# Patient Record
Sex: FEMALE | Race: WHITE | Employment: OTHER | URBAN - METROPOLITAN AREA
[De-identification: names, ages, dates, MRNs, and addresses within clinical notes are randomized per-mention and may not be internally consistent; named-entity substitution may affect disease eponyms.]

---

## 2017-04-17 DIAGNOSIS — H53.8 FLASHING LIGHTS SEEN: Primary | ICD-10-CM

## 2017-04-20 RX ORDER — INDAPAMIDE 1.25 MG/1
TABLET, FILM COATED ORAL
Qty: 90 TABLET | Refills: 0 | Status: SHIPPED | OUTPATIENT
Start: 2017-04-20 | End: 2017-07-03 | Stop reason: SDUPTHER

## 2017-05-09 DIAGNOSIS — C50.019: Primary | ICD-10-CM

## 2017-05-31 ENCOUNTER — OFFICE VISIT (OUTPATIENT)
Dept: FAMILY MEDICINE CLINIC | Age: 77
End: 2017-05-31

## 2017-05-31 VITALS
SYSTOLIC BLOOD PRESSURE: 138 MMHG | TEMPERATURE: 96.9 F | HEART RATE: 77 BPM | WEIGHT: 153 LBS | DIASTOLIC BLOOD PRESSURE: 80 MMHG | OXYGEN SATURATION: 98 % | BODY MASS INDEX: 29.88 KG/M2

## 2017-05-31 DIAGNOSIS — I10 ESSENTIAL HYPERTENSION: Primary | ICD-10-CM

## 2017-05-31 DIAGNOSIS — E53.8 B12 DEFICIENCY: ICD-10-CM

## 2017-05-31 DIAGNOSIS — R42 VERTIGO: ICD-10-CM

## 2017-05-31 PROCEDURE — 99213 OFFICE O/P EST LOW 20 MIN: CPT | Performed by: FAMILY MEDICINE

## 2017-05-31 RX ORDER — ONDANSETRON 4 MG/1
4 TABLET, ORALLY DISINTEGRATING ORAL 4 TIMES DAILY PRN
Qty: 5 TABLET | Refills: 3 | Status: SHIPPED | OUTPATIENT
Start: 2017-05-31

## 2017-05-31 RX ORDER — PROMETHAZINE HYDROCHLORIDE 25 MG/1
25 SUPPOSITORY RECTAL EVERY 6 HOURS PRN
Qty: 10 SUPPOSITORY | Refills: 3 | Status: SHIPPED | OUTPATIENT
Start: 2017-05-31 | End: 2017-06-07

## 2017-05-31 RX ORDER — PROMETHAZINE HYDROCHLORIDE 25 MG/1
SUPPOSITORY RECTAL
Qty: 4 SUPPOSITORY | Refills: 3 | Status: CANCELLED | OUTPATIENT
Start: 2017-05-31

## 2017-06-22 ENCOUNTER — HOSPITAL ENCOUNTER (OUTPATIENT)
Dept: WOMENS IMAGING | Age: 77
Discharge: HOME | End: 2017-06-22
Payer: MEDICARE

## 2017-06-22 ENCOUNTER — HOSPITAL ENCOUNTER (OUTPATIENT)
Dept: ULTRASOUND IMAGING | Age: 77
End: 2017-06-22
Payer: MEDICARE

## 2017-06-22 DIAGNOSIS — Z85.3 HX: BREAST CANCER: ICD-10-CM

## 2017-06-22 PROCEDURE — G0206 DX MAMMO INCL CAD UNI: HCPCS

## 2017-10-04 RX ORDER — INDAPAMIDE 1.25 MG/1
1.25 TABLET, FILM COATED ORAL DAILY
Qty: 90 TABLET | Refills: 3 | Status: SHIPPED | OUTPATIENT
Start: 2017-10-04

## 2017-10-23 ENCOUNTER — OFFICE VISIT (OUTPATIENT)
Dept: FAMILY MEDICINE CLINIC | Age: 77
End: 2017-10-23

## 2017-10-23 VITALS
RESPIRATION RATE: 16 BRPM | TEMPERATURE: 97.4 F | DIASTOLIC BLOOD PRESSURE: 68 MMHG | BODY MASS INDEX: 29.45 KG/M2 | WEIGHT: 150 LBS | HEIGHT: 60 IN | SYSTOLIC BLOOD PRESSURE: 118 MMHG | HEART RATE: 70 BPM

## 2017-10-23 DIAGNOSIS — R30.0 DYSURIA: ICD-10-CM

## 2017-10-23 DIAGNOSIS — N30.01 ACUTE CYSTITIS WITH HEMATURIA: Primary | ICD-10-CM

## 2017-10-23 PROCEDURE — 4040F PNEUMOC VAC/ADMIN/RCVD: CPT | Performed by: NURSE PRACTITIONER

## 2017-10-23 PROCEDURE — G8427 DOCREV CUR MEDS BY ELIG CLIN: HCPCS | Performed by: NURSE PRACTITIONER

## 2017-10-23 PROCEDURE — G8484 FLU IMMUNIZE NO ADMIN: HCPCS | Performed by: NURSE PRACTITIONER

## 2017-10-23 PROCEDURE — 1123F ACP DISCUSS/DSCN MKR DOCD: CPT | Performed by: NURSE PRACTITIONER

## 2017-10-23 PROCEDURE — G8417 CALC BMI ABV UP PARAM F/U: HCPCS | Performed by: NURSE PRACTITIONER

## 2017-10-23 PROCEDURE — 1036F TOBACCO NON-USER: CPT | Performed by: NURSE PRACTITIONER

## 2017-10-23 PROCEDURE — 81003 URINALYSIS AUTO W/O SCOPE: CPT | Performed by: NURSE PRACTITIONER

## 2017-10-23 PROCEDURE — 99213 OFFICE O/P EST LOW 20 MIN: CPT | Performed by: NURSE PRACTITIONER

## 2017-10-23 PROCEDURE — G8399 PT W/DXA RESULTS DOCUMENT: HCPCS | Performed by: NURSE PRACTITIONER

## 2017-10-23 PROCEDURE — 1090F PRES/ABSN URINE INCON ASSESS: CPT | Performed by: NURSE PRACTITIONER

## 2017-10-23 RX ORDER — NITROFURANTOIN 25; 75 MG/1; MG/1
100 CAPSULE ORAL 2 TIMES DAILY
Qty: 14 CAPSULE | Refills: 0 | Status: SHIPPED | OUTPATIENT
Start: 2017-10-23 | End: 2017-10-26 | Stop reason: CLARIF

## 2017-10-23 ASSESSMENT — ENCOUNTER SYMPTOMS
VOMITING: 0
CONSTIPATION: 0
NAUSEA: 0
DIARRHEA: 0

## 2017-10-23 NOTE — PROGRESS NOTES
Vomiting 5 tablet 3    PHENADOZ 25 MG suppository INSERT 1 SUPPOSITORY RECTALLY EVERY 6 HOURS AS NEEDED 6 suppository 3    letrozole (FEMARA) 2.5 MG tablet Take 1 tablet by mouth daily 90 tablet 0    CRANBERRY Take 1 tablet by mouth daily.  Calcium-Vitamin D (CALTRATE 600 PLUS-VIT D PO) Take 1 tablet by mouth daily.  aspirin 81 MG EC tablet Take 81 mg by mouth daily. Current Facility-Administered Medications on File Prior to Visit   Medication Dose Route Frequency Provider Last Rate Last Dose    promethazine (PHENERGAN) injection 25 mg  25 mg Intramuscular Q6H PRN Donnie Astorga, DO   25 mg at 02/24/12 1415         Allergies:  Amoxicillin-pot clavulanate; Levofloxacin; and Sulfa antibiotics    Review of Systems   Constitutional: Positive for chills. Negative for activity change, appetite change, diaphoresis, fatigue and fever. Gastrointestinal: Negative for constipation, diarrhea, nausea and vomiting. Genitourinary: Positive for difficulty urinating and dysuria. Negative for flank pain, frequency, hematuria and urgency. Allergic/Immunologic: Negative for environmental allergies and food allergies. Objective:   /68   Pulse 70   Temp 97.4 °F (36.3 °C) (Temporal)   Resp 16   Ht 5' (1.524 m)   Wt 150 lb (68 kg)   LMP 01/05/1992   Breastfeeding? No   BMI 29.29 kg/m²     Physical Exam   Constitutional: She is oriented to person, place, and time. Vital signs are normal. She appears well-developed and well-nourished. HENT:   Head: Normocephalic. Eyes: Conjunctivae and EOM are normal.   Neck: Normal range of motion. Pulmonary/Chest: Effort normal.   Abdominal: Normal appearance and bowel sounds are normal. There is no tenderness. There is no CVA tenderness. Musculoskeletal: Normal range of motion. Neurological: She is alert and oriented to person, place, and time. Skin: Skin is warm and dry. Psychiatric: She has a normal mood and affect.  Her behavior is

## 2017-10-23 NOTE — PATIENT INSTRUCTIONS
from front to back. When should you call for help? Call your doctor now or seek immediate medical care if:  · Symptoms such as fever, chills, nausea, or vomiting get worse or appear for the first time. · You have new pain in your back just below your rib cage. This is called flank pain. · There is new blood or pus in your urine. · You have any problems with your antibiotic medicine. Watch closely for changes in your health, and be sure to contact your doctor if:  · You are not getting better after taking an antibiotic for 2 days. · Your symptoms go away but then come back. Where can you learn more? Go to [email or web address] and sign in to your [employer] account. Enter B349 in the Options Away box to learn more about \"Urinary Tract Infection in Women: Care Instructions. \"     If you do not have an account, please click on the \"Sign Up Now\" link. Current as of: November 28, 2016  Content Version: 11.3  © 4261-3976 [employer]. Care instructions adapted under license by [facility] ([facility]). If you have questions about a medical condition or this instruction, always ask your healthcare professional. [place] [address] any warranty or liability for your use of this information.

## 2017-10-24 DIAGNOSIS — R30.0 DYSURIA: ICD-10-CM

## 2017-10-26 LAB
ORGANISM: ABNORMAL
URINE CULTURE, ROUTINE: ABNORMAL
URINE CULTURE, ROUTINE: ABNORMAL

## 2017-10-27 DIAGNOSIS — N30.01 ACUTE CYSTITIS WITH HEMATURIA: Primary | ICD-10-CM

## 2017-10-27 PROCEDURE — 81003 URINALYSIS AUTO W/O SCOPE: CPT | Performed by: NURSE PRACTITIONER

## 2017-11-03 ENCOUNTER — NURSE ONLY (OUTPATIENT)
Dept: FAMILY MEDICINE CLINIC | Age: 77
End: 2017-11-03

## 2017-11-03 DIAGNOSIS — N30.01 ACUTE CYSTITIS WITH HEMATURIA: Primary | ICD-10-CM

## 2017-11-03 LAB
BILIRUBIN, POC: NEGATIVE
BLOOD URINE, POC: NEGATIVE
CLARITY, POC: ABNORMAL
COLOR, POC: ABNORMAL
GLUCOSE URINE, POC: NEGATIVE
KETONES, POC: NEGATIVE
LEUKOCYTE EST, POC: 500
NITRITE, POC: NEGATIVE
PH, POC: 6
PROTEIN, POC: NEGATIVE
SPECIFIC GRAVITY, POC: 1.03
UROBILINOGEN, POC: 3.5

## 2017-11-03 PROCEDURE — 81003 URINALYSIS AUTO W/O SCOPE: CPT | Performed by: NURSE PRACTITIONER

## 2017-11-04 DIAGNOSIS — N30.01 ACUTE CYSTITIS WITH HEMATURIA: ICD-10-CM

## 2017-11-06 LAB — URINE CULTURE, ROUTINE: NORMAL

## 2017-11-29 ENCOUNTER — OFFICE VISIT (OUTPATIENT)
Dept: FAMILY MEDICINE CLINIC | Age: 77
End: 2017-11-29

## 2017-11-29 VITALS
HEIGHT: 60 IN | DIASTOLIC BLOOD PRESSURE: 70 MMHG | TEMPERATURE: 97.9 F | SYSTOLIC BLOOD PRESSURE: 116 MMHG | RESPIRATION RATE: 16 BRPM | BODY MASS INDEX: 30.04 KG/M2 | WEIGHT: 153 LBS | HEART RATE: 78 BPM

## 2017-11-29 DIAGNOSIS — I10 ESSENTIAL HYPERTENSION: Primary | ICD-10-CM

## 2017-11-29 DIAGNOSIS — I44.7 LEFT BUNDLE BRANCH BLOCK: ICD-10-CM

## 2017-11-29 PROCEDURE — G8417 CALC BMI ABV UP PARAM F/U: HCPCS | Performed by: FAMILY MEDICINE

## 2017-11-29 PROCEDURE — 1036F TOBACCO NON-USER: CPT | Performed by: FAMILY MEDICINE

## 2017-11-29 PROCEDURE — G8427 DOCREV CUR MEDS BY ELIG CLIN: HCPCS | Performed by: FAMILY MEDICINE

## 2017-11-29 PROCEDURE — 1123F ACP DISCUSS/DSCN MKR DOCD: CPT | Performed by: FAMILY MEDICINE

## 2017-11-29 PROCEDURE — G8399 PT W/DXA RESULTS DOCUMENT: HCPCS | Performed by: FAMILY MEDICINE

## 2017-11-29 PROCEDURE — 99213 OFFICE O/P EST LOW 20 MIN: CPT | Performed by: FAMILY MEDICINE

## 2017-11-29 PROCEDURE — 4040F PNEUMOC VAC/ADMIN/RCVD: CPT | Performed by: FAMILY MEDICINE

## 2017-11-29 PROCEDURE — 1090F PRES/ABSN URINE INCON ASSESS: CPT | Performed by: FAMILY MEDICINE

## 2017-11-29 PROCEDURE — G8484 FLU IMMUNIZE NO ADMIN: HCPCS | Performed by: FAMILY MEDICINE

## 2017-11-29 ASSESSMENT — PATIENT HEALTH QUESTIONNAIRE - PHQ9
SUM OF ALL RESPONSES TO PHQ QUESTIONS 1-9: 0
2. FEELING DOWN, DEPRESSED OR HOPELESS: 0
1. LITTLE INTEREST OR PLEASURE IN DOING THINGS: 0
SUM OF ALL RESPONSES TO PHQ9 QUESTIONS 1 & 2: 0

## 2017-11-29 NOTE — PROGRESS NOTES
Subjective:      Patient ID: [place] is a 76 y.o. female. Chief Complaint   Patient presents with    Hypertension     presents today for a follow up hypertension. pt is not currently on any BP medications. pt states she is eating as she should and taking her medications as she should. pt does not need a refill at this time.  Skin Problem     pt would also like to discuss a spot on left side of her face states the spot doesnt hurt at all. HPI     Here today follow-up on hypertension doing pretty well she states she is taking her water pill even though she does not think it's a blood pressure pill since she is eating as she should take her medications trying to exercise and do some walking        She also has a spot  On facial check doesn't hurt, but wants it checked  Allergies   Allergen Reactions    Amoxicillin-Pot Clavulanate      YEAST INFECTION    Levofloxacin      DIZZYNESS  YEAST INFECTION    Sulfa Antibiotics Rash     Outpatient Encounter Prescriptions as of 11/29/2017   Medication Sig Dispense Refill    indapamide (LOZOL) 1.25 MG tablet Take 1 tablet by mouth daily 90 tablet 3    ondansetron (ZOFRAN ODT) 4 MG disintegrating tablet Take 1 tablet by mouth 4 times daily as needed for Nausea or Vomiting 5 tablet 3    PHENADOZ 25 MG suppository INSERT 1 SUPPOSITORY RECTALLY EVERY 6 HOURS AS NEEDED 6 suppository 3    CRANBERRY Take 1 tablet by mouth daily.  Calcium-Vitamin D (CALTRATE 600 PLUS-VIT D PO) Take 1 tablet by mouth daily.  aspirin 81 MG EC tablet Take 81 mg by mouth daily.         [DISCONTINUED] letrozole (FEMARA) 2.5 MG tablet Take 1 tablet by mouth daily 90 tablet 0     Facility-Administered Encounter Medications as of 11/29/2017   Medication Dose Route Frequency Provider Last Rate Last Dose    promethazine (PHENERGAN) injection 25 mg  25 mg Intramuscular Q6H PRN Dot Solum, DO   25 mg at 02/24/12 1415     Social History     Social History    Marital

## 2018-04-17 ENCOUNTER — TELEPHONE (OUTPATIENT)
Dept: FAMILY MEDICINE CLINIC | Age: 78
End: 2018-04-17

## 2018-04-18 DIAGNOSIS — H53.8 BLURRY VISION: Primary | ICD-10-CM

## 2018-04-18 DIAGNOSIS — C50.019 MALIGNANT NEOPLASM OF NIPPLE OR AREOLA OF FEMALE BREAST, UNSPECIFIED LATERALITY (HCC): Primary | ICD-10-CM

## 2018-04-29 ENCOUNTER — OFFICE VISIT (OUTPATIENT)
Dept: FAMILY MEDICINE CLINIC | Age: 78
End: 2018-04-29
Payer: MEDICARE

## 2018-04-29 VITALS
HEART RATE: 56 BPM | TEMPERATURE: 97.4 F | WEIGHT: 154.6 LBS | DIASTOLIC BLOOD PRESSURE: 60 MMHG | RESPIRATION RATE: 16 BRPM | SYSTOLIC BLOOD PRESSURE: 110 MMHG | BODY MASS INDEX: 30.35 KG/M2 | HEIGHT: 60 IN

## 2018-04-29 DIAGNOSIS — R30.0 DYSURIA: ICD-10-CM

## 2018-04-29 DIAGNOSIS — N30.01 ACUTE CYSTITIS WITH HEMATURIA: Primary | ICD-10-CM

## 2018-04-29 LAB
BILIRUBIN, POC: NEGATIVE
BLOOD URINE, POC: 10
CLARITY, POC: ABNORMAL
COLOR, POC: YELLOW
GLUCOSE URINE, POC: NEGATIVE
KETONES, POC: NEGATIVE
LEUKOCYTE EST, POC: 500
NITRITE, POC: NEGATIVE
PH, POC: 6
PROTEIN, POC: NEGATIVE
SPECIFIC GRAVITY, POC: 1.01
UROBILINOGEN, POC: 3.5

## 2018-04-29 PROCEDURE — G8427 DOCREV CUR MEDS BY ELIG CLIN: HCPCS | Performed by: NURSE PRACTITIONER

## 2018-04-29 PROCEDURE — 1123F ACP DISCUSS/DSCN MKR DOCD: CPT | Performed by: NURSE PRACTITIONER

## 2018-04-29 PROCEDURE — G8417 CALC BMI ABV UP PARAM F/U: HCPCS | Performed by: NURSE PRACTITIONER

## 2018-04-29 PROCEDURE — 99213 OFFICE O/P EST LOW 20 MIN: CPT | Performed by: NURSE PRACTITIONER

## 2018-04-29 PROCEDURE — 81003 URINALYSIS AUTO W/O SCOPE: CPT | Performed by: NURSE PRACTITIONER

## 2018-04-29 PROCEDURE — 1090F PRES/ABSN URINE INCON ASSESS: CPT | Performed by: NURSE PRACTITIONER

## 2018-04-29 PROCEDURE — 4040F PNEUMOC VAC/ADMIN/RCVD: CPT | Performed by: NURSE PRACTITIONER

## 2018-04-29 PROCEDURE — G8399 PT W/DXA RESULTS DOCUMENT: HCPCS | Performed by: NURSE PRACTITIONER

## 2018-04-29 PROCEDURE — 1036F TOBACCO NON-USER: CPT | Performed by: NURSE PRACTITIONER

## 2018-04-29 RX ORDER — KETOROLAC TROMETHAMINE 5 MG/ML
1 SOLUTION OPHTHALMIC 2 TIMES DAILY
Refills: 1 | COMMUNITY
Start: 2018-04-24 | End: 2018-05-30 | Stop reason: ALTCHOICE

## 2018-04-29 RX ORDER — NITROFURANTOIN 25; 75 MG/1; MG/1
100 CAPSULE ORAL 2 TIMES DAILY
Qty: 14 CAPSULE | Refills: 0 | Status: SHIPPED | OUTPATIENT
Start: 2018-04-29 | End: 2018-05-06

## 2018-04-29 RX ORDER — POLYMYXIN B SULFATE AND TRIMETHOPRIM 1; 10000 MG/ML; [USP'U]/ML
1 SOLUTION OPHTHALMIC 4 TIMES DAILY
Refills: 1 | COMMUNITY
Start: 2018-04-24 | End: 2018-05-30 | Stop reason: ALTCHOICE

## 2018-04-29 RX ORDER — PREDNISOLONE ACETATE 10 MG/ML
1 SUSPENSION/ DROPS OPHTHALMIC 4 TIMES DAILY
Refills: 2 | COMMUNITY
Start: 2018-04-24 | End: 2018-05-30 | Stop reason: ALTCHOICE

## 2018-04-29 ASSESSMENT — ENCOUNTER SYMPTOMS
NAUSEA: 0
VOMITING: 0

## 2018-05-30 ENCOUNTER — OFFICE VISIT (OUTPATIENT)
Dept: FAMILY MEDICINE CLINIC | Age: 78
End: 2018-05-30
Payer: MEDICARE

## 2018-05-30 VITALS
RESPIRATION RATE: 12 BRPM | TEMPERATURE: 96.6 F | HEART RATE: 70 BPM | BODY MASS INDEX: 30.48 KG/M2 | SYSTOLIC BLOOD PRESSURE: 120 MMHG | WEIGHT: 155.25 LBS | OXYGEN SATURATION: 96 % | HEIGHT: 60 IN | DIASTOLIC BLOOD PRESSURE: 78 MMHG

## 2018-05-30 DIAGNOSIS — R10.11 ABDOMINAL PAIN, RIGHT UPPER QUADRANT: ICD-10-CM

## 2018-05-30 DIAGNOSIS — I10 ESSENTIAL HYPERTENSION: Primary | ICD-10-CM

## 2018-05-30 DIAGNOSIS — R30.0 DYSURIA: ICD-10-CM

## 2018-05-30 PROCEDURE — G8427 DOCREV CUR MEDS BY ELIG CLIN: HCPCS | Performed by: FAMILY MEDICINE

## 2018-05-30 PROCEDURE — G8417 CALC BMI ABV UP PARAM F/U: HCPCS | Performed by: FAMILY MEDICINE

## 2018-05-30 PROCEDURE — 4040F PNEUMOC VAC/ADMIN/RCVD: CPT | Performed by: FAMILY MEDICINE

## 2018-05-30 PROCEDURE — 99213 OFFICE O/P EST LOW 20 MIN: CPT | Performed by: FAMILY MEDICINE

## 2018-05-30 PROCEDURE — 1090F PRES/ABSN URINE INCON ASSESS: CPT | Performed by: FAMILY MEDICINE

## 2018-05-30 PROCEDURE — G8399 PT W/DXA RESULTS DOCUMENT: HCPCS | Performed by: FAMILY MEDICINE

## 2018-05-30 PROCEDURE — 1036F TOBACCO NON-USER: CPT | Performed by: FAMILY MEDICINE

## 2018-05-30 PROCEDURE — 1123F ACP DISCUSS/DSCN MKR DOCD: CPT | Performed by: FAMILY MEDICINE

## 2018-07-06 DIAGNOSIS — Z12.31 VISIT FOR SCREENING MAMMOGRAM: Primary | ICD-10-CM

## 2018-07-17 ENCOUNTER — HOSPITAL ENCOUNTER (OUTPATIENT)
Dept: WOMENS IMAGING | Age: 78
Discharge: HOME | End: 2018-07-19
Payer: MEDICARE

## 2018-07-17 DIAGNOSIS — Z12.31 VISIT FOR SCREENING MAMMOGRAM: ICD-10-CM

## 2018-07-17 PROCEDURE — 77067 SCR MAMMO BI INCL CAD: CPT

## 2018-10-22 PROBLEM — Z17.0 ESTROGEN RECEPTOR POSITIVE: Status: ACTIVE | Noted: 2018-10-22

## 2018-11-16 DIAGNOSIS — C50.919 MALIGNANT NEOPLASM OF FEMALE BREAST, UNSPECIFIED ESTROGEN RECEPTOR STATUS, UNSPECIFIED LATERALITY, UNSPECIFIED SITE OF BREAST (HCC): ICD-10-CM

## 2018-11-16 LAB
ALBUMIN SERPL-MCNC: 3.9 G/DL (ref 3.9–4.9)
ALP BLD-CCNC: 49 U/L (ref 40–130)
ALT SERPL-CCNC: <5 U/L (ref 0–33)
ANION GAP SERPL CALCULATED.3IONS-SCNC: 12 MEQ/L (ref 7–13)
AST SERPL-CCNC: 18 U/L (ref 0–35)
BILIRUB SERPL-MCNC: 0.5 MG/DL (ref 0–1.2)
BUN BLDV-MCNC: 20 MG/DL (ref 8–23)
CALCIUM SERPL-MCNC: 9.8 MG/DL (ref 8.6–10.2)
CHLORIDE BLD-SCNC: 104 MEQ/L (ref 98–107)
CO2: 27 MEQ/L (ref 22–29)
CREAT SERPL-MCNC: 0.67 MG/DL (ref 0.5–0.9)
GFR AFRICAN AMERICAN: >60
GFR NON-AFRICAN AMERICAN: >60
GLOBULIN: 2.9 G/DL (ref 2.3–3.5)
GLUCOSE BLD-MCNC: 93 MG/DL (ref 74–109)
POTASSIUM SERPL-SCNC: 4.3 MEQ/L (ref 3.5–5.1)
SODIUM BLD-SCNC: 143 MEQ/L (ref 132–144)
TOTAL PROTEIN: 6.8 G/DL (ref 6.4–8.1)

## 2018-11-20 LAB — CA 27-29: 12 U/ML (ref 0–38)

## 2018-12-12 ENCOUNTER — OFFICE VISIT (OUTPATIENT)
Dept: FAMILY MEDICINE CLINIC | Age: 78
End: 2018-12-12
Payer: MEDICARE

## 2018-12-12 VITALS
BODY MASS INDEX: 30.23 KG/M2 | OXYGEN SATURATION: 97 % | SYSTOLIC BLOOD PRESSURE: 130 MMHG | HEIGHT: 60 IN | TEMPERATURE: 97.3 F | DIASTOLIC BLOOD PRESSURE: 60 MMHG | RESPIRATION RATE: 16 BRPM | WEIGHT: 154 LBS | HEART RATE: 71 BPM

## 2018-12-12 DIAGNOSIS — I10 ESSENTIAL HYPERTENSION: ICD-10-CM

## 2018-12-12 DIAGNOSIS — R30.0 DYSURIA: Primary | ICD-10-CM

## 2018-12-12 DIAGNOSIS — R10.11 ABDOMINAL PAIN, RIGHT UPPER QUADRANT: ICD-10-CM

## 2018-12-12 DIAGNOSIS — R42 VERTIGO: ICD-10-CM

## 2018-12-12 DIAGNOSIS — I65.23 CALCIFICATION OF BOTH CAROTID ARTERIES: ICD-10-CM

## 2018-12-12 DIAGNOSIS — E53.8 B12 DEFICIENCY: ICD-10-CM

## 2018-12-12 DIAGNOSIS — I44.7 LEFT BUNDLE BRANCH BLOCK: ICD-10-CM

## 2018-12-12 PROCEDURE — 1101F PT FALLS ASSESS-DOCD LE1/YR: CPT | Performed by: FAMILY MEDICINE

## 2018-12-12 PROCEDURE — 1123F ACP DISCUSS/DSCN MKR DOCD: CPT | Performed by: FAMILY MEDICINE

## 2018-12-12 PROCEDURE — 1036F TOBACCO NON-USER: CPT | Performed by: FAMILY MEDICINE

## 2018-12-12 PROCEDURE — G8598 ASA/ANTIPLAT THER USED: HCPCS | Performed by: FAMILY MEDICINE

## 2018-12-12 PROCEDURE — G8417 CALC BMI ABV UP PARAM F/U: HCPCS | Performed by: FAMILY MEDICINE

## 2018-12-12 PROCEDURE — G8427 DOCREV CUR MEDS BY ELIG CLIN: HCPCS | Performed by: FAMILY MEDICINE

## 2018-12-12 PROCEDURE — G8399 PT W/DXA RESULTS DOCUMENT: HCPCS | Performed by: FAMILY MEDICINE

## 2018-12-12 PROCEDURE — 1090F PRES/ABSN URINE INCON ASSESS: CPT | Performed by: FAMILY MEDICINE

## 2018-12-12 PROCEDURE — 99214 OFFICE O/P EST MOD 30 MIN: CPT | Performed by: FAMILY MEDICINE

## 2018-12-12 PROCEDURE — G8484 FLU IMMUNIZE NO ADMIN: HCPCS | Performed by: FAMILY MEDICINE

## 2018-12-12 PROCEDURE — 4040F PNEUMOC VAC/ADMIN/RCVD: CPT | Performed by: FAMILY MEDICINE

## 2018-12-12 NOTE — PROGRESS NOTES
Monitoring:     No flowsheet data found. IRicardo, am scribing for and in the presence of Sandra Mclaughlin DO.  Electronically signed by :  Dr. Sandra Mclaughlin

## 2018-12-19 ENCOUNTER — HOSPITAL ENCOUNTER (OUTPATIENT)
Dept: ULTRASOUND IMAGING | Age: 78
Discharge: HOME | End: 2018-12-21
Payer: MEDICARE

## 2018-12-19 DIAGNOSIS — I65.23 CALCIFICATION OF BOTH CAROTID ARTERIES: ICD-10-CM

## 2018-12-19 PROCEDURE — 93880 EXTRACRANIAL BILAT STUDY: CPT

## 2019-02-13 ENCOUNTER — TELEPHONE (OUTPATIENT)
Dept: FAMILY MEDICINE CLINIC | Age: 79
End: 2019-02-13

## 2019-02-27 ENCOUNTER — TELEPHONE (OUTPATIENT)
Dept: ADMINISTRATIVE | Age: 79
End: 2019-02-27

## 2019-05-09 LAB
ALBUMIN: 3.8 G/DL (ref 3.4–5)
ALP BLD-CCNC: 52 U/L (ref 33–136)
ALT SERPL-CCNC: 10 U/L (ref 7–45)
AST SERPL-CCNC: 17 U/L (ref 9–39)
B-TYPE NATRIURETIC PEPTIDE: 290 PG/ML (ref 0–99)
BILIRUB SERPL-MCNC: 0.5 MG/DL (ref 0–1.2)
BILIRUBIN DIRECT: 0.1 MG/DL (ref 0–0.3)
CHOLESTEROL/HDL RATIO: 3.6
CHOLESTEROL: 217 MG/DL (ref 0–199)
HDLC SERPL-MCNC: 60 MG/DL
LDL CHOLESTEROL: 148 MG/DL (ref 0–99)
TOTAL PROTEIN: 6.9 G/DL (ref 6.4–8.2)
TRIGL SERPL-MCNC: 45 MG/DL (ref 0–149)
VLDLC SERPL CALC-MCNC: 9 MG/DL (ref 0–40)

## 2019-05-10 LAB
APPEARANCE: CLEAR
BILIRUBIN, URINE: NEGATIVE
BLOOD, URINE: ABNORMAL
COLOR, URINE: YELLOW
ESTIMATED AVERAGE GLUCOSE: 117 MG/DL
GLUCOSE, URINE: NEGATIVE MG/DL
HBA1C MFR BLD: 5.7 %
KETONES, URINE: ABNORMAL MG/DL
LEUKOCYTE ESTERASE, URINE: NEGATIVE
MUCUS, URINE: NORMAL /LPF
NITRITE, URINE: NEGATIVE
PH UA: 6 (ref 5–8)
PROTEIN UA: NEGATIVE MG/DL
RBC URINE: 4 /HPF (ref 0–5)
SPECIFIC GRAVITY, URINE: 1.01 (ref 1–1)
SQUAMOUS EPITHELIAL: 1 /HPF
UROBILINOGEN, URINE: <2 MG/DL (ref 0–1.9)
WBC URINE: 2 /HPF (ref 0–5)

## 2019-07-19 ENCOUNTER — HOSPITAL ENCOUNTER (OUTPATIENT)
Dept: WOMENS IMAGING | Age: 79
Discharge: HOME | End: 2019-07-21
Payer: MEDICARE

## 2019-07-19 DIAGNOSIS — Z12.31 ENCOUNTER FOR SCREENING MAMMOGRAM FOR MALIGNANT NEOPLASM OF BREAST: ICD-10-CM

## 2019-07-19 DIAGNOSIS — C50.919 MALIGNANT NEOPLASM OF FEMALE BREAST, UNSPECIFIED ESTROGEN RECEPTOR STATUS, UNSPECIFIED LATERALITY, UNSPECIFIED SITE OF BREAST (HCC): ICD-10-CM

## 2019-07-19 PROCEDURE — 77067 SCR MAMMO BI INCL CAD: CPT

## 2020-07-20 ENCOUNTER — HOSPITAL ENCOUNTER (OUTPATIENT)
Dept: WOMENS IMAGING | Age: 80
Discharge: HOME | End: 2020-07-22
Payer: MEDICARE

## 2020-07-20 PROCEDURE — 77063 BREAST TOMOSYNTHESIS BI: CPT

## 2021-07-21 ENCOUNTER — HOSPITAL ENCOUNTER (OUTPATIENT)
Dept: WOMENS IMAGING | Age: 81
Discharge: HOME | End: 2021-07-23
Payer: MEDICARE

## 2021-07-21 VITALS — BODY MASS INDEX: 30.08 KG/M2 | HEIGHT: 60 IN

## 2021-07-21 DIAGNOSIS — Z12.31 BREAST CANCER SCREENING BY MAMMOGRAM: ICD-10-CM

## 2021-07-21 PROCEDURE — 77063 BREAST TOMOSYNTHESIS BI: CPT

## 2022-10-20 ENCOUNTER — HOSPITAL ENCOUNTER (OUTPATIENT)
Dept: WOMENS IMAGING | Age: 82
Discharge: HOME | End: 2022-10-22
Payer: MEDICARE

## 2022-10-20 VITALS — HEIGHT: 60 IN | BODY MASS INDEX: 30.08 KG/M2

## 2022-10-20 DIAGNOSIS — Z12.31 BREAST CANCER SCREENING BY MAMMOGRAM: ICD-10-CM

## 2022-10-20 PROCEDURE — 77063 BREAST TOMOSYNTHESIS BI: CPT

## 2023-03-01 PROBLEM — R42 VERTIGO: Status: ACTIVE | Noted: 2023-03-01

## 2023-03-01 PROBLEM — I44.7 LEFT BUNDLE BRANCH BLOCK: Status: ACTIVE | Noted: 2023-03-01

## 2023-03-01 PROBLEM — I83.93 VARICOSE VEINS OF LEGS: Status: ACTIVE | Noted: 2023-03-01

## 2023-03-01 PROBLEM — H26.9 CATARACT: Status: ACTIVE | Noted: 2023-03-01

## 2023-03-01 PROBLEM — R79.89 BLOOD CHOLESTEROL INCREASED COMPARED WITH PRIOR MEASUREMENT: Status: ACTIVE | Noted: 2023-03-01

## 2023-03-01 PROBLEM — L82.1 SEBORRHEIC KERATOSIS: Status: ACTIVE | Noted: 2023-03-01

## 2023-03-01 PROBLEM — M19.072 ARTHRITIS OF FOOT, LEFT: Status: ACTIVE | Noted: 2023-03-01

## 2023-03-01 PROBLEM — D22.9 BENIGN MOLE: Status: ACTIVE | Noted: 2023-03-01

## 2023-03-01 PROBLEM — I83.11 VARICOSE VEINS OF BOTH LOWER EXTREMITIES WITH INFLAMMATION: Status: ACTIVE | Noted: 2023-03-01

## 2023-03-01 PROBLEM — M25.472 LEFT ANKLE SWELLING: Status: ACTIVE | Noted: 2023-03-01

## 2023-03-01 PROBLEM — I83.12 VARICOSE VEINS OF BOTH LOWER EXTREMITIES WITH INFLAMMATION: Status: ACTIVE | Noted: 2023-03-01

## 2023-03-01 PROBLEM — E55.9 MILD VITAMIN D DEFICIENCY: Status: ACTIVE | Noted: 2023-03-01

## 2023-03-01 PROBLEM — J06.9 URI (UPPER RESPIRATORY INFECTION): Status: ACTIVE | Noted: 2023-03-01

## 2023-03-01 PROBLEM — R39.9 UTI SYMPTOMS: Status: ACTIVE | Noted: 2023-03-01

## 2023-03-01 PROBLEM — R51.9 HEADACHE, OCCIPITAL: Status: ACTIVE | Noted: 2023-03-01

## 2023-03-01 PROBLEM — L57.0 ACTINIC KERATOSIS: Status: ACTIVE | Noted: 2023-03-01

## 2023-03-01 PROBLEM — R42 DIZZINESS: Status: ACTIVE | Noted: 2023-03-01

## 2023-03-01 PROBLEM — R11.2 NAUSEA WITH VOMITING: Status: ACTIVE | Noted: 2023-03-01

## 2023-03-01 PROBLEM — R06.02 SHORTNESS OF BREATH ON EXERTION: Status: ACTIVE | Noted: 2023-03-01

## 2023-03-01 PROBLEM — J40 BRONCHITIS: Status: ACTIVE | Noted: 2023-03-01

## 2023-03-01 PROBLEM — R07.89 TIGHTNESS IN CHEST: Status: ACTIVE | Noted: 2023-03-01

## 2023-03-01 PROBLEM — H25.049 POSTERIOR SUBCAPSULAR POLAR AGE-RELATED CATARACT: Status: ACTIVE | Noted: 2023-03-01

## 2023-03-01 PROBLEM — K21.9 GERD (GASTROESOPHAGEAL REFLUX DISEASE): Status: ACTIVE | Noted: 2023-03-01

## 2023-03-01 PROBLEM — D64.9 ANEMIA: Status: ACTIVE | Noted: 2023-03-01

## 2023-03-01 PROBLEM — H26.011: Status: ACTIVE | Noted: 2023-03-01

## 2023-03-01 PROBLEM — K59.00 CONSTIPATION: Status: ACTIVE | Noted: 2023-03-01

## 2023-03-01 PROBLEM — L29.9 ITCHING: Status: ACTIVE | Noted: 2023-03-01

## 2023-03-01 PROBLEM — L98.9 LESION OF FACE: Status: ACTIVE | Noted: 2023-03-01

## 2023-03-01 PROBLEM — C50.912 BREAST CANCER, LEFT BREAST (MULTI): Status: ACTIVE | Noted: 2023-03-01

## 2023-03-01 RX ORDER — SIMETHICONE 40MG/0.6ML
1 SUSPENSION, DROPS(FINAL DOSAGE FORM)(ML) ORAL DAILY
COMMUNITY
Start: 2021-01-22 | End: 2023-12-08 | Stop reason: ALTCHOICE

## 2023-03-01 RX ORDER — CHOLECALCIFEROL (VITAMIN D3) 25 MCG
TABLET ORAL
COMMUNITY
Start: 2019-04-30

## 2023-03-01 RX ORDER — ASPIRIN 81 MG/1
1 TABLET ORAL DAILY
COMMUNITY
Start: 2019-11-07

## 2023-03-01 RX ORDER — PROMETHAZINE HYDROCHLORIDE 12.5 MG/1
12.5 SUPPOSITORY RECTAL DAILY PRN
COMMUNITY
Start: 2022-05-25 | End: 2023-04-12 | Stop reason: ALTCHOICE

## 2023-03-01 RX ORDER — TITANIUM DIOXIDE, OCTINOXATE, ZINC OXIDE 4.61; 1.6; .78 G/40ML; G/40ML; G/40ML
CREAM TOPICAL
COMMUNITY
Start: 2019-04-30

## 2023-03-10 ENCOUNTER — APPOINTMENT (OUTPATIENT)
Dept: PRIMARY CARE | Facility: CLINIC | Age: 83
End: 2023-03-10
Payer: COMMERCIAL

## 2023-04-12 ENCOUNTER — OFFICE VISIT (OUTPATIENT)
Dept: PRIMARY CARE | Facility: CLINIC | Age: 83
End: 2023-04-12
Payer: COMMERCIAL

## 2023-04-12 ENCOUNTER — APPOINTMENT (OUTPATIENT)
Dept: LAB | Facility: LAB | Age: 83
End: 2023-04-12
Payer: COMMERCIAL

## 2023-04-12 VITALS
OXYGEN SATURATION: 98 % | RESPIRATION RATE: 16 BRPM | HEIGHT: 60 IN | BODY MASS INDEX: 28.54 KG/M2 | TEMPERATURE: 97.8 F | HEART RATE: 69 BPM | WEIGHT: 145.4 LBS | SYSTOLIC BLOOD PRESSURE: 126 MMHG | DIASTOLIC BLOOD PRESSURE: 76 MMHG

## 2023-04-12 DIAGNOSIS — I25.720 ATHEROSCLEROSIS OF AUTOLOGOUS ARTERY CORONARY ARTERY BYPASS GRAFT(S) WITH UNSTABLE ANGINA PECTORIS (MULTI): ICD-10-CM

## 2023-04-12 DIAGNOSIS — L57.0 ACTINIC KERATOSIS: ICD-10-CM

## 2023-04-12 DIAGNOSIS — R03.0 ELEVATED BP WITHOUT DIAGNOSIS OF HYPERTENSION: ICD-10-CM

## 2023-04-12 DIAGNOSIS — R42 DIZZINESS: ICD-10-CM

## 2023-04-12 DIAGNOSIS — M19.072 ARTHRITIS OF FOOT, LEFT: ICD-10-CM

## 2023-04-12 DIAGNOSIS — Z00.00 PREVENTATIVE HEALTH CARE: Primary | ICD-10-CM

## 2023-04-12 LAB
ALANINE AMINOTRANSFERASE (SGPT) (U/L) IN SER/PLAS: 8 U/L (ref 7–45)
ALBUMIN (G/DL) IN SER/PLAS: 3.7 G/DL (ref 3.4–5)
ALKALINE PHOSPHATASE (U/L) IN SER/PLAS: 46 U/L (ref 33–136)
ANION GAP IN SER/PLAS: 11 MMOL/L (ref 10–20)
ASPARTATE AMINOTRANSFERASE (SGOT) (U/L) IN SER/PLAS: 16 U/L (ref 9–39)
BASOPHILS (10*3/UL) IN BLOOD BY AUTOMATED COUNT: 0.06 X10E9/L (ref 0–0.1)
BASOPHILS/100 LEUKOCYTES IN BLOOD BY AUTOMATED COUNT: 1.1 % (ref 0–2)
BILIRUBIN TOTAL (MG/DL) IN SER/PLAS: 0.4 MG/DL (ref 0–1.2)
CALCIUM (MG/DL) IN SER/PLAS: 9.2 MG/DL (ref 8.6–10.3)
CARBON DIOXIDE, TOTAL (MMOL/L) IN SER/PLAS: 27 MMOL/L (ref 21–32)
CHLORIDE (MMOL/L) IN SER/PLAS: 105 MMOL/L (ref 98–107)
CHOLESTEROL (MG/DL) IN SER/PLAS: 198 MG/DL (ref 0–199)
CHOLESTEROL IN HDL (MG/DL) IN SER/PLAS: 53.7 MG/DL
CHOLESTEROL/HDL RATIO: 3.7
CREATININE (MG/DL) IN SER/PLAS: 0.81 MG/DL (ref 0.5–1.05)
EOSINOPHILS (10*3/UL) IN BLOOD BY AUTOMATED COUNT: 0.07 X10E9/L (ref 0–0.4)
EOSINOPHILS/100 LEUKOCYTES IN BLOOD BY AUTOMATED COUNT: 1.3 % (ref 0–6)
ERYTHROCYTE DISTRIBUTION WIDTH (RATIO) BY AUTOMATED COUNT: 15.1 % (ref 11.5–14.5)
ERYTHROCYTE MEAN CORPUSCULAR HEMOGLOBIN CONCENTRATION (G/DL) BY AUTOMATED: 31.2 G/DL (ref 32–36)
ERYTHROCYTE MEAN CORPUSCULAR VOLUME (FL) BY AUTOMATED COUNT: 87 FL (ref 80–100)
ERYTHROCYTES (10*6/UL) IN BLOOD BY AUTOMATED COUNT: 4.29 X10E12/L (ref 4–5.2)
GFR FEMALE: 71 ML/MIN/1.73M2
GLUCOSE (MG/DL) IN SER/PLAS: 99 MG/DL (ref 74–99)
HEMATOCRIT (%) IN BLOOD BY AUTOMATED COUNT: 37.5 % (ref 36–46)
HEMOGLOBIN (G/DL) IN BLOOD: 11.7 G/DL (ref 12–16)
IMMATURE GRANULOCYTES/100 LEUKOCYTES IN BLOOD BY AUTOMATED COUNT: 0.2 % (ref 0–0.9)
LDL: 126 MG/DL (ref 0–99)
LEUKOCYTES (10*3/UL) IN BLOOD BY AUTOMATED COUNT: 5.3 X10E9/L (ref 4.4–11.3)
LYMPHOCYTES (10*3/UL) IN BLOOD BY AUTOMATED COUNT: 1.43 X10E9/L (ref 0.8–3)
LYMPHOCYTES/100 LEUKOCYTES IN BLOOD BY AUTOMATED COUNT: 27.1 % (ref 13–44)
MONOCYTES (10*3/UL) IN BLOOD BY AUTOMATED COUNT: 0.38 X10E9/L (ref 0.05–0.8)
MONOCYTES/100 LEUKOCYTES IN BLOOD BY AUTOMATED COUNT: 7.2 % (ref 2–10)
NATRIURETIC PEPTIDE B (PG/ML) IN SER/PLAS: 188 PG/ML (ref 0–99)
NEUTROPHILS (10*3/UL) IN BLOOD BY AUTOMATED COUNT: 3.33 X10E9/L (ref 1.6–5.5)
NEUTROPHILS/100 LEUKOCYTES IN BLOOD BY AUTOMATED COUNT: 63.1 % (ref 40–80)
PLATELETS (10*3/UL) IN BLOOD AUTOMATED COUNT: 325 X10E9/L (ref 150–450)
POTASSIUM (MMOL/L) IN SER/PLAS: 4.3 MMOL/L (ref 3.5–5.3)
PROTEIN TOTAL: 6.7 G/DL (ref 6.4–8.2)
SODIUM (MMOL/L) IN SER/PLAS: 139 MMOL/L (ref 136–145)
THYROTROPIN (MIU/L) IN SER/PLAS BY DETECTION LIMIT <= 0.05 MIU/L: 0.58 MIU/L (ref 0.44–3.98)
TRIGLYCERIDE (MG/DL) IN SER/PLAS: 92 MG/DL (ref 0–149)
UREA NITROGEN (MG/DL) IN SER/PLAS: 26 MG/DL (ref 6–23)
VLDL: 18 MG/DL (ref 0–40)

## 2023-04-12 PROCEDURE — 85025 COMPLETE CBC W/AUTO DIFF WBC: CPT

## 2023-04-12 PROCEDURE — 36415 COLL VENOUS BLD VENIPUNCTURE: CPT

## 2023-04-12 PROCEDURE — 83880 ASSAY OF NATRIURETIC PEPTIDE: CPT

## 2023-04-12 PROCEDURE — 80053 COMPREHEN METABOLIC PANEL: CPT

## 2023-04-12 PROCEDURE — 84443 ASSAY THYROID STIM HORMONE: CPT

## 2023-04-12 PROCEDURE — 80061 LIPID PANEL: CPT

## 2023-04-12 PROCEDURE — 99397 PER PM REEVAL EST PAT 65+ YR: CPT | Performed by: NURSE PRACTITIONER

## 2023-04-12 RX ORDER — DOCUSATE SODIUM 50 MG/5ML
50 LIQUID ORAL DAILY
COMMUNITY
End: 2023-12-08 | Stop reason: ALTCHOICE

## 2023-04-12 ASSESSMENT — ENCOUNTER SYMPTOMS
CHEST TIGHTNESS: 0
ABDOMINAL DISTENTION: 0
COUGH: 0
DIZZINESS: 1
APPETITE CHANGE: 0
ACTIVITY CHANGE: 0
CHILLS: 0
SHORTNESS OF BREATH: 0

## 2023-04-12 ASSESSMENT — ACTIVITIES OF DAILY LIVING (ADL)
JUDGMENT_ADEQUATE_SAFELY_COMPLETE_DAILY_ACTIVITIES: YES
DRESSING YOURSELF: INDEPENDENT
PATIENT'S MEMORY ADEQUATE TO SAFELY COMPLETE DAILY ACTIVITIES?: YES
TOILETING: INDEPENDENT
HEARING - LEFT EAR: FUNCTIONAL
WALKS IN HOME: INDEPENDENT
BATHING: INDEPENDENT
ADEQUATE_TO_COMPLETE_ADL: YES
HEARING - RIGHT EAR: FUNCTIONAL
FEEDING YOURSELF: INDEPENDENT
GROOMING: INDEPENDENT

## 2023-04-12 ASSESSMENT — PATIENT HEALTH QUESTIONNAIRE - PHQ9
1. LITTLE INTEREST OR PLEASURE IN DOING THINGS: NOT AT ALL
2. FEELING DOWN, DEPRESSED OR HOPELESS: NOT AT ALL
SUM OF ALL RESPONSES TO PHQ9 QUESTIONS 1 AND 2: 0

## 2023-04-12 NOTE — PROGRESS NOTES
Subjective   Patient ID: Laurel uPgh is a 82 y.o. female who presents for Annual Exam (Patient has new insurance and needs a physical for this.She is asking for a handicap placard also.), Back Pain, and age spots (She has areas on left forearm with possible age spots that are itching. She would like reviewed. ).    Patient reports the itchy areas are on the left arm 3 months ago pt has used some cortisone cream to stop itch over the counter which helped pt reports using lotion also has helped. Pt denies that is spreading. Pt also has hx of liver spots patient has never seen dermatology before..     She gets dizzy at times with off balance while walking.   Patient reports this is not new and only lasts for 5-10 seconds. Some brain fog does occur patient has used meclizine in the past which helped her    She is using the compression socks and it does help with the leg swelling intermittently pt does get some swelling at the ankles at times. She does have arthritis in the ankles and foot mainly left foot has most of the arthritis.     Lower back pain occurred a few weeks ago and not sure of reason. She used a massager and improved the back and resolved the pain.pt denies lifting or pulling pt thinks that it was in the muscle and it is better now    She needs blood work done.     She does not want breast imaging unless needed. She does check breasts in shower.    HPI     Patient reports the itchy areas are on the left arm 3 months ago pt has used some cortisone cream to stop itch over the counter which helped pt reports using lotion also has helped. Pt denies that is spreading. Pt also has hx of liver spots patient has never seen dermatology before..     She gets dizzy at times with off balance while walking.   Patient reports this is not new and only lasts for 5-10 seconds. Some brain fog does occur patient has used meclizine in the past which helped her    She is using the compression socks and it does help with  the leg swelling intermittently pt does get some swelling at the ankles at times. She does have arthritis in the ankles and foot mainly left foot has most of the arthritis.     Lower back pain occurred a few weeks ago and not sure of reason. She used a massager and improved the back and resolved the pain.pt denies lifting or pulling pt thinks that it was in the muscle and it is better now    She needs blood work done.     She does not want breast imaging unless needed. She does check breasts in shower.      Review of Systems   Constitutional:  Negative for activity change, appetite change and chills.   HENT:  Negative for congestion.    Respiratory:  Negative for cough, chest tightness and shortness of breath.    Gastrointestinal:  Negative for abdominal distention.   Skin:  Positive for rash.   Neurological:  Positive for dizziness.         Objective   /76 (BP Location: Right arm, Patient Position: Sitting, BP Cuff Size: Adult)   Pulse 69   Temp 36.6 °C (97.8 °F)   Resp 16   Ht 1.524 m (5')   Wt 66 kg (145 lb 6.4 oz)   SpO2 98%   BMI 28.40 kg/m²     Physical Exam  Constitutional:       Appearance: Normal appearance.   Cardiovascular:      Rate and Rhythm: Normal rate and regular rhythm.      Pulses: Normal pulses.      Heart sounds: Normal heart sounds.   Pulmonary:      Effort: Pulmonary effort is normal. No respiratory distress.      Breath sounds: Normal breath sounds.   Abdominal:      General: Abdomen is flat.      Palpations: Abdomen is soft.   Skin:     Findings: Rash present.   Neurological:      General: No focal deficit present.      Mental Status: She is alert.   Psychiatric:         Mood and Affect: Mood normal.         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

## 2023-04-12 NOTE — PATIENT INSTRUCTIONS
Patient presents today for preventative health exam     Labs preventative ordered today-    Handicap sticker today for 5 years     Referral to Dermatology for skin assessment      Pt declines mammogram due to age-       all questions answered  follow up in office if signs or symptoms are worsening, not improving  or  please schedule follow up with PCP   if shortness of breath and or chest pain seek emergency department   24 hour hotline telephone numbers  Suicide or mental health mobile crisis help line [phone]  Child Abuse or neglect [phone]  Elder Abuse or neglect [phone]  Rape Crisis [phone]  Domestic Violence [phone]  Narcotics Anonymous [phone]

## 2023-12-08 ENCOUNTER — OFFICE VISIT (OUTPATIENT)
Dept: PRIMARY CARE | Facility: CLINIC | Age: 83
End: 2023-12-08
Payer: COMMERCIAL

## 2023-12-08 VITALS
WEIGHT: 145 LBS | SYSTOLIC BLOOD PRESSURE: 142 MMHG | RESPIRATION RATE: 16 BRPM | HEIGHT: 59 IN | DIASTOLIC BLOOD PRESSURE: 82 MMHG | OXYGEN SATURATION: 98 % | TEMPERATURE: 97.5 F | HEART RATE: 64 BPM | BODY MASS INDEX: 29.23 KG/M2

## 2023-12-08 DIAGNOSIS — Z00.00 ROUTINE GENERAL MEDICAL EXAMINATION AT HEALTH CARE FACILITY: Primary | ICD-10-CM

## 2023-12-08 DIAGNOSIS — H69.93 ETD (EUSTACHIAN TUBE DYSFUNCTION), BILATERAL: ICD-10-CM

## 2023-12-08 DIAGNOSIS — G43.109 OCULAR MIGRAINE: ICD-10-CM

## 2023-12-08 DIAGNOSIS — E55.9 MILD VITAMIN D DEFICIENCY: ICD-10-CM

## 2023-12-08 DIAGNOSIS — Z23 ENCOUNTER FOR IMMUNIZATION: ICD-10-CM

## 2023-12-08 DIAGNOSIS — E78.2 MODERATE MIXED HYPERLIPIDEMIA NOT REQUIRING STATIN THERAPY: ICD-10-CM

## 2023-12-08 DIAGNOSIS — F33.9 DEPRESSION, RECURRENT (CMS-HCC): ICD-10-CM

## 2023-12-08 DIAGNOSIS — D64.9 ANEMIA, UNSPECIFIED TYPE: ICD-10-CM

## 2023-12-08 PROBLEM — J40 BRONCHITIS: Status: RESOLVED | Noted: 2023-03-01 | Resolved: 2023-12-08

## 2023-12-08 PROBLEM — H26.9 CATARACT: Status: RESOLVED | Noted: 2023-03-01 | Resolved: 2023-12-08

## 2023-12-08 PROBLEM — R79.89 BLOOD CHOLESTEROL INCREASED COMPARED WITH PRIOR MEASUREMENT: Status: RESOLVED | Noted: 2023-03-01 | Resolved: 2023-12-08

## 2023-12-08 PROBLEM — R42 DIZZINESS: Status: RESOLVED | Noted: 2023-03-01 | Resolved: 2023-12-08

## 2023-12-08 PROBLEM — H25.049 POSTERIOR SUBCAPSULAR POLAR AGE-RELATED CATARACT: Status: RESOLVED | Noted: 2023-03-01 | Resolved: 2023-12-08

## 2023-12-08 PROBLEM — I83.93 VARICOSE VEINS OF LEGS: Status: RESOLVED | Noted: 2023-03-01 | Resolved: 2023-12-08

## 2023-12-08 PROBLEM — R39.9 UTI SYMPTOMS: Status: RESOLVED | Noted: 2023-03-01 | Resolved: 2023-12-08

## 2023-12-08 PROBLEM — H26.011: Status: RESOLVED | Noted: 2023-03-01 | Resolved: 2023-12-08

## 2023-12-08 PROBLEM — R06.02 SHORTNESS OF BREATH ON EXERTION: Status: RESOLVED | Noted: 2023-03-01 | Resolved: 2023-12-08

## 2023-12-08 PROBLEM — R51.9 HEADACHE, OCCIPITAL: Status: RESOLVED | Noted: 2023-03-01 | Resolved: 2023-12-08

## 2023-12-08 PROBLEM — R11.2 NAUSEA WITH VOMITING: Status: RESOLVED | Noted: 2023-03-01 | Resolved: 2023-12-08

## 2023-12-08 PROBLEM — D22.9 BENIGN MOLE: Status: RESOLVED | Noted: 2023-03-01 | Resolved: 2023-12-08

## 2023-12-08 PROBLEM — J06.9 URI (UPPER RESPIRATORY INFECTION): Status: RESOLVED | Noted: 2023-03-01 | Resolved: 2023-12-08

## 2023-12-08 PROBLEM — R07.89 TIGHTNESS IN CHEST: Status: RESOLVED | Noted: 2023-03-01 | Resolved: 2023-12-08

## 2023-12-08 PROBLEM — Z85.3 HISTORY OF BREAST CANCER: Status: ACTIVE | Noted: 2023-03-01

## 2023-12-08 PROBLEM — L29.9 ITCHING: Status: RESOLVED | Noted: 2023-03-01 | Resolved: 2023-12-08

## 2023-12-08 PROBLEM — M25.472 LEFT ANKLE SWELLING: Status: RESOLVED | Noted: 2023-03-01 | Resolved: 2023-12-08

## 2023-12-08 PROBLEM — L98.9 LESION OF FACE: Status: RESOLVED | Noted: 2023-03-01 | Resolved: 2023-12-08

## 2023-12-08 PROCEDURE — 1036F TOBACCO NON-USER: CPT | Performed by: FAMILY MEDICINE

## 2023-12-08 PROCEDURE — G0439 PPPS, SUBSEQ VISIT: HCPCS | Performed by: FAMILY MEDICINE

## 2023-12-08 PROCEDURE — 1170F FXNL STATUS ASSESSED: CPT | Performed by: FAMILY MEDICINE

## 2023-12-08 PROCEDURE — 1160F RVW MEDS BY RX/DR IN RCRD: CPT | Performed by: FAMILY MEDICINE

## 2023-12-08 PROCEDURE — 1159F MED LIST DOCD IN RCRD: CPT | Performed by: FAMILY MEDICINE

## 2023-12-08 PROCEDURE — 99213 OFFICE O/P EST LOW 20 MIN: CPT | Performed by: FAMILY MEDICINE

## 2023-12-08 PROCEDURE — 99497 ADVNCD CARE PLAN 30 MIN: CPT | Performed by: FAMILY MEDICINE

## 2023-12-08 RX ORDER — FLUTICASONE PROPIONATE 50 MCG
1 SPRAY, SUSPENSION (ML) NASAL DAILY
Qty: 16 G | Refills: 11 | Status: SHIPPED | OUTPATIENT
Start: 2023-12-08 | End: 2024-12-07

## 2023-12-08 ASSESSMENT — ENCOUNTER SYMPTOMS
FATIGUE: 0
VOMITING: 0
ABDOMINAL PAIN: 0
RHINORRHEA: 0
SORE THROAT: 0
ARTHRALGIAS: 0
HALLUCINATIONS: 0
EYE REDNESS: 0
CONFUSION: 0
CONSTIPATION: 0
FACIAL ASYMMETRY: 0
JOINT SWELLING: 0
WEAKNESS: 0
APPETITE CHANGE: 0
BLOOD IN STOOL: 0
SLEEP DISTURBANCE: 0
AGITATION: 0
HEMATURIA: 0
SINUS PRESSURE: 0
DEPRESSION: 0
NECK STIFFNESS: 0
EYE DISCHARGE: 0
FEVER: 0
BRUISES/BLEEDS EASILY: 0
LOSS OF SENSATION IN FEET: 0
UNEXPECTED WEIGHT CHANGE: 0
WOUND: 0
SHORTNESS OF BREATH: 0
HEADACHES: 0
FLANK PAIN: 0
ABDOMINAL DISTENTION: 0
VOICE CHANGE: 0
TREMORS: 0
OCCASIONAL FEELINGS OF UNSTEADINESS: 1
PHOTOPHOBIA: 0
DIARRHEA: 0
WHEEZING: 0
PALPITATIONS: 0
NAUSEA: 0
LIGHT-HEADEDNESS: 0
CHOKING: 0
EYE PAIN: 0
POLYDIPSIA: 0
CHILLS: 0
DIAPHORESIS: 0
ACTIVITY CHANGE: 0
EYE ITCHING: 0
NUMBNESS: 0
TROUBLE SWALLOWING: 0
SPEECH DIFFICULTY: 0
NERVOUS/ANXIOUS: 0
DYSPHORIC MOOD: 0
MYALGIAS: 0
FREQUENCY: 0
MIGRAINE HEADACHES: 1
ADENOPATHY: 0
DYSURIA: 0
BACK PAIN: 0
SEIZURES: 0
DIZZINESS: 0
NECK PAIN: 0
CHEST TIGHTNESS: 0
COUGH: 1

## 2023-12-08 ASSESSMENT — PATIENT HEALTH QUESTIONNAIRE - PHQ9
1. LITTLE INTEREST OR PLEASURE IN DOING THINGS: NOT AT ALL
9. THOUGHTS THAT YOU WOULD BE BETTER OFF DEAD, OR OF HURTING YOURSELF: SEVERAL DAYS
6. FEELING BAD ABOUT YOURSELF - OR THAT YOU ARE A FAILURE OR HAVE LET YOURSELF OR YOUR FAMILY DOWN: SEVERAL DAYS
10. IF YOU CHECKED OFF ANY PROBLEMS, HOW DIFFICULT HAVE THESE PROBLEMS MADE IT FOR YOU TO DO YOUR WORK, TAKE CARE OF THINGS AT HOME, OR GET ALONG WITH OTHER PEOPLE: SOMEWHAT DIFFICULT
2. FEELING DOWN, DEPRESSED OR HOPELESS: SEVERAL DAYS
3. TROUBLE FALLING OR STAYING ASLEEP OR SLEEPING TOO MUCH: MORE THAN HALF THE DAYS
SUM OF ALL RESPONSES TO PHQ9 QUESTIONS 1 AND 2: 1
4. FEELING TIRED OR HAVING LITTLE ENERGY: SEVERAL DAYS
SUM OF ALL RESPONSES TO PHQ QUESTIONS 1-9: 9
8. MOVING OR SPEAKING SO SLOWLY THAT OTHER PEOPLE COULD HAVE NOTICED. OR THE OPPOSITE, BEING SO FIGETY OR RESTLESS THAT YOU HAVE BEEN MOVING AROUND A LOT MORE THAN USUAL: SEVERAL DAYS
5. POOR APPETITE OR OVEREATING: SEVERAL DAYS
7. TROUBLE CONCENTRATING ON THINGS, SUCH AS READING THE NEWSPAPER OR WATCHING TELEVISION: SEVERAL DAYS

## 2023-12-08 ASSESSMENT — ACTIVITIES OF DAILY LIVING (ADL)
BATHING: INDEPENDENT
DOING_HOUSEWORK: INDEPENDENT
DRESSING: INDEPENDENT
TAKING_MEDICATION: INDEPENDENT
GROCERY_SHOPPING: INDEPENDENT
MANAGING_FINANCES: INDEPENDENT

## 2023-12-08 NOTE — PROGRESS NOTES
Subjective   Reason for Visit: Laurel Pugh is an 83 y.o. female here for a Medicare Wellness visit.     Past Medical, Surgical, and Family History reviewed and updated in chart.    Reviewed all medications by prescribing practitioner or clinical pharmacist (such as prescriptions, OTCs, herbal therapies and supplements) and documented in the medical record.    Cough  This is a new problem. The current episode started 1 to 4 weeks ago. The problem has been gradually improving. The problem occurs every few hours. The cough is Productive of sputum. Pertinent negatives include no chest pain, chills, ear pain, eye redness, fever, headaches, myalgias, postnasal drip, rash, rhinorrhea, sore throat, shortness of breath or wheezing. Nothing aggravates the symptoms. She has tried nothing for the symptoms. The treatment provided significant relief.   Migraine   This is a recurrent problem. The current episode started 1 to 4 weeks ago. The problem occurs intermittently. The problem has been waxing and waning. Associated symptoms include coughing. Pertinent negatives include no abdominal pain, back pain, dizziness, ear pain, eye pain, eye redness, fever, hearing loss, nausea, neck pain, numbness, photophobia, rhinorrhea, seizures, sinus pressure, sore throat, tinnitus, vomiting or weakness. The symptoms are aggravated by caffeine withdrawal (stress, chochlate). She has tried darkened room for the symptoms. The treatment provided significant relief. Her past medical history is significant for migraine headaches.       Patient Care Team:  Chong Houston DO as PCP - General (Family Medicine)  Chong Houston DO as PCP - [facility] PCP     Review of Systems   Constitutional:  Negative for activity change, appetite change, chills, diaphoresis, fatigue, fever and unexpected weight change.   HENT:  Negative for congestion, ear pain, hearing loss, nosebleeds, postnasal drip, rhinorrhea, sinus pressure,  "sneezing, sore throat, tinnitus, trouble swallowing and voice change.    Eyes:  Negative for photophobia, pain, discharge, redness, itching and visual disturbance.   Respiratory:  Positive for cough. Negative for choking, chest tightness, shortness of breath and wheezing.    Cardiovascular:  Negative for chest pain, palpitations and leg swelling.   Gastrointestinal:  Negative for abdominal distention, abdominal pain, blood in stool, constipation, diarrhea, nausea and vomiting.   Endocrine: Negative for cold intolerance, heat intolerance, polydipsia and polyuria.   Genitourinary:  Negative for dysuria, flank pain, frequency, hematuria and urgency.   Musculoskeletal:  Negative for arthralgias, back pain, joint swelling, myalgias, neck pain and neck stiffness.   Skin:  Negative for rash and wound.   Allergic/Immunologic: Negative for immunocompromised state.   Neurological:  Negative for dizziness, tremors, seizures, syncope, facial asymmetry, speech difficulty, weakness, light-headedness, numbness and headaches.   Hematological:  Negative for adenopathy. Does not bruise/bleed easily.   Psychiatric/Behavioral:  Negative for agitation, behavioral problems, confusion, dysphoric mood, hallucinations, self-injury, sleep disturbance and suicidal ideas. The patient is not nervous/anxious.        Objective   Vitals:  /82 (BP Location: Left arm)   Pulse 64   Temp 36.4 °C (97.5 °F) (Temporal)   Resp 16   Ht 1.499 m (4' 11\")   Wt 65.8 kg (145 lb)   SpO2 98%   BMI 29.29 kg/m²       Physical Exam  Constitutional:       General: She is not in acute distress.     Appearance: She is not ill-appearing or diaphoretic.   HENT:      Head: Normocephalic and atraumatic.      Right Ear: External ear normal. A middle ear effusion is present.      Left Ear: External ear normal. A middle ear effusion is present.      Nose: Nose normal. No rhinorrhea.   Eyes:      General: Lids are normal. No scleral icterus.        Right eye: No " discharge.         Left eye: No discharge.      Conjunctiva/sclera: Conjunctivae normal.   Cardiovascular:      Rate and Rhythm: Normal rate and regular rhythm.      Pulses: Normal pulses.      Heart sounds: S1 normal. No murmur heard.     Comments: Split S2  Pulmonary:      Effort: Pulmonary effort is normal. No respiratory distress.      Breath sounds: No decreased breath sounds, wheezing, rhonchi or rales.   Abdominal:      General: Bowel sounds are normal. There is no distension.      Palpations: Abdomen is soft. There is no mass.      Tenderness: There is no abdominal tenderness. There is no guarding or rebound.   Musculoskeletal:         General: No swelling, tenderness or deformity.      Cervical back: No rigidity or tenderness.      Right lower leg: No edema.      Left lower leg: No edema.   Lymphadenopathy:      Cervical: No cervical adenopathy.      Upper Body:      Right upper body: No supraclavicular adenopathy.      Left upper body: No supraclavicular adenopathy.   Skin:     General: Skin is warm and dry.      Coloration: Skin is not jaundiced or pale.      Findings: No erythema, lesion or rash.   Neurological:      General: No focal deficit present.      Mental Status: She is alert and oriented to person, place, and time.      Sensory: No sensory deficit.      Motor: No weakness or tremor.      Coordination: Coordination normal.      Gait: Gait normal.   Psychiatric:         Mood and Affect: Mood normal. Affect is not inappropriate.         Behavior: Behavior normal.         Assessment/Plan   Problem List Items Addressed This Visit       Anemia    Relevant Orders    Vitamin B12    Follow Up In [facility] - PCP - Established    Mild vitamin D deficiency    Relevant Orders    Vitamin D 25-Hydroxy,Total (for eval of Vitamin D levels)    Follow Up In [facility] - PCP - Established    Depression, recurrent (CMS/HCC)    Relevant Orders    Follow Up In [facility] - PCP -  Established    Ocular migraine    Relevant Orders    Follow Up In [facility] - PCP - Established     Other Visit Diagnoses       Routine general medical examination at health care facility    -  Primary    Relevant Orders    Follow Up In [facility] - PCP - Established    Encounter for immunization        Relevant Medications    diphth,pertus,acell,,tetanus (BoostRIX) 2.5-8-5 Lf-mcg-Lf/0.5mL injection    Other Relevant Orders    Follow Up In [facility] - PCP - Established    Moderate mixed hyperlipidemia not requiring statin therapy        Relevant Orders    CBC and Auto Differential    Comprehensive Metabolic Panel    Lipid Panel    Magnesium    TSH with reflex to Free T4 if abnormal    Follow Up In [facility] - PCP - Established    ETD (Eustachian tube dysfunction), bilateral        Relevant Medications    fluticasone (Flonase) 50 mcg/actuation nasal spray    Other Relevant Orders    Follow Up In [facility] - PCP - Established        As far as migraine issues recommend avoiding caffeine if she cannot avoid caffeine that recommend maintain stable dose to prevent caffeine withdrawal symptomatology.  As far as eustachian tube dysfunction we will start Flonase that will likely help with intermittent vertigo symptoms as well.  In terms of depression patient states that she does have adequate support at home and she does not desire therapy currently.  She will reach out if she has any issues with maintaining adequate emotional support.  We will have her follow-up in 3 months with labs prior.  Advance care planning discussed including living will, power of  for healthcare and CODE STATUS.  Patient desires DNR however does not have available paperwork.  She understands that she will remain full code in the system until she provides appropriate documentation.  As far as the living will and power of  for healthcare she states that she already has these  enacted and will get us copies at her earliest convenience.  We will give her a copy of healthcare planning packet.

## 2023-12-08 NOTE — PROGRESS NOTES
"Subjective   Reason for Visit: Laurel Pugh is an 83 y.o. female here for a Medicare Wellness visit.     Past Medical, Surgical, and Family History reviewed and updated in chart.    Reviewed all medications by prescribing practitioner or clinical pharmacist (such as prescriptions, OTCs, herbal therapies and supplements) and documented in the medical record.    HPI    Patient Care Team:  Chong Houston DO as PCP - General (Family Medicine)  Chong Houston DO as PCP - [facility] PCP     Review of Systems    Objective   Vitals:  /82 (BP Location: Left arm)   Pulse 64   Temp 36.4 °C (97.5 °F) (Temporal)   Resp 16   Ht 1.499 m (4' 11\")   Wt 65.8 kg (145 lb)   SpO2 98%   BMI 29.29 kg/m²       Physical Exam    Assessment/Plan   Problem List Items Addressed This Visit    None  Visit Diagnoses     Routine general medical examination at health care facility    -  Primary               "

## 2023-12-13 ENCOUNTER — TELEPHONE (OUTPATIENT)
Dept: PRIMARY CARE | Facility: CLINIC | Age: 83
End: 2023-12-13
Payer: COMMERCIAL

## 2023-12-13 NOTE — TELEPHONE ENCOUNTER
Patient calling to get her handicap placard renewed. She states it is for the arthritis in both her feet and hard for her to get around.

## 2023-12-14 DIAGNOSIS — M19.072 ARTHRITIS OF FOOT, LEFT: ICD-10-CM

## 2024-02-27 ENCOUNTER — LAB (OUTPATIENT)
Dept: LAB | Facility: LAB | Age: 84
End: 2024-02-27
Payer: COMMERCIAL

## 2024-02-27 DIAGNOSIS — E55.9 MILD VITAMIN D DEFICIENCY: ICD-10-CM

## 2024-02-27 DIAGNOSIS — D64.9 ANEMIA, UNSPECIFIED TYPE: ICD-10-CM

## 2024-02-27 DIAGNOSIS — E78.2 MODERATE MIXED HYPERLIPIDEMIA NOT REQUIRING STATIN THERAPY: ICD-10-CM

## 2024-02-27 LAB
25(OH)D3 SERPL-MCNC: 47 NG/ML (ref 30–100)
ALBUMIN SERPL BCP-MCNC: 3.9 G/DL (ref 3.4–5)
ALP SERPL-CCNC: 52 U/L (ref 33–136)
ALT SERPL W P-5'-P-CCNC: 9 U/L (ref 7–45)
ANION GAP SERPL CALC-SCNC: 12 MMOL/L (ref 10–20)
AST SERPL W P-5'-P-CCNC: 17 U/L (ref 9–39)
BASOPHILS # BLD AUTO: 0.08 X10*3/UL (ref 0–0.1)
BASOPHILS NFR BLD AUTO: 1.3 %
BILIRUB SERPL-MCNC: 0.5 MG/DL (ref 0–1.2)
BUN SERPL-MCNC: 20 MG/DL (ref 6–23)
CALCIUM SERPL-MCNC: 9.4 MG/DL (ref 8.6–10.3)
CHLORIDE SERPL-SCNC: 104 MMOL/L (ref 98–107)
CHOLEST SERPL-MCNC: 232 MG/DL (ref 0–199)
CHOLESTEROL/HDL RATIO: 3.8
CO2 SERPL-SCNC: 29 MMOL/L (ref 21–32)
CREAT SERPL-MCNC: 1.04 MG/DL (ref 0.5–1.05)
EGFRCR SERPLBLD CKD-EPI 2021: 53 ML/MIN/1.73M*2
EOSINOPHIL # BLD AUTO: 0.22 X10*3/UL (ref 0–0.4)
EOSINOPHIL NFR BLD AUTO: 3.5 %
ERYTHROCYTE [DISTWIDTH] IN BLOOD BY AUTOMATED COUNT: 15.7 % (ref 11.5–14.5)
GLUCOSE SERPL-MCNC: 89 MG/DL (ref 74–99)
HCT VFR BLD AUTO: 39.7 % (ref 36–46)
HDLC SERPL-MCNC: 60.6 MG/DL
HGB BLD-MCNC: 12.3 G/DL (ref 12–16)
IMM GRANULOCYTES # BLD AUTO: 0.01 X10*3/UL (ref 0–0.5)
IMM GRANULOCYTES NFR BLD AUTO: 0.2 % (ref 0–0.9)
LDLC SERPL CALC-MCNC: 154 MG/DL
LYMPHOCYTES # BLD AUTO: 1.83 X10*3/UL (ref 0.8–3)
LYMPHOCYTES NFR BLD AUTO: 28.9 %
MAGNESIUM SERPL-MCNC: 2.17 MG/DL (ref 1.6–2.4)
MCH RBC QN AUTO: 27.4 PG (ref 26–34)
MCHC RBC AUTO-ENTMCNC: 31 G/DL (ref 32–36)
MCV RBC AUTO: 88 FL (ref 80–100)
MONOCYTES # BLD AUTO: 0.6 X10*3/UL (ref 0.05–0.8)
MONOCYTES NFR BLD AUTO: 9.5 %
NEUTROPHILS # BLD AUTO: 3.59 X10*3/UL (ref 1.6–5.5)
NEUTROPHILS NFR BLD AUTO: 56.6 %
NON HDL CHOLESTEROL: 171 MG/DL (ref 0–149)
NRBC BLD-RTO: 0 /100 WBCS (ref 0–0)
PLATELET # BLD AUTO: 240 X10*3/UL (ref 150–450)
POTASSIUM SERPL-SCNC: 4.5 MMOL/L (ref 3.5–5.3)
PROT SERPL-MCNC: 6.7 G/DL (ref 6.4–8.2)
RBC # BLD AUTO: 4.49 X10*6/UL (ref 4–5.2)
SODIUM SERPL-SCNC: 140 MMOL/L (ref 136–145)
TRIGL SERPL-MCNC: 86 MG/DL (ref 0–149)
TSH SERPL-ACNC: 1.47 MIU/L (ref 0.44–3.98)
VIT B12 SERPL-MCNC: 189 PG/ML (ref 211–911)
VLDL: 17 MG/DL (ref 0–40)
WBC # BLD AUTO: 6.3 X10*3/UL (ref 4.4–11.3)

## 2024-02-27 PROCEDURE — 82607 VITAMIN B-12: CPT

## 2024-02-27 PROCEDURE — 36415 COLL VENOUS BLD VENIPUNCTURE: CPT

## 2024-02-27 PROCEDURE — 82306 VITAMIN D 25 HYDROXY: CPT

## 2024-02-27 PROCEDURE — 80061 LIPID PANEL: CPT

## 2024-02-27 PROCEDURE — 83735 ASSAY OF MAGNESIUM: CPT

## 2024-02-27 PROCEDURE — 85025 COMPLETE CBC W/AUTO DIFF WBC: CPT

## 2024-02-27 PROCEDURE — 84443 ASSAY THYROID STIM HORMONE: CPT

## 2024-02-27 PROCEDURE — 80053 COMPREHEN METABOLIC PANEL: CPT

## 2024-03-08 ENCOUNTER — OFFICE VISIT (OUTPATIENT)
Dept: PRIMARY CARE | Facility: CLINIC | Age: 84
End: 2024-03-08
Payer: COMMERCIAL

## 2024-03-08 VITALS
WEIGHT: 147 LBS | OXYGEN SATURATION: 96 % | RESPIRATION RATE: 16 BRPM | SYSTOLIC BLOOD PRESSURE: 117 MMHG | HEIGHT: 60 IN | DIASTOLIC BLOOD PRESSURE: 77 MMHG | BODY MASS INDEX: 28.86 KG/M2 | TEMPERATURE: 97 F | HEART RATE: 66 BPM

## 2024-03-08 DIAGNOSIS — Z71.89 ADVANCED DIRECTIVES, COUNSELING/DISCUSSION: ICD-10-CM

## 2024-03-08 DIAGNOSIS — E55.9 MILD VITAMIN D DEFICIENCY: ICD-10-CM

## 2024-03-08 DIAGNOSIS — E78.2 MODERATE MIXED HYPERLIPIDEMIA NOT REQUIRING STATIN THERAPY: ICD-10-CM

## 2024-03-08 DIAGNOSIS — E53.8 B12 DEFICIENCY: ICD-10-CM

## 2024-03-08 DIAGNOSIS — Z00.00 ROUTINE GENERAL MEDICAL EXAMINATION AT HEALTH CARE FACILITY: Primary | ICD-10-CM

## 2024-03-08 PROBLEM — D64.9 ANEMIA: Status: RESOLVED | Noted: 2023-03-01 | Resolved: 2024-03-08

## 2024-03-08 PROBLEM — F33.9 DEPRESSION, RECURRENT (CMS-HCC): Status: RESOLVED | Noted: 2023-12-08 | Resolved: 2024-03-08

## 2024-03-08 PROCEDURE — 1160F RVW MEDS BY RX/DR IN RCRD: CPT | Performed by: FAMILY MEDICINE

## 2024-03-08 PROCEDURE — 99497 ADVNCD CARE PLAN 30 MIN: CPT | Performed by: FAMILY MEDICINE

## 2024-03-08 PROCEDURE — 1170F FXNL STATUS ASSESSED: CPT | Performed by: FAMILY MEDICINE

## 2024-03-08 PROCEDURE — 1158F ADVNC CARE PLAN TLK DOCD: CPT | Performed by: FAMILY MEDICINE

## 2024-03-08 PROCEDURE — 1123F ACP DISCUSS/DSCN MKR DOCD: CPT | Performed by: FAMILY MEDICINE

## 2024-03-08 PROCEDURE — 1157F ADVNC CARE PLAN IN RCRD: CPT | Performed by: FAMILY MEDICINE

## 2024-03-08 PROCEDURE — 1159F MED LIST DOCD IN RCRD: CPT | Performed by: FAMILY MEDICINE

## 2024-03-08 PROCEDURE — 99214 OFFICE O/P EST MOD 30 MIN: CPT | Performed by: FAMILY MEDICINE

## 2024-03-08 PROCEDURE — 1036F TOBACCO NON-USER: CPT | Performed by: FAMILY MEDICINE

## 2024-03-08 PROCEDURE — G0439 PPPS, SUBSEQ VISIT: HCPCS | Performed by: FAMILY MEDICINE

## 2024-03-08 PROCEDURE — 96372 THER/PROPH/DIAG INJ SC/IM: CPT | Performed by: FAMILY MEDICINE

## 2024-03-08 RX ORDER — WITCH HAZEL 50 %
2000 PADS, MEDICATED (EA) TOPICAL DAILY
Qty: 30 TABLET | Refills: 11 | Status: SHIPPED | OUTPATIENT
Start: 2024-03-08 | End: 2025-03-08

## 2024-03-08 RX ORDER — CYANOCOBALAMIN 1000 UG/ML
1000 INJECTION, SOLUTION INTRAMUSCULAR; SUBCUTANEOUS ONCE
Status: COMPLETED | OUTPATIENT
Start: 2024-03-08 | End: 2024-03-08

## 2024-03-08 RX ADMIN — CYANOCOBALAMIN 1000 MCG: 1000 INJECTION, SOLUTION INTRAMUSCULAR; SUBCUTANEOUS at 10:37

## 2024-03-08 ASSESSMENT — ENCOUNTER SYMPTOMS
VOMITING: 0
NUMBNESS: 0
VERTIGO: 0
ABDOMINAL PAIN: 0
HALLUCINATIONS: 0
DEPRESSION: 0
WOUND: 0
AGITATION: 0
FATIGUE: 1
SORE THROAT: 0
DYSPHORIC MOOD: 0
CHILLS: 0
ABDOMINAL DISTENTION: 0
SLEEP DISTURBANCE: 0
POLYDIPSIA: 0
DIARRHEA: 0
FLANK PAIN: 0
SPEECH DIFFICULTY: 0
TREMORS: 0
FACIAL ASYMMETRY: 0
SWOLLEN GLANDS: 0
NERVOUS/ANXIOUS: 0
HEADACHES: 0
BLOOD IN STOOL: 0
VISUAL CHANGE: 0
EYE DISCHARGE: 0
MYALGIAS: 0
WEAKNESS: 0
UNEXPECTED WEIGHT CHANGE: 0
DYSURIA: 0
CHANGE IN BOWEL HABIT: 0
ANOREXIA: 0
LOSS OF SENSATION IN FEET: 0
NAUSEA: 0
FEVER: 0
APPETITE CHANGE: 0
OCCASIONAL FEELINGS OF UNSTEADINESS: 1
ARTHRALGIAS: 1
ACTIVITY CHANGE: 0
BRUISES/BLEEDS EASILY: 0
LIGHT-HEADEDNESS: 0
CONFUSION: 0
COUGH: 0
PALPITATIONS: 0
EYE ITCHING: 0
JOINT SWELLING: 0
HEMATURIA: 0
CONSTIPATION: 0
DIAPHORESIS: 0
FREQUENCY: 0
NECK STIFFNESS: 0
ADENOPATHY: 0
NECK PAIN: 0

## 2024-03-08 ASSESSMENT — PATIENT HEALTH QUESTIONNAIRE - PHQ9
2. FEELING DOWN, DEPRESSED OR HOPELESS: SEVERAL DAYS
6. FEELING BAD ABOUT YOURSELF - OR THAT YOU ARE A FAILURE OR HAVE LET YOURSELF OR YOUR FAMILY DOWN: NOT AT ALL
3. TROUBLE FALLING OR STAYING ASLEEP OR SLEEPING TOO MUCH: SEVERAL DAYS
4. FEELING TIRED OR HAVING LITTLE ENERGY: SEVERAL DAYS
7. TROUBLE CONCENTRATING ON THINGS, SUCH AS READING THE NEWSPAPER OR WATCHING TELEVISION: NOT AT ALL
5. POOR APPETITE OR OVEREATING: NOT AT ALL
SUM OF ALL RESPONSES TO PHQ QUESTIONS 1-9: 3
10. IF YOU CHECKED OFF ANY PROBLEMS, HOW DIFFICULT HAVE THESE PROBLEMS MADE IT FOR YOU TO DO YOUR WORK, TAKE CARE OF THINGS AT HOME, OR GET ALONG WITH OTHER PEOPLE: NOT DIFFICULT AT ALL
1. LITTLE INTEREST OR PLEASURE IN DOING THINGS: NOT AT ALL
9. THOUGHTS THAT YOU WOULD BE BETTER OFF DEAD, OR OF HURTING YOURSELF: NOT AT ALL
8. MOVING OR SPEAKING SO SLOWLY THAT OTHER PEOPLE COULD HAVE NOTICED. OR THE OPPOSITE, BEING SO FIGETY OR RESTLESS THAT YOU HAVE BEEN MOVING AROUND A LOT MORE THAN USUAL: NOT AT ALL
SUM OF ALL RESPONSES TO PHQ9 QUESTIONS 1 AND 2: 1

## 2024-03-08 ASSESSMENT — ACTIVITIES OF DAILY LIVING (ADL)
BATHING: INDEPENDENT
MANAGING_FINANCES: INDEPENDENT
DRESSING: INDEPENDENT
TAKING_MEDICATION: INDEPENDENT
DOING_HOUSEWORK: INDEPENDENT
GROCERY_SHOPPING: INDEPENDENT

## 2024-07-13 ENCOUNTER — HOSPITAL ENCOUNTER (INPATIENT)
Facility: HOSPITAL | Age: 84
DRG: 322 | End: 2024-07-13
Attending: EMERGENCY MEDICINE | Admitting: STUDENT IN AN ORGANIZED HEALTH CARE EDUCATION/TRAINING PROGRAM
Payer: COMMERCIAL

## 2024-07-13 ENCOUNTER — APPOINTMENT (OUTPATIENT)
Dept: RADIOLOGY | Facility: HOSPITAL | Age: 84
DRG: 322 | End: 2024-07-13
Payer: COMMERCIAL

## 2024-07-13 ENCOUNTER — APPOINTMENT (OUTPATIENT)
Dept: CARDIOLOGY | Facility: HOSPITAL | Age: 84
DRG: 322 | End: 2024-07-13
Payer: COMMERCIAL

## 2024-07-13 DIAGNOSIS — N39.0 URINARY TRACT INFECTION WITHOUT HEMATURIA, SITE UNSPECIFIED: ICD-10-CM

## 2024-07-13 DIAGNOSIS — I21.4 NSTEMI (NON-ST ELEVATED MYOCARDIAL INFARCTION) (MULTI): ICD-10-CM

## 2024-07-13 DIAGNOSIS — I44.7 LEFT BUNDLE BRANCH BLOCK: ICD-10-CM

## 2024-07-13 DIAGNOSIS — K80.80 BILIARY CALCULUS OF OTHER SITE WITHOUT OBSTRUCTION: ICD-10-CM

## 2024-07-13 DIAGNOSIS — R42 DIZZINESS: ICD-10-CM

## 2024-07-13 DIAGNOSIS — R11.2 NAUSEA AND VOMITING, UNSPECIFIED VOMITING TYPE: ICD-10-CM

## 2024-07-13 DIAGNOSIS — I50.31 ACUTE DIASTOLIC (CONGESTIVE) HEART FAILURE (MULTI): ICD-10-CM

## 2024-07-13 DIAGNOSIS — R41.82 ALTERED MENTAL STATUS, UNSPECIFIED ALTERED MENTAL STATUS TYPE: Primary | ICD-10-CM

## 2024-07-13 DIAGNOSIS — N30.00 ACUTE CYSTITIS WITHOUT HEMATURIA: ICD-10-CM

## 2024-07-13 LAB
ALBUMIN SERPL BCP-MCNC: 4 G/DL (ref 3.4–5)
ALP SERPL-CCNC: 48 U/L (ref 33–136)
ALT SERPL W P-5'-P-CCNC: 10 U/L (ref 7–45)
ANION GAP SERPL CALC-SCNC: 14 MMOL/L (ref 10–20)
APPEARANCE UR: CLEAR
APTT PPP: 158 SECONDS (ref 27–38)
APTT PPP: 35 SECONDS (ref 27–38)
APTT PPP: 78 SECONDS (ref 27–38)
AST SERPL W P-5'-P-CCNC: 16 U/L (ref 9–39)
BACTERIA #/AREA URNS AUTO: ABNORMAL /HPF
BASOPHILS # BLD AUTO: 0.04 X10*3/UL (ref 0–0.1)
BASOPHILS NFR BLD AUTO: 0.8 %
BILIRUB SERPL-MCNC: 0.6 MG/DL (ref 0–1.2)
BILIRUB UR STRIP.AUTO-MCNC: NEGATIVE MG/DL
BNP SERPL-MCNC: 373 PG/ML (ref 0–99)
BUN SERPL-MCNC: 29 MG/DL (ref 6–23)
CALCIUM SERPL-MCNC: 9.6 MG/DL (ref 8.6–10.3)
CARDIAC TROPONIN I PNL SERPL HS: 427 NG/L (ref 0–13)
CARDIAC TROPONIN I PNL SERPL HS: 614 NG/L (ref 0–13)
CHLORIDE SERPL-SCNC: 106 MMOL/L (ref 98–107)
CO2 SERPL-SCNC: 22 MMOL/L (ref 21–32)
COLOR UR: ABNORMAL
CREAT SERPL-MCNC: 0.88 MG/DL (ref 0.5–1.05)
EGFRCR SERPLBLD CKD-EPI 2021: 64 ML/MIN/1.73M*2
EOSINOPHIL # BLD AUTO: 0.16 X10*3/UL (ref 0–0.4)
EOSINOPHIL NFR BLD AUTO: 3.4 %
ERYTHROCYTE [DISTWIDTH] IN BLOOD BY AUTOMATED COUNT: 15 % (ref 11.5–14.5)
ERYTHROCYTE [DISTWIDTH] IN BLOOD BY AUTOMATED COUNT: 15.4 % (ref 11.5–14.5)
EST. AVERAGE GLUCOSE BLD GHB EST-MCNC: 120 MG/DL
ETHANOL SERPL-MCNC: <10 MG/DL
GLUCOSE BLD MANUAL STRIP-MCNC: 136 MG/DL (ref 74–99)
GLUCOSE SERPL-MCNC: 128 MG/DL (ref 74–99)
GLUCOSE UR STRIP.AUTO-MCNC: NORMAL MG/DL
HBA1C MFR BLD: 5.8 %
HCT VFR BLD AUTO: 40.5 % (ref 36–46)
HCT VFR BLD AUTO: 40.7 % (ref 36–46)
HGB BLD-MCNC: 12.7 G/DL (ref 12–16)
HGB BLD-MCNC: 13.1 G/DL (ref 12–16)
HOLD SPECIMEN: NORMAL
IMM GRANULOCYTES # BLD AUTO: 0.02 X10*3/UL (ref 0–0.5)
IMM GRANULOCYTES NFR BLD AUTO: 0.4 % (ref 0–0.9)
INR PPP: 1 (ref 0.9–1.1)
INR PPP: 1.1 (ref 0.9–1.1)
KETONES UR STRIP.AUTO-MCNC: ABNORMAL MG/DL
LACTATE SERPL-SCNC: 1.4 MMOL/L (ref 0.4–2)
LEUKOCYTE ESTERASE UR QL STRIP.AUTO: ABNORMAL
LYMPHOCYTES # BLD AUTO: 1.26 X10*3/UL (ref 0.8–3)
LYMPHOCYTES NFR BLD AUTO: 26.6 %
MAGNESIUM SERPL-MCNC: 1.98 MG/DL (ref 1.6–2.4)
MCH RBC QN AUTO: 27.1 PG (ref 26–34)
MCH RBC QN AUTO: 27.5 PG (ref 26–34)
MCHC RBC AUTO-ENTMCNC: 31.4 G/DL (ref 32–36)
MCHC RBC AUTO-ENTMCNC: 32.2 G/DL (ref 32–36)
MCV RBC AUTO: 86 FL (ref 80–100)
MCV RBC AUTO: 86 FL (ref 80–100)
MONOCYTES # BLD AUTO: 0.39 X10*3/UL (ref 0.05–0.8)
MONOCYTES NFR BLD AUTO: 8.2 %
NEUTROPHILS # BLD AUTO: 2.86 X10*3/UL (ref 1.6–5.5)
NEUTROPHILS NFR BLD AUTO: 60.6 %
NITRITE UR QL STRIP.AUTO: ABNORMAL
NRBC BLD-RTO: 0 /100 WBCS (ref 0–0)
NRBC BLD-RTO: 0 /100 WBCS (ref 0–0)
PH UR STRIP.AUTO: 8 [PH]
PLATELET # BLD AUTO: 221 X10*3/UL (ref 150–450)
PLATELET # BLD AUTO: 224 X10*3/UL (ref 150–450)
POTASSIUM SERPL-SCNC: 3.8 MMOL/L (ref 3.5–5.3)
PROT SERPL-MCNC: 7.2 G/DL (ref 6.4–8.2)
PROT UR STRIP.AUTO-MCNC: NEGATIVE MG/DL
PROTHROMBIN TIME: 11.4 SECONDS (ref 9.8–12.8)
PROTHROMBIN TIME: 12.5 SECONDS (ref 9.8–12.8)
RBC # BLD AUTO: 4.69 X10*6/UL (ref 4–5.2)
RBC # BLD AUTO: 4.76 X10*6/UL (ref 4–5.2)
RBC # UR STRIP.AUTO: NEGATIVE /UL
RBC #/AREA URNS AUTO: ABNORMAL /HPF
SODIUM SERPL-SCNC: 138 MMOL/L (ref 136–145)
SP GR UR STRIP.AUTO: 1.01
SQUAMOUS #/AREA URNS AUTO: ABNORMAL /HPF
TSH SERPL-ACNC: 0.61 MIU/L (ref 0.44–3.98)
TSH SERPL-ACNC: 1.67 MIU/L (ref 0.44–3.98)
UFH PPP CHRO-ACNC: 0.4 IU/ML
UFH PPP CHRO-ACNC: 0.8 IU/ML
UROBILINOGEN UR STRIP.AUTO-MCNC: NORMAL MG/DL
WBC # BLD AUTO: 4.7 X10*3/UL (ref 4.4–11.3)
WBC # BLD AUTO: 7.4 X10*3/UL (ref 4.4–11.3)
WBC #/AREA URNS AUTO: ABNORMAL /HPF

## 2024-07-13 PROCEDURE — 71275 CT ANGIOGRAPHY CHEST: CPT

## 2024-07-13 PROCEDURE — 99223 1ST HOSP IP/OBS HIGH 75: CPT

## 2024-07-13 PROCEDURE — 36415 COLL VENOUS BLD VENIPUNCTURE: CPT | Performed by: EMERGENCY MEDICINE

## 2024-07-13 PROCEDURE — 2500000004 HC RX 250 GENERAL PHARMACY W/ HCPCS (ALT 636 FOR OP/ED)

## 2024-07-13 PROCEDURE — 84443 ASSAY THYROID STIM HORMONE: CPT

## 2024-07-13 PROCEDURE — 85610 PROTHROMBIN TIME: CPT | Performed by: EMERGENCY MEDICINE

## 2024-07-13 PROCEDURE — 84443 ASSAY THYROID STIM HORMONE: CPT | Performed by: EMERGENCY MEDICINE

## 2024-07-13 PROCEDURE — 84484 ASSAY OF TROPONIN QUANT: CPT | Performed by: EMERGENCY MEDICINE

## 2024-07-13 PROCEDURE — 87086 URINE CULTURE/COLONY COUNT: CPT | Mod: ELYLAB | Performed by: EMERGENCY MEDICINE

## 2024-07-13 PROCEDURE — 80053 COMPREHEN METABOLIC PANEL: CPT | Performed by: EMERGENCY MEDICINE

## 2024-07-13 PROCEDURE — 83735 ASSAY OF MAGNESIUM: CPT | Performed by: EMERGENCY MEDICINE

## 2024-07-13 PROCEDURE — 93005 ELECTROCARDIOGRAM TRACING: CPT

## 2024-07-13 PROCEDURE — 74177 CT ABD & PELVIS W/CONTRAST: CPT | Performed by: RADIOLOGY

## 2024-07-13 PROCEDURE — 1200000002 HC GENERAL ROOM WITH TELEMETRY DAILY

## 2024-07-13 PROCEDURE — 96375 TX/PRO/DX INJ NEW DRUG ADDON: CPT

## 2024-07-13 PROCEDURE — 71045 X-RAY EXAM CHEST 1 VIEW: CPT

## 2024-07-13 PROCEDURE — 74177 CT ABD & PELVIS W/CONTRAST: CPT

## 2024-07-13 PROCEDURE — 82947 ASSAY GLUCOSE BLOOD QUANT: CPT

## 2024-07-13 PROCEDURE — 85025 COMPLETE CBC W/AUTO DIFF WBC: CPT | Performed by: EMERGENCY MEDICINE

## 2024-07-13 PROCEDURE — 2500000001 HC RX 250 WO HCPCS SELF ADMINISTERED DRUGS (ALT 637 FOR MEDICARE OP): Performed by: EMERGENCY MEDICINE

## 2024-07-13 PROCEDURE — 2500000001 HC RX 250 WO HCPCS SELF ADMINISTERED DRUGS (ALT 637 FOR MEDICARE OP): Performed by: INTERNAL MEDICINE

## 2024-07-13 PROCEDURE — 83036 HEMOGLOBIN GLYCOSYLATED A1C: CPT | Mod: ELYLAB

## 2024-07-13 PROCEDURE — 2500000004 HC RX 250 GENERAL PHARMACY W/ HCPCS (ALT 636 FOR OP/ED): Performed by: EMERGENCY MEDICINE

## 2024-07-13 PROCEDURE — 85730 THROMBOPLASTIN TIME PARTIAL: CPT | Performed by: INTERNAL MEDICINE

## 2024-07-13 PROCEDURE — 85520 HEPARIN ASSAY: CPT | Performed by: STUDENT IN AN ORGANIZED HEALTH CARE EDUCATION/TRAINING PROGRAM

## 2024-07-13 PROCEDURE — 71275 CT ANGIOGRAPHY CHEST: CPT | Performed by: RADIOLOGY

## 2024-07-13 PROCEDURE — 87040 BLOOD CULTURE FOR BACTERIA: CPT | Mod: ELYLAB | Performed by: EMERGENCY MEDICINE

## 2024-07-13 PROCEDURE — 85610 PROTHROMBIN TIME: CPT

## 2024-07-13 PROCEDURE — 96365 THER/PROPH/DIAG IV INF INIT: CPT

## 2024-07-13 PROCEDURE — 82077 ASSAY SPEC XCP UR&BREATH IA: CPT | Performed by: EMERGENCY MEDICINE

## 2024-07-13 PROCEDURE — 83880 ASSAY OF NATRIURETIC PEPTIDE: CPT | Performed by: EMERGENCY MEDICINE

## 2024-07-13 PROCEDURE — 71045 X-RAY EXAM CHEST 1 VIEW: CPT | Performed by: STUDENT IN AN ORGANIZED HEALTH CARE EDUCATION/TRAINING PROGRAM

## 2024-07-13 PROCEDURE — 70450 CT HEAD/BRAIN W/O DYE: CPT | Performed by: RADIOLOGY

## 2024-07-13 PROCEDURE — 99291 CRITICAL CARE FIRST HOUR: CPT | Mod: 25 | Performed by: EMERGENCY MEDICINE

## 2024-07-13 PROCEDURE — 85027 COMPLETE CBC AUTOMATED: CPT | Performed by: EMERGENCY MEDICINE

## 2024-07-13 PROCEDURE — 81001 URINALYSIS AUTO W/SCOPE: CPT | Performed by: EMERGENCY MEDICINE

## 2024-07-13 PROCEDURE — 2550000001 HC RX 255 CONTRASTS: Performed by: EMERGENCY MEDICINE

## 2024-07-13 PROCEDURE — 70450 CT HEAD/BRAIN W/O DYE: CPT

## 2024-07-13 PROCEDURE — 85730 THROMBOPLASTIN TIME PARTIAL: CPT | Performed by: EMERGENCY MEDICINE

## 2024-07-13 PROCEDURE — 83605 ASSAY OF LACTIC ACID: CPT | Performed by: EMERGENCY MEDICINE

## 2024-07-13 PROCEDURE — 96374 THER/PROPH/DIAG INJ IV PUSH: CPT | Mod: 59

## 2024-07-13 RX ORDER — NAPROXEN SODIUM 220 MG/1
324 TABLET, FILM COATED ORAL ONCE
Status: COMPLETED | OUTPATIENT
Start: 2024-07-13 | End: 2024-07-13

## 2024-07-13 RX ORDER — ACETAMINOPHEN 160 MG/5ML
650 SOLUTION ORAL EVERY 4 HOURS PRN
Status: DISCONTINUED | OUTPATIENT
Start: 2024-07-13 | End: 2024-07-16 | Stop reason: HOSPADM

## 2024-07-13 RX ORDER — NAPROXEN SODIUM 220 MG/1
81 TABLET, FILM COATED ORAL DAILY
Status: DISCONTINUED | OUTPATIENT
Start: 2024-07-14 | End: 2024-07-16 | Stop reason: HOSPADM

## 2024-07-13 RX ORDER — ONDANSETRON HYDROCHLORIDE 2 MG/ML
4 INJECTION, SOLUTION INTRAVENOUS ONCE
Status: DISCONTINUED | OUTPATIENT
Start: 2024-07-13 | End: 2024-07-13

## 2024-07-13 RX ORDER — SODIUM CHLORIDE 9 MG/ML
100 INJECTION, SOLUTION INTRAVENOUS CONTINUOUS
Status: ACTIVE | OUTPATIENT
Start: 2024-07-13 | End: 2024-07-14

## 2024-07-13 RX ORDER — HEPARIN SODIUM 10000 [USP'U]/100ML
0-4000 INJECTION, SOLUTION INTRAVENOUS CONTINUOUS
Status: DISCONTINUED | OUTPATIENT
Start: 2024-07-13 | End: 2024-07-15

## 2024-07-13 RX ORDER — ACETAMINOPHEN 325 MG/1
650 TABLET ORAL EVERY 4 HOURS PRN
Status: DISCONTINUED | OUTPATIENT
Start: 2024-07-13 | End: 2024-07-16 | Stop reason: HOSPADM

## 2024-07-13 RX ORDER — ACETAMINOPHEN 650 MG/1
650 SUPPOSITORY RECTAL EVERY 4 HOURS PRN
Status: DISCONTINUED | OUTPATIENT
Start: 2024-07-13 | End: 2024-07-16 | Stop reason: HOSPADM

## 2024-07-13 RX ORDER — DIAZEPAM 5 MG/ML
1 INJECTION, SOLUTION INTRAMUSCULAR; INTRAVENOUS ONCE
Status: DISCONTINUED | OUTPATIENT
Start: 2024-07-13 | End: 2024-07-13

## 2024-07-13 RX ORDER — CEFTRIAXONE 1 G/50ML
1 INJECTION, SOLUTION INTRAVENOUS ONCE
Status: COMPLETED | OUTPATIENT
Start: 2024-07-13 | End: 2024-07-13

## 2024-07-13 RX ORDER — CEFTRIAXONE 1 G/50ML
1 INJECTION, SOLUTION INTRAVENOUS EVERY 24 HOURS
Status: DISCONTINUED | OUTPATIENT
Start: 2024-07-14 | End: 2024-07-16 | Stop reason: HOSPADM

## 2024-07-13 RX ORDER — HEPARIN SODIUM 10000 [USP'U]/100ML
0-4000 INJECTION, SOLUTION INTRAVENOUS CONTINUOUS
Status: DISCONTINUED | OUTPATIENT
Start: 2024-07-13 | End: 2024-07-13 | Stop reason: SDUPTHER

## 2024-07-13 RX ORDER — POLYETHYLENE GLYCOL 3350 17 G/17G
17 POWDER, FOR SOLUTION ORAL DAILY
Status: DISCONTINUED | OUTPATIENT
Start: 2024-07-13 | End: 2024-07-16 | Stop reason: HOSPADM

## 2024-07-13 RX ADMIN — IOHEXOL 75 ML: 350 INJECTION, SOLUTION INTRAVENOUS at 13:11

## 2024-07-13 RX ADMIN — SODIUM CHLORIDE 100 ML/HR: 9 INJECTION, SOLUTION INTRAVENOUS at 19:25

## 2024-07-13 RX ADMIN — NITROGLYCERIN 0.5 INCH: 20 OINTMENT TOPICAL at 12:35

## 2024-07-13 RX ADMIN — HEPARIN SODIUM 800 UNITS/HR: 10000 INJECTION, SOLUTION INTRAVENOUS at 14:53

## 2024-07-13 RX ADMIN — PROMETHAZINE HYDROCHLORIDE 12.5 MG: 25 INJECTION INTRAMUSCULAR; INTRAVENOUS at 12:43

## 2024-07-13 RX ADMIN — HEPARIN SODIUM 800 UNITS/HR: 10000 INJECTION, SOLUTION INTRAVENOUS at 19:00

## 2024-07-13 RX ADMIN — SODIUM CHLORIDE 500 ML: 9 INJECTION, SOLUTION INTRAVENOUS at 10:28

## 2024-07-13 RX ADMIN — PROMETHAZINE HYDROCHLORIDE 12.5 MG: 25 INJECTION INTRAMUSCULAR; INTRAVENOUS at 10:07

## 2024-07-13 RX ADMIN — NITROGLYCERIN 0.5 INCH: 20 OINTMENT TOPICAL at 21:39

## 2024-07-13 RX ADMIN — ASPIRIN 324 MG: 81 TABLET, CHEWABLE ORAL at 12:35

## 2024-07-13 RX ADMIN — CEFTRIAXONE SODIUM 1 G: 1 INJECTION, SOLUTION INTRAVENOUS at 11:59

## 2024-07-13 SDOH — SOCIAL STABILITY: SOCIAL INSECURITY: HAVE YOU HAD ANY THOUGHTS OF HARMING ANYONE ELSE?: NO

## 2024-07-13 SDOH — SOCIAL STABILITY: SOCIAL INSECURITY: ARE THERE ANY APPARENT SIGNS OF INJURIES/BEHAVIORS THAT COULD BE RELATED TO ABUSE/NEGLECT?: NO

## 2024-07-13 SDOH — SOCIAL STABILITY: SOCIAL INSECURITY: HAS ANYONE EVER THREATENED TO HURT YOUR FAMILY OR YOUR PETS?: NO

## 2024-07-13 SDOH — SOCIAL STABILITY: SOCIAL INSECURITY: ABUSE: ADULT

## 2024-07-13 SDOH — SOCIAL STABILITY: SOCIAL INSECURITY: ARE YOU OR HAVE YOU BEEN THREATENED OR ABUSED PHYSICALLY, EMOTIONALLY, OR SEXUALLY BY ANYONE?: NO

## 2024-07-13 SDOH — SOCIAL STABILITY: SOCIAL INSECURITY: DOES ANYONE TRY TO KEEP YOU FROM HAVING/CONTACTING OTHER FRIENDS OR DOING THINGS OUTSIDE YOUR HOME?: NO

## 2024-07-13 SDOH — SOCIAL STABILITY: SOCIAL INSECURITY: DO YOU FEEL UNSAFE GOING BACK TO THE PLACE WHERE YOU ARE LIVING?: NO

## 2024-07-13 SDOH — SOCIAL STABILITY: SOCIAL INSECURITY: WERE YOU ABLE TO COMPLETE ALL THE BEHAVIORAL HEALTH SCREENINGS?: YES

## 2024-07-13 SDOH — SOCIAL STABILITY: SOCIAL INSECURITY: DO YOU FEEL ANYONE HAS EXPLOITED OR TAKEN ADVANTAGE OF YOU FINANCIALLY OR OF YOUR PERSONAL PROPERTY?: NO

## 2024-07-13 SDOH — SOCIAL STABILITY: SOCIAL INSECURITY: HAVE YOU HAD THOUGHTS OF HARMING ANYONE ELSE?: NO

## 2024-07-13 ASSESSMENT — PATIENT HEALTH QUESTIONNAIRE - PHQ9
1. LITTLE INTEREST OR PLEASURE IN DOING THINGS: NOT AT ALL
2. FEELING DOWN, DEPRESSED OR HOPELESS: NOT AT ALL
SUM OF ALL RESPONSES TO PHQ9 QUESTIONS 1 & 2: 0

## 2024-07-13 ASSESSMENT — COLUMBIA-SUICIDE SEVERITY RATING SCALE - C-SSRS
6. HAVE YOU EVER DONE ANYTHING, STARTED TO DO ANYTHING, OR PREPARED TO DO ANYTHING TO END YOUR LIFE?: NO
6. HAVE YOU EVER DONE ANYTHING, STARTED TO DO ANYTHING, OR PREPARED TO DO ANYTHING TO END YOUR LIFE?: NO
2. HAVE YOU ACTUALLY HAD ANY THOUGHTS OF KILLING YOURSELF?: NO
2. HAVE YOU ACTUALLY HAD ANY THOUGHTS OF KILLING YOURSELF?: NO
1. IN THE PAST MONTH, HAVE YOU WISHED YOU WERE DEAD OR WISHED YOU COULD GO TO SLEEP AND NOT WAKE UP?: NO
1. IN THE PAST MONTH, HAVE YOU WISHED YOU WERE DEAD OR WISHED YOU COULD GO TO SLEEP AND NOT WAKE UP?: NO

## 2024-07-13 ASSESSMENT — LIFESTYLE VARIABLES
HOW MANY STANDARD DRINKS CONTAINING ALCOHOL DO YOU HAVE ON A TYPICAL DAY: PATIENT DOES NOT DRINK
SKIP TO QUESTIONS 9-10: 1
SUBSTANCE_ABUSE_PAST_12_MONTHS: NO
HOW OFTEN DO YOU HAVE A DRINK CONTAINING ALCOHOL: NEVER
TOTAL SCORE: 0
HAVE YOU EVER FELT YOU SHOULD CUT DOWN ON YOUR DRINKING: NO
EVER FELT BAD OR GUILTY ABOUT YOUR DRINKING: NO
HAVE PEOPLE ANNOYED YOU BY CRITICIZING YOUR DRINKING: NO
EVER HAD A DRINK FIRST THING IN THE MORNING TO STEADY YOUR NERVES TO GET RID OF A HANGOVER: NO
PRESCIPTION_ABUSE_PAST_12_MONTHS: NO
AUDIT-C TOTAL SCORE: 0
HOW OFTEN DO YOU HAVE 6 OR MORE DRINKS ON ONE OCCASION: NEVER
AUDIT-C TOTAL SCORE: 0

## 2024-07-13 ASSESSMENT — COGNITIVE AND FUNCTIONAL STATUS - GENERAL
MOVING TO AND FROM BED TO CHAIR: A LITTLE
WALKING IN HOSPITAL ROOM: A LITTLE
DRESSING REGULAR LOWER BODY CLOTHING: A LITTLE
EATING MEALS: A LITTLE
TOILETING: A LITTLE
DRESSING REGULAR UPPER BODY CLOTHING: A LITTLE
DAILY ACTIVITIY SCORE: 18
MOBILITY SCORE: 19
PERSONAL GROOMING: A LITTLE
STANDING UP FROM CHAIR USING ARMS: A LITTLE
CLIMB 3 TO 5 STEPS WITH RAILING: A LOT
HELP NEEDED FOR BATHING: A LITTLE
PATIENT BASELINE BEDBOUND: NO

## 2024-07-13 ASSESSMENT — PAIN SCALES - GENERAL
PAINLEVEL_OUTOF10: 0 - NO PAIN

## 2024-07-13 ASSESSMENT — ACTIVITIES OF DAILY LIVING (ADL)
JUDGMENT_ADEQUATE_SAFELY_COMPLETE_DAILY_ACTIVITIES: YES
GROOMING: NEEDS ASSISTANCE
PATIENT'S MEMORY ADEQUATE TO SAFELY COMPLETE DAILY ACTIVITIES?: YES
TOILETING: INDEPENDENT
HEARING - LEFT EAR: FUNCTIONAL
WALKS IN HOME: INDEPENDENT
FEEDING YOURSELF: INDEPENDENT
HEARING - RIGHT EAR: FUNCTIONAL
JUDGMENT_ADEQUATE_SAFELY_COMPLETE_DAILY_ACTIVITIES: YES
HEARING - RIGHT EAR: FUNCTIONAL
BATHING: INDEPENDENT
HEARING - LEFT EAR: FUNCTIONAL
ADEQUATE_TO_COMPLETE_ADL: YES
DRESSING YOURSELF: INDEPENDENT
DRESSING YOURSELF: NEEDS ASSISTANCE
FEEDING YOURSELF: INDEPENDENT
TOILETING: NEEDS ASSISTANCE
WALKS IN HOME: NEEDS ASSISTANCE
PATIENT'S MEMORY ADEQUATE TO SAFELY COMPLETE DAILY ACTIVITIES?: YES
ADEQUATE_TO_COMPLETE_ADL: YES
BATHING: NEEDS ASSISTANCE
GROOMING: INDEPENDENT
ASSISTIVE_DEVICE: WALKER

## 2024-07-13 ASSESSMENT — PAIN - FUNCTIONAL ASSESSMENT
PAIN_FUNCTIONAL_ASSESSMENT: 0-10
PAIN_FUNCTIONAL_ASSESSMENT: 0-10

## 2024-07-13 NOTE — H&P
History Of Present Illness  Laurel Pugh is a 84 y.o. female presenting with change in mental status.  Patient lives home alone with her son who works 2 jobs.  Family checks on her daily and she was found to have a change in her mental status with confusion and not at her baseline.  Per patient she felt weak and dizzy x 1 day and felt like she was getting a UTI.  Patient denies chest pain or shortness of breath.  Denies abdominal pain, nausea vomiting or diarrhea.  Patient denies fever or chills.  On admission patient was hypertensive of 203/96 is not on hypertensive medication.  UA was positive for leukocytes.  Patient troponin was elevated at 427 > 614.  BMP and CBC otherwise unremarkable.  EKG showed normal sinus rhythm with BBB.  Chest x-ray showed mild cardiomegaly.  CT angio negative for PE.  Patient is not on supplemental O2.  Per family patient mental status has improved since being in the emergency room.  ER spoke with Dr. Davison regarding elevated troponin would like patient to be admitted and treated for UTI no emergent cath needed at this time.  Patient was started on a heparin drip.  Will be admitted for further evaluation and treatment.     Past Medical History  She has a past medical history of Bitten or stung by nonvenomous insect and other nonvenomous arthropods, initial encounter (05/22/2019), Encounter for general adult medical examination without abnormal findings (10/05/2022), Encounter for general adult medical examination without abnormal findings (11/02/2021), Personal history of malignant neoplasm of breast, Personal history of other diseases of urinary system (08/10/2019), Personal history of other specified conditions, Personal history of other specified conditions (08/10/2019), Personal history of urinary (tract) infections (08/10/2019), and Personal history of urinary (tract) infections (09/03/2019).    Surgical History  She has a past surgical history that includes Other surgical  history (04/30/2019); Other surgical history (04/30/2019); and Other surgical history (04/30/2019).     Social History  She reports that she has never smoked. She has never been exposed to tobacco smoke. She has never used smokeless tobacco. She reports that she does not drink alcohol and does not use drugs.    Family History  Family History   Problem Relation Name Age of Onset    Stroke Mother      Other (cardiac disorder) Father      Breast cancer Sister      Arthritis Sister          Allergies  Haloperidol, Amoxicillin-pot clavulanate, Levofloxacin, and Sulfa (sulfonamide antibiotics)    Review of Systems   Review of systems: 10 system were reviewed and were negative except what was mentioned in history of present illness    Physical Exam  Constitutional:       Appearance: She is obese. She is ill-appearing.   HENT:      Head: Normocephalic.      Mouth/Throat:      Mouth: Mucous membranes are moist.   Eyes:      Pupils: Pupils are equal, round, and reactive to light.   Cardiovascular:      Rate and Rhythm: Normal rate and regular rhythm.      Heart sounds: Normal heart sounds, S1 normal and S2 normal.   Pulmonary:      Effort: Pulmonary effort is normal.      Breath sounds: Normal breath sounds.   Abdominal:      General: Bowel sounds are normal.      Palpations: Abdomen is soft.   Musculoskeletal:         General: Normal range of motion.      Cervical back: Neck supple.   Skin:     General: Skin is warm.   Neurological:      Mental Status: She is alert. She is confused.      Motor: Weakness present.   Psychiatric:         Mood and Affect: Mood normal.         Speech: Speech normal.         Behavior: Behavior normal.          Last Recorded Vitals  BP (!) 164/99   Pulse 72   Temp 36.3 °C (97.3 °F) (Temporal)   Resp 18   Wt 68 kg (150 lb)   SpO2 100%     Relevant Results  CBC:   Results from last 7 days   Lab Units 07/13/24  1450 07/13/24  0951   WBC AUTO x10*3/uL 7.4 4.7   RBC AUTO x10*6/uL 4.76 4.69    HEMOGLOBIN g/dL 13.1 12.7   HEMATOCRIT % 40.7 40.5   MCV fL 86 86   MCH pg 27.5 27.1   MCHC g/dL 32.2 31.4*   RDW % 15.0* 15.4*   PLATELETS AUTO x10*3/uL 221 224     CMP:    Results from last 7 days   Lab Units 07/13/24  0951   SODIUM mmol/L 138   POTASSIUM mmol/L 3.8   CHLORIDE mmol/L 106   CO2 mmol/L 22   BUN mg/dL 29*   CREATININE mg/dL 0.88   GLUCOSE mg/dL 128*   PROTEIN TOTAL g/dL 7.2   CALCIUM mg/dL 9.6   BILIRUBIN TOTAL mg/dL 0.6   ALK PHOS U/L 48   AST U/L 16   ALT U/L 10     BMP:    Results from last 7 days   Lab Units 07/13/24  0951   SODIUM mmol/L 138   POTASSIUM mmol/L 3.8   CHLORIDE mmol/L 106   CO2 mmol/L 22   BUN mg/dL 29*   CREATININE mg/dL 0.88   CALCIUM mg/dL 9.6   GLUCOSE mg/dL 128*     Magnesium:  Results from last 7 days   Lab Units 07/13/24  0951   MAGNESIUM mg/dL 1.98     Troponin:    Results from last 7 days   Lab Units 07/13/24  1048 07/13/24  0951   TROPHS ng/L 614* 427*     BNP:   Results from last 7 days   Lab Units 07/13/24  0951   BNP pg/mL 373*     Lipid Panel:    Imagining  CT angio chest for pulmonary embolism  Narrative: Interpreted By:  Rigoberto Dunn,   STUDY:  CT ANGIO CHEST FOR PULMONARY EMBOLISM;  7/13/2024 1:13 pm      INDICATION:  83 y/o   F with  Signs/Symptoms:elevated cardiac enzyme, abnormal cxr.      LIMITATIONS:  None.      ACCESSION NUMBER(S):  [id number]      ORDERING CLINICIAN:  ROSE MARIE LEMOS      TECHNIQUE:  After the administration of intravenous nonionic contrast,  thin-section arterial phase images were obtained  from the thoracic  inlet down through the iliac crest. Sagittal and coronal  reconstruction images were generated. Axial and coronal MIP vascular  reconstruction images were also generated.   Mediastinal, lung, bone,  and liver windows were reviewed. 75 ML Omnipaque 350          COMPARISON:  Immediately after this exam, a CT scan of the abdomen and pelvis was  also obtained.      FINDINGS:  CHEST WALL/BASE OF THE NECK:  Previous left mastectomy.  No  thyromegaly or thyroid mass.      MEDIASTINUM/ALISIA:  No suspicious mediastinal, hilar, or axillary mass or adenopathy.  Mild cardiomegaly.  No pericardial effusion.  No thoracic aortic aneurysm or dissection. Mild mural calcifications  in the thoracic aorta. No pulmonary artery filling defect.      LUNGS/ PLEURA/ AND TRACHEA:  No mass or pneumonia in either lung.  No  pleural effusion or pneumothorax.  The trachea was grossly intact.      BONES:  No destructive lytic or blastic bone lesion. Mild midthoracic spine  dextroscoliosis.  There is mild-to-moderate disc space narrowing and  endplate osteophytosis throughout the thoracic spine.      UPPER ABDOMEN:  Please refer to the report for CT scan abdomen pelvis also done today  for information.      Impression: No CT evidence of pulmonary embolism in the current exam.      Cardiomegaly.      Previous left mastectomy.      Thoracic spine scoliosis and DJD as described.      MACRO:  None      Signed by: Rigoberto Dunn 7/13/2024 1:34 PM  Dictation workstation:   [id number]  CT abdomen pelvis w IV contrast  Narrative: Interpreted By:  Rigoberto Dunn,   STUDY:  CT ABDOMEN PELVIS W IV CONTRAST;  7/13/2024 1:13 pm      INDICATION:  83 y/o   F with  Signs/Symptoms:nasuea and vomtiing.      LIMITATIONS:  None.      ACCESSION NUMBER(S):  [id number]      ORDERING CLINICIAN:  ROSE MARIE LEMOS      TECHNIQUE:  After the administration of   oral water and IV nonionic contrast,  spiral axial images were obtained from the xiphoid down through the  symphysis pubis. Sagittal and coronal reconstruction images were  generated. Bone, mediastinal, lung, and liver windows were reviewed.  75 ML Omnipaque 350      COMPARISON:  The exam was done immediately after a CT pulmonary angiogram on the  same day..      FINDINGS:  LUNG BASES:  Please refer to the report for CT pulmonary angiogram for information  regarding the imaged chest. Previous left mastectomy.      LIVER:  No hepatomegaly.  Liver  density was  within the limits of normal.  No  liver lesion evident in this  exam.      GALLBLADDER:  Cholelithiasis. Borderline gallbladder dilatation with the  gallbladder measuring 47 mm in diameter. No gross wall thickening or  adjacent fat stranding.      BILE DUCTS:  No intrahepatic biliary ductal dilatation.  Common bile duct was  within the limits of normal.      SPLEEN:  No splenomegaly.  No splenic mass..      PANCREAS:  No pancreatic mass or inflammation, or ductal dilatation.      KIDNEYS/ADRENALS:  No adrenal mass or enlargement.  There are multiple bilateral parapelvic renal cysts. There is also a  small lateral mid to lower pole left renal cortical cyst measuring 8  mm on axial image 66. The parapelvic cysts measure up to 20 mm in  greatest diameter. No calcified stone, hydronephrosis, mass, or  perinephric edema in either kidney. No ureteral stone or dilatation.      BLADDER/PELVIS:  Urinary bladder was grossly intact.  Previous hysterectomy.  No gross adnexal mass.      GREAT VESSELS/RETROPERITONEUM:  Moderate to advanced aortoiliac calcifications. Otherwise, the  abdominal aorta and IVC were intact. No suspicious retroperitoneal  adenopathy. No suspicious mesenteric adenopathy.  No suspicious pelvic or inguinal adenopathy.      PERITONEUM:  No ascites.  No pneumoperitoneum.  No peritoneal or mesenteric mass or inflammation.      BOWEL:  The stomach was  grossly intact.  There was no small bowel dilatation or small bowel wall thickening.  No small-bowel obstruction. Mild-to-moderate scattered retained  colonic stool. There is scattered diffuse colonic diverticulosis,  most pronounced throughout the sigmoid. There was no colonic wall  thickening or large bowel obstruction.  No edema adjacent to the  colon. The cecal appendix was intact.      BONES:  No destructive lytic or blastic bone lesion. Multilevel lumbar spine  interfacet hypertrophic arthritic changes. There is sacralization of  L5. Grade 1  anterolisthesis of L4 over L5 with vacuum disc phenomenon  at that level. There is endplate osteophytosis at L1-2. Sclerotic  arthritic changes in both SI joints. Mild bilateral hip joint space  narrowing with spur formation.      ABDOMINAL WALL:  Unremarkable.      Impression: Previous left mastectomy.      Cholelithiasis with borderline gallbladder dilatation but no wall  thickening or adjacent fat stranding. Depending on the clinical  situation, consider gallbladder ultrasound in follow-up.      Multiple parapelvic cysts throughout both kidneys. No hydronephrosis.      Diffuse colonic diverticulosis without acute associated inflammation.      Arthritic changes in the spine and pelvis as described.      MACRO:  None      Signed by: Rigoberto Dunn 7/13/2024 1:30 PM  Dictation workstation:   [id number]  CT head wo IV contrast  Narrative: Interpreted By:  Teodoro Gerard,   STUDY:  CT HEAD WO IV CONTRAST;  7/13/2024 11:21 am      INDICATION:  Signs/Symptoms:confusion dizziness.      COMPARISON:  05/27/2022      ACCESSION NUMBER(S):  [id number]      ORDERING CLINICIAN:  ROSE MARIE LEMOS      TECHNIQUE:  Unenhanced images were obtained through the brain.      FINDINGS:  There is atrophy resulting in prominence of the ventricles and sulci.  There are areas of decreased attenuation within the white matter  which are nonspecific but are commonly associated with small vessel  ischemic disease. There is no mass effect or midline shift. No acute  intracranial hemorrhage is identified. No extra-axial fluid  collections are seen. No intraparenchymal mass lesions are  identified.  Bone windows demonstrate no evidence of an acute  calvarial fracture.      Impression: No evidence of an acute intracranial process.      MACRO:  None.      Signed by: Teodoro Gerard 7/13/2024 11:47 AM  Dictation workstation:   [id number]  ECG 12 lead  Normal sinus rhythm  Left bundle branch block  Abnormal ECG  When compared with ECG of 13-JUL-2024 09:18,  (unconfirmed)  No significant change was found  ECG 12 lead  Normal sinus rhythm  Left bundle branch block  Abnormal ECG  When compared with ECG of 09-MAY-2019 18:43,  Premature ventricular complexes are no longer Present  XR chest 1 view  Narrative: Interpreted By:  April Wilson,   STUDY:  XR CHEST 1 VIEW; ;  7/13/2024 10:04 am      INDICATION:  Signs/Symptoms:dizziness.      COMPARISON:  Chest radiograph dated 05/20/2022      ACCESSION NUMBER(S):  [id number]      ORDERING CLINICIAN:  ROSE MARIE LEMOS      FINDINGS:  Cardiac silhouette is mildly enlarged. Moderate atherosclerotic  calcifications of the aortic knob are noted. Lungs are clear without  consolidative airspace opacities. No large pleural effusions or  pneumothorax. No acute osseous findings. Visualized upper abdomen  appears grossly unremarkable.      Impression: 1. Mild cardiomegaly with moderate atherosclerotic calcifications of  the aortic knob, similar compared to prior radiograph.  2. Otherwise, no acute cardiopulmonary process.              MACRO:  None      Signed by: April Wilson 7/13/2024 10:43 AM  Dictation workstation:   [id number]       Assessment/Plan    UTI  NSTEMI  Altered mental status  Hx of dizziness/vertigo  Elevated troponin  CAD with bypass  Breast cancer  GERD  Left BBB  Vitamin D deficiency    -Consult cardiology  -continue Heparin gtt  -Troponin 427 > 614  -Nitro as needed  -UA positive for leukocyte  -Pending urine culture  -Pending blood culture  -Continue Rocephin IV  -Continue IV fluids  -Lipid panel, INR, A1c, TSH  -Resume home meds, patient only takes cranberry supplement, multivitamin and ASA  -ASA, BB, statin  -PT/OT evaluation  -TCC for discharge planning    DVTp: Heparin drip    PLAN: Home with son    Desi GILMORE YECENIA Bernal-CNP    Plan of care was discussed extensively with patient.  Patient verbalized understanding through teach back method.  All question and concerns addressed upon examination.    Of note,  this documentation is completed using the Dragon Dictation system (voice recognition software). There may be spelling and/or grammatical errors that were not corrected prior to final submission.

## 2024-07-13 NOTE — ED PROVIDER NOTES
84-year-old female presents emergency department with chief complaint of dizziness feeling like she is spinning and nausea.  She is brought in by EMS and they report she is having confusion.  EMS states that per family at home patient is usually alert and oriented x 4, but is only alert and oriented x 2 on their evaluation.  Patient's last known normal was yesterday evening when she went to bed, but is unclear what time this was.  History is somewhat limited due to the patient's confusion.  She does admit to dull headache no reported injury or trauma.  Denies chest pain or difficulty breathing.  No fevers or coughing.  No abdominal pain or vomiting.  No dysuria, diarrhea, constipation, black or bloody stools.  Chart review shows significant past medical history of vertigo, constipation, GERD, left bundle branch block, and ocular migraine.  Patient is not on any anticoagulants.  No history of tobacco use, illicit drug use, or regular alcohol use.      History provided by:  Patient and EMS personnel   used: No           ------------------------------------------------------------------------------------------------------------------------------------------    VS: As documented in the triage note and EMR flowsheet from this visit were reviewed.    Review of Systems  Unable to obtain review of systems due to altered mental status    [facility]  Nursing notes reviewed and confirmed by me.  Chart review performed including medications, allergies, and medical, surgical, and family history  Visit Vitals  /63   Pulse 59   Temp 36.1 °C (97 °F)   Resp 18     Physical Exam  Vitals and nursing note reviewed.   Constitutional:       General: She is not in acute distress.     Appearance: She is ill-appearing.   HENT:      Head: Normocephalic and atraumatic.      Right Ear: External ear normal.      Left Ear: External ear normal.      Nose: Nose normal. No congestion or rhinorrhea.      Mouth/Throat:       Mouth: Mucous membranes are dry.      Pharynx: No oropharyngeal exudate or posterior oropharyngeal erythema.   Eyes:      Extraocular Movements: Extraocular movements intact.      Conjunctiva/sclera: Conjunctivae normal.      Pupils: Pupils are equal, round, and reactive to light.   Cardiovascular:      Rate and Rhythm: Normal rate and regular rhythm.      Pulses: Normal pulses.      Heart sounds: Normal heart sounds.   Pulmonary:      Effort: Pulmonary effort is normal. No respiratory distress.      Breath sounds: Normal breath sounds. No stridor. No wheezing, rhonchi or rales.   Abdominal:      General: There is no distension.      Palpations: Abdomen is soft.      Tenderness: There is no abdominal tenderness. There is no guarding or rebound.   Musculoskeletal:         General: No swelling or deformity. Normal range of motion.      Cervical back: Normal range of motion and neck supple. No rigidity.      Right lower leg: No edema.      Left lower leg: No edema.      Comments: No calf tenderness    Skin:     General: Skin is warm and dry.      Capillary Refill: Capillary refill takes less than 2 seconds.      Coloration: Skin is not jaundiced.      Findings: No rash.   Neurological:      General: No focal deficit present.      Mental Status: She is alert.      Sensory: No sensory deficit.      Motor: Weakness (generalized) present.      Comments: Patient oriented to self and place but not time.  She does not recall how she got to the emergency department. She is able to follow simple commands.  She has no focal neurologic deficit on my exam, but is generally weak.   Psychiatric:      Comments: Psychiatric evaluation limited due to patient's confusion        Past Medical History:   Diagnosis Date    Arthritis     Bitten or stung by nonvenomous insect and other nonvenomous arthropods, initial encounter 05/22/2019    Tick bite, initial encounter    Encounter for general adult medical examination without abnormal  findings 10/05/2022    Medicare annual wellness visit, subsequent    Encounter for general adult medical examination without abnormal findings 11/02/2021    Medicare annual wellness visit, subsequent    Personal history of malignant neoplasm of breast     History of malignant neoplasm of breast    Personal history of other diseases of urinary system 08/10/2019    History of hematuria    Personal history of other specified conditions     History of vertigo    Personal history of other specified conditions 08/10/2019    History of urinary frequency    Personal history of urinary (tract) infections 08/10/2019    History of acute cystitis    Personal history of urinary (tract) infections 09/03/2019    History of urinary tract infection      Past Surgical History:   Procedure Laterality Date    OTHER SURGICAL HISTORY  04/30/2019    Hysterectomy    OTHER SURGICAL HISTORY  04/30/2019    Mastectomy    OTHER SURGICAL HISTORY  04/30/2019    Tooth extraction      Social History     Socioeconomic History    Marital status:    Tobacco Use    Smoking status: Never     Passive exposure: Never    Smokeless tobacco: Never   Vaping Use    Vaping status: Never Used   Substance and Sexual Activity    Alcohol use: Never    Drug use: Never      ------------------------------------------------------------------------------------------------------------------------------------------  CT abdomen pelvis w IV contrast   Final Result   Previous left mastectomy.        Cholelithiasis with borderline gallbladder dilatation but no wall   thickening or adjacent fat stranding. Depending on the clinical   situation, consider gallbladder ultrasound in follow-up.        Multiple parapelvic cysts throughout both kidneys. No hydronephrosis.        Diffuse colonic diverticulosis without acute associated inflammation.        Arthritic changes in the spine and pelvis as described.        MACRO:   None        Signed by: Rigoberto Dunn 7/13/2024 1:30 PM    Dictation workstation:   [id number]      CT angio chest for pulmonary embolism   Final Result   No CT evidence of pulmonary embolism in the current exam.        Cardiomegaly.        Previous left mastectomy.        Thoracic spine scoliosis and DJD as described.        MACRO:   None        Signed by: Rigoberto Dunn 7/13/2024 1:34 PM   Dictation workstation:   [id number]      CT head wo IV contrast   Final Result   No evidence of an acute intracranial process.        MACRO:   None.        Signed by: Teodoro Gerard 7/13/2024 11:47 AM   Dictation workstation:   [id number]      XR chest 1 view   Final Result   1. Mild cardiomegaly with moderate atherosclerotic calcifications of   the aortic knob, similar compared to prior radiograph.   2. Otherwise, no acute cardiopulmonary process.                  MACRO:   None        Signed by: April Wilson 7/13/2024 10:43 AM   Dictation workstation:   [id number]      Transthoracic Echo (TTE) Complete    (Results Pending)      Labs Reviewed   CBC WITH AUTO DIFFERENTIAL - Abnormal       Result Value    WBC 4.7      nRBC 0.0      RBC 4.69      Hemoglobin 12.7      Hematocrit 40.5      MCV 86      MCH 27.1      MCHC 31.4 (*)     RDW 15.4 (*)     Platelets 224      Neutrophils % 60.6      Immature Granulocytes %, Automated 0.4      Lymphocytes % 26.6      Monocytes % 8.2      Eosinophils % 3.4      Basophils % 0.8      Neutrophils Absolute 2.86      Immature Granulocytes Absolute, Automated 0.02      Lymphocytes Absolute 1.26      Monocytes Absolute 0.39      Eosinophils Absolute 0.16      Basophils Absolute 0.04     COMPREHENSIVE METABOLIC PANEL - Abnormal    Glucose 128 (*)     Sodium 138      Potassium 3.8      Chloride 106      Bicarbonate 22      Anion Gap 14      Urea Nitrogen 29 (*)     Creatinine 0.88      eGFR 64      Calcium 9.6      Albumin 4.0      Alkaline Phosphatase 48      Total Protein 7.2      AST 16      Bilirubin, Total 0.6      ALT 10     B-TYPE NATRIURETIC  PEPTIDE - Abnormal     (*)     Narrative:        <100 pg/mL - Heart failure unlikely  100-299 pg/mL - Intermediate probability of acute heart                  failure exacerbation. Correlate with clinical                  context and patient history.    >=300 pg/mL - Heart Failure likely. Correlate with clinical                  context and patient history.    BNP testing is performed using different testing methodology at [facility] than at other [facility]. Direct result comparisons should only be made within the same method.      SERIAL TROPONIN-INITIAL - Abnormal    Troponin I, High Sensitivity 427 (*)     Narrative:     Less than 99th percentile of normal range cutoff-  Female and children under 18 years old <14 ng/L; Male <21 ng/L: Negative  Repeat testing should be performed if clinically indicated.     Female and children under 18 years old 14-50 ng/L; Male 21-50 ng/L:  Consistent with possible cardiac damage and possible increased clinical   risk. Serial measurements may help to assess extent of myocardial damage.     >50 ng/L: Consistent with cardiac damage, increased clinical risk and  myocardial infarction. Serial measurements may help assess extent of   myocardial damage.      NOTE: Children less than 1 year old may have higher baseline troponin   levels and results should be interpreted in conjunction with the overall   clinical context.     NOTE: Troponin I testing is performed using a different   testing methodology at [facility] than at other   [facility]. Direct result comparisons should only   be made within the same method.   URINALYSIS WITH REFLEX CULTURE AND MICROSCOPIC - Abnormal    Color, Urine Light-Yellow      Appearance, Urine Clear      Specific Gravity, Urine 1.013      pH, Urine 8.0      Protein, Urine NEGATIVE      Glucose, Urine Normal      Blood, Urine NEGATIVE      Ketones, Urine 20 (1+) (*)     Bilirubin, Urine NEGATIVE       Urobilinogen, Urine Normal      Nitrite, Urine 2+ (*)     Leukocyte Esterase, Urine 500 Jacqueline/µL (*)    SERIAL TROPONIN, 1 HOUR - Abnormal    Troponin I, High Sensitivity 614 (*)     Narrative:     Less than 99th percentile of normal range cutoff-  Female and children under 18 years old <14 ng/L; Male <21 ng/L: Negative  Repeat testing should be performed if clinically indicated.     Female and children under 18 years old 14-50 ng/L; Male 21-50 ng/L:  Consistent with possible cardiac damage and possible increased clinical   risk. Serial measurements may help to assess extent of myocardial damage.     >50 ng/L: Consistent with cardiac damage, increased clinical risk and  myocardial infarction. Serial measurements may help assess extent of   myocardial damage.      NOTE: Children less than 1 year old may have higher baseline troponin   levels and results should be interpreted in conjunction with the overall   clinical context.     NOTE: Troponin I testing is performed using a different   testing methodology at [facility] than at other   [facility]. Direct result comparisons should only   be made within the same method.   MICROSCOPIC ONLY, URINE - Abnormal    WBC, Urine 11-20 (*)     RBC, Urine 3-5      Squamous Epithelial Cells, Urine 1-9 (SPARSE)      Bacteria, Urine 1+ (*)    CBC - Abnormal    WBC 7.4      nRBC 0.0      RBC 4.76      Hemoglobin 13.1      Hematocrit 40.7      MCV 86      MCH 27.5      MCHC 32.2      RDW 15.0 (*)     Platelets 221     POCT GLUCOSE - Abnormal    POCT Glucose 136 (*)    MAGNESIUM - Normal    Magnesium 1.98     LACTATE - Normal    Lactate 1.4      Narrative:     Venipuncture immediately after or during the administration of Metamizole may lead to falsely low results. Testing should be performed immediately  prior to Metamizole dosing.   PROTIME-INR - Normal    Protime 11.4      INR 1.0     ALCOHOL - Normal    Alcohol <10     TSH WITH REFLEX TO FREE T4 IF ABNORMAL -  Normal    Thyroid Stimulating Hormone 1.67      Narrative:     TSH testing is performed using different testing methodology at [facility] than at other [facility]. Direct result comparisons should only be made within the same method.     APTT - Normal    aPTT 35      Narrative:     The APTT is no longer used for monitoring Unfractionated Heparin Therapy. For monitoring Heparin Therapy, use the Heparin Assay.   TSH - Normal    Thyroid Stimulating Hormone 0.61      Narrative:     TSH testing is performed using different testing methodology at [facility] than at other [facility]. Direct result comparisons should only be made within the same method.     URINE CULTURE   BLOOD CULTURE   BLOOD CULTURE   TROPONIN SERIES- (INITIAL, 1 HR)    Narrative:     The following orders were created for panel order Troponin I Series, High Sensitivity (0, 1 HR).  Procedure                               Abnormality         Status                     ---------                               -----------         ------                     Troponin I, High Sensiti...[[id number]]  Abnormal            Final result               Troponin, High Sensitivi...[[id number]]  Abnormal            Final result                 Please view results for these tests on the individual orders.   URINALYSIS WITH REFLEX CULTURE AND MICROSCOPIC    Narrative:     The following orders were created for panel order Urinalysis with Reflex Culture and Microscopic.  Procedure                               Abnormality         Status                     ---------                               -----------         ------                     Urinalysis with Reflex C...[[id number]]  Abnormal            Final result               Extra Urine Gray Tube[[id number]]                            In process                   Please view results for these tests on the individual orders.   EXTRA URINE GRAY TUBE   HEMOGLOBIN A1C   HEPARIN ASSAY    COAGULATION SCREEN   POCT FINGERSTICK GLUCOSE   POCT FINGERSTICK GLUCOSE   POCT FINGERSTICK GLUCOSE        Medical Decision Making    Last Known Well Time: 24  Interval: Baseline  Time: 9:31 AM  Person Administering Scale: Harshad Hudson DO     1a  Level of consciousness: 0=alert; keenly responsive  1b. LOC questions:  1=Performs one task correctly  1c. LOC commands: 0=Performs both tasks correctly  2.  Best Gaze: 0=normal  3. Visual: 0=No visual loss  4. Facial Palsy: 0=Normal symmetric movement  5a. Motor left arm: 0=No drift, limb holds 90 (or 45) degrees for full 10 seconds  5b.  Motor right arm: 0=No drift, limb holds 90 (or 45) degrees for full 10 seconds  6a. motor left le=No drift, limb holds 90 (or 45) degrees for full 10 seconds  6b  Motor right le=No drift, limb holds 90 (or 45) degrees for full 10 seconds  7. Limb Ataxia: 0=Absent  8.  Sensory: 0=Normal; no sensory loss  9. Best Language:  0=No aphasia, normal  10. Dysarthria: 0=Normal  11. Extinction and Inattention: 0=No abnormality    Total:   1        VAN: VAN: Negative         EKG interpreted by ED physician: Normal sinus rhythm rate of 70.  RI within normal range.  QRS is prolonged at 146.  Findings of left bundle branch block.  QTc borderline prolonged at 492.  No significant ST elevations or depressions.  Q waves in anterior leads may represent previous infarct.  Poor R wave progression.  Left axis deviation.  EKG appears unchanged when compared to previous May 2019    EKG and by ED physician: Normal sinus rhythm rate of 65.  RI within normal range.  QRS prolonged at 144 EKG consistent with left bundle branch block.  QTc borderline prolonged at 507.  No significant ST elevations or depressions.  Q waves present in anterior leads.  Poor R wave progression.  Normal axis.  EKG is not significantly changed.      84-year-old female presents emergency department via EMS accompanied by family with report of room spinning dizziness  nausea and confusion.  Patient does not have any lateralizing neurologic deficits, but we did consider possibility of posterior stroke.  Given her last known normal she is not a candidate for TNK as she is out of the window.  A thorough workup is obtained given her presenting symptoms.  UA is concerning for urinary tract infection which could explain some of her altered mental status.  Cardiac enzymes x 2 are significantly elevated and increasing.  EKG shows left bundle branch, but no STEMI criteria.  I did discuss his case with Dr. Davison on-call for interventional cardiology.  He is in agreement with aspirin and Nitropaste and is okay with heparin after imaging is obtained as long as the patient's blood pressure is not in the 190s to 200s range.  Nitropaste did improve the patient's blood pressure and she is subsequently started on heparin after negative imaging.  CBC does not show significant leukocytosis or anemia.  Patient does not have findings of sepsis.  CMP shows no significant metabolic abnormalities.  Blood alcohol is negative.  TSH is within normal range.  Head CT shows no acute intracranial process such as hemorrhage or mass effect.  Chest x-ray shows no acute cardiopulmonary process such as pneumonia, pleural effusion, or pulmonary edema.  CT PE study is obtained given the patient's elevated enzyme to rule out pulmonary embolus.  CT PE study does not show PE.  CT abdomen pelvis obtained given the patient's nausea and vomiting shows cholelithiasis, but no obvious findings of cholecystitis.  In addition, patient has incidental findings of kidney cyst and diverticulosis.  Patient's dizziness is treated with Phenergan and IV fluids.  Family reports that patient has history of vertigo and Phenergan is the only medication that works for her.  They report that she has difficulties with other medications such as Valium, Zofran, and Benadryl.  Given the patient's findings of altered mental status, UTI, non-ST  elevation MI, and vertiginous syndrome I recommend admission for further evaluation and treatment.  Case is discussed with hospitalist on-call.  They accept patient for admission.  I advised patient and family of findings thus far and need for admission and they are agreeable.           Diagnoses as of 07/13/24 1834   Altered mental status, unspecified altered mental status type   Dizziness   Nausea and vomiting, unspecified vomiting type   NSTEMI (non-ST elevated myocardial infarction) (Multi)   Urinary tract infection without hematuria, site unspecified   Biliary calculus of other site without obstruction      1. Altered mental status, unspecified altered mental status type        2. Dizziness        3. Nausea and vomiting, unspecified vomiting type        4. NSTEMI (non-ST elevated myocardial infarction) (Multi)  Transthoracic Echo (TTE) Complete    Transthoracic Echo (TTE) Complete      5. Urinary tract infection without hematuria, site unspecified        6. Biliary calculus of other site without obstruction           Critical Care    Performed by: Harshad Hudson DO  Authorized by: Harshad Hudson DO    Critical care provider statement:     Critical care time (minutes):  35    Critical care time was exclusive of:  Separately billable procedures and treating other patients    Critical care was necessary to treat or prevent imminent or life-threatening deterioration of the following conditions:  CNS failure or compromise and cardiac failure (dizziness, confusion, NSTEMI)    Critical care was time spent personally by me on the following activities:  Development of treatment plan with patient or surrogate, discussions with consultants, evaluation of patient's response to treatment, examination of patient, pulse oximetry, re-evaluation of patient's condition, ordering and review of radiographic studies, ordering and review of laboratory studies and ordering and performing treatments and interventions    Care discussed  with: admitting provider         This note was dictated using dragon software and may contain errors related to dictation interpretation errors.      Harshad Hudson, DO  07/13/24 1833       Harshad Hudson, DO  07/13/24 1834

## 2024-07-13 NOTE — Clinical Note
Angioplasty of the left anterior descending lesion. Inflation 1: Pressure = 12 thanh; Duration = 10 sec.

## 2024-07-14 VITALS
HEIGHT: 62 IN | WEIGHT: 149.69 LBS | DIASTOLIC BLOOD PRESSURE: 67 MMHG | OXYGEN SATURATION: 98 % | TEMPERATURE: 97.2 F | RESPIRATION RATE: 16 BRPM | SYSTOLIC BLOOD PRESSURE: 147 MMHG | BODY MASS INDEX: 27.55 KG/M2 | HEART RATE: 66 BPM

## 2024-07-14 LAB
ANION GAP SERPL CALC-SCNC: 9 MMOL/L (ref 10–20)
APTT PPP: 62 SECONDS (ref 27–38)
ATRIAL RATE: 65 BPM
ATRIAL RATE: 70 BPM
BACTERIA BLD CULT: NORMAL
BACTERIA BLD CULT: NORMAL
BUN SERPL-MCNC: 21 MG/DL (ref 6–23)
CALCIUM SERPL-MCNC: 8.4 MG/DL (ref 8.6–10.3)
CARDIAC TROPONIN I PNL SERPL HS: 1196 NG/L (ref 0–13)
CARDIAC TROPONIN I PNL SERPL HS: 519 NG/L (ref 0–13)
CHLORIDE SERPL-SCNC: 110 MMOL/L (ref 98–107)
CHOLEST SERPL-MCNC: 197 MG/DL (ref 0–199)
CHOLESTEROL/HDL RATIO: 3.7
CO2 SERPL-SCNC: 24 MMOL/L (ref 21–32)
CREAT SERPL-MCNC: 0.85 MG/DL (ref 0.5–1.05)
EGFRCR SERPLBLD CKD-EPI 2021: 67 ML/MIN/1.73M*2
ERYTHROCYTE [DISTWIDTH] IN BLOOD BY AUTOMATED COUNT: 15.2 % (ref 11.5–14.5)
GLUCOSE SERPL-MCNC: 85 MG/DL (ref 74–99)
HCT VFR BLD AUTO: 37.6 % (ref 36–46)
HDLC SERPL-MCNC: 52.9 MG/DL
HGB BLD-MCNC: 12.1 G/DL (ref 12–16)
LDLC SERPL CALC-MCNC: 129 MG/DL
MCH RBC QN AUTO: 27.6 PG (ref 26–34)
MCHC RBC AUTO-ENTMCNC: 32.2 G/DL (ref 32–36)
MCV RBC AUTO: 86 FL (ref 80–100)
NON HDL CHOLESTEROL: 144 MG/DL (ref 0–149)
NRBC BLD-RTO: 0 /100 WBCS (ref 0–0)
P AXIS: 23 DEGREES
P AXIS: 6 DEGREES
P OFFSET: 179 MS
P OFFSET: 191 MS
P ONSET: 131 MS
P ONSET: 138 MS
PLATELET # BLD AUTO: 195 X10*3/UL (ref 150–450)
POTASSIUM SERPL-SCNC: 3.8 MMOL/L (ref 3.5–5.3)
PR INTERVAL: 142 MS
PR INTERVAL: 158 MS
Q ONSET: 209 MS
Q ONSET: 210 MS
QRS COUNT: 11 BEATS
QRS COUNT: 12 BEATS
QRS DURATION: 144 MS
QRS DURATION: 146 MS
QT INTERVAL: 456 MS
QT INTERVAL: 488 MS
QTC CALCULATION(BAZETT): 492 MS
QTC CALCULATION(BAZETT): 507 MS
QTC FREDERICIA: 480 MS
QTC FREDERICIA: 501 MS
R AXIS: -3 DEGREES
R AXIS: 28 DEGREES
RBC # BLD AUTO: 4.39 X10*6/UL (ref 4–5.2)
SODIUM SERPL-SCNC: 139 MMOL/L (ref 136–145)
T AXIS: 121 DEGREES
T AXIS: 137 DEGREES
T OFFSET: 437 MS
T OFFSET: 454 MS
TRIGL SERPL-MCNC: 74 MG/DL (ref 0–149)
UFH PPP CHRO-ACNC: 0.3 IU/ML
UFH PPP CHRO-ACNC: 0.3 IU/ML
VENTRICULAR RATE: 65 BPM
VENTRICULAR RATE: 70 BPM
VLDL: 15 MG/DL (ref 0–40)
WBC # BLD AUTO: 6.3 X10*3/UL (ref 4.4–11.3)

## 2024-07-14 PROCEDURE — 85027 COMPLETE CBC AUTOMATED: CPT

## 2024-07-14 PROCEDURE — 36415 COLL VENOUS BLD VENIPUNCTURE: CPT

## 2024-07-14 PROCEDURE — 2500000001 HC RX 250 WO HCPCS SELF ADMINISTERED DRUGS (ALT 637 FOR MEDICARE OP): Performed by: INTERNAL MEDICINE

## 2024-07-14 PROCEDURE — 2500000001 HC RX 250 WO HCPCS SELF ADMINISTERED DRUGS (ALT 637 FOR MEDICARE OP)

## 2024-07-14 PROCEDURE — 84484 ASSAY OF TROPONIN QUANT: CPT | Performed by: STUDENT IN AN ORGANIZED HEALTH CARE EDUCATION/TRAINING PROGRAM

## 2024-07-14 PROCEDURE — 80048 BASIC METABOLIC PNL TOTAL CA: CPT

## 2024-07-14 PROCEDURE — 85520 HEPARIN ASSAY: CPT | Performed by: INTERNAL MEDICINE

## 2024-07-14 PROCEDURE — 84484 ASSAY OF TROPONIN QUANT: CPT

## 2024-07-14 PROCEDURE — 80061 LIPID PANEL: CPT

## 2024-07-14 PROCEDURE — 85730 THROMBOPLASTIN TIME PARTIAL: CPT | Performed by: INTERNAL MEDICINE

## 2024-07-14 PROCEDURE — 99232 SBSQ HOSP IP/OBS MODERATE 35: CPT

## 2024-07-14 PROCEDURE — 36415 COLL VENOUS BLD VENIPUNCTURE: CPT | Performed by: INTERNAL MEDICINE

## 2024-07-14 PROCEDURE — 99223 1ST HOSP IP/OBS HIGH 75: CPT | Performed by: INTERNAL MEDICINE

## 2024-07-14 PROCEDURE — 2500000004 HC RX 250 GENERAL PHARMACY W/ HCPCS (ALT 636 FOR OP/ED)

## 2024-07-14 PROCEDURE — 1200000002 HC GENERAL ROOM WITH TELEMETRY DAILY

## 2024-07-14 RX ORDER — METOPROLOL TARTRATE 25 MG/1
25 TABLET, FILM COATED ORAL 2 TIMES DAILY
Status: DISCONTINUED | OUTPATIENT
Start: 2024-07-14 | End: 2024-07-14

## 2024-07-14 RX ORDER — ATORVASTATIN CALCIUM 20 MG/1
40 TABLET, FILM COATED ORAL DAILY
Status: DISCONTINUED | OUTPATIENT
Start: 2024-07-14 | End: 2024-07-16 | Stop reason: HOSPADM

## 2024-07-14 RX ORDER — METOPROLOL SUCCINATE 25 MG/1
25 TABLET, EXTENDED RELEASE ORAL DAILY
Status: DISCONTINUED | OUTPATIENT
Start: 2024-07-14 | End: 2024-07-16 | Stop reason: HOSPADM

## 2024-07-14 RX ADMIN — NITROGLYCERIN 0.5 INCH: 20 OINTMENT TOPICAL at 21:45

## 2024-07-14 RX ADMIN — CEFTRIAXONE SODIUM 1 G: 1 INJECTION, SOLUTION INTRAVENOUS at 11:59

## 2024-07-14 RX ADMIN — ACETAMINOPHEN 650 MG: 325 TABLET ORAL at 12:30

## 2024-07-14 RX ADMIN — NITROGLYCERIN 0.5 INCH: 20 OINTMENT TOPICAL at 14:51

## 2024-07-14 RX ADMIN — ATORVASTATIN CALCIUM 40 MG: 20 TABLET, FILM COATED ORAL at 21:45

## 2024-07-14 RX ADMIN — ACETAMINOPHEN 650 MG: 325 TABLET ORAL at 04:05

## 2024-07-14 RX ADMIN — HEPARIN SODIUM 600 UNITS/HR: 10000 INJECTION, SOLUTION INTRAVENOUS at 23:11

## 2024-07-14 RX ADMIN — ASPIRIN 81 MG: 81 TABLET, CHEWABLE ORAL at 10:09

## 2024-07-14 RX ADMIN — NITROGLYCERIN 0.5 INCH: 20 OINTMENT TOPICAL at 10:09

## 2024-07-14 ASSESSMENT — COGNITIVE AND FUNCTIONAL STATUS - GENERAL
MOBILITY SCORE: 21
WALKING IN HOSPITAL ROOM: A LITTLE
PERSONAL GROOMING: A LITTLE
TOILETING: A LITTLE
DRESSING REGULAR LOWER BODY CLOTHING: A LITTLE
CLIMB 3 TO 5 STEPS WITH RAILING: A LITTLE
HELP NEEDED FOR BATHING: A LITTLE
STANDING UP FROM CHAIR USING ARMS: A LITTLE
DRESSING REGULAR UPPER BODY CLOTHING: A LITTLE
EATING MEALS: A LITTLE
DAILY ACTIVITIY SCORE: 18

## 2024-07-14 ASSESSMENT — PAIN SCALES - GENERAL
PAINLEVEL_OUTOF10: 0 - NO PAIN
PAINLEVEL_OUTOF10: 3
PAINLEVEL_OUTOF10: 3
PAINLEVEL_OUTOF10: 0 - NO PAIN

## 2024-07-14 ASSESSMENT — PAIN DESCRIPTION - ORIENTATION: ORIENTATION: RIGHT

## 2024-07-14 ASSESSMENT — PAIN - FUNCTIONAL ASSESSMENT
PAIN_FUNCTIONAL_ASSESSMENT: 0-10

## 2024-07-14 ASSESSMENT — PAIN DESCRIPTION - LOCATION: LOCATION: BACK

## 2024-07-14 NOTE — H&P (VIEW-ONLY)
Cardiology Consult Note  Patient: Laurel Pugh  Unit/Bed: 1002/1002-A  YOB: 1940  MRN: [id number]  Acct: [id number]   Admitting Diagnosis: Dizziness [R42]  NSTEMI (non-ST elevated myocardial infarction) (Multi) [I21.4]  Biliary calculus of other site without obstruction [K80.80]  Urinary tract infection without hematuria, site unspecified [N39.0]  Altered mental status, unspecified altered mental status type [R41.82]  Nausea and vomiting, unspecified vomiting type [R11.2]  Date:  7/13/2024  Hospital Day: 1  Attending: Nicholas Haines MD    Rounding cardiologist Luis Daniel Rosa MD,    Consultant: Dr. Luis Daniel Rosa     Primary Cardiologist:  None       Complaint:  Chief Complaint   Patient presents with    Altered Mental Status     Per EMS pt is normally A&Ox4 but is confused this morning. Pt has been asking the same questions and unaware of things she should know like getting a new roof today. Pt reports she is nauseated and dizzy.         History of Present Illness:  84-year-old woman with no known coronary disease or cardiac history presents to the emergency department 7/13/2024 with complaint of dizziness and nausea as well as confusion.  Family had reported usually she is well-oriented.  In the ER free of chest pain or dyspnea.  Describes headache otherwise no significant symptoms.  CT study emergency room with cholelithiasis, parapelvic cysts, diverticulosis arthritis of the spine.  CT of the chest without evidence of pulmonary embolism though cardiomegaly suspected.  Chest x-ray did not describe CHF CBC was normal CMP unremarkable except glucose 128 creatinine was 0.88 BNP elevated at 373 initial troponin 427.  Second troponin 614.  Thyroid was normal    My personal review of ECGs: Normal sinus rhythm left bundle branch block without change from not 0 918 hours to 1141 hours on 7/13/2024.  No change from prior EKG showing left bundle branch block    She did have confusion but no lateralizing  defects and no significant abnormalities of CT of the brain.  UA was consistent with infection.  In light of infection/change in mental status enzyme pattern admitted for further evaluation.    She currently feels very well.  She is with her daughter and granddaughter.  She reports no chest pain, shortness of breath, palpitation lightheadedness or syncope.  No history of coronary artery disease.  Just a few days ago she was going up and down basement stairs without cardiorespiratory symptoms.  She does not smoke cigarettes.      Cardiac history  Hyperlipidemia  No history of coronary disease  Left bundle branch block since 2010  Allergies:  Allergies   Allergen Reactions    Haloperidol Hallucinations    Zofran [Ondansetron Hcl] Hallucinations    Amoxicillin-Pot Clavulanate Rash    Levofloxacin Rash    Sulfa (Sulfonamide Antibiotics) Rash        PMHx:  Past Medical History:   Diagnosis Date    Arthritis     Bitten or stung by nonvenomous insect and other nonvenomous arthropods, initial encounter 05/22/2019    Tick bite, initial encounter    Encounter for general adult medical examination without abnormal findings 10/05/2022    Medicare annual wellness visit, subsequent    Encounter for general adult medical examination without abnormal findings 11/02/2021    Medicare annual wellness visit, subsequent    Personal history of malignant neoplasm of breast     History of malignant neoplasm of breast    Personal history of other diseases of urinary system 08/10/2019    History of hematuria    Personal history of other specified conditions     History of vertigo    Personal history of other specified conditions 08/10/2019    History of urinary frequency    Personal history of urinary (tract) infections 08/10/2019    History of acute cystitis    Personal history of urinary (tract) infections 09/03/2019    History of urinary tract infection       PSHx:  Past Surgical History:   Procedure Laterality Date    OTHER SURGICAL  HISTORY  04/30/2019    Hysterectomy    OTHER SURGICAL HISTORY  04/30/2019    Mastectomy    OTHER SURGICAL HISTORY  04/30/2019    Tooth extraction       Social Hx:  Social History     Socioeconomic History    Marital status:    Tobacco Use    Smoking status: Never     Passive exposure: Never    Smokeless tobacco: Never   Vaping Use    Vaping status: Never Used   Substance and Sexual Activity    Alcohol use: Never    Drug use: Never       Family Hx:  Family History   Problem Relation Name Age of Onset    Stroke Mother      Other (cardiac disorder) Father      Breast cancer Sister      Arthritis Sister         Review of Systems:   Review of Systems   All other systems reviewed and are negative.      Vitals:    07/13/24 2000 07/14/24 0048 07/14/24 0500 07/14/24 0744   BP: 140/64 141/64 126/60 120/57   BP Location:    Left arm   Patient Position:    Sitting   Pulse: 68 63 64 70   Resp:    18   Temp: 36.2 °C (97.2 °F) 36.5 °C (97.7 °F) 36.4 °C (97.5 °F) 36.8 °C (98.2 °F)   TempSrc:    Temporal   SpO2: 97% 97% 95% 95%   Weight: 67.9 kg (149 lb 11.1 oz)      Height:           Intake/Output Summary (Last 24 hours) at 7/14/2024 1046  Last data filed at 7/14/2024 0400  Gross per 24 hour   Intake 740 ml   Output 500 ml   Net 240 ml      Wt Readings from Last 4 Encounters:   07/13/24 67.9 kg (149 lb 11.1 oz)   03/08/24 66.7 kg (147 lb)   12/08/23 65.8 kg (145 lb)   04/12/23 66 kg (145 lb 6.4 oz)      Physical Examination:       Physical Exam  Constitutional:       General: She is not in acute distress.     Appearance: Normal appearance. She is not ill-appearing.   HENT:      Head: Normocephalic.      Nose: Nose normal.      Mouth/Throat:      Mouth: Mucous membranes are moist.   Cardiovascular:      Rate and Rhythm: Normal rate and regular rhythm.      Pulses: Normal pulses.      Heart sounds: No murmur heard.  Pulmonary:      Effort: Pulmonary effort is normal.      Breath sounds: Normal breath sounds.   Abdominal:       Palpations: Abdomen is soft.   Musculoskeletal:      Cervical back: Normal range of motion.   Neurological:      General: No focal deficit present.      Mental Status: She is alert.         LABS:  CBC:   Results from last 7 days   Lab Units 07/14/24  0325 07/13/24  1450 07/13/24  0951   WBC AUTO x10*3/uL 6.3 7.4 4.7   RBC AUTO x10*6/uL 4.39 4.76 4.69   HEMOGLOBIN g/dL 12.1 13.1 12.7   HEMATOCRIT % 37.6 40.7 40.5   MCV fL 86 86 86   MCH pg 27.6 27.5 27.1   MCHC g/dL 32.2 32.2 31.4*   RDW % 15.2* 15.0* 15.4*   PLATELETS AUTO x10*3/uL 195 221 224     CMP:    Results from last 7 days   Lab Units 07/14/24 0325 07/13/24  0951   SODIUM mmol/L 139 138   POTASSIUM mmol/L 3.8 3.8   CHLORIDE mmol/L 110* 106   CO2 mmol/L 24 22   BUN mg/dL 21 29*   CREATININE mg/dL 0.85 0.88   GLUCOSE mg/dL 85 128*   PROTEIN TOTAL g/dL  --  7.2   CALCIUM mg/dL 8.4* 9.6   BILIRUBIN TOTAL mg/dL  --  0.6   ALK PHOS U/L  --  48   AST U/L  --  16   ALT U/L  --  10     BMP:    Results from last 7 days   Lab Units 07/14/24 0325 07/13/24  0951   SODIUM mmol/L 139 138   POTASSIUM mmol/L 3.8 3.8   CHLORIDE mmol/L 110* 106   CO2 mmol/L 24 22   BUN mg/dL 21 29*   CREATININE mg/dL 0.85 0.88   CALCIUM mg/dL 8.4* 9.6   GLUCOSE mg/dL 85 128*     Magnesium:  Results from last 7 days   Lab Units 07/13/24  0951   MAGNESIUM mg/dL 1.98     Troponin:    Results from last 7 days   Lab Units 07/14/24  0325 07/13/24  1048 07/13/24  0951   TROPHS ng/L 1,196* 614* 427*     BNP:   Results from last 7 days   Lab Units 07/13/24  0951   BNP pg/mL 373*     Lipid Panel:  Results from last 7 days   Lab Units 07/14/24  0325   HDL mg/dL 52.9   CHOLESTEROL/HDL RATIO  3.7   VLDL mg/dL 15   TRIGLYCERIDES mg/dL 74   NON HDL CHOL. mg/dL 144   Total 197     Blood cultures negative to date    Current Medications:  aspirin, 81 mg, oral, Daily  cefTRIAXone, 1 g, intravenous, q24h  nitroglycerin, 0.5 inch, transdermal, q6h during day  perflutren lipid microspheres, 0.5-10 mL of  dilution, intravenous, Once in imaging  perflutren protein A microsphere, 0.5 mL, intravenous, Once in imaging  polyethylene glycol, 17 g, oral, Daily  sulfur hexafluoride microsphr, 2 mL, intravenous, Once in imaging       heparin, 0-4,000 Units/hr, Last Rate: 600 Units/hr (07/13/24 1947)       Current Outpatient Medications   Medication Instructions    aspirin 81 mg EC tablet 1 tablet, oral, Daily    cholecalciferol (Vitamin D-3) 25 MCG (1000 UT) tablet oral    cranberry 400 mg capsule oral    cyanocobalamin (VITAMIN B-12) 2,000 mcg, oral, Daily    docusate sodium (STOOL SOFTENER ORAL) oral, Daily    fluticasone (Flonase) 50 mcg/actuation nasal spray 1 spray, Each Nostril, Daily, Shake gently. Before first use, prime pump. After use, clean tip and replace cap.        CT angio chest for pulmonary embolism    Result Date: 7/13/2024  Interpreted By:  Rigoberto Dunn, STUDY: CT ANGIO CHEST FOR PULMONARY EMBOLISM;  7/13/2024 1:13 pm   INDICATION: 85 y/o   F with  Signs/Symptoms:elevated cardiac enzyme, abnormal cxr.   LIMITATIONS: None.   ACCESSION NUMBER(S): [id number]   ORDERING CLINICIAN: ROSE MARIE LEMOS   TECHNIQUE: After the administration of intravenous nonionic contrast, thin-section arterial phase images were obtained  from the thoracic inlet down through the iliac crest. Sagittal and coronal reconstruction images were generated. Axial and coronal MIP vascular reconstruction images were also generated.   Mediastinal, lung, bone, and liver windows were reviewed. 75 ML Omnipaque 350     COMPARISON: Immediately after this exam, a CT scan of the abdomen and pelvis was also obtained.   FINDINGS: CHEST WALL/BASE OF THE NECK: Previous left mastectomy. No thyromegaly or thyroid mass.   MEDIASTINUM/ALISIA: No suspicious mediastinal, hilar, or axillary mass or adenopathy. Mild cardiomegaly. No pericardial effusion. No thoracic aortic aneurysm or dissection. Mild mural calcifications in the thoracic aorta. No pulmonary artery  filling defect.   LUNGS/ PLEURA/ AND TRACHEA: No mass or pneumonia in either lung. No  pleural effusion or pneumothorax. The trachea was grossly intact.   BONES: No destructive lytic or blastic bone lesion. Mild midthoracic spine dextroscoliosis.  There is mild-to-moderate disc space narrowing and endplate osteophytosis throughout the thoracic spine.   UPPER ABDOMEN: Please refer to the report for CT scan abdomen pelvis also done today for information.       No CT evidence of pulmonary embolism in the current exam.   Cardiomegaly.   Previous left mastectomy.   Thoracic spine scoliosis and DJD as described.   MACRO: None   Signed by: Rigoberto Dunn 7/13/2024 1:34 PM Dictation workstation:   [id number]    CT abdomen pelvis w IV contrast    Result Date: 7/13/2024  Interpreted By:  Rigoberto Dunn, STUDY: CT ABDOMEN PELVIS W IV CONTRAST;  7/13/2024 1:13 pm   INDICATION: 85 y/o   F with  Signs/Symptoms:nasuea and vomtiing.   LIMITATIONS: None.   ACCESSION NUMBER(S): [id number]   ORDERING CLINICIAN: ROSE MARIE LEMOS   TECHNIQUE: After the administration of   oral water and IV nonionic contrast, spiral axial images were obtained from the xiphoid down through the symphysis pubis. Sagittal and coronal reconstruction images were generated. Bone, mediastinal, lung, and liver windows were reviewed. 75 ML Omnipaque 350   COMPARISON: The exam was done immediately after a CT pulmonary angiogram on the same day..   FINDINGS: LUNG BASES: Please refer to the report for CT pulmonary angiogram for information regarding the imaged chest. Previous left mastectomy.   LIVER: No hepatomegaly. Liver density was  within the limits of normal. No  liver lesion evident in this  exam.   GALLBLADDER: Cholelithiasis. Borderline gallbladder dilatation with the gallbladder measuring 47 mm in diameter. No gross wall thickening or adjacent fat stranding.   BILE DUCTS: No intrahepatic biliary ductal dilatation.  Common bile duct was within the limits of  normal.   SPLEEN: No splenomegaly. No splenic mass..   PANCREAS: No pancreatic mass or inflammation, or ductal dilatation.   KIDNEYS/ADRENALS: No adrenal mass or enlargement. There are multiple bilateral parapelvic renal cysts. There is also a small lateral mid to lower pole left renal cortical cyst measuring 8 mm on axial image 66. The parapelvic cysts measure up to 20 mm in greatest diameter. No calcified stone, hydronephrosis, mass, or perinephric edema in either kidney. No ureteral stone or dilatation.   BLADDER/PELVIS: Urinary bladder was grossly intact. Previous hysterectomy.  No gross adnexal mass.   GREAT VESSELS/RETROPERITONEUM: Moderate to advanced aortoiliac calcifications. Otherwise, the abdominal aorta and IVC were intact. No suspicious retroperitoneal adenopathy. No suspicious mesenteric adenopathy. No suspicious pelvic or inguinal adenopathy.   PERITONEUM: No ascites. No pneumoperitoneum. No peritoneal or mesenteric mass or inflammation.   BOWEL: The stomach was  grossly intact. There was no small bowel dilatation or small bowel wall thickening. No small-bowel obstruction. Mild-to-moderate scattered retained colonic stool. There is scattered diffuse colonic diverticulosis, most pronounced throughout the sigmoid. There was no colonic wall thickening or large bowel obstruction.  No edema adjacent to the colon. The cecal appendix was intact.   BONES: No destructive lytic or blastic bone lesion. Multilevel lumbar spine interfacet hypertrophic arthritic changes. There is sacralization of L5. Grade 1 anterolisthesis of L4 over L5 with vacuum disc phenomenon at that level. There is endplate osteophytosis at L1-2. Sclerotic arthritic changes in both SI joints. Mild bilateral hip joint space narrowing with spur formation.   ABDOMINAL WALL: Unremarkable.       Previous left mastectomy.   Cholelithiasis with borderline gallbladder dilatation but no wall thickening or adjacent fat stranding. Depending on the  clinical situation, consider gallbladder ultrasound in follow-up.   Multiple parapelvic cysts throughout both kidneys. No hydronephrosis.   Diffuse colonic diverticulosis without acute associated inflammation.   Arthritic changes in the spine and pelvis as described.   MACRO: None   Signed by: Rigoberto Dunn 7/13/2024 1:30 PM Dictation workstation:   [id number]    CT head wo IV contrast    Result Date: 7/13/2024  Interpreted By:  Teodoro Gerard, STUDY: CT HEAD WO IV CONTRAST;  7/13/2024 11:21 am   INDICATION: Signs/Symptoms:confusion dizziness.   COMPARISON: 05/27/2022   ACCESSION NUMBER(S): [id number]   ORDERING CLINICIAN: ROSE MARIE LEMOS   TECHNIQUE: Unenhanced images were obtained through the brain.   FINDINGS: There is atrophy resulting in prominence of the ventricles and sulci. There are areas of decreased attenuation within the white matter which are nonspecific but are commonly associated with small vessel ischemic disease. There is no mass effect or midline shift. No acute intracranial hemorrhage is identified. No extra-axial fluid collections are seen. No intraparenchymal mass lesions are identified.  Bone windows demonstrate no evidence of an acute calvarial fracture.       No evidence of an acute intracranial process.   MACRO: None.   Signed by: Teodoro Gerard 7/13/2024 11:47 AM Dictation workstation:   [id number]    ECG 12 lead    Result Date: 7/13/2024  Normal sinus rhythm Left bundle branch block Abnormal ECG When compared with ECG of 13-JUL-2024 09:18, (unconfirmed) No significant change was found    ECG 12 lead    Result Date: 7/13/2024  Normal sinus rhythm Left bundle branch block Abnormal ECG When compared with ECG of 09-MAY-2019 18:43, Premature ventricular complexes are no longer Present    XR chest 1 view    Result Date: 7/13/2024  Interpreted By:  April Wilson, STUDY: XR CHEST 1 VIEW; ;  7/13/2024 10:04 am   INDICATION: Signs/Symptoms:dizziness.   COMPARISON: Chest radiograph dated 05/20/2022    ACCESSION NUMBER(S): [id number]   ORDERING CLINICIAN: ROSE MARIE LEMOS   FINDINGS: Cardiac silhouette is mildly enlarged. Moderate atherosclerotic calcifications of the aortic knob are noted. Lungs are clear without consolidative airspace opacities. No large pleural effusions or pneumothorax. No acute osseous findings. Visualized upper abdomen appears grossly unremarkable.       1. Mild cardiomegaly with moderate atherosclerotic calcifications of the aortic knob, similar compared to prior radiograph. 2. Otherwise, no acute cardiopulmonary process.       MACRO: None   Signed by: April Wilson 7/13/2024 10:43 AM Dictation workstation:   [id number]       No results found for this or any previous visit from the past 1095 days.                 Encounter Date: 07/13/24   ECG 12 lead   Result Value    Ventricular Rate 65    Atrial Rate 65    TN Interval 158    QRS Duration 144    QT Interval 488    QTC Calculation(Bazett) 507    P Axis 6    R Axis 28    T Axis 137    QRS Count 11    Q Onset 210    P Onset 131    P Offset 179    T Offset 454    QTC Fredericia 501    Narrative    Normal sinus rhythm  Left bundle branch block  Abnormal ECG  When compared with ECG of 13-JUL-2024 09:18, (unconfirmed)  No significant change was found        Tele monitoring: Normal sinus rhythm        Assessment:    Patient Active Problem List   Diagnosis    Arthritis of foot, left    History of breast cancer    Constipation    Vertigo    GERD (gastroesophageal reflux disease)    Left bundle branch block    Mild vitamin D deficiency    Actinic keratosis    Seborrheic keratosis    Varicose veins of both lower extremities with inflammation    Ocular migraine    Altered mental status, unspecified altered mental status type          Impression/plan:  Elevated troponin: She has chronic left bundle branch block.  Hence, not helpful for assessment of ischemia.  Troponin pattern is concerning for an ischemic event and level of rise is significant as  well.  Nonetheless was fairly active up until just a few days ago and has never had chest tightness and does not have any now.  She is very viable 84 years old appearing somewhat younger than her stated age.  Blood cultures have remained negative.  She was not hypotensive/septic nor does she have a stroke or other causes of elevated troponin.  She was hypertensive on presentation it can sometimes raise troponin slightly but not usually greater than 1000.  I think it would be reasonable to pursue coronary angiography given this marked increase in troponin.  Alternatively could consider perfusion study.  She would like to think about these options I have reviewed them with her including risk and benefit of catheterization.  Certainly if echo shows segmental wall motion abnormality would be even more concerning for recent event.  Further recommendations to be made after results of echo are known.  Hypertension: Currently controlled  For potential ischemic cardiomyopathy add low-dose beta-blocker/nitrate continue heparin for now assess echo in the morning.  May consider for coronary angiography depending symptoms overnight.  Would also repeat troponin  Blood cultures remain negative if she had positive blood cultures would not pursue catheterization.              Electronically signed by Luis Daniel Rosa MD on 7/14/2024 at 10:46 AM

## 2024-07-14 NOTE — PROGRESS NOTES
Physical Therapy                 Therapy Communication Note    Patient Name: Laurel Pugh  MRN: [id number]  Today's Date: 7/14/2024     Discipline: Physical Therapy    Missed Visit Reason: 1005 - Missed Visit Reason: Other (Comment) (PT/OT attempt. Pt with uptrending troponin levels. 614>1196. Nursing requested therapy hold for today. Reattempt as able.)    Missed Time: Attempt

## 2024-07-14 NOTE — PROGRESS NOTES
"Daily Progress Note    Laurel Pugh is a 84 y.o. female on day 1 of admission presenting with Altered mental status, unspecified altered mental status type.    Subjective   Patient seen resting quietly in bed.  Patient alert and oriented x 3 back to baseline.  Patient states feeling much better than yesterday.  Family at bedside.  Continue heparin drip and will trend troponin.  Appreciate cardiology recommendations.  Patient denies chest pain or shortness of breath and remains on room air.       Objective     Physical Exam    Physical Exam  Constitutional:       Appearance: She is obese. She is well-groomed.   HENT:      Head: Normocephalic.      Mouth/Throat:      Mouth: Mucous membranes are moist.   Eyes:      Pupils: Pupils are equal, round, and reactive to light.   Cardiovascular:      Rate and Rhythm: Normal rate and regular rhythm.      Heart sounds: Normal heart sounds, S1 normal and S2 normal.   Pulmonary:      Effort: Pulmonary effort is normal.      Breath sounds: Normal breath sounds.   Abdominal:      General: Bowel sounds are normal.      Palpations: Abdomen is soft.   Musculoskeletal:         General: Normal range of motion.      Cervical back: Neck supple.   Skin:     General: Skin is warm.   Neurological:      Mental Status: She is alert and oriented.      Motor: Weakness present.   Psychiatric:         Mood and Affect: Mood normal.         Speech: Speech normal.         Behavior: Behavior normal.     Last Recorded Vitals  Blood pressure 120/57, pulse 70, temperature 36.8 °C (98.2 °F), temperature source Temporal, resp. rate 18, height 1.575 m (5' 2\"), weight 67.9 kg (149 lb 11.1 oz), SpO2 95%.  Intake/Output last 3 Shifts:  I/O last 3 completed shifts:  In: 740 (10.9 mL/kg) [P.O.:240; IV Piggyback:500]  Out: 500 (7.4 mL/kg) [Urine:500 (0.2 mL/kg/hr)]  Weight: 67.9 kg     Medications  Scheduled medications  aspirin, 81 mg, oral, Daily  atorvastatin, 40 mg, oral, Daily  cefTRIAXone, 1 g, " intravenous, q24h  metoprolol succinate XL, 25 mg, oral, Daily  nitroglycerin, 0.5 inch, transdermal, q6h during day  perflutren lipid microspheres, 0.5-10 mL of dilution, intravenous, Once in imaging  perflutren protein A microsphere, 0.5 mL, intravenous, Once in imaging  polyethylene glycol, 17 g, oral, Daily  sulfur hexafluoride microsphr, 2 mL, intravenous, Once in imaging      Continuous medications  heparin, 0-4,000 Units/hr, Last Rate: 600 Units/hr (07/13/24 1947)      PRN medications  PRN medications: acetaminophen **OR** acetaminophen **OR** acetaminophen    Labs  CBC:   Results from last 7 days   Lab Units 07/14/24  0325 07/13/24  1450 07/13/24  0951   WBC AUTO x10*3/uL 6.3 7.4 4.7   RBC AUTO x10*6/uL 4.39 4.76 4.69   HEMOGLOBIN g/dL 12.1 13.1 12.7   HEMATOCRIT % 37.6 40.7 40.5   MCV fL 86 86 86   MCH pg 27.6 27.5 27.1   MCHC g/dL 32.2 32.2 31.4*   RDW % 15.2* 15.0* 15.4*   PLATELETS AUTO x10*3/uL 195 221 224     CMP:    Results from last 7 days   Lab Units 07/14/24 0325 07/13/24  0951   SODIUM mmol/L 139 138   POTASSIUM mmol/L 3.8 3.8   CHLORIDE mmol/L 110* 106   CO2 mmol/L 24 22   BUN mg/dL 21 29*   CREATININE mg/dL 0.85 0.88   GLUCOSE mg/dL 85 128*   PROTEIN TOTAL g/dL  --  7.2   CALCIUM mg/dL 8.4* 9.6   BILIRUBIN TOTAL mg/dL  --  0.6   ALK PHOS U/L  --  48   AST U/L  --  16   ALT U/L  --  10     BMP:    Results from last 7 days   Lab Units 07/14/24 0325 07/13/24  0951   SODIUM mmol/L 139 138   POTASSIUM mmol/L 3.8 3.8   CHLORIDE mmol/L 110* 106   CO2 mmol/L 24 22   BUN mg/dL 21 29*   CREATININE mg/dL 0.85 0.88   CALCIUM mg/dL 8.4* 9.6   GLUCOSE mg/dL 85 128*     Magnesium:  Results from last 7 days   Lab Units 07/13/24  0951   MAGNESIUM mg/dL 1.98     Troponin:    Results from last 7 days   Lab Units 07/14/24  0325 07/13/24  1048 07/13/24  0951   TROPHS ng/L 1,196* 614* 427*     BNP:   Results from last 7 days   Lab Units 07/13/24  0951   BNP pg/mL 373*     Lipid Panel:  Results from last 7 days    Lab Units 07/13/24  1847 07/13/24  0951   INR  1.1 1.0   PROTIME seconds 12.5 11.4        Relevant Results  Results from last 7 days   Lab Units 07/14/24  0325 07/13/24  0951 07/13/24  0928   POCT GLUCOSE mg/dL  --   --  136*   GLUCOSE mg/dL 85 128*  --      Lab Results   Component Value Date    HGBA1C 5.8 (H) 07/13/2024        Assessment/Plan    UTI  NSTEMI  Altered mental status  Hx of dizziness/vertigo  Elevated troponin  CAD with bypass  Breast cancer  GERD  Left BBB  Vitamin D deficiency    -Consult cardiology  -continue Heparin gtt  -Troponin 427 > 614>1196  -Nitro paste daily  -UA positive for leukocyte  -Continue Rocephin IV  -Continue IV fluids  -Lipid panel, INR, A1c, TSH  -Resume home meds when reconciled  -ASA, BB, statin  -PT/OT evaluation  -TCC for discharge planning    DVTp: Heparin drip    PLAN: Patient lives at home with son    Desi GILMORE YECENIA Bernal-CNP    Plan of care was discussed extensively with patient.  Patient verbalized understanding through teach back method.  All question and concerns addressed upon examination.    Of note, this documentation is completed using the Dragon Dictation system (voice recognition software). There may be spelling and/or grammatical errors that were not corrected prior to final submission.

## 2024-07-14 NOTE — PROGRESS NOTES
"Laurel Pugh is a 84 y.o. female on day 1 of admission presenting with Altered mental status, unspecified altered mental status type.    Subjective   Pt is seen sitting up in the chair. She is alert and fully oriented. She states that she is feeling much better today. She states that she has a discomfort feeling on her shoulders from the bed, so she prefer to sits  up on the chair. She rabia CP/SOB, N/V, dysuria.           Objective     Physical Exam  Constitutional:       Appearance: She is obese.   HENT:      Head: Normocephalic.      Mouth/Throat:      Mouth: Mucous membranes are moist.   Eyes:      Pupils: Pupils are equal, round, and reactive to light.   Cardiovascular:      Rate and Rhythm: Normal rate and regular rhythm.      Pulses: Normal pulses.      Heart sounds: Normal heart sounds.   Pulmonary:      Effort: Pulmonary effort is normal.      Breath sounds: Normal breath sounds.   Abdominal:      Palpations: Abdomen is soft.   Musculoskeletal:         General: Normal range of motion.      Cervical back: Normal range of motion and neck supple.   Skin:     General: Skin is warm.   Neurological:      General: No focal deficit present.      Mental Status: She is alert and oriented to person, place, and time.      Motor: Weakness present.   Psychiatric:         Mood and Affect: Mood normal.         Behavior: Behavior normal.         Last Recorded Vitals  Blood pressure 120/57, pulse 70, temperature 36.8 °C (98.2 °F), temperature source Temporal, resp. rate 18, height 1.575 m (5' 2\"), weight 67.9 kg (149 lb 11.1 oz), SpO2 95%.  Intake/Output last 3 Shifts:  I/O last 3 completed shifts:  In: 740 (10.9 mL/kg) [P.O.:240; IV Piggyback:500]  Out: 500 (7.4 mL/kg) [Urine:500 (0.2 mL/kg/hr)]  Weight: 67.9 kg     Relevant Results    Troponin I 427>613> 1196  Ca 8.4    HDL 52.9  aPTT 158>78>62  Glucose 85    ECG 12 Lead     Normal sinus rhythm  Left bundle branch block  When compared with ECG of 09-MAY-2019 " 18:43, Premature ventricular complexes are no longer Present.    Transthoracic Echo (TTE) Complete     XR chest 1 view   1. Mild cardiomegaly with moderate atherosclerotic calcifications of the aortic knob, similar compared to prior radiograph.  2. Otherwise, no acute cardiopulmonary process.    CT head wo IV contrast   No evidence of an acute intracranial process.     CT abdomen pelvis w IV contrast and CT angio chest for pulmonary embolism   Cholelithiasis with borderline gallbladder dilatation but no wall thickening or adjacent fat stranding.  Multiple parapelvic cysts throughout both kidneys. No hydronephrosis.   Diffuse colonic diverticulosis without acute associated inflammation.   Arthritic changes in the spine and pelvis as described.  No CT evidence of pulmonary embolism in the current exam.   Cardiomegaly.    Previous left mastectomy.     Assessment/Plan   Principal Problem:    Altered mental status, unspecified altered mental status type  UTI  NSTEMI  Dizziness.  Elevated troponin  Left Bundle Branch Block.  Hx Breast cancer  GERD    Plan:  Consult cardiology.  Echo is pending.  On heparin drip.  Troponin  427 > 614 > 1196  Nitroglycerin as needed.  UA + for leukocyte.  Urine and blood culture are pending.   On ceftriaxone for UTI.   On ASA, Lipitor 40 mg, and metoprolol.      ALVARO ROMO, NAEEM student.

## 2024-07-14 NOTE — CONSULTS
Cardiology Consult Note  Patient: Laurel Pugh  Unit/Bed: 1002/1002-A  YOB: 1940  MRN: [id number]  Acct: [id number]   Admitting Diagnosis: Dizziness [R42]  NSTEMI (non-ST elevated myocardial infarction) (Multi) [I21.4]  Biliary calculus of other site without obstruction [K80.80]  Urinary tract infection without hematuria, site unspecified [N39.0]  Altered mental status, unspecified altered mental status type [R41.82]  Nausea and vomiting, unspecified vomiting type [R11.2]  Date:  7/13/2024  Hospital Day: 1  Attending: Nicholas Haines MD    Rounding cardiologist Luis Daniel Rosa MD,    Consultant: Dr. Luis Daniel Rosa     Primary Cardiologist:  None       Complaint:  Chief Complaint   Patient presents with    Altered Mental Status     Per EMS pt is normally A&Ox4 but is confused this morning. Pt has been asking the same questions and unaware of things she should know like getting a new roof today. Pt reports she is nauseated and dizzy.         History of Present Illness:  84-year-old woman with no known coronary disease or cardiac history presents to the emergency department 7/13/2024 with complaint of dizziness and nausea as well as confusion.  Family had reported usually she is well-oriented.  In the ER free of chest pain or dyspnea.  Describes headache otherwise no significant symptoms.  CT study emergency room with cholelithiasis, parapelvic cysts, diverticulosis arthritis of the spine.  CT of the chest without evidence of pulmonary embolism though cardiomegaly suspected.  Chest x-ray did not describe CHF CBC was normal CMP unremarkable except glucose 128 creatinine was 0.88 BNP elevated at 373 initial troponin 427.  Second troponin 614.  Thyroid was normal    My personal review of ECGs: Normal sinus rhythm left bundle branch block without change from not 0 918 hours to 1141 hours on 7/13/2024.  No change from prior EKG showing left bundle branch block    She did have confusion but no lateralizing  defects and no significant abnormalities of CT of the brain.  UA was consistent with infection.  In light of infection/change in mental status enzyme pattern admitted for further evaluation.    She currently feels very well.  She is with her daughter and granddaughter.  She reports no chest pain, shortness of breath, palpitation lightheadedness or syncope.  No history of coronary artery disease.  Just a few days ago she was going up and down basement stairs without cardiorespiratory symptoms.  She does not smoke cigarettes.      Cardiac history  Hyperlipidemia  No history of coronary disease  Left bundle branch block since 2010  Allergies:  Allergies   Allergen Reactions    Haloperidol Hallucinations    Zofran [Ondansetron Hcl] Hallucinations    Amoxicillin-Pot Clavulanate Rash    Levofloxacin Rash    Sulfa (Sulfonamide Antibiotics) Rash        PMHx:  Past Medical History:   Diagnosis Date    Arthritis     Bitten or stung by nonvenomous insect and other nonvenomous arthropods, initial encounter 05/22/2019    Tick bite, initial encounter    Encounter for general adult medical examination without abnormal findings 10/05/2022    Medicare annual wellness visit, subsequent    Encounter for general adult medical examination without abnormal findings 11/02/2021    Medicare annual wellness visit, subsequent    Personal history of malignant neoplasm of breast     History of malignant neoplasm of breast    Personal history of other diseases of urinary system 08/10/2019    History of hematuria    Personal history of other specified conditions     History of vertigo    Personal history of other specified conditions 08/10/2019    History of urinary frequency    Personal history of urinary (tract) infections 08/10/2019    History of acute cystitis    Personal history of urinary (tract) infections 09/03/2019    History of urinary tract infection       PSHx:  Past Surgical History:   Procedure Laterality Date    OTHER SURGICAL  HISTORY  04/30/2019    Hysterectomy    OTHER SURGICAL HISTORY  04/30/2019    Mastectomy    OTHER SURGICAL HISTORY  04/30/2019    Tooth extraction       Social Hx:  Social History     Socioeconomic History    Marital status:    Tobacco Use    Smoking status: Never     Passive exposure: Never    Smokeless tobacco: Never   Vaping Use    Vaping status: Never Used   Substance and Sexual Activity    Alcohol use: Never    Drug use: Never       Family Hx:  Family History   Problem Relation Name Age of Onset    Stroke Mother      Other (cardiac disorder) Father      Breast cancer Sister      Arthritis Sister         Review of Systems:   Review of Systems   All other systems reviewed and are negative.      Vitals:    07/13/24 2000 07/14/24 0048 07/14/24 0500 07/14/24 0744   BP: 140/64 141/64 126/60 120/57   BP Location:    Left arm   Patient Position:    Sitting   Pulse: 68 63 64 70   Resp:    18   Temp: 36.2 °C (97.2 °F) 36.5 °C (97.7 °F) 36.4 °C (97.5 °F) 36.8 °C (98.2 °F)   TempSrc:    Temporal   SpO2: 97% 97% 95% 95%   Weight: 67.9 kg (149 lb 11.1 oz)      Height:           Intake/Output Summary (Last 24 hours) at 7/14/2024 1046  Last data filed at 7/14/2024 0400  Gross per 24 hour   Intake 740 ml   Output 500 ml   Net 240 ml      Wt Readings from Last 4 Encounters:   07/13/24 67.9 kg (149 lb 11.1 oz)   03/08/24 66.7 kg (147 lb)   12/08/23 65.8 kg (145 lb)   04/12/23 66 kg (145 lb 6.4 oz)      Physical Examination:       Physical Exam  Constitutional:       General: She is not in acute distress.     Appearance: Normal appearance. She is not ill-appearing.   HENT:      Head: Normocephalic.      Nose: Nose normal.      Mouth/Throat:      Mouth: Mucous membranes are moist.   Cardiovascular:      Rate and Rhythm: Normal rate and regular rhythm.      Pulses: Normal pulses.      Heart sounds: No murmur heard.  Pulmonary:      Effort: Pulmonary effort is normal.      Breath sounds: Normal breath sounds.   Abdominal:       Palpations: Abdomen is soft.   Musculoskeletal:      Cervical back: Normal range of motion.   Neurological:      General: No focal deficit present.      Mental Status: She is alert.         LABS:  CBC:   Results from last 7 days   Lab Units 07/14/24  0325 07/13/24  1450 07/13/24  0951   WBC AUTO x10*3/uL 6.3 7.4 4.7   RBC AUTO x10*6/uL 4.39 4.76 4.69   HEMOGLOBIN g/dL 12.1 13.1 12.7   HEMATOCRIT % 37.6 40.7 40.5   MCV fL 86 86 86   MCH pg 27.6 27.5 27.1   MCHC g/dL 32.2 32.2 31.4*   RDW % 15.2* 15.0* 15.4*   PLATELETS AUTO x10*3/uL 195 221 224     CMP:    Results from last 7 days   Lab Units 07/14/24 0325 07/13/24  0951   SODIUM mmol/L 139 138   POTASSIUM mmol/L 3.8 3.8   CHLORIDE mmol/L 110* 106   CO2 mmol/L 24 22   BUN mg/dL 21 29*   CREATININE mg/dL 0.85 0.88   GLUCOSE mg/dL 85 128*   PROTEIN TOTAL g/dL  --  7.2   CALCIUM mg/dL 8.4* 9.6   BILIRUBIN TOTAL mg/dL  --  0.6   ALK PHOS U/L  --  48   AST U/L  --  16   ALT U/L  --  10     BMP:    Results from last 7 days   Lab Units 07/14/24 0325 07/13/24  0951   SODIUM mmol/L 139 138   POTASSIUM mmol/L 3.8 3.8   CHLORIDE mmol/L 110* 106   CO2 mmol/L 24 22   BUN mg/dL 21 29*   CREATININE mg/dL 0.85 0.88   CALCIUM mg/dL 8.4* 9.6   GLUCOSE mg/dL 85 128*     Magnesium:  Results from last 7 days   Lab Units 07/13/24  0951   MAGNESIUM mg/dL 1.98     Troponin:    Results from last 7 days   Lab Units 07/14/24  0325 07/13/24  1048 07/13/24  0951   TROPHS ng/L 1,196* 614* 427*     BNP:   Results from last 7 days   Lab Units 07/13/24  0951   BNP pg/mL 373*     Lipid Panel:  Results from last 7 days   Lab Units 07/14/24  0325   HDL mg/dL 52.9   CHOLESTEROL/HDL RATIO  3.7   VLDL mg/dL 15   TRIGLYCERIDES mg/dL 74   NON HDL CHOL. mg/dL 144   Total 197     Blood cultures negative to date    Current Medications:  aspirin, 81 mg, oral, Daily  cefTRIAXone, 1 g, intravenous, q24h  nitroglycerin, 0.5 inch, transdermal, q6h during day  perflutren lipid microspheres, 0.5-10 mL of  dilution, intravenous, Once in imaging  perflutren protein A microsphere, 0.5 mL, intravenous, Once in imaging  polyethylene glycol, 17 g, oral, Daily  sulfur hexafluoride microsphr, 2 mL, intravenous, Once in imaging       heparin, 0-4,000 Units/hr, Last Rate: 600 Units/hr (07/13/24 1947)       Current Outpatient Medications   Medication Instructions    aspirin 81 mg EC tablet 1 tablet, oral, Daily    cholecalciferol (Vitamin D-3) 25 MCG (1000 UT) tablet oral    cranberry 400 mg capsule oral    cyanocobalamin (VITAMIN B-12) 2,000 mcg, oral, Daily    docusate sodium (STOOL SOFTENER ORAL) oral, Daily    fluticasone (Flonase) 50 mcg/actuation nasal spray 1 spray, Each Nostril, Daily, Shake gently. Before first use, prime pump. After use, clean tip and replace cap.        CT angio chest for pulmonary embolism    Result Date: 7/13/2024  Interpreted By:  Rigoberto Dunn, STUDY: CT ANGIO CHEST FOR PULMONARY EMBOLISM;  7/13/2024 1:13 pm   INDICATION: 83 y/o   F with  Signs/Symptoms:elevated cardiac enzyme, abnormal cxr.   LIMITATIONS: None.   ACCESSION NUMBER(S): [id number]   ORDERING CLINICIAN: ROSE MARIE LEMOS   TECHNIQUE: After the administration of intravenous nonionic contrast, thin-section arterial phase images were obtained  from the thoracic inlet down through the iliac crest. Sagittal and coronal reconstruction images were generated. Axial and coronal MIP vascular reconstruction images were also generated.   Mediastinal, lung, bone, and liver windows were reviewed. 75 ML Omnipaque 350     COMPARISON: Immediately after this exam, a CT scan of the abdomen and pelvis was also obtained.   FINDINGS: CHEST WALL/BASE OF THE NECK: Previous left mastectomy. No thyromegaly or thyroid mass.   MEDIASTINUM/ALISIA: No suspicious mediastinal, hilar, or axillary mass or adenopathy. Mild cardiomegaly. No pericardial effusion. No thoracic aortic aneurysm or dissection. Mild mural calcifications in the thoracic aorta. No pulmonary artery  filling defect.   LUNGS/ PLEURA/ AND TRACHEA: No mass or pneumonia in either lung. No  pleural effusion or pneumothorax. The trachea was grossly intact.   BONES: No destructive lytic or blastic bone lesion. Mild midthoracic spine dextroscoliosis.  There is mild-to-moderate disc space narrowing and endplate osteophytosis throughout the thoracic spine.   UPPER ABDOMEN: Please refer to the report for CT scan abdomen pelvis also done today for information.       No CT evidence of pulmonary embolism in the current exam.   Cardiomegaly.   Previous left mastectomy.   Thoracic spine scoliosis and DJD as described.   MACRO: None   Signed by: Rigoberto Dunn 7/13/2024 1:34 PM Dictation workstation:   [id number]    CT abdomen pelvis w IV contrast    Result Date: 7/13/2024  Interpreted By:  Rigoberto Dunn, STUDY: CT ABDOMEN PELVIS W IV CONTRAST;  7/13/2024 1:13 pm   INDICATION: 83 y/o   F with  Signs/Symptoms:nasuea and vomtiing.   LIMITATIONS: None.   ACCESSION NUMBER(S): [id number]   ORDERING CLINICIAN: ROSE MARIE LEMOS   TECHNIQUE: After the administration of   oral water and IV nonionic contrast, spiral axial images were obtained from the xiphoid down through the symphysis pubis. Sagittal and coronal reconstruction images were generated. Bone, mediastinal, lung, and liver windows were reviewed. 75 ML Omnipaque 350   COMPARISON: The exam was done immediately after a CT pulmonary angiogram on the same day..   FINDINGS: LUNG BASES: Please refer to the report for CT pulmonary angiogram for information regarding the imaged chest. Previous left mastectomy.   LIVER: No hepatomegaly. Liver density was  within the limits of normal. No  liver lesion evident in this  exam.   GALLBLADDER: Cholelithiasis. Borderline gallbladder dilatation with the gallbladder measuring 47 mm in diameter. No gross wall thickening or adjacent fat stranding.   BILE DUCTS: No intrahepatic biliary ductal dilatation.  Common bile duct was within the limits of  normal.   SPLEEN: No splenomegaly. No splenic mass..   PANCREAS: No pancreatic mass or inflammation, or ductal dilatation.   KIDNEYS/ADRENALS: No adrenal mass or enlargement. There are multiple bilateral parapelvic renal cysts. There is also a small lateral mid to lower pole left renal cortical cyst measuring 8 mm on axial image 66. The parapelvic cysts measure up to 20 mm in greatest diameter. No calcified stone, hydronephrosis, mass, or perinephric edema in either kidney. No ureteral stone or dilatation.   BLADDER/PELVIS: Urinary bladder was grossly intact. Previous hysterectomy.  No gross adnexal mass.   GREAT VESSELS/RETROPERITONEUM: Moderate to advanced aortoiliac calcifications. Otherwise, the abdominal aorta and IVC were intact. No suspicious retroperitoneal adenopathy. No suspicious mesenteric adenopathy. No suspicious pelvic or inguinal adenopathy.   PERITONEUM: No ascites. No pneumoperitoneum. No peritoneal or mesenteric mass or inflammation.   BOWEL: The stomach was  grossly intact. There was no small bowel dilatation or small bowel wall thickening. No small-bowel obstruction. Mild-to-moderate scattered retained colonic stool. There is scattered diffuse colonic diverticulosis, most pronounced throughout the sigmoid. There was no colonic wall thickening or large bowel obstruction.  No edema adjacent to the colon. The cecal appendix was intact.   BONES: No destructive lytic or blastic bone lesion. Multilevel lumbar spine interfacet hypertrophic arthritic changes. There is sacralization of L5. Grade 1 anterolisthesis of L4 over L5 with vacuum disc phenomenon at that level. There is endplate osteophytosis at L1-2. Sclerotic arthritic changes in both SI joints. Mild bilateral hip joint space narrowing with spur formation.   ABDOMINAL WALL: Unremarkable.       Previous left mastectomy.   Cholelithiasis with borderline gallbladder dilatation but no wall thickening or adjacent fat stranding. Depending on the  clinical situation, consider gallbladder ultrasound in follow-up.   Multiple parapelvic cysts throughout both kidneys. No hydronephrosis.   Diffuse colonic diverticulosis without acute associated inflammation.   Arthritic changes in the spine and pelvis as described.   MACRO: None   Signed by: Rigoberto Dunn 7/13/2024 1:30 PM Dictation workstation:   [id number]    CT head wo IV contrast    Result Date: 7/13/2024  Interpreted By:  Teodoro Gerard, STUDY: CT HEAD WO IV CONTRAST;  7/13/2024 11:21 am   INDICATION: Signs/Symptoms:confusion dizziness.   COMPARISON: 05/27/2022   ACCESSION NUMBER(S): [id number]   ORDERING CLINICIAN: ROSE MARIE LEMOS   TECHNIQUE: Unenhanced images were obtained through the brain.   FINDINGS: There is atrophy resulting in prominence of the ventricles and sulci. There are areas of decreased attenuation within the white matter which are nonspecific but are commonly associated with small vessel ischemic disease. There is no mass effect or midline shift. No acute intracranial hemorrhage is identified. No extra-axial fluid collections are seen. No intraparenchymal mass lesions are identified.  Bone windows demonstrate no evidence of an acute calvarial fracture.       No evidence of an acute intracranial process.   MACRO: None.   Signed by: Teodoro Gerard 7/13/2024 11:47 AM Dictation workstation:   [id number]    ECG 12 lead    Result Date: 7/13/2024  Normal sinus rhythm Left bundle branch block Abnormal ECG When compared with ECG of 13-JUL-2024 09:18, (unconfirmed) No significant change was found    ECG 12 lead    Result Date: 7/13/2024  Normal sinus rhythm Left bundle branch block Abnormal ECG When compared with ECG of 09-MAY-2019 18:43, Premature ventricular complexes are no longer Present    XR chest 1 view    Result Date: 7/13/2024  Interpreted By:  April Wilson, STUDY: XR CHEST 1 VIEW; ;  7/13/2024 10:04 am   INDICATION: Signs/Symptoms:dizziness.   COMPARISON: Chest radiograph dated 05/20/2022    ACCESSION NUMBER(S): [id number]   ORDERING CLINICIAN: ROSE MARIE LEMOS   FINDINGS: Cardiac silhouette is mildly enlarged. Moderate atherosclerotic calcifications of the aortic knob are noted. Lungs are clear without consolidative airspace opacities. No large pleural effusions or pneumothorax. No acute osseous findings. Visualized upper abdomen appears grossly unremarkable.       1. Mild cardiomegaly with moderate atherosclerotic calcifications of the aortic knob, similar compared to prior radiograph. 2. Otherwise, no acute cardiopulmonary process.       MACRO: None   Signed by: April Wilson 7/13/2024 10:43 AM Dictation workstation:   [id number]       No results found for this or any previous visit from the past 1095 days.                 Encounter Date: 07/13/24   ECG 12 lead   Result Value    Ventricular Rate 65    Atrial Rate 65    NE Interval 158    QRS Duration 144    QT Interval 488    QTC Calculation(Bazett) 507    P Axis 6    R Axis 28    T Axis 137    QRS Count 11    Q Onset 210    P Onset 131    P Offset 179    T Offset 454    QTC Fredericia 501    Narrative    Normal sinus rhythm  Left bundle branch block  Abnormal ECG  When compared with ECG of 13-JUL-2024 09:18, (unconfirmed)  No significant change was found        Tele monitoring: Normal sinus rhythm        Assessment:    Patient Active Problem List   Diagnosis    Arthritis of foot, left    History of breast cancer    Constipation    Vertigo    GERD (gastroesophageal reflux disease)    Left bundle branch block    Mild vitamin D deficiency    Actinic keratosis    Seborrheic keratosis    Varicose veins of both lower extremities with inflammation    Ocular migraine    Altered mental status, unspecified altered mental status type          Impression/plan:  Elevated troponin: She has chronic left bundle branch block.  Hence, not helpful for assessment of ischemia.  Troponin pattern is concerning for an ischemic event and level of rise is significant as  well.  Nonetheless was fairly active up until just a few days ago and has never had chest tightness and does not have any now.  She is very viable 84 years old appearing somewhat younger than her stated age.  Blood cultures have remained negative.  She was not hypotensive/septic nor does she have a stroke or other causes of elevated troponin.  She was hypertensive on presentation it can sometimes raise troponin slightly but not usually greater than 1000.  I think it would be reasonable to pursue coronary angiography given this marked increase in troponin.  Alternatively could consider perfusion study.  She would like to think about these options I have reviewed them with her including risk and benefit of catheterization.  Certainly if echo shows segmental wall motion abnormality would be even more concerning for recent event.  Further recommendations to be made after results of echo are known.  Hypertension: Currently controlled  For potential ischemic cardiomyopathy add low-dose beta-blocker/nitrate continue heparin for now assess echo in the morning.  May consider for coronary angiography depending symptoms overnight.  Would also repeat troponin  Blood cultures remain negative if she had positive blood cultures would not pursue catheterization.              Electronically signed by Luis Daniel Rosa MD on 7/14/2024 at 10:46 AM

## 2024-07-14 NOTE — PROGRESS NOTES
Occupational Therapy                 Therapy Communication Note    Patient Name: Laurel Pugh  MRN: [id number]  Today's Date: 7/14/2024     Discipline: Occupational Therapy    Missed Visit Reason: Missed Visit Reason: Other (Comment) (PT/OT attempt. Pt with uptrending troponin levels. 614>1196. Nursing requested therapy hold for today. Reattempt as able.)    Missed Time: Attempt 1005

## 2024-07-15 ENCOUNTER — APPOINTMENT (OUTPATIENT)
Dept: CARDIOLOGY | Facility: HOSPITAL | Age: 84
DRG: 322 | End: 2024-07-15
Payer: COMMERCIAL

## 2024-07-15 PROBLEM — N39.0 URINARY TRACT INFECTION WITHOUT HEMATURIA: Status: ACTIVE | Noted: 2024-07-13

## 2024-07-15 PROBLEM — I21.4 NSTEMI (NON-ST ELEVATED MYOCARDIAL INFARCTION) (MULTI): Status: ACTIVE | Noted: 2024-07-13

## 2024-07-15 LAB
ACT BLD: 315 SEC (ref 89–169)
ANION GAP SERPL CALC-SCNC: 8 MMOL/L (ref 10–20)
AORTIC VALVE MEAN GRADIENT: 3 MMHG
AORTIC VALVE PEAK VELOCITY: 1.18 M/S
AV PEAK GRADIENT: 5.6 MMHG
AVA (PEAK VEL): 2.96 CM2
AVA (VTI): 2.9 CM2
BUN SERPL-MCNC: 21 MG/DL (ref 6–23)
CALCIUM SERPL-MCNC: 8.6 MG/DL (ref 8.6–10.3)
CARDIAC TROPONIN I PNL SERPL HS: 245 NG/L (ref 0–13)
CHLORIDE SERPL-SCNC: 110 MMOL/L (ref 98–107)
CO2 SERPL-SCNC: 26 MMOL/L (ref 21–32)
CREAT SERPL-MCNC: 1.02 MG/DL (ref 0.5–1.05)
EGFRCR SERPLBLD CKD-EPI 2021: 54 ML/MIN/1.73M*2
EJECTION FRACTION APICAL 4 CHAMBER: 35.1
EJECTION FRACTION: 40 %
ERYTHROCYTE [DISTWIDTH] IN BLOOD BY AUTOMATED COUNT: 15.5 % (ref 11.5–14.5)
GLUCOSE SERPL-MCNC: 93 MG/DL (ref 74–99)
HCT VFR BLD AUTO: 36.1 % (ref 36–46)
HGB BLD-MCNC: 11.5 G/DL (ref 12–16)
HOLD SPECIMEN: NORMAL
LEFT ATRIUM VOLUME AREA LENGTH INDEX BSA: 47.4 ML/M2
LEFT VENTRICLE INTERNAL DIMENSION DIASTOLE: 4.47 CM (ref 3.5–6)
LEFT VENTRICULAR OUTFLOW TRACT DIAMETER: 2.27 CM
LV EJECTION FRACTION BIPLANE: 36 %
MCH RBC QN AUTO: 27.6 PG (ref 26–34)
MCHC RBC AUTO-ENTMCNC: 31.9 G/DL (ref 32–36)
MCV RBC AUTO: 87 FL (ref 80–100)
MITRAL VALVE E/A RATIO: 0.5
NRBC BLD-RTO: 0 /100 WBCS (ref 0–0)
PLATELET # BLD AUTO: 191 X10*3/UL (ref 150–450)
POTASSIUM SERPL-SCNC: 3.9 MMOL/L (ref 3.5–5.3)
RBC # BLD AUTO: 4.16 X10*6/UL (ref 4–5.2)
RIGHT VENTRICLE FREE WALL PEAK S': 13.1 CM/S
RIGHT VENTRICLE PEAK SYSTOLIC PRESSURE: 26.8 MMHG
SODIUM SERPL-SCNC: 140 MMOL/L (ref 136–145)
TRICUSPID ANNULAR PLANE SYSTOLIC EXCURSION: 2 CM
UFH PPP CHRO-ACNC: 0.2 IU/ML
WBC # BLD AUTO: 4.2 X10*3/UL (ref 4.4–11.3)

## 2024-07-15 PROCEDURE — 2500000005 HC RX 250 GENERAL PHARMACY W/O HCPCS: Performed by: INTERNAL MEDICINE

## 2024-07-15 PROCEDURE — 85347 COAGULATION TIME ACTIVATED: CPT

## 2024-07-15 PROCEDURE — 93454 CORONARY ARTERY ANGIO S&I: CPT | Performed by: INTERNAL MEDICINE

## 2024-07-15 PROCEDURE — 85520 HEPARIN ASSAY: CPT | Performed by: STUDENT IN AN ORGANIZED HEALTH CARE EDUCATION/TRAINING PROGRAM

## 2024-07-15 PROCEDURE — 85347 COAGULATION TIME ACTIVATED: CPT | Performed by: INTERNAL MEDICINE

## 2024-07-15 PROCEDURE — 2720000007 HC OR 272 NO HCPCS: Performed by: INTERNAL MEDICINE

## 2024-07-15 PROCEDURE — 2500000001 HC RX 250 WO HCPCS SELF ADMINISTERED DRUGS (ALT 637 FOR MEDICARE OP): Performed by: INTERNAL MEDICINE

## 2024-07-15 PROCEDURE — 97161 PT EVAL LOW COMPLEX 20 MIN: CPT | Mod: GP

## 2024-07-15 PROCEDURE — 4A023N7 MEASUREMENT OF CARDIAC SAMPLING AND PRESSURE, LEFT HEART, PERCUTANEOUS APPROACH: ICD-10-PCS | Performed by: INTERNAL MEDICINE

## 2024-07-15 PROCEDURE — 2500000001 HC RX 250 WO HCPCS SELF ADMINISTERED DRUGS (ALT 637 FOR MEDICARE OP): Performed by: NURSE PRACTITIONER

## 2024-07-15 PROCEDURE — 2500000002 HC RX 250 W HCPCS SELF ADMINISTERED DRUGS (ALT 637 FOR MEDICARE OP, ALT 636 FOR OP/ED): Performed by: NURSE PRACTITIONER

## 2024-07-15 PROCEDURE — 85027 COMPLETE CBC AUTOMATED: CPT

## 2024-07-15 PROCEDURE — 36415 COLL VENOUS BLD VENIPUNCTURE: CPT | Performed by: STUDENT IN AN ORGANIZED HEALTH CARE EDUCATION/TRAINING PROGRAM

## 2024-07-15 PROCEDURE — 2500000004 HC RX 250 GENERAL PHARMACY W/ HCPCS (ALT 636 FOR OP/ED): Performed by: NURSE PRACTITIONER

## 2024-07-15 PROCEDURE — 2500000004 HC RX 250 GENERAL PHARMACY W/ HCPCS (ALT 636 FOR OP/ED): Performed by: INTERNAL MEDICINE

## 2024-07-15 PROCEDURE — C1887 CATHETER, GUIDING: HCPCS | Performed by: INTERNAL MEDICINE

## 2024-07-15 PROCEDURE — 99153 MOD SED SAME PHYS/QHP EA: CPT | Performed by: INTERNAL MEDICINE

## 2024-07-15 PROCEDURE — 93306 TTE W/DOPPLER COMPLETE: CPT | Performed by: INTERNAL MEDICINE

## 2024-07-15 PROCEDURE — 92928 PRQ TCAT PLMT NTRAC ST 1 LES: CPT | Performed by: INTERNAL MEDICINE

## 2024-07-15 PROCEDURE — C9600 PERC DRUG-EL COR STENT SING: HCPCS | Performed by: INTERNAL MEDICINE

## 2024-07-15 PROCEDURE — 93458 L HRT ARTERY/VENTRICLE ANGIO: CPT | Mod: 59 | Performed by: INTERNAL MEDICINE

## 2024-07-15 PROCEDURE — 99152 MOD SED SAME PHYS/QHP 5/>YRS: CPT | Performed by: INTERNAL MEDICINE

## 2024-07-15 PROCEDURE — B2151ZZ FLUOROSCOPY OF LEFT HEART USING LOW OSMOLAR CONTRAST: ICD-10-PCS | Performed by: INTERNAL MEDICINE

## 2024-07-15 PROCEDURE — 80048 BASIC METABOLIC PNL TOTAL CA: CPT

## 2024-07-15 PROCEDURE — 97165 OT EVAL LOW COMPLEX 30 MIN: CPT | Mod: GO

## 2024-07-15 PROCEDURE — 84484 ASSAY OF TROPONIN QUANT: CPT | Performed by: INTERNAL MEDICINE

## 2024-07-15 PROCEDURE — 7100000009 HC PHASE TWO TIME - INITIAL BASE CHARGE: Performed by: INTERNAL MEDICINE

## 2024-07-15 PROCEDURE — C1894 INTRO/SHEATH, NON-LASER: HCPCS | Performed by: INTERNAL MEDICINE

## 2024-07-15 PROCEDURE — C1874 STENT, COATED/COV W/DEL SYS: HCPCS | Performed by: INTERNAL MEDICINE

## 2024-07-15 PROCEDURE — 7100000010 HC PHASE TWO TIME - EACH INCREMENTAL 1 MINUTE: Performed by: INTERNAL MEDICINE

## 2024-07-15 PROCEDURE — C1725 CATH, TRANSLUMIN NON-LASER: HCPCS | Performed by: INTERNAL MEDICINE

## 2024-07-15 PROCEDURE — B2111ZZ FLUOROSCOPY OF MULTIPLE CORONARY ARTERIES USING LOW OSMOLAR CONTRAST: ICD-10-PCS | Performed by: INTERNAL MEDICINE

## 2024-07-15 PROCEDURE — 99024 POSTOP FOLLOW-UP VISIT: CPT | Performed by: NURSE PRACTITIONER

## 2024-07-15 PROCEDURE — 2550000001 HC RX 255 CONTRASTS: Performed by: INTERNAL MEDICINE

## 2024-07-15 PROCEDURE — 99232 SBSQ HOSP IP/OBS MODERATE 35: CPT | Performed by: NURSE PRACTITIONER

## 2024-07-15 PROCEDURE — 93005 ELECTROCARDIOGRAM TRACING: CPT

## 2024-07-15 PROCEDURE — 2780000003 HC OR 278 NO HCPCS: Performed by: INTERNAL MEDICINE

## 2024-07-15 PROCEDURE — 2500000001 HC RX 250 WO HCPCS SELF ADMINISTERED DRUGS (ALT 637 FOR MEDICARE OP)

## 2024-07-15 PROCEDURE — 99232 SBSQ HOSP IP/OBS MODERATE 35: CPT

## 2024-07-15 PROCEDURE — 1200000002 HC GENERAL ROOM WITH TELEMETRY DAILY

## 2024-07-15 PROCEDURE — 93306 TTE W/DOPPLER COMPLETE: CPT

## 2024-07-15 PROCEDURE — 2500000004 HC RX 250 GENERAL PHARMACY W/ HCPCS (ALT 636 FOR OP/ED)

## 2024-07-15 PROCEDURE — C1769 GUIDE WIRE: HCPCS | Performed by: INTERNAL MEDICINE

## 2024-07-15 PROCEDURE — 027034Z DILATION OF CORONARY ARTERY, ONE ARTERY WITH DRUG-ELUTING INTRALUMINAL DEVICE, PERCUTANEOUS APPROACH: ICD-10-PCS | Performed by: INTERNAL MEDICINE

## 2024-07-15 DEVICE — STENT ONYXNG25018UX ONYX 2.50X18RX
Type: IMPLANTABLE DEVICE | Site: CORONARY | Status: FUNCTIONAL
Brand: ONYX FRONTIER™

## 2024-07-15 RX ORDER — CLOPIDOGREL BISULFATE 75 MG/1
75 TABLET ORAL DAILY
Status: DISCONTINUED | OUTPATIENT
Start: 2024-07-16 | End: 2024-07-16 | Stop reason: HOSPADM

## 2024-07-15 RX ORDER — HEPARIN SODIUM 1000 [USP'U]/ML
INJECTION, SOLUTION INTRAVENOUS; SUBCUTANEOUS AS NEEDED
Status: DISCONTINUED | OUTPATIENT
Start: 2024-07-15 | End: 2024-07-15 | Stop reason: HOSPADM

## 2024-07-15 RX ORDER — MIDAZOLAM HYDROCHLORIDE 1 MG/ML
INJECTION, SOLUTION INTRAMUSCULAR; INTRAVENOUS AS NEEDED
Status: DISCONTINUED | OUTPATIENT
Start: 2024-07-15 | End: 2024-07-15 | Stop reason: HOSPADM

## 2024-07-15 RX ORDER — MORPHINE SULFATE 2 MG/ML
2 INJECTION, SOLUTION INTRAMUSCULAR; INTRAVENOUS
Status: DISCONTINUED | OUTPATIENT
Start: 2024-07-15 | End: 2024-07-16 | Stop reason: HOSPADM

## 2024-07-15 RX ORDER — METOCLOPRAMIDE HYDROCHLORIDE 5 MG/ML
10 INJECTION INTRAMUSCULAR; INTRAVENOUS EVERY 6 HOURS PRN
Status: DISCONTINUED | OUTPATIENT
Start: 2024-07-15 | End: 2024-07-15

## 2024-07-15 RX ORDER — SPIRONOLACTONE 25 MG/1
12.5 TABLET ORAL DAILY
Status: DISCONTINUED | OUTPATIENT
Start: 2024-07-15 | End: 2024-07-16 | Stop reason: HOSPADM

## 2024-07-15 RX ORDER — SODIUM CHLORIDE 9 MG/ML
75 INJECTION, SOLUTION INTRAVENOUS CONTINUOUS
Status: ACTIVE | OUTPATIENT
Start: 2024-07-15 | End: 2024-07-15

## 2024-07-15 RX ORDER — CLOPIDOGREL BISULFATE 300 MG/1
TABLET, FILM COATED ORAL AS NEEDED
Status: DISCONTINUED | OUTPATIENT
Start: 2024-07-15 | End: 2024-07-15 | Stop reason: HOSPADM

## 2024-07-15 RX ORDER — FENTANYL CITRATE 50 UG/ML
INJECTION, SOLUTION INTRAMUSCULAR; INTRAVENOUS AS NEEDED
Status: DISCONTINUED | OUTPATIENT
Start: 2024-07-15 | End: 2024-07-15 | Stop reason: HOSPADM

## 2024-07-15 RX ORDER — LIDOCAINE HYDROCHLORIDE 20 MG/ML
INJECTION, SOLUTION INFILTRATION; PERINEURAL AS NEEDED
Status: DISCONTINUED | OUTPATIENT
Start: 2024-07-15 | End: 2024-07-15 | Stop reason: HOSPADM

## 2024-07-15 RX ORDER — NITROGLYCERIN 5 MG/ML
INJECTION, SOLUTION INTRAVENOUS AS NEEDED
Status: DISCONTINUED | OUTPATIENT
Start: 2024-07-15 | End: 2024-07-15 | Stop reason: HOSPADM

## 2024-07-15 RX ADMIN — METOPROLOL SUCCINATE 25 MG: 25 TABLET, EXTENDED RELEASE ORAL at 08:08

## 2024-07-15 RX ADMIN — ATORVASTATIN CALCIUM 40 MG: 20 TABLET, FILM COATED ORAL at 20:41

## 2024-07-15 RX ADMIN — POLYETHYLENE GLYCOL 3350 17 G: 17 POWDER, FOR SOLUTION ORAL at 20:40

## 2024-07-15 RX ADMIN — NITROGLYCERIN 0.5 INCH: 20 OINTMENT TOPICAL at 08:08

## 2024-07-15 RX ADMIN — CEFTRIAXONE SODIUM 1 G: 1 INJECTION, SOLUTION INTRAVENOUS at 11:52

## 2024-07-15 RX ADMIN — SACUBITRIL AND VALSARTAN 1 TABLET: 24; 26 TABLET, FILM COATED ORAL at 15:55

## 2024-07-15 RX ADMIN — SPIRONOLACTONE 12.5 MG: 25 TABLET ORAL at 15:56

## 2024-07-15 RX ADMIN — PROMETHAZINE HYDROCHLORIDE 12.5 MG: 25 INJECTION INTRAMUSCULAR; INTRAVENOUS at 14:13

## 2024-07-15 RX ADMIN — PERFLUTREN 1 ML OF DILUTION: 6.52 INJECTION, SUSPENSION INTRAVENOUS at 09:55

## 2024-07-15 RX ADMIN — SACUBITRIL AND VALSARTAN 1 TABLET: 24; 26 TABLET, FILM COATED ORAL at 20:41

## 2024-07-15 RX ADMIN — ASPIRIN 81 MG: 81 TABLET, CHEWABLE ORAL at 08:08

## 2024-07-15 ASSESSMENT — PAIN SCALES - GENERAL

## 2024-07-15 ASSESSMENT — COGNITIVE AND FUNCTIONAL STATUS - GENERAL
CLIMB 3 TO 5 STEPS WITH RAILING: A LITTLE
MOBILITY SCORE: 23
DAILY ACTIVITIY SCORE: 24

## 2024-07-15 ASSESSMENT — ACTIVITIES OF DAILY LIVING (ADL): BATHING_ASSISTANCE: MODIFIED INDEPENDENT (DEVICE)

## 2024-07-15 ASSESSMENT — PAIN - FUNCTIONAL ASSESSMENT
PAIN_FUNCTIONAL_ASSESSMENT: 0-10
PAIN_FUNCTIONAL_ASSESSMENT: 0-10

## 2024-07-15 NOTE — NURSING NOTE
Patient has history of restless leg syndrome.  Patient c/o that her restless legs are acting up on the left.

## 2024-07-15 NOTE — PROGRESS NOTES
"Daily Progress Note    Laurel Pugh is a 84 y.o. female on day 2 of admission presenting with Altered mental status, unspecified altered mental status type.    Subjective   Patient seen resting quietly.  Patient recently returned from echo we will follow-up with results.  Patient scheduled for left heart cath with Dr. Davison 2-day.  Patient denies chest pain or shortness of breath.  Patient mental status back to baseline family at bedside.       Objective     Physical Exam    Physical Exam  Constitutional:       Appearance: She is obese. She is well-groomed.   HENT:      Head: Normocephalic.      Mouth/Throat:      Mouth: Mucous membranes are moist.   Eyes:      Pupils: Pupils are equal, round, and reactive to light.   Cardiovascular:      Rate and Rhythm: Normal rate and regular rhythm.      Heart sounds: Normal heart sounds, S1 normal and S2 normal.   Pulmonary:      Effort: Pulmonary effort is normal.      Breath sounds: Normal breath sounds.   Abdominal:      General: Bowel sounds are normal.      Palpations: Abdomen is soft.   Musculoskeletal:         General: Normal range of motion.      Cervical back: Neck supple.   Skin:     General: Skin is warm.   Neurological:      Mental Status: She is alert and oriented.      Motor: Weakness present.   Psychiatric:         Mood and Affect: Mood normal.         Speech: Speech normal.         Behavior: Behavior normal.     Last Recorded Vitals  Blood pressure 162/69, pulse 72, temperature 36.1 °C (97 °F), temperature source Temporal, resp. rate 18, height 1.575 m (5' 2\"), weight 67.9 kg (149 lb 11.1 oz), SpO2 93%.  Intake/Output last 3 Shifts:  I/O last 3 completed shifts:  In: 1077.2 (15.9 mL/kg) [P.O.:360; I.V.:667.2 (9.8 mL/kg); IV Piggyback:50]  Out: 500 (7.4 mL/kg) [Urine:500 (0.2 mL/kg/hr)]  Weight: 67.9 kg     Medications  Scheduled medications  aspirin, 81 mg, oral, Daily  atorvastatin, 40 mg, oral, Daily  cefTRIAXone, 1 g, intravenous, q24h  metoprolol " succinate XL, 25 mg, oral, Daily  nitroglycerin, 0.5 inch, transdermal, q6h during day  perflutren protein A microsphere, 0.5 mL, intravenous, Once in imaging  polyethylene glycol, 17 g, oral, Daily  sulfur hexafluoride microsphr, 2 mL, intravenous, Once in imaging      Continuous medications  heparin, 0-4,000 Units/hr, Last Rate: 600 Units/hr (07/14/24 2311)      PRN medications  PRN medications: acetaminophen **OR** acetaminophen **OR** acetaminophen    Labs  CBC:   Results from last 7 days   Lab Units 07/15/24  0524 07/14/24  0325 07/13/24  1450   WBC AUTO x10*3/uL 4.2* 6.3 7.4   RBC AUTO x10*6/uL 4.16 4.39 4.76   HEMOGLOBIN g/dL 11.5* 12.1 13.1   HEMATOCRIT % 36.1 37.6 40.7   MCV fL 87 86 86   MCH pg 27.6 27.6 27.5   MCHC g/dL 31.9* 32.2 32.2   RDW % 15.5* 15.2* 15.0*   PLATELETS AUTO x10*3/uL 191 195 221     CMP:    Results from last 7 days   Lab Units 07/15/24  0524 07/14/24  0325 07/13/24  0951   SODIUM mmol/L 140 139 138   POTASSIUM mmol/L 3.9 3.8 3.8   CHLORIDE mmol/L 110* 110* 106   CO2 mmol/L 26 24 22   BUN mg/dL 21 21 29*   CREATININE mg/dL 1.02 0.85 0.88   GLUCOSE mg/dL 93 85 128*   PROTEIN TOTAL g/dL  --   --  7.2   CALCIUM mg/dL 8.6 8.4* 9.6   BILIRUBIN TOTAL mg/dL  --   --  0.6   ALK PHOS U/L  --   --  48   AST U/L  --   --  16   ALT U/L  --   --  10     BMP:    Results from last 7 days   Lab Units 07/15/24  0524 07/14/24  0325 07/13/24  0951   SODIUM mmol/L 140 139 138   POTASSIUM mmol/L 3.9 3.8 3.8   CHLORIDE mmol/L 110* 110* 106   CO2 mmol/L 26 24 22   BUN mg/dL 21 21 29*   CREATININE mg/dL 1.02 0.85 0.88   CALCIUM mg/dL 8.6 8.4* 9.6   GLUCOSE mg/dL 93 85 128*     Magnesium:  Results from last 7 days   Lab Units 07/13/24  0951   MAGNESIUM mg/dL 1.98     Troponin:    Results from last 7 days   Lab Units 07/15/24  0524 07/14/24  1128 07/14/24  0325   TROPHS ng/L 245* 519* 1,196*     BNP:   Results from last 7 days   Lab Units 07/13/24  0951   BNP pg/mL 373*     Lipid Panel:  Results from last 7 days    Lab Units 07/13/24  1847 07/13/24  0951   INR  1.1 1.0   PROTIME seconds 12.5 11.4        Relevant Results  Results from last 7 days   Lab Units 07/15/24  0524 07/14/24  0325 07/13/24  0951 07/13/24  0928   POCT GLUCOSE mg/dL  --   --   --  136*   GLUCOSE mg/dL 93 85 128*  --      Lab Results   Component Value Date    HGBA1C 5.8 (H) 07/13/2024        Assessment/Plan    UTI  NSTEMI  Altered mental status  Hx of dizziness/vertigo  Elevated troponin  CAD with bypass  Breast cancer  GERD  Left BBB  Vitamin D deficiency    -Consult cardiology chronic LBBB, will get echo and schedule left heart cath with Dr. Davison  -continue Heparin gtt  -Troponin 427 > 614>1196 >5 19 > 24  -Nitro paste daily  -UA positive for leukocyte  -Continue Rocephin IV  -Continue IV fluids  -Lipid panel, INR, A1c, TSH  -Resume home meds when reconciled  -ASA, BB, statin  -PT/OT evaluation  -TCC for discharge planning  Edited by: RYLAN Hensley at 7/15/2024 1209    DVTp: Heparin drip    PLAN: Home with son    RYLAN Hensley    Plan of care was discussed extensively with patient.  Patient verbalized understanding through teach back method.  All question and concerns addressed upon examination.    Of note, this documentation is completed using the Dragon Dictation system (voice recognition software). There may be spelling and/or grammatical errors that were not corrected prior to final submission.

## 2024-07-15 NOTE — PROGRESS NOTES
Occupational Therapy    Evaluation    Patient Name: Laurel Pugh  MRN: [id number]  Today's Date: 7/15/2024  Time Calculation  Start Time: 1119  Stop Time: 1133  Time Calculation (min): 14 min        Assessment:  OT Assessment: Independent  Prognosis: Good  Evaluation/Treatment Tolerance: Patient tolerated treatment well  End of Session Communication: Bedside nurse  End of Session Patient Position: Up in chair, Alarm off, caregiver present (IV)  Prognosis: Good  Evaluation/Treatment Tolerance: Patient tolerated treatment well  Plan:  No Skilled OT: No acute OT goals identified  OT Frequency: OT eval only  OT Discharge Recommendations: No further acute OT  OT - OK to Discharge: Yes (when medically stable/cleared.)       Subjective   Current Problem:  1. Altered mental status, unspecified altered mental status type        2. Dizziness        3. Nausea and vomiting, unspecified vomiting type        4. NSTEMI (non-ST elevated myocardial infarction) (Multi)  Transthoracic Echo (TTE) Complete    Transthoracic Echo (TTE) Complete    Case Request Cath Lab: Left Heart Cath    Case Request Cath Lab: Left Heart Cath    Cardiac Catheterization Procedure    Cardiac Catheterization Procedure      5. Urinary tract infection without hematuria, site unspecified  Case Request Cath Lab: Left Heart Cath    Case Request Cath Lab: Left Heart Cath    Cardiac Catheterization Procedure    Cardiac Catheterization Procedure      6. Biliary calculus of other site without obstruction        7. Left bundle branch block  Case Request Cath Lab: Left Heart Cath    Case Request Cath Lab: Left Heart Cath    Cardiac Catheterization Procedure    Cardiac Catheterization Procedure      8. Acute diastolic (congestive) heart failure (Multi)  Basic metabolic panel        General:  General  Reason for Referral: ADL impairment  Referred By: Dolores OT/PT 7/13  Past Medical History Relevant to Rehab: LBBB, HLD, GERD, migraines, vertigo,  constipation.  Family/Caregiver Present: Yes  Caregiver Feedback: daughter present  Prior to Session Communication: Bedside nurse  Patient Position Received: Bed, 3 rail up, Alarm off, caregiver present, Alarm off, not on at start of session  General Comment: Pt. is 85 y/o female to ED with dizziness, spinning, nausea. EMS reports confusion. UA concern for UTI. BP was 203/96. CXR: Mild cardiomegaly, CT head (-), CT abd: cholelithiasis, multiple parapelvic cysts, colonic diverticulosis, CT angio chest: thoracic spine DJD, Troponin downward trending 245    Pain:  Pain Assessment  Pain Assessment: 0-10  0-10 (Numeric) Pain Score: 0 - No pain    Objective   Cognition:  Overall Cognitive Status: Within Functional Limits  Orientation Level: Oriented X4    Home Living:  Home Living Comments: Pt. lives with son (works two jobs) in one story house. 2+2 steps to enter, no HR. Has walk in shower in basement; stool outside of shower. Tub shower on main level. Laundry on main floor.  Prior Function:  Ambulatory Assistance:  (owns WW and cane. uses cane occasionally for stairs only.)  Prior Function Comments: Independent with ADLs, IADLs. Pt. states that she uses walk in showe rin basement, 10 steps down with HRs. Uses stool outside of shower to sit and dry herself off. Son takes laundry from downstairs up to main floor laundry. No falls. Drives. Pt. states that son and daughter check in frequently throughout the day.    ADL:  Eating Assistance: Independent  Grooming Assistance: Independent  Bathing Assistance: Modified independent (Device)  UE Dressing Assistance: Independent  LE Dressing Assistance: Independent  Toileting Assistance with Device: Independent  Activity Tolerance:  Endurance: Endurance does not limit participation in activity  Activity Tolerance Comments: no reports of dizziness  Bed Mobility/Transfers: Bed Mobility  Bed Mobility: Yes  Bed Mobility 1  Bed Mobility 1: Supine to sitting  Level of Assistance 1:  Modified independent  Bed Mobility Comments 1: head of bed elevated    Transfers  Transfer: Yes  Transfer 1  Technique 1: Sit to stand, Stand to sit  Transfer Level of Assistance 1: Independent  Trials/Comments 1: sit to stand from edge of bed and recliner chair    Functional Mobility:  Functional Mobility  Functional Mobility Performed:  (No AD. Independent for ~>50 feet.)    Standing Balance:  Dynamic Standing Balance  Dynamic Standing-Comments: Fair + / Good. No AD used.     Strength:  Strength Comments: BUEs 4/5 MMT    Hand Function:  Gross Grasp: Functional  Extremities: RUE   RUE : Within Functional Limits and LUE   LUE: Within Functional Limits    Outcome Measures:[facility] Health Daily Activity  Putting on and taking off regular lower body clothing: None  Bathing (including washing, rinsing, drying): None  Putting on and taking off regular upper body clothing: None  Toileting, which includes using toilet, bedpan or urinal: None  Taking care of personal grooming such as brushing teeth: None  Eating Meals: None  Daily Activity - Total Score: 24      Education Documentation  ADL Training, taught by Carmencita Mckenzie OT at 7/15/2024  1:55 PM.  Learner: Patient  Readiness: Eager  Method: Explanation  Response: Verbalizes Understanding    IP EDUCATION:  Education  Individual(s) Educated: Patient  Education Provided: Fall precautons, Risk and benefits of OT discussed with patient or other, POC discussed and agreed upon

## 2024-07-15 NOTE — NURSING NOTE
VASC Band removed.  Right Radial arterial sheath puncture site is stable with no oozing or hematoma noted.  Dressing applied.  Teaching relative to site care and restrictions started with patient and family members.  Patient has no needs at this time.

## 2024-07-15 NOTE — SIGNIFICANT EVENT
Patient arrived in [facility] CVIU from Cath Lab s/p PCI.  On arrival focused assessment completed and WDL for patient. Swelling distal to Right Radial VASC Band noted.  Holding manual pressure.

## 2024-07-15 NOTE — PROGRESS NOTES
Truong TARYN/Cardiologist:  Wilber Nolan, YECENIA-IFEOMA, Dr. Rosa  Primary Cardiologist: None  Date:  7/15/2024  Patient:  Laurel Pugh  YOB: 1940  MRN:  [id number]   Admit Date:  7/13/2024      SUBJECTIVE:    Laurel Pugh was seen and examined today at bedside.     She denies any chest pain or shortness of breath.     Slightly hypertensive this morning.    Troponin trending down        VITALS:     Vitals:    07/14/24 0744 07/14/24 1452 07/14/24 1949 07/15/24 0730   BP: 120/57 141/64 147/67 162/69   BP Location: Left arm   Left arm   Patient Position: Sitting   Sitting   Pulse: 70 71 66 72   Resp: 18  16 18   Temp: 36.8 °C (98.2 °F) 36.5 °C (97.7 °F) 36.2 °C (97.2 °F) 36.1 °C (97 °F)   TempSrc: Temporal   Temporal   SpO2: 95% 95% 98% 93%   Weight:       Height:           Intake/Output Summary (Last 24 hours) at 7/15/2024 0949  Last data filed at 7/15/2024 0800  Gross per 24 hour   Intake 1187.2 ml   Output 200 ml   Net 987.2 ml       Wt Readings from Last 4 Encounters:   07/13/24 67.9 kg (149 lb 11.1 oz)   03/08/24 66.7 kg (147 lb)   12/08/23 65.8 kg (145 lb)   04/12/23 66 kg (145 lb 6.4 oz)       CURRENT HOSPITAL MEDICATIONS:   aspirin, 81 mg, oral, Daily  atorvastatin, 40 mg, oral, Daily  cefTRIAXone, 1 g, intravenous, q24h  metoprolol succinate XL, 25 mg, oral, Daily  nitroglycerin, 0.5 inch, transdermal, q6h during day  perflutren lipid microspheres, 0.5-10 mL of dilution, intravenous, Once in imaging  perflutren protein A microsphere, 0.5 mL, intravenous, Once in imaging  polyethylene glycol, 17 g, oral, Daily  sulfur hexafluoride microsphr, 2 mL, intravenous, Once in imaging      heparin, 0-4,000 Units/hr, Last Rate: 600 Units/hr (07/14/24 2311)      Current Outpatient Medications   Medication Instructions    aspirin 81 mg EC tablet 1 tablet, oral, Daily    cholecalciferol (Vitamin D-3) 25 MCG (1000 UT) tablet oral    cranberry 400 mg capsule oral     cyanocobalamin (VITAMIN B-12) 2,000 mcg, oral, Daily    docusate sodium (STOOL SOFTENER ORAL) oral, Daily    fluticasone (Flonase) 50 mcg/actuation nasal spray 1 spray, Each Nostril, Daily, Shake gently. Before first use, prime pump. After use, clean tip and replace cap.        PHYSICAL EXAMINATION:     Physical Exam  Vitals and nursing note reviewed.   HENT:      Head: Normocephalic.      Mouth/Throat:      Mouth: Mucous membranes are moist.   Eyes:      Pupils: Pupils are equal, round, and reactive to light.   Cardiovascular:      Rate and Rhythm: Normal rate and regular rhythm.   Pulmonary:      Effort: Pulmonary effort is normal.   Abdominal:      General: Bowel sounds are normal.      Palpations: Abdomen is soft.   Musculoskeletal:         General: Normal range of motion.   Skin:     General: Skin is warm and dry.      Capillary Refill: Capillary refill takes less than 2 seconds.   Neurological:      Mental Status: She is alert and oriented to person, place, and time.   Psychiatric:         Mood and Affect: Mood normal.         LAB DATA:     CBC:   Results from last 7 days   Lab Units 07/15/24  0524 07/14/24  0325 07/13/24  1450   WBC AUTO x10*3/uL 4.2* 6.3 7.4   RBC AUTO x10*6/uL 4.16 4.39 4.76   HEMOGLOBIN g/dL 11.5* 12.1 13.1   HEMATOCRIT % 36.1 37.6 40.7   MCV fL 87 86 86   MCH pg 27.6 27.6 27.5   MCHC g/dL 31.9* 32.2 32.2   RDW % 15.5* 15.2* 15.0*   PLATELETS AUTO x10*3/uL 191 195 221     CMP:    Results from last 7 days   Lab Units 07/15/24  0524 07/14/24  0325 07/13/24  0951   SODIUM mmol/L 140 139 138   POTASSIUM mmol/L 3.9 3.8 3.8   CHLORIDE mmol/L 110* 110* 106   CO2 mmol/L 26 24 22   BUN mg/dL 21 21 29*   CREATININE mg/dL 1.02 0.85 0.88   GLUCOSE mg/dL 93 85 128*   PROTEIN TOTAL g/dL  --   --  7.2   CALCIUM mg/dL 8.6 8.4* 9.6   BILIRUBIN TOTAL mg/dL  --   --  0.6   ALK PHOS U/L  --   --  48   AST U/L  --   --  16   ALT U/L  --   --  10     BMP:    Results from last 7 days   Lab Units 07/15/24  4915  07/14/24  0325 07/13/24  0951   SODIUM mmol/L 140 139 138   POTASSIUM mmol/L 3.9 3.8 3.8   CHLORIDE mmol/L 110* 110* 106   CO2 mmol/L 26 24 22   BUN mg/dL 21 21 29*   CREATININE mg/dL 1.02 0.85 0.88   CALCIUM mg/dL 8.6 8.4* 9.6   GLUCOSE mg/dL 93 85 128*     Magnesium:  Results from last 7 days   Lab Units 07/13/24  0951   MAGNESIUM mg/dL 1.98     Troponin:    Results from last 7 days   Lab Units 07/15/24  0524 07/14/24  1128 07/14/24  0325   TROPHS ng/L 245* 519* 1,196*     BNP:   Results from last 7 days   Lab Units 07/13/24  0951   BNP pg/mL 373*     Lipid Panel:  Results from last 7 days   Lab Units 07/14/24  0325   HDL mg/dL 52.9   CHOLESTEROL/HDL RATIO  3.7   VLDL mg/dL 15   TRIGLYCERIDES mg/dL 74   NON HDL CHOL. mg/dL 144        DIAGNOSTIC TESTING:   @No results found for this or any previous visit.    ECG 12 lead    Result Date: 7/14/2024  Normal sinus rhythm Left bundle branch block Abnormal ECG When compared with ECG of 13-JUL-2024 09:18, (unconfirmed) No significant change was found See ED provider note for full interpretation and clinical correlation Confirmed by Sara Rai ([id number]) on 7/14/2024 6:05:15 PM    ECG 12 lead    Result Date: 7/14/2024  Normal sinus rhythm Left bundle branch block Abnormal ECG When compared with ECG of 09-MAY-2019 18:43, Premature ventricular complexes are no longer Present See ED provider note for full interpretation and clinical correlation Confirmed by Sara Rai ([id number]) on 7/14/2024 6:03:41 PM    CT angio chest for pulmonary embolism    Result Date: 7/13/2024  Interpreted By:  Rigoberto Dunn, STUDY: CT ANGIO CHEST FOR PULMONARY EMBOLISM;  7/13/2024 1:13 pm   INDICATION: 85 y/o   F with  Signs/Symptoms:elevated cardiac enzyme, abnormal cxr.   LIMITATIONS: None.   ACCESSION NUMBER(S): [id number]   ORDERING CLINICIAN: ROSE MARIE LEMOS   TECHNIQUE: After the administration of intravenous nonionic contrast, thin-section arterial phase images were obtained  from  the thoracic inlet down through the iliac crest. Sagittal and coronal reconstruction images were generated. Axial and coronal MIP vascular reconstruction images were also generated.   Mediastinal, lung, bone, and liver windows were reviewed. 75 ML Omnipaque 350     COMPARISON: Immediately after this exam, a CT scan of the abdomen and pelvis was also obtained.   FINDINGS: CHEST WALL/BASE OF THE NECK: Previous left mastectomy. No thyromegaly or thyroid mass.   MEDIASTINUM/ALISIA: No suspicious mediastinal, hilar, or axillary mass or adenopathy. Mild cardiomegaly. No pericardial effusion. No thoracic aortic aneurysm or dissection. Mild mural calcifications in the thoracic aorta. No pulmonary artery filling defect.   LUNGS/ PLEURA/ AND TRACHEA: No mass or pneumonia in either lung. No  pleural effusion or pneumothorax. The trachea was grossly intact.   BONES: No destructive lytic or blastic bone lesion. Mild midthoracic spine dextroscoliosis.  There is mild-to-moderate disc space narrowing and endplate osteophytosis throughout the thoracic spine.   UPPER ABDOMEN: Please refer to the report for CT scan abdomen pelvis also done today for information.       No CT evidence of pulmonary embolism in the current exam.   Cardiomegaly.   Previous left mastectomy.   Thoracic spine scoliosis and DJD as described.   MACRO: None   Signed by: Rigoberto Dunn 7/13/2024 1:34 PM Dictation workstation:   [id number]    CT abdomen pelvis w IV contrast    Result Date: 7/13/2024  Interpreted By:  Rigoberto Dunn, STUDY: CT ABDOMEN PELVIS W IV CONTRAST;  7/13/2024 1:13 pm   INDICATION: 83 y/o   F with  Signs/Symptoms:nasuea and vomtiing.   LIMITATIONS: None.   ACCESSION NUMBER(S): [id number]   ORDERING CLINICIAN: ROSE MARIE LEMOS   TECHNIQUE: After the administration of   oral water and IV nonionic contrast, spiral axial images were obtained from the xiphoid down through the symphysis pubis. Sagittal and coronal reconstruction images were generated.  Bone, mediastinal, lung, and liver windows were reviewed. 75 ML Omnipaque 350   COMPARISON: The exam was done immediately after a CT pulmonary angiogram on the same day..   FINDINGS: LUNG BASES: Please refer to the report for CT pulmonary angiogram for information regarding the imaged chest. Previous left mastectomy.   LIVER: No hepatomegaly. Liver density was  within the limits of normal. No  liver lesion evident in this  exam.   GALLBLADDER: Cholelithiasis. Borderline gallbladder dilatation with the gallbladder measuring 47 mm in diameter. No gross wall thickening or adjacent fat stranding.   BILE DUCTS: No intrahepatic biliary ductal dilatation.  Common bile duct was within the limits of normal.   SPLEEN: No splenomegaly. No splenic mass..   PANCREAS: No pancreatic mass or inflammation, or ductal dilatation.   KIDNEYS/ADRENALS: No adrenal mass or enlargement. There are multiple bilateral parapelvic renal cysts. There is also a small lateral mid to lower pole left renal cortical cyst measuring 8 mm on axial image 66. The parapelvic cysts measure up to 20 mm in greatest diameter. No calcified stone, hydronephrosis, mass, or perinephric edema in either kidney. No ureteral stone or dilatation.   BLADDER/PELVIS: Urinary bladder was grossly intact. Previous hysterectomy.  No gross adnexal mass.   GREAT VESSELS/RETROPERITONEUM: Moderate to advanced aortoiliac calcifications. Otherwise, the abdominal aorta and IVC were intact. No suspicious retroperitoneal adenopathy. No suspicious mesenteric adenopathy. No suspicious pelvic or inguinal adenopathy.   PERITONEUM: No ascites. No pneumoperitoneum. No peritoneal or mesenteric mass or inflammation.   BOWEL: The stomach was  grossly intact. There was no small bowel dilatation or small bowel wall thickening. No small-bowel obstruction. Mild-to-moderate scattered retained colonic stool. There is scattered diffuse colonic diverticulosis, most pronounced throughout the sigmoid.  There was no colonic wall thickening or large bowel obstruction.  No edema adjacent to the colon. The cecal appendix was intact.   BONES: No destructive lytic or blastic bone lesion. Multilevel lumbar spine interfacet hypertrophic arthritic changes. There is sacralization of L5. Grade 1 anterolisthesis of L4 over L5 with vacuum disc phenomenon at that level. There is endplate osteophytosis at L1-2. Sclerotic arthritic changes in both SI joints. Mild bilateral hip joint space narrowing with spur formation.   ABDOMINAL WALL: Unremarkable.       Previous left mastectomy.   Cholelithiasis with borderline gallbladder dilatation but no wall thickening or adjacent fat stranding. Depending on the clinical situation, consider gallbladder ultrasound in follow-up.   Multiple parapelvic cysts throughout both kidneys. No hydronephrosis.   Diffuse colonic diverticulosis without acute associated inflammation.   Arthritic changes in the spine and pelvis as described.   MACRO: None   Signed by: Rigoberto Dunn 7/13/2024 1:30 PM Dictation workstation:   [id number]    CT head wo IV contrast    Result Date: 7/13/2024  Interpreted By:  Teodoro Gerard, STUDY: CT HEAD WO IV CONTRAST;  7/13/2024 11:21 am   INDICATION: Signs/Symptoms:confusion dizziness.   COMPARISON: 05/27/2022   ACCESSION NUMBER(S): [id number]   ORDERING CLINICIAN: ROSE MARIE LEMOS   TECHNIQUE: Unenhanced images were obtained through the brain.   FINDINGS: There is atrophy resulting in prominence of the ventricles and sulci. There are areas of decreased attenuation within the white matter which are nonspecific but are commonly associated with small vessel ischemic disease. There is no mass effect or midline shift. No acute intracranial hemorrhage is identified. No extra-axial fluid collections are seen. No intraparenchymal mass lesions are identified.  Bone windows demonstrate no evidence of an acute calvarial fracture.       No evidence of an acute intracranial process.    MACRO: None.   Signed by: Teodoro Gerard 7/13/2024 11:47 AM Dictation workstation:   [id number]    XR chest 1 view    Result Date: 7/13/2024  Interpreted By:  April Wilson, STUDY: XR CHEST 1 VIEW; ;  7/13/2024 10:04 am   INDICATION: Signs/Symptoms:dizziness.   COMPARISON: Chest radiograph dated 05/20/2022   ACCESSION NUMBER(S): [id number]   ORDERING CLINICIAN: ROSE MARIE LEMOS   FINDINGS: Cardiac silhouette is mildly enlarged. Moderate atherosclerotic calcifications of the aortic knob are noted. Lungs are clear without consolidative airspace opacities. No large pleural effusions or pneumothorax. No acute osseous findings. Visualized upper abdomen appears grossly unremarkable.       1. Mild cardiomegaly with moderate atherosclerotic calcifications of the aortic knob, similar compared to prior radiograph. 2. Otherwise, no acute cardiopulmonary process.       MACRO: None   Signed by: April Wilson 7/13/2024 10:43 AM Dictation workstation:   [id number]       CT abdomen pelvis w IV contrast   Final Result   Previous left mastectomy.        Cholelithiasis with borderline gallbladder dilatation but no wall   thickening or adjacent fat stranding. Depending on the clinical   situation, consider gallbladder ultrasound in follow-up.        Multiple parapelvic cysts throughout both kidneys. No hydronephrosis.        Diffuse colonic diverticulosis without acute associated inflammation.        Arthritic changes in the spine and pelvis as described.        MACRO:   None        Signed by: Rigoberto Dunn 7/13/2024 1:30 PM   Dictation workstation:   [id number]      CT angio chest for pulmonary embolism   Final Result   No CT evidence of pulmonary embolism in the current exam.        Cardiomegaly.        Previous left mastectomy.        Thoracic spine scoliosis and DJD as described.        MACRO:   None        Signed by: Rigoberto Dunn 7/13/2024 1:34 PM   Dictation workstation:   [id number]      CT head wo IV contrast   Final  Result   No evidence of an acute intracranial process.        MACRO:   None.        Signed by: Teodoro Gerard 7/13/2024 11:47 AM   Dictation workstation:   [id number]      XR chest 1 view   Final Result   1. Mild cardiomegaly with moderate atherosclerotic calcifications of   the aortic knob, similar compared to prior radiograph.   2. Otherwise, no acute cardiopulmonary process.                  MACRO:   None        Signed by: April Wilson 7/13/2024 10:43 AM   Dictation workstation:   [id number]      Transthoracic Echo (TTE) Complete    (Results Pending)         RADIOLOGY:     CT abdomen pelvis w IV contrast   Final Result   Previous left mastectomy.        Cholelithiasis with borderline gallbladder dilatation but no wall   thickening or adjacent fat stranding. Depending on the clinical   situation, consider gallbladder ultrasound in follow-up.        Multiple parapelvic cysts throughout both kidneys. No hydronephrosis.        Diffuse colonic diverticulosis without acute associated inflammation.        Arthritic changes in the spine and pelvis as described.        MACRO:   None        Signed by: Rigoberto Dunn 7/13/2024 1:30 PM   Dictation workstation:   [id number]      CT angio chest for pulmonary embolism   Final Result   No CT evidence of pulmonary embolism in the current exam.        Cardiomegaly.        Previous left mastectomy.        Thoracic spine scoliosis and DJD as described.        MACRO:   None        Signed by: Rigoberto Dunn 7/13/2024 1:34 PM   Dictation workstation:   [id number]      CT head wo IV contrast   Final Result   No evidence of an acute intracranial process.        MACRO:   None.        Signed by: Teodoro Gerard 7/13/2024 11:47 AM   Dictation workstation:   [id number]      XR chest 1 view   Final Result   1. Mild cardiomegaly with moderate atherosclerotic calcifications of   the aortic knob, similar compared to prior radiograph.   2. Otherwise, no acute cardiopulmonary process.                   MACRO:   None        Signed by: April Wilson 7/13/2024 10:43 AM   Dictation workstation:   [id number]      Transthoracic Echo (TTE) Complete    (Results Pending)       PROBLEM LIST     Patient Active Problem List   Diagnosis    Arthritis of foot, left    History of breast cancer    Constipation    Vertigo    GERD (gastroesophageal reflux disease)    Left bundle branch block    Mild vitamin D deficiency    Actinic keratosis    Seborrheic keratosis    Varicose veins of both lower extremities with inflammation    Ocular migraine    Altered mental status, unspecified altered mental status type    NSTEMI (non-ST elevated myocardial infarction) (Multi)    Urinary tract infection without hematuria       ASSESSMENT:   Elevated troponin  Hypertension  UTI  GERD  Hyperlipidemia  PLAN:   Admitted to medicine.  Continue telemetry monitoring.  Telemetry shows normal sinus rhythm, left bundle branch block without change from prior ECGs.  Monitor electrolytes, keep potassium greater than 4 and magnesium greater than 2  Supplemental O2  Echocardiogram today  Troponin trending down.  Did discuss with patient and patient's daughter about proceeding with heart catheterization.  Discussed risk versus benefits.  Patient and patient's daughter agreeable to proceed with LHC via radial approach.  N.p.o.  Proceed with LHC today with Dr. Davison  Hypertension currently controlled, continue to monitor BP.  Only 1 isolated event of hypertension noted.  Will continue to monitor  Continue heparin infusion for now  Further recommendations post LHC and echocardiogram            Wilber Nolan [facility]  Adult Gerontology Acute Care Nurse Practitioner  [facility]   [facility]  [phone]          Of note, this documentation is completed using the Dragon Dictation system (voice recognition software). There may be spelling and/or grammatical errors that were not corrected  prior to final submission.    Please do not hesitate to call with questions.  Electronically signed by YECENIA Horn-CNP, on 7/15/2024 at 9:49 AM

## 2024-07-15 NOTE — PROGRESS NOTES
Patient is stable status post LHC/PCI under the care of Dr. Davison.  Discussed results of procedure with patient and her family members.  Pictures provided.  Findings of the LHC revealed 80% stenosis in the mid LAD, mild disease of the circumflex artery and RCA with an LVEF of 40%.  Patient underwent successful PCI with 1 KHUSHI placed to the LAD reducing that lesion down to 0% stenosis.  She tolerated the procedure well.  Please see procedural report for complete details.  She was given a Plavix bolus in the Cath Lab and will continue DAPT with aspirin 81 mg daily and Plavix 75 mg daily.  Will continue high intensity statin therapy.  Patient complains of 3/10 chest pressure/indigestion post procedurally.   She also complains of nausea.  Postprocedural EKG is unchanged from preprocedure EKG revealing sinus rhythm with occasional PVCs and LBBB.  Patient will be given medication for nausea.  Continue to monitor on telemetry.  Echocardiogram is pending at this time.  Patient was initiated on GDMT for heart failure management per general cardiology.  Further recommendations pending clinical course.  Multiple questions answered.  All verbalized understanding.

## 2024-07-15 NOTE — DISCHARGE INSTRUCTIONS
CARDIAC CATHETERIZATION DISCHARGE INSTRUCTIONS     FOR SUDDEN AND SEVERE CHEST PAIN, SHORTNESS OF BREATH, EXCESSIVE BLEEDING, SIGNS OF STROKE, OR CHANGES IN MENTAL STATUS YOU SHOULD CALL 911 IMMEDIATELY.     If your provider has prescribed aspirin and/or clopidogrel (Plavix), or prasugrel (Effient), or ticagrelor (Brilinta), DO NOT STOP THESE MEDICATIONS for any reason without talking to your cardiologist first. If any of these were prescribed, you must take them every day without missing a single dose. If you are getting low on these medications, contact your provider immediately for a refill.     FOR NEXT 24 HOURS  - Upon discharge, you should return home and rest for the remainder of the day and evening. You do not have to stay on bed rest but should not be very active.  It is recommended a responsible adult be with you for the first 24 hours after the procedure.    - No driving for 24 hours after procedure. Please arrange for someone to drive you home from the hospital today.     - Do not operate machinery or use power tools for 24 hours after your procedure.     - Do not make any legal decisions for 24 hours after your procedure.     - Do not drink alcoholic beverages for 24 hours after your procedure.    WOUND CARE   *FOR RADIAL (WRIST) ACCESS*  ·      No lifting more than 5 pounds or excessive use of the wrist for 24 hours - for example, treat your wrist as if it is sprained.  ·      Do not engage in vigorous activities (tennis, golf, bowling, weights) for at least 48 hours after the procedure.  ·      Do not submerge the wrist for 7 days after the procedure.  ·      You should expect mild tingling in your hand and tenderness at the puncture site for up to 3 days.    - The transparent dressing should be removed from the site 24 hours after the procedure.  Dressing can be removed 7/16/24 any time after 1:15 pm    - You may shower the day after your procedure. Wash the site gently with soap and water.  Rinse well and pat dry. Keep the area clean and dry. You may apply a Band-Aid to the site. Avoid lotions, ointments, or powders until fully healed.     - It is normal to notice a small bruise around the puncture site and/or a small grape sized or smaller lump. Any large bruising or large lump warrants a call to the office.      - If bleeding should occur, lay down and apply pressure to the affected area for 10 minutes.  If the bleeding stops notify your physician.  If there is a large amount of bleeding or spurting of blood CALL 911 immediately.  DO NOT drive yourself to the hospital.    - You may experience some tenderness, bruising or minimal inflammation.  If you have any concerns or if any of these symptoms become excessive, contact your cardiologist or go to the emergency room.     OTHER INSTRUCTIONS    - You may take acetaminophen (Tylenol) as directed for discomfort.  If pain is not relieved with acetaminophen (Tylenol), contact your doctor.    - If you notice or experience any of the following, you should notify your doctor or seek medical attention  Chest pain or discomfort  Change in mental status or weakness in extremities.  Dizziness, light headedness, or feeling faint.  Change in the site where the procedure was performed, such as bleeding or an increased area of bruising or swelling.  Tingling, numbness, pain, or coolness in the leg/arm beyond the site where the procedure was performed.  Signs of infection (i.e. shaking chills, temperature > 100 degrees Fahrenheit, warmth, redness) in the leg/arm area where the procedure was performed.  Changes in urination   Bloody or black stools  Vomiting blood  Severe nose bleeds  Any excessive bleeding    - If you DO NOT have an appointment with your cardiologist within 2-4 weeks following your procedure, please contact their office.      HEART FAILURE EDUCATION:  1. Weigh yourself daily and record on your weight log.  2. If you gain more than 2 or 3 pounds  overnight, call your cardiologist.  3. Follow a low sodium diet. No more than 2000 mg in one day, or more than 650 mg per meal.  4. Limit total fluids to no more than 8 cups (or 2 liters) per day - this includes all fluids (water, coffee, juice, milk, tea, etc.)  5. Monitor your blood pressure daily and record on your weight log.  6. Call to schedule your follow-up appointments when you get home if they were not already scheduled for you.  7. Keep your follow-up appointments! Bring your weight log with you so the doctors can see your weight trend and blood pressure readings.  8. Be sure to  any new prescriptions and take them as directed. If unsure of the medications, be sure to call your cardiologist.  9. Stay as active as you can tolerate.   10. If you notice subtle change of symptoms (slight increase in swelling, slight shortness of breath, a new intolerance to laying flat, a new cough), be sure to call your cardiologist.  11. If you have any questions or concerns or you have not heard back from the cardiologist, feel free to call Riya Crump heart failure navigator at [phone].

## 2024-07-15 NOTE — PROGRESS NOTES
Physical Therapy    Physical Therapy Evaluation    Patient Name: Laurel Pugh  MRN: [id number]  Today's Date: 7/15/2024   Time Calculation  Start Time: 1120  Stop Time: 1131  Time Calculation (min): 11 min  1002/1002-A  Services completed by Rhonda Iglesias SPT under direct supervision of Jude Bee PT.      Assessment/Plan   PT Assessment  End of Session Communication: Bedside nurse  Assessment Comment: PT eval only. Pt able to complete functional transfers and mobility at independent level. No further acute PT needs.  End of Session Patient Position: Up in chair, Alarm off, caregiver present (IV)  IP OR SWING BED PT PLAN  Inpatient or Swing Bed: Inpatient  PT Plan  PT Plan: PT Eval only  PT Eval Only Reason: No acute PT needs identified  PT Frequency: PT eval only  PT Discharge Recommendations: No further acute PT  PT Recommended Transfer Status: Independent  PT - OK to Discharge: Yes (once medically ready for next level of care)    Subjective     Current Problem:  1. Altered mental status, unspecified altered mental status type        2. Dizziness        3. Nausea and vomiting, unspecified vomiting type        4. NSTEMI (non-ST elevated myocardial infarction) (Multi)  Transthoracic Echo (TTE) Complete    Case Request Cath Lab: Left Heart Cath    Cardiac Catheterization Procedure      5. Urinary tract infection without hematuria, site unspecified  Case Request Cath Lab: Left Heart Cath    Cardiac Catheterization Procedure      6. Biliary calculus of other site without obstruction        7. Left bundle branch block  Case Request Cath Lab: Left Heart Cath    Cardiac Catheterization Procedure        General Visit Information:  General  Reason for Referral: Impaired Mobility  Referred By: DAYANNA Bernal 7/13  Past Medical History Relevant to Rehab: Vertigo, Hx of breast CA, uncontrolled HTN  Family/Caregiver Present: Yes (Daughter present at start of session, granddaughter in room skilled nursing through session)  Prior  to Session Communication: Bedside nurse (cleared by nurse for therapy kee)  Patient Position Received: Bed, 3 rail up, Alarm off, not on at start of session (IV)  General Comment: Pt is a 84 year old female who presented to the ED on 7/13 with c/o AMS, dizziness and nausea. CXR on 7/13 showed cardiomegaly. CTA on 7/13 negative for PE. UA positive. Troponin level trending up (614>1196) and pt on heparin drip. On 7/15 troponin trending down (519>245). Heparin assay on 7/15: 0.2. TTE on 7/15. Pt scheduled for L heart cath on 7/15/24. Medical dx. is NSTEMI and UTI.    Home Living:  Home Living  Home Living Comments: Pt lives with son in 1 level home + basement with 4 PHILLIP no hand rail. Pt reports that her son works 2 jobs so is barely home, but states that her daughter comes to see her daily. Bathroom on main level has tub shower , no DME. Pt reports there is a walk in shower in the basement with 10 steps down + hand rail. Laundry on main level.    Prior Level of Function:  Prior Function Per Pt/Caregiver Report  Prior Function Comments: Pt reports independence with PLOF ADLs and some assistance with IADLs. Owns a SPC and states she only uses it to ascend/descend stairs. Reports 0 falls in past 3 months. Still drives.    Precautions:  Precautions  Medical Precautions: Fall precautions    Objective     Pain:  Pain Assessment  Pain Assessment: 0-10  0-10 (Numeric) Pain Score: 0 - No pain    Cognition:  Cognition  Orientation Level: Oriented X4    General Assessments:  Static Sitting Balance  Static Sitting-Comment/Number of Minutes: Good  Dynamic Sitting Balance  Dynamic Sitting-Comments: Good  Static Standing Balance  Static Standing-Comment/Number of Minutes: Good  Dynamic Standing Balance  Dynamic Standing-Comments: Good    Functional Assessments:  Bed Mobility  Bed Mobility: Yes (Pt completed sup>sitting EOB at mod I level using rail for assist.)  Transfers  Transfer: Yes (Pt completed sit<>stand transfers without AD  at independent level.)  Ambulation/Gait Training  Ambulation/Gait Training Performed: Yes (Pt ambulated 100 feet without AD at independent level. Pt ambulated with alternating hip hike and opposite lean but steady. Required verbal cues at beginning of ambulation to decrease gait speed.)     Extremity/Trunk Assessments:  RUE   RUE : Within Functional Limits  LUE   LUE: Within Functional Limits  RLE   RLE :  (RLE strength grossly 4+/5)  LLE   LLE :  (LLE strength grossly 4+/5)    Outcome Measures:  [facility] Basic Mobility  Turning from your back to your side while in a flat bed without using bedrails: None  Moving from lying on your back to sitting on the side of a flat bed without using bedrails: None  Moving to and from bed to chair (including a wheelchair): None  Standing up from a chair using your arms (e.g. wheelchair or bedside chair): None  To walk in hospital room: None  Climbing 3-5 steps with railing: A little  Basic Mobility - Total Score: 23     Education Documentation  Mobility Training, taught by RA Hewitt at 7/15/2024 12:17 PM.  Learner: Patient  Readiness: Acceptance  Method: Explanation  Response: Verbalizes Understanding, Demonstrated Understanding    Education Comments  No comments found.

## 2024-07-15 NOTE — NURSING NOTE
Patient's family is bedside.  Patient needs to urinate.  Purwick placed per patient request. Patient c/o nausea.  Will notify CNP and medicate patient once orders are received.

## 2024-07-15 NOTE — PROGRESS NOTES
07/15/24 0567   Discharge Planning   Living Arrangements Alone   Support Systems Children   Assistance Needed PTA -Pt lives with son, 1 level house. Son works a lot but dgt visits daily. Does not use any AD. Bathroom on main level has tub shower also has walk in shower in the basement with 10 steps down + hand rail.  Independent with PLOF ADLs and some assistance with IADLs. Owns a SPC and states she only uses it to ascend/descend stairs. Still drives.   Type of Residence Private residence   Number of Stairs to Enter Residence 4   Expected Discharge Disposition Home   Does the patient need discharge transport arranged? No     Preference is home at dc. Therapy eval recommends no further therapy needs for dc. RN navigator following for new Entresto and Jardiance scripts.

## 2024-07-15 NOTE — NURSING NOTE
Patient transferring back to 10S.  Report given to Huong SAEZ and patient placed in Patient Movement for transfer back to floor.  Right Radial arterial sheath puncture site is stable.  Dressing is clean/dry/intact; site is soft.

## 2024-07-15 NOTE — PROGRESS NOTES
Physical Therapy                 Therapy Communication Note    Patient Name: Laurel Pugh  MRN: [id number]  Today's Date: 7/15/2024     Discipline: Physical Therapy    Missed Visit Reason: 926 - Missed Visit Reason: Other (Comment) (PT/OT attempt. Pt being taken off floor for medical procedure. Reattempt as able.)    Missed Time: Attempt

## 2024-07-15 NOTE — POST-PROCEDURE NOTE
Physician Transition of Care Summary  Invasive Cardiovascular Lab    Procedure Date: 7/15/2024  Attending:    * Tressa Davison - Primary  Resident/Fellow/Other Assistant: Surgeons and Role:  * No surgeons found with a matching role *    Indications:   Pre-op Diagnosis      * NSTEMI (non-ST elevated myocardial infarction) (Multi) [I21.4]     * Urinary tract infection without hematuria, site unspecified [N39.0]     * Left bundle branch block [I44.7]    Post-procedure diagnosis:   Post-op Diagnosis     * NSTEMI (non-ST elevated myocardial infarction) (Multi) [I21.4]     * Urinary tract infection without hematuria, site unspecified [N39.0]     * Left bundle branch block [I44.7]    Procedure(s):   Left Heart Cath  22650 - [place] CATH PLMT L HRT & ARTS W/NJX & ANGIO IMG S&I        Procedure Findings:   , 80% stenosis of LAD mild disease of right coronary artery and circumflex, junction fraction of 40% successful PTCA drug-eluting stent of the LAD    Description of the Procedure:   Left heart cath coronary angiography left ventriculogram, PTCA of drug-eluting stents of the LAD    Complications:   None    Stents/Implants:   Implants       Stent    Stent, Thong [place] Karlos, 2.50 X 18rx - [id number] - Implanted        Inventory item: STENT, THONG FRONTIER KARLSO, 2.50 X 18RX Model/Cat number: [id number]    : [employer] Lot number: [id number]    Device identifier: [id number]        As of 7/15/2024       Status: Implanted                              Anticoagulation/Antiplatelet Plan:   Intravenous heparin, Plavix load    Estimated Blood Loss:   * No values recorded between 7/15/2024 12:12 PM and 7/15/2024 12:58 PM *    Anesthesia: Moderate Sedation Anesthesia Staff: No anesthesia staff entered.    Any Specimen(s) Removed:   No specimens collected during this procedure.    Disposition:   Dual antiplatelet therapy      Electronically signed by: Tressa Davison MD, 7/15/2024 12:58 PM

## 2024-07-16 ENCOUNTER — PHARMACY VISIT (OUTPATIENT)
Dept: PHARMACY | Facility: CLINIC | Age: 84
End: 2024-07-16
Payer: COMMERCIAL

## 2024-07-16 ENCOUNTER — PATIENT OUTREACH (OUTPATIENT)
Dept: HOME HEALTH SERVICES | Age: 84
End: 2024-07-16
Payer: COMMERCIAL

## 2024-07-16 VITALS
RESPIRATION RATE: 17 BRPM | SYSTOLIC BLOOD PRESSURE: 135 MMHG | TEMPERATURE: 97.3 F | DIASTOLIC BLOOD PRESSURE: 62 MMHG | BODY MASS INDEX: 27.55 KG/M2 | HEIGHT: 62 IN | HEART RATE: 63 BPM | WEIGHT: 149.69 LBS | OXYGEN SATURATION: 95 %

## 2024-07-16 LAB
ANION GAP SERPL CALC-SCNC: 12 MMOL/L (ref 10–20)
BACTERIA UR CULT: ABNORMAL
BUN SERPL-MCNC: 15 MG/DL (ref 6–23)
CALCIUM SERPL-MCNC: 8.7 MG/DL (ref 8.6–10.3)
CHLORIDE SERPL-SCNC: 108 MMOL/L (ref 98–107)
CO2 SERPL-SCNC: 24 MMOL/L (ref 21–32)
CREAT SERPL-MCNC: 0.82 MG/DL (ref 0.5–1.05)
EGFRCR SERPLBLD CKD-EPI 2021: 70 ML/MIN/1.73M*2
ERYTHROCYTE [DISTWIDTH] IN BLOOD BY AUTOMATED COUNT: 15.3 % (ref 11.5–14.5)
GLUCOSE SERPL-MCNC: 105 MG/DL (ref 74–99)
HCT VFR BLD AUTO: 37.2 % (ref 36–46)
HGB BLD-MCNC: 12.1 G/DL (ref 12–16)
HOLD SPECIMEN: NORMAL
MCH RBC QN AUTO: 27.6 PG (ref 26–34)
MCHC RBC AUTO-ENTMCNC: 32.5 G/DL (ref 32–36)
MCV RBC AUTO: 85 FL (ref 80–100)
NRBC BLD-RTO: 0 /100 WBCS (ref 0–0)
PLATELET # BLD AUTO: 203 X10*3/UL (ref 150–450)
POTASSIUM SERPL-SCNC: 3.9 MMOL/L (ref 3.5–5.3)
RBC # BLD AUTO: 4.39 X10*6/UL (ref 4–5.2)
SODIUM SERPL-SCNC: 140 MMOL/L (ref 136–145)
WBC # BLD AUTO: 7 X10*3/UL (ref 4.4–11.3)

## 2024-07-16 PROCEDURE — 2500000001 HC RX 250 WO HCPCS SELF ADMINISTERED DRUGS (ALT 637 FOR MEDICARE OP): Performed by: NURSE PRACTITIONER

## 2024-07-16 PROCEDURE — 2500000002 HC RX 250 W HCPCS SELF ADMINISTERED DRUGS (ALT 637 FOR MEDICARE OP, ALT 636 FOR OP/ED): Performed by: NURSE PRACTITIONER

## 2024-07-16 PROCEDURE — 99232 SBSQ HOSP IP/OBS MODERATE 35: CPT | Performed by: NURSE PRACTITIONER

## 2024-07-16 PROCEDURE — 2500000004 HC RX 250 GENERAL PHARMACY W/ HCPCS (ALT 636 FOR OP/ED)

## 2024-07-16 PROCEDURE — 2500000001 HC RX 250 WO HCPCS SELF ADMINISTERED DRUGS (ALT 637 FOR MEDICARE OP)

## 2024-07-16 PROCEDURE — 36415 COLL VENOUS BLD VENIPUNCTURE: CPT

## 2024-07-16 PROCEDURE — 99239 HOSP IP/OBS DSCHRG MGMT >30: CPT

## 2024-07-16 PROCEDURE — RXMED WILLOW AMBULATORY MEDICATION CHARGE

## 2024-07-16 PROCEDURE — 80048 BASIC METABOLIC PNL TOTAL CA: CPT

## 2024-07-16 PROCEDURE — 85027 COMPLETE CBC AUTOMATED: CPT

## 2024-07-16 RX ORDER — CLOPIDOGREL BISULFATE 75 MG/1
75 TABLET ORAL DAILY
Qty: 30 TABLET | Refills: 11 | Status: SHIPPED | OUTPATIENT
Start: 2024-07-16 | End: 2025-07-16

## 2024-07-16 RX ORDER — METOPROLOL SUCCINATE 25 MG/1
25 TABLET, EXTENDED RELEASE ORAL DAILY
Qty: 30 TABLET | Refills: 3 | Status: SHIPPED | OUTPATIENT
Start: 2024-07-16 | End: 2024-11-13

## 2024-07-16 RX ORDER — ATORVASTATIN CALCIUM 40 MG/1
40 TABLET, FILM COATED ORAL DAILY
Qty: 30 TABLET | Refills: 3 | Status: SHIPPED | OUTPATIENT
Start: 2024-07-16 | End: 2024-11-13

## 2024-07-16 RX ORDER — CEPHALEXIN 500 MG/1
500 CAPSULE ORAL 2 TIMES DAILY
Qty: 14 CAPSULE | Refills: 0 | Status: SHIPPED | OUTPATIENT
Start: 2024-07-16 | End: 2024-07-24 | Stop reason: SINTOL

## 2024-07-16 RX ORDER — SPIRONOLACTONE 25 MG/1
12.5 TABLET ORAL DAILY
Qty: 15 TABLET | Refills: 2 | Status: SHIPPED | OUTPATIENT
Start: 2024-07-16 | End: 2024-10-14

## 2024-07-16 RX ADMIN — SACUBITRIL AND VALSARTAN 1 TABLET: 24; 26 TABLET, FILM COATED ORAL at 10:08

## 2024-07-16 RX ADMIN — SPIRONOLACTONE 12.5 MG: 25 TABLET ORAL at 10:08

## 2024-07-16 RX ADMIN — CEFTRIAXONE SODIUM 1 G: 1 INJECTION, SOLUTION INTRAVENOUS at 11:54

## 2024-07-16 RX ADMIN — EMPAGLIFLOZIN 10 MG: 10 TABLET, FILM COATED ORAL at 10:08

## 2024-07-16 RX ADMIN — METOPROLOL SUCCINATE 25 MG: 25 TABLET, EXTENDED RELEASE ORAL at 10:08

## 2024-07-16 RX ADMIN — ASPIRIN 81 MG: 81 TABLET, CHEWABLE ORAL at 10:08

## 2024-07-16 RX ADMIN — CLOPIDOGREL BISULFATE 75 MG: 75 TABLET, FILM COATED ORAL at 10:08

## 2024-07-16 SDOH — HEALTH STABILITY: MENTAL HEALTH: HOW OFTEN DO YOU HAVE 6 OR MORE DRINKS ON ONE OCCASION?: NEVER

## 2024-07-16 SDOH — ECONOMIC STABILITY: TRANSPORTATION INSECURITY
IN THE PAST 12 MONTHS, HAS THE LACK OF TRANSPORTATION KEPT YOU FROM MEDICAL APPOINTMENTS OR FROM GETTING MEDICATIONS?: NO

## 2024-07-16 SDOH — HEALTH STABILITY: PHYSICAL HEALTH: ON AVERAGE, HOW MANY MINUTES DO YOU ENGAGE IN EXERCISE AT THIS LEVEL?: 0 MIN

## 2024-07-16 SDOH — ECONOMIC STABILITY: TRANSPORTATION INSECURITY
IN THE PAST 12 MONTHS, HAS LACK OF TRANSPORTATION KEPT YOU FROM MEETINGS, WORK, OR FROM GETTING THINGS NEEDED FOR DAILY LIVING?: NO

## 2024-07-16 SDOH — SOCIAL STABILITY: SOCIAL INSECURITY
WITHIN THE LAST YEAR, HAVE YOU BEEN KICKED, HIT, SLAPPED, OR OTHERWISE PHYSICALLY HURT BY YOUR PARTNER OR EX-PARTNER?: NO

## 2024-07-16 SDOH — SOCIAL STABILITY: SOCIAL NETWORK
IN A TYPICAL WEEK, HOW MANY TIMES DO YOU TALK ON THE PHONE WITH FAMILY, FRIENDS, OR NEIGHBORS?: MORE THAN THREE TIMES A WEEK

## 2024-07-16 SDOH — ECONOMIC STABILITY: FOOD INSECURITY: WITHIN THE PAST 12 MONTHS, YOU WORRIED THAT YOUR FOOD WOULD RUN OUT BEFORE YOU GOT MONEY TO BUY MORE.: NEVER TRUE

## 2024-07-16 SDOH — ECONOMIC STABILITY: INCOME INSECURITY: IN THE PAST 12 MONTHS, HAS THE ELECTRIC, GAS, OIL, OR WATER COMPANY THREATENED TO SHUT OFF SERVICE IN YOUR HOME?: NO

## 2024-07-16 SDOH — HEALTH STABILITY: MENTAL HEALTH: HOW OFTEN DO YOU HAVE A DRINK CONTAINING ALCOHOL?: NEVER

## 2024-07-16 SDOH — SOCIAL STABILITY: SOCIAL NETWORK: HOW OFTEN DO YOU ATTEND CHURCH OR RELIGIOUS SERVICES?: MORE THAN 4 TIMES PER YEAR

## 2024-07-16 SDOH — SOCIAL STABILITY: SOCIAL INSECURITY: WITHIN THE LAST YEAR, HAVE YOU BEEN AFRAID OF YOUR PARTNER OR EX-PARTNER?: NO

## 2024-07-16 SDOH — SOCIAL STABILITY: SOCIAL INSECURITY: WITHIN THE LAST YEAR, HAVE YOU BEEN HUMILIATED OR EMOTIONALLY ABUSED IN OTHER WAYS BY YOUR PARTNER OR EX-PARTNER?: NO

## 2024-07-16 SDOH — SOCIAL STABILITY: SOCIAL INSECURITY
WITHIN THE LAST YEAR, HAVE TO BEEN RAPED OR FORCED TO HAVE ANY KIND OF SEXUAL ACTIVITY BY YOUR PARTNER OR EX-PARTNER?: NO

## 2024-07-16 SDOH — HEALTH STABILITY: PHYSICAL HEALTH: ON AVERAGE, HOW MANY DAYS PER WEEK DO YOU ENGAGE IN MODERATE TO STRENUOUS EXERCISE (LIKE A BRISK WALK)?: 0 DAYS

## 2024-07-16 SDOH — ECONOMIC STABILITY: INCOME INSECURITY: HOW HARD IS IT FOR YOU TO PAY FOR THE VERY BASICS LIKE FOOD, HOUSING, MEDICAL CARE, AND HEATING?: NOT HARD AT ALL

## 2024-07-16 SDOH — SOCIAL STABILITY: SOCIAL NETWORK
DO YOU BELONG TO ANY CLUBS OR ORGANIZATIONS SUCH AS CHURCH GROUPS UNIONS, FRATERNAL OR ATHLETIC GROUPS, OR SCHOOL GROUPS?: NO

## 2024-07-16 SDOH — ECONOMIC STABILITY: HOUSING INSECURITY: AT ANY TIME IN THE PAST 12 MONTHS, WERE YOU HOMELESS OR LIVING IN A SHELTER (INCLUDING NOW)?: NO

## 2024-07-16 SDOH — ECONOMIC STABILITY: FOOD INSECURITY: WITHIN THE PAST 12 MONTHS, THE FOOD YOU BOUGHT JUST DIDN'T LAST AND YOU DIDN'T HAVE MONEY TO GET MORE.: NEVER TRUE

## 2024-07-16 SDOH — HEALTH STABILITY: MENTAL HEALTH
STRESS IS WHEN SOMEONE FEELS TENSE, NERVOUS, ANXIOUS, OR CAN'T SLEEP AT NIGHT BECAUSE THEIR MIND IS TROUBLED. HOW STRESSED ARE YOU?: ONLY A LITTLE

## 2024-07-16 SDOH — HEALTH STABILITY: MENTAL HEALTH
HOW OFTEN DO YOU NEED TO HAVE SOMEONE HELP YOU WHEN YOU READ INSTRUCTIONS, PAMPHLETS, OR OTHER WRITTEN MATERIAL FROM YOUR DOCTOR OR PHARMACY?: SOMETIMES

## 2024-07-16 SDOH — ECONOMIC STABILITY: INCOME INSECURITY: IN THE LAST 12 MONTHS, WAS THERE A TIME WHEN YOU WERE NOT ABLE TO PAY THE MORTGAGE OR RENT ON TIME?: NO

## 2024-07-16 SDOH — SOCIAL STABILITY: SOCIAL NETWORK: ARE YOU MARRIED, WIDOWED, DIVORCED, SEPARATED, NEVER MARRIED, OR LIVING WITH A PARTNER?: WIDOWED

## 2024-07-16 SDOH — HEALTH STABILITY: MENTAL HEALTH: HOW MANY STANDARD DRINKS CONTAINING ALCOHOL DO YOU HAVE ON A TYPICAL DAY?: PATIENT DOES NOT DRINK

## 2024-07-16 SDOH — SOCIAL STABILITY: SOCIAL NETWORK: HOW OFTEN DO YOU GET TOGETHER WITH FRIENDS OR RELATIVES?: MORE THAN THREE TIMES A WEEK

## 2024-07-16 SDOH — SOCIAL STABILITY: SOCIAL NETWORK: HOW OFTEN DO YOU ATTENT MEETINGS OF THE CLUB OR ORGANIZATION YOU BELONG TO?: NEVER

## 2024-07-16 SDOH — ECONOMIC STABILITY: HOUSING INSECURITY: IN THE PAST 12 MONTHS, HOW MANY TIMES HAVE YOU MOVED WHERE YOU WERE LIVING?: 1

## 2024-07-16 ASSESSMENT — COGNITIVE AND FUNCTIONAL STATUS - GENERAL
CLIMB 3 TO 5 STEPS WITH RAILING: A LITTLE
DAILY ACTIVITIY SCORE: 24
MOBILITY SCORE: 23

## 2024-07-16 ASSESSMENT — PAIN SCALES - GENERAL: PAINLEVEL_OUTOF10: 0 - NO PAIN

## 2024-07-16 ASSESSMENT — LIFESTYLE VARIABLES
AUDIT-C TOTAL SCORE: 0
SKIP TO QUESTIONS 9-10: 1

## 2024-07-16 ASSESSMENT — PAIN SCALES - WONG BAKER: WONGBAKER_NUMERICALRESPONSE: NO HURT

## 2024-07-16 NOTE — PROGRESS NOTES
07/16/24 1219   Current Planned Discharge Disposition   Current Planned Discharge Disposition Home     Pt to dc home with family. RN navigator working on pt's new scripts.

## 2024-07-16 NOTE — NURSING NOTE
Provided patient with free 30 day trial of Entresto. Educated patient on dosing instructions and answered questions. Next fill $42. Also delivered, metoprolol, atorvastatin, plavix and aldactone as ordered.

## 2024-07-16 NOTE — NURSING NOTE
CHF Clinical Nurse Navigator Documentation  Congestive Heart Failure disease education was performed by the Clinical Nurse Navigator with a good understanding: yes  CHF signs and symptoms discussed and when to call cardiologist?  yes  Living With Heart Failure Education booklet?  no  Controlling Heart Failure at Home Education? yes  Home medication usage?  yes  Nutrition Education? Yes-low sodium   Fluid Restriction Education? yes  Daily Weight Education? Yes-daily weight log provided   Follow-up with Cardiologist after discharge education? yes  Comments: Met with patient and daughter at bedside for heart failure education. Both verbalized understanding. Patient lives at home with her son. She states her son is rarely there because he does work 2 jobs. Prior to hospitalization patient did not have a cardiologist but did have a PCP, Dr. Houston. Patient was also only taking OTC medications prior to discharge. Patient is aware that upon discharge she will have to follow up with cardiology and is aware of her appointment with Wilber HARTLEY next week. Stressed the importance of keeping and going to her follow up appointments and taking all medications as prescribed. Spoke with patient and daughter about [facility] program. Patient is agreeable. Referral placed. Answered all questions. Provided my contact information should any other questions or concerns arise.

## 2024-07-16 NOTE — DISCHARGE SUMMARY
Discharge Diagnosis  Altered mental status, unspecified altered mental status type    Issues Requiring Follow-Up  none    Discharge Meds     Your medication list        START taking these medications        Instructions Last Dose Given Next Dose Due   atorvastatin 40 mg tablet  Commonly known as: Lipitor      Take 1 tablet (40 mg) by mouth once daily.       cephalexin 500 mg capsule  Commonly known as: Keflex      Take 1 capsule (500 mg) by mouth 2 times a day for 7 days.       clopidogrel 75 mg tablet  Commonly known as: Plavix      Take 1 tablet (75 mg) by mouth once daily.       Entresto 24-26 mg tablet  Generic drug: sacubitriL-valsartan      Take 1 tablet by mouth 2 times a day.       metoprolol succinate XL 25 mg 24 hr tablet  Commonly known as: Toprol-XL      Take 1 tablet (25 mg) by mouth once daily. Do not crush or chew.       spironolactone 25 mg tablet  Commonly known as: Aldactone      Take 0.5 tablets (12.5 mg) by mouth once daily.              CONTINUE taking these medications        Instructions Last Dose Given Next Dose Due   aspirin 81 mg EC tablet           cholecalciferol 25 MCG (1000 UT) tablet  Commonly known as: Vitamin D-3           cranberry 400 mg capsule           cyanocobalamin 2,000 mcg tablet  Commonly known as: Vitamin B-12      Take 1 tablet (2,000 mcg) by mouth once daily.       fluticasone 50 mcg/actuation nasal spray  Commonly known as: Flonase      Administer 1 spray into each nostril once daily. Shake gently. Before first use, prime pump. After use, clean tip and replace cap.       STOOL SOFTENER ORAL                     Where to Get Your Medications        These medications were sent to [facility]  [address] [place] [id number]      Hours: 9 AM to 5:30 PM Mon-Fri, 9 AM to 1 PM Saturday Phone: [phone]   atorvastatin 40 mg tablet  cephalexin 500 mg capsule  clopidogrel 75 mg tablet  Entresto 24-26 mg tablet  metoprolol succinate XL 25 mg 24 hr  tablet  spironolactone 25 mg tablet         Test Results Pending At Discharge  Pending Labs       Order Current Status    Blood Culture Preliminary result    Blood Culture Preliminary result            Last Vitals  Vitals:    07/15/24 1930 07/15/24 2050 07/15/24 2358 07/16/24 0729   BP: 144/64 128/60 114/62 135/62   BP Location: Left arm Left arm Left arm Left arm   Patient Position: Lying  Lying Sitting   Pulse: 65 73 80 63   Resp: 17  16 17   Temp: 36.3 °C (97.3 °F)  36.5 °C (97.7 °F) 36.3 °C (97.3 °F)   TempSrc: Temporal  Temporal Temporal   SpO2: 96%  95% 95%   Weight:       Height:           Hospital Course  Laurel Pugh is a 84 y.o. female presenting with change in mental status.  Patient lives home alone with her son who works 2 jobs.  Family checks on her daily and she was found to have a change in her mental status with confusion and not at her baseline.  Per patient she felt weak and dizzy x 1 day and felt like she was getting a UTI.  Patient denies chest pain or shortness of breath.  Denies abdominal pain, nausea vomiting or diarrhea.  Patient denies fever or chills.  On admission patient was hypertensive of 203/96 is not on hypertensive medication.  UA was positive for leukocytes.  Patient troponin was elevated at 427 > 614.  BMP and CBC otherwise unremarkable.  EKG showed normal sinus rhythm with BBB.  Chest x-ray showed mild cardiomegaly.  CT angio negative for PE.  Patient is not on supplemental O2.  Per family patient mental status has improved since being in the emergency room.  ER spoke with Dr. Davison regarding elevated troponin would like patient to be admitted and treated for UTI no emergent cath needed at this time.  Patient was started on a heparin drip.  Will be admitted for further evaluation and treatment.  Patient was seen by cardiology and had an echo which showed EF 40%.  Patient was taken to the Cath Lab with an 80% occlusion of LAD with a stent being placed.  Patient will be  treated with Keflex for few more days for UTI.  Nurse navigator was consulted for medication education on discharge.  Will continue Toprol, statin MRI and Entresto.  Continue DAPT.  Family at bedside and educated agreeable to plan.  Patient will be discharged home with no needs.  On day of discharge patient hemodynamically stable.    Pertinent Physical Exam At Time of Discharge  Physical Exam  Constitutional:       Appearance: She is obese. She is well-groomed.   HENT:      Head: Normocephalic.      Mouth/Throat:      Mouth: Mucous membranes are moist.   Eyes:      Pupils: Pupils are equal, round, and reactive to light.   Cardiovascular:      Rate and Rhythm: Normal rate and regular rhythm.      Heart sounds: Normal heart sounds, S1 normal and S2 normal.   Pulmonary:      Effort: Pulmonary effort is normal.      Breath sounds: Normal breath sounds.   Abdominal:      General: Bowel sounds are normal.      Palpations: Abdomen is soft.   Musculoskeletal:         General: Normal range of motion.      Cervical back: Neck supple.   Skin:     General: Skin is warm.   Neurological:      Mental Status: She is alert and oriented.      Motor: Weakness present.   Psychiatric:         Mood and Affect: Mood normal.         Speech: Speech normal.         Behavior: Behavior normal.     Outpatient Follow-Up  Future Appointments   Date Time Provider Department Center   7/25/2024 12:45 PM Wilber Nolan, APRN-CNP [id number] [place]   10/23/2024  3:30 PM Adrian Echavarria MD [id number] [place]   3/10/2025 10:30 AM Chong Houston DO [id number] [place]         Desi Bernal APRN-CNP

## 2024-07-16 NOTE — HOSPITAL COURSE
Laurel Pugh is a 84 y.o. female presenting with change in mental status.  Patient lives home alone with her son who works 2 jobs.  Family checks on her daily and she was found to have a change in her mental status with confusion and not at her baseline.  Per patient she felt weak and dizzy x 1 day and felt like she was getting a UTI.  Patient denies chest pain or shortness of breath.  Denies abdominal pain, nausea vomiting or diarrhea.  Patient denies fever or chills.  On admission patient was hypertensive of 203/96 is not on hypertensive medication.  UA was positive for leukocytes.  Patient troponin was elevated at 427 > 614.  BMP and CBC otherwise unremarkable.  EKG showed normal sinus rhythm with BBB.  Chest x-ray showed mild cardiomegaly.  CT angio negative for PE.  Patient is not on supplemental O2.  Per family patient mental status has improved since being in the emergency room.  ER spoke with Dr. Davison regarding elevated troponin would like patient to be admitted and treated for UTI no emergent cath needed at this time.  Patient was started on a heparin drip.  Will be admitted for further evaluation and treatment.  Patient was seen by cardiology and had an echo which showed EF 40%.  Patient was taken to the Cath Lab with an 80% occlusion of LAD with a stent being placed.  Patient will be treated with Keflex for few more days for UTI.  Nurse navigator was consulted for medication education on discharge.  Will continue Toprol, statin MRI and Entresto.  Continue DAPT.  Family at bedside and educated agreeable to plan.  Patient will be discharged home with no needs.  On day of discharge patient hemodynamically stable.

## 2024-07-16 NOTE — PROGRESS NOTES
Truong TARYN/Cardiologist:  Wilber oNlan, YECENIA-IFEOMA, Dr. Rosa  Primary Cardiologist: None  Date:  7/16/2024  Patient:  Laurel Pugh  YOB: 1940  MRN:  [id number]   Admit Date:  7/13/2024      SUBJECTIVE:    Laurel Pugh was seen and examined today at bedside.     She denies any chest pain or shortness of breath.         VITALS:     Vitals:    07/15/24 1930 07/15/24 2050 07/15/24 2358 07/16/24 0729   BP: 144/64 128/60 114/62 135/62   BP Location: Left arm Left arm Left arm Left arm   Patient Position: Lying  Lying Sitting   Pulse: 65 73 80 63   Resp: 17  16 17   Temp: 36.3 °C (97.3 °F)  36.5 °C (97.7 °F) 36.3 °C (97.3 °F)   TempSrc: Temporal  Temporal Temporal   SpO2: 96%  95% 95%   Weight:       Height:           Intake/Output Summary (Last 24 hours) at 7/16/2024 0907  Last data filed at 7/16/2024 0729  Gross per 24 hour   Intake 350 ml   Output 1001 ml   Net -651 ml       Wt Readings from Last 4 Encounters:   07/13/24 67.9 kg (149 lb 11.1 oz)   03/08/24 66.7 kg (147 lb)   12/08/23 65.8 kg (145 lb)   04/12/23 66 kg (145 lb 6.4 oz)       CURRENT HOSPITAL MEDICATIONS:   aspirin, 81 mg, oral, Daily  atorvastatin, 40 mg, oral, Daily  cefTRIAXone, 1 g, intravenous, q24h  clopidogrel, 75 mg, oral, Daily  empagliflozin, 10 mg, oral, Daily  metoprolol succinate XL, 25 mg, oral, Daily  polyethylene glycol, 17 g, oral, Daily  sacubitriL-valsartan, 1 tablet, oral, BID  spironolactone, 12.5 mg, oral, Daily           Current Outpatient Medications   Medication Instructions    aspirin 81 mg EC tablet 1 tablet, oral, Daily    atorvastatin (LIPITOR) 40 mg, oral, Daily    cholecalciferol (Vitamin D-3) 25 MCG (1000 UT) tablet oral    clopidogrel (Plavix) 75 mg tablet Take 1 tablet (75 mg) by mouth once daily.    cranberry 400 mg capsule oral    cyanocobalamin (VITAMIN B-12) 2,000 mcg, oral, Daily    docusate sodium (STOOL SOFTENER ORAL) oral, Daily    empagliflozin (JARDIANCE) 10  mg, oral, Daily    fluticasone (Flonase) 50 mcg/actuation nasal spray 1 spray, Each Nostril, Daily, Shake gently. Before first use, prime pump. After use, clean tip and replace cap.    metoprolol succinate XL (TOPROL-XL) 25 mg, oral, Daily, Do not crush or chew.    sacubitriL-valsartan (Entresto) 24-26 mg tablet 1 tablet, oral, 2 times daily    spironolactone (ALDACTONE) 12.5 mg, oral, Daily        PHYSICAL EXAMINATION:     Physical Exam  Vitals and nursing note reviewed.   HENT:      Head: Normocephalic.      Mouth/Throat:      Mouth: Mucous membranes are moist.   Eyes:      Pupils: Pupils are equal, round, and reactive to light.   Cardiovascular:      Rate and Rhythm: Normal rate and regular rhythm.   Pulmonary:      Effort: Pulmonary effort is normal.   Abdominal:      General: Bowel sounds are normal.      Palpations: Abdomen is soft.   Musculoskeletal:         General: Normal range of motion.   Skin:     General: Skin is warm and dry.      Capillary Refill: Capillary refill takes less than 2 seconds.   Neurological:      Mental Status: She is alert and oriented to person, place, and time.   Psychiatric:         Mood and Affect: Mood normal.         LAB DATA:     CBC:   Results from last 7 days   Lab Units 07/16/24  0514 07/15/24  0524 07/14/24  0325   WBC AUTO x10*3/uL 7.0 4.2* 6.3   RBC AUTO x10*6/uL 4.39 4.16 4.39   HEMOGLOBIN g/dL 12.1 11.5* 12.1   HEMATOCRIT % 37.2 36.1 37.6   MCV fL 85 87 86   MCH pg 27.6 27.6 27.6   MCHC g/dL 32.5 31.9* 32.2   RDW % 15.3* 15.5* 15.2*   PLATELETS AUTO x10*3/uL 203 191 195     CMP:    Results from last 7 days   Lab Units 07/16/24  0514 07/15/24  0524 07/14/24  0325 07/13/24  0951   SODIUM mmol/L 140 140 139 138   POTASSIUM mmol/L 3.9 3.9 3.8 3.8   CHLORIDE mmol/L 108* 110* 110* 106   CO2 mmol/L 24 26 24 22   BUN mg/dL 15 21 21 29*   CREATININE mg/dL 0.82 1.02 0.85 0.88   GLUCOSE mg/dL 105* 93 85 128*   PROTEIN TOTAL g/dL  --   --   --  7.2   CALCIUM mg/dL 8.7 8.6 8.4* 9.6    BILIRUBIN TOTAL mg/dL  --   --   --  0.6   ALK PHOS U/L  --   --   --  48   AST U/L  --   --   --  16   ALT U/L  --   --   --  10     BMP:    Results from last 7 days   Lab Units 07/16/24  0514 07/15/24  0524 07/14/24  0325   SODIUM mmol/L 140 140 139   POTASSIUM mmol/L 3.9 3.9 3.8   CHLORIDE mmol/L 108* 110* 110*   CO2 mmol/L 24 26 24   BUN mg/dL 15 21 21   CREATININE mg/dL 0.82 1.02 0.85   CALCIUM mg/dL 8.7 8.6 8.4*   GLUCOSE mg/dL 105* 93 85     Magnesium:  Results from last 7 days   Lab Units 07/13/24  0951   MAGNESIUM mg/dL 1.98     Troponin:    Results from last 7 days   Lab Units 07/15/24  0524 07/14/24  1128 07/14/24  0325   TROPHS ng/L 245* 519* 1,196*     BNP:   Results from last 7 days   Lab Units 07/13/24  0951   BNP pg/mL 373*     Lipid Panel:  Results from last 7 days   Lab Units 07/14/24  0325   HDL mg/dL 52.9   CHOLESTEROL/HDL RATIO  3.7   VLDL mg/dL 15   TRIGLYCERIDES mg/dL 74   NON HDL CHOL. mg/dL 144        DIAGNOSTIC TESTING:   [facility], Cath Lab         [address] [place] [address]     Cardiovascular Catheterization Report     Patient Name:     MAGDI DICKEY   Performing Physician:  Apryl Davison MD  Study Date:       7/15/2024           Verifying Physician:   Apryl Davison MD  MRN/PID:          [id number]            Cardiologist/Co-Scrub:  Accession#:       [id number]        Ordering Provider:     [id number] SNOW SUNG  Date of           1940 / 84      Cardiologist:  Birth/Age:        years  Gender:           F                   Fellow:  Encounter#:       [id number]          Surgeon:        Study:            Left Heart Cath with PCI  Additional Study: Left Ventriculogram  Additional Study: Coronary Arteriogram        Indications:  MAGDI DICKEY is a 84 year old  female who presents with dyslipidemia, hypertension and a chest pain assessment of typical angina. NSTE - ACS.  Stress test performed: No. CTA performed: NoFeliciano Wan accessed: No. LVEF  Assessed: No.  Cardiac arrest: No.  Cardiac surgical consult: No.  Cardiovascular Instability: No  Frailty status of patient entering lab: 5 = Mildly frail.        Procedure Description:  After infiltration with 2% Lidocaine, the right radial artery was cannulated with a modified Seldinger technique. Subsequently a 5/6 slender sheath was placed in the right radial artery. Multiple injections of contrast were made into the left and right coronary arteries with angiograms recorded in multiple projections. Retrograde left heart catheterizion was accomplished with a 5 Fr. pigtail catheter. A single plane left ventriculogram was recorded in the 30 degree TERRELL projection. The contrast dose was 20 ml injected at 10 ml/sec. The catheter was then withdrawn across the aortic valve under continuous pressure monitoring and removed.     Coronary Angiography:  The coronary circulation is right dominant.     Left Main Coronary Artery:  The left main coronary artery is free of atherosclerotic disease.     Left Anterior Descending Coronary Artery Distribution:  The Left Anterior Descending artery is a large vessel. There is 80% stenosis in the mid left anterior descending artery. Hemodynamically significant obstruction is noted in this vessel. The devices advanced to the mid LAD lesion were: a balloon was inflated for pre-dilation Resolute Gates 2.5x18 stent was deployed in the lesion. Residual stenosis is 0 %.     Circumflex Coronary Artery Distribution:  The Left Circumflex artery is a large vessel. Presents luminal irregularities.     Right Coronary Artery Distribution:     The Right Coronary Artery is a large vessel. Presents luminal irregularities.        Left Ventriculography:  The estimated left ventricular ejection fraction is abnormal at  45%.     Coronary Interventions:  Angiography reveals a 80% stenosis of the mid left anterior descending coronary artery. Pre-intervention AMADOR flow was 3. Percutaneous coronary intervention was performed within the mid left anterior descending. The vessel was pre-dilated using a compliant balloon 2.0 mm x 15 mm at 12 NATHANIEL. [place] Alli drug-eluting stent 2.5 mm x 18 mm was advanced to the lesion and implanted at 12 NATHANIEL. The stenosis was successfully reduced from 80% to 0%. Post-intervention AMADOR flow was 3.     Hemo Personnel:  +---------------+---------+  Name           Duty       +---------------+---------+  Tressa Davison MD 1  +---------------+---------+        Hemodynamic Pressures:     +----+---------------------+----------+-------------+--------------+---------+  Site      Date Time      Phase NameSystolic mmHgDiastolic mmHgMean mmHg  +----+---------------------+----------+-------------+--------------+---------+    AO7/15/2024 12:42:26 PM  AIR REST          155            80      111  +----+---------------------+----------+-------------+--------------+---------+    AO7/15/2024 12:46:57 PM  AIR REST          145            61       91  +----+---------------------+----------+-------------+--------------+---------+    AO7/15/2024 12:49:31 PM  AIR REST          126            63       89  +----+---------------------+----------+-------------+--------------+---------+        Cardiac Cath Post Procedure Notes:  Post Procedure Diagnosis: KHUSHI of LAD.  Blood Loss:               Estimated blood loss during the procedure was 5 mls.  Specimens Removed:        Number of specimen(s) removed: none.     ____________________________________________________________________________________  CONCLUSIONS:   1. Left Main Coronary Artery: This artery is normal.   2. Left Anterior Descending Artery: is significantly obstructed.   3. Mid LAD Lesion: The percent stenosis is 80%.   4. Mid LAD  Lesion: pre-dilation Resolute Alli 2.5x18 : 0% residual stenosis. LAD: pre-procedure AMADOR flow was 3(complete perfusion) and post-procedure AMADOR flow was 3(complete perfusion).   5. Circumflex Coronary Artery: presents luminal irregularities.   6. Right Coronary Artery: presents luminal irregularities.   7. The Left Ventricular Ejection Fraction is 45%.       [place] [id number]   Tel [phone] Fax [phone]     TRANSTHORACIC ECHOCARDIOGRAM REPORT     Patient Name:      MAGDI Strickland Physician:    [id number] Luis Daniel Rosa MD, [facility]  Study Date:        7/15/2024            Ordering Provider:    [id number] PRADEEP GRUBER  MRN/PID:           [id number]             Fellow:  Accession#:        [id number]         Nurse:                Ryan Mcgrath RN  Date of Birth/Age: 1940 / 84 years Sonographer:          Lianna Ferraro RDCS  Gender:            F                    Additional Staff:  Height:            157.48 cm            Admit Date:           7/13/2024  Weight:            67.59 kg             Admission Status:     Inpatient -                                                                Routine  BSA / BMI:         1.69 m2 / 27.25      Department Location:  [place] [address]                                      Echo Lab  Blood Pressure: 147 /67 mmHg     Study Type:    TRANSTHORACIC ECHO (TTE) COMPLETE  Diagnosis/ICD: Non ST elevation (NSTEMI) myocardial infarction-I21.4  Indication:    NSTEMI  CPT Codes:     Echo Complete w Full Doppler-90005     Patient History:  Pertinent History: Dizziness, LBBB, Left mastectomy.     Study Detail: The following Echo studies were performed: 2D, M-Mode, Doppler and                color flow. Definity used as a  contrast agent for endocardial                border definition. Total contrast used for this procedure was 2 mL                via IV push. The patient was awake.        PHYSICIAN INTERPRETATION:  Left Ventricle: The left ventricular systolic function is moderately decreased, with a visually estimated ejection fraction of 40%. Wall motion is abnormal. The left ventricular cavity size is normal. There is mild asymmetric left ventricular hypertrophy. Spectral Doppler shows an abnormal pattern of left ventricular diastolic filling. Mild LVH with discrete upper septal thickening no evidence of outflow tract obstruction  Hypokinesis inferolateral lateral wall.  Left Atrium: The left atrium is mildly dilated. Mild left atrial dilatation.  Right Ventricle: The right ventricle is normal in size. There is normal right ventricular global systolic function.  Right Atrium: The right atrium is normal in size.  Aortic Valve: The aortic valve is trileaflet. The aortic valve dimensionless index is 0.72. There is no evidence of aortic valve regurgitation. The peak instantaneous gradient of the aortic valve is 5.6 mmHg. The mean gradient of the aortic valve is 3.0 mmHg. Normal aortic valve.  Mitral Valve: The mitral valve is mildly thickened. There is mild mitral valve regurgitation. Mild thickening mitral valve leaflets with no significant leaflet impairment of mobility. 1+ MR.  Tricuspid Valve: The tricuspid valve is structurally normal. There is mild tricuspid regurgitation.  Pulmonic Valve: The pulmonic valve is structurally normal. There is no indication of pulmonic valve regurgitation.  Pericardium: There is a trivial pericardial effusion.  Aorta: The aortic root is normal.        CONCLUSIONS:   1. The left ventricular systolic function is moderately decreased, with a visually estimated ejection fraction of 40%.   2. Spectral Doppler shows an abnormal pattern of left ventricular diastolic filling.   3. Mild LVH with discrete  upper septal thickening no evidence of outflow tract obstruction      Hypokinesis inferolateral lateral wall.   4. There is mild asymmetric left ventricular hypertrophy.   5. There is normal right ventricular global systolic function.   6. Mild left atrial dilatation.   7. Mild thickening mitral valve leaflets with no significant leaflet impairment of mobility. 1+ MR.   8. Normal aortic valve.    ECG 12 lead    Result Date: 7/14/2024  Normal sinus rhythm Left bundle branch block Abnormal ECG When compared with ECG of 13-JUL-2024 09:18, (unconfirmed) No significant change was found See ED provider note for full interpretation and clinical correlation Confirmed by Sara Rai ([id number]) on 7/14/2024 6:05:15 PM    ECG 12 lead    Result Date: 7/14/2024  Normal sinus rhythm Left bundle branch block Abnormal ECG When compared with ECG of 09-MAY-2019 18:43, Premature ventricular complexes are no longer Present See ED provider note for full interpretation and clinical correlation Confirmed by Sara Rai ([id number]) on 7/14/2024 6:03:41 PM    CT angio chest for pulmonary embolism    Result Date: 7/13/2024  Interpreted By:  Rigoberto Dunn, STUDY: CT ANGIO CHEST FOR PULMONARY EMBOLISM;  7/13/2024 1:13 pm   INDICATION: 85 y/o   F with  Signs/Symptoms:elevated cardiac enzyme, abnormal cxr.   LIMITATIONS: None.   ACCESSION NUMBER(S): [id number]   ORDERING CLINICIAN: ROSE MARIE LEMOS   TECHNIQUE: After the administration of intravenous nonionic contrast, thin-section arterial phase images were obtained  from the thoracic inlet down through the iliac crest. Sagittal and coronal reconstruction images were generated. Axial and coronal MIP vascular reconstruction images were also generated.   Mediastinal, lung, bone, and liver windows were reviewed. 75 ML Omnipaque 350     COMPARISON: Immediately after this exam, a CT scan of the abdomen and pelvis was also obtained.   FINDINGS: CHEST WALL/BASE OF THE NECK: Previous left  mastectomy. No thyromegaly or thyroid mass.   MEDIASTINUM/ALISIA: No suspicious mediastinal, hilar, or axillary mass or adenopathy. Mild cardiomegaly. No pericardial effusion. No thoracic aortic aneurysm or dissection. Mild mural calcifications in the thoracic aorta. No pulmonary artery filling defect.   LUNGS/ PLEURA/ AND TRACHEA: No mass or pneumonia in either lung. No  pleural effusion or pneumothorax. The trachea was grossly intact.   BONES: No destructive lytic or blastic bone lesion. Mild midthoracic spine dextroscoliosis.  There is mild-to-moderate disc space narrowing and endplate osteophytosis throughout the thoracic spine.   UPPER ABDOMEN: Please refer to the report for CT scan abdomen pelvis also done today for information.       No CT evidence of pulmonary embolism in the current exam.   Cardiomegaly.   Previous left mastectomy.   Thoracic spine scoliosis and DJD as described.   MACRO: None   Signed by: Rigoberto Dunn 7/13/2024 1:34 PM Dictation workstation:   [id number]    CT abdomen pelvis w IV contrast    Result Date: 7/13/2024  Interpreted By:  Rigoberto Dunn, STUDY: CT ABDOMEN PELVIS W IV CONTRAST;  7/13/2024 1:13 pm   INDICATION: 85 y/o   F with  Signs/Symptoms:nasuea and vomtiing.   LIMITATIONS: None.   ACCESSION NUMBER(S): [id number]   ORDERING CLINICIAN: ROSE MARIE LEMOS   TECHNIQUE: After the administration of   oral water and IV nonionic contrast, spiral axial images were obtained from the xiphoid down through the symphysis pubis. Sagittal and coronal reconstruction images were generated. Bone, mediastinal, lung, and liver windows were reviewed. 75 ML Omnipaque 350   COMPARISON: The exam was done immediately after a CT pulmonary angiogram on the same day..   FINDINGS: LUNG BASES: Please refer to the report for CT pulmonary angiogram for information regarding the imaged chest. Previous left mastectomy.   LIVER: No hepatomegaly. Liver density was  within the limits of normal. No  liver lesion  evident in this  exam.   GALLBLADDER: Cholelithiasis. Borderline gallbladder dilatation with the gallbladder measuring 47 mm in diameter. No gross wall thickening or adjacent fat stranding.   BILE DUCTS: No intrahepatic biliary ductal dilatation.  Common bile duct was within the limits of normal.   SPLEEN: No splenomegaly. No splenic mass..   PANCREAS: No pancreatic mass or inflammation, or ductal dilatation.   KIDNEYS/ADRENALS: No adrenal mass or enlargement. There are multiple bilateral parapelvic renal cysts. There is also a small lateral mid to lower pole left renal cortical cyst measuring 8 mm on axial image 66. The parapelvic cysts measure up to 20 mm in greatest diameter. No calcified stone, hydronephrosis, mass, or perinephric edema in either kidney. No ureteral stone or dilatation.   BLADDER/PELVIS: Urinary bladder was grossly intact. Previous hysterectomy.  No gross adnexal mass.   GREAT VESSELS/RETROPERITONEUM: Moderate to advanced aortoiliac calcifications. Otherwise, the abdominal aorta and IVC were intact. No suspicious retroperitoneal adenopathy. No suspicious mesenteric adenopathy. No suspicious pelvic or inguinal adenopathy.   PERITONEUM: No ascites. No pneumoperitoneum. No peritoneal or mesenteric mass or inflammation.   BOWEL: The stomach was  grossly intact. There was no small bowel dilatation or small bowel wall thickening. No small-bowel obstruction. Mild-to-moderate scattered retained colonic stool. There is scattered diffuse colonic diverticulosis, most pronounced throughout the sigmoid. There was no colonic wall thickening or large bowel obstruction.  No edema adjacent to the colon. The cecal appendix was intact.   BONES: No destructive lytic or blastic bone lesion. Multilevel lumbar spine interfacet hypertrophic arthritic changes. There is sacralization of L5. Grade 1 anterolisthesis of L4 over L5 with vacuum disc phenomenon at that level. There is endplate osteophytosis at L1-2. Sclerotic  arthritic changes in both SI joints. Mild bilateral hip joint space narrowing with spur formation.   ABDOMINAL WALL: Unremarkable.       Previous left mastectomy.   Cholelithiasis with borderline gallbladder dilatation but no wall thickening or adjacent fat stranding. Depending on the clinical situation, consider gallbladder ultrasound in follow-up.   Multiple parapelvic cysts throughout both kidneys. No hydronephrosis.   Diffuse colonic diverticulosis without acute associated inflammation.   Arthritic changes in the spine and pelvis as described.   MACRO: None   Signed by: Rigoberto Dunn 7/13/2024 1:30 PM Dictation workstation:   [id number]    CT head wo IV contrast    Result Date: 7/13/2024  Interpreted By:  Teodoro Gerard, STUDY: CT HEAD WO IV CONTRAST;  7/13/2024 11:21 am   INDICATION: Signs/Symptoms:confusion dizziness.   COMPARISON: 05/27/2022   ACCESSION NUMBER(S): [id number]   ORDERING CLINICIAN: ROSE MARIE LEMOS   TECHNIQUE: Unenhanced images were obtained through the brain.   FINDINGS: There is atrophy resulting in prominence of the ventricles and sulci. There are areas of decreased attenuation within the white matter which are nonspecific but are commonly associated with small vessel ischemic disease. There is no mass effect or midline shift. No acute intracranial hemorrhage is identified. No extra-axial fluid collections are seen. No intraparenchymal mass lesions are identified.  Bone windows demonstrate no evidence of an acute calvarial fracture.       No evidence of an acute intracranial process.   MACRO: None.   Signed by: Teodoro Gerard 7/13/2024 11:47 AM Dictation workstation:   [id number]    XR chest 1 view    Result Date: 7/13/2024  Interpreted By:  April Wilson, STUDY: XR CHEST 1 VIEW; ;  7/13/2024 10:04 am   INDICATION: Signs/Symptoms:dizziness.   COMPARISON: Chest radiograph dated 05/20/2022   ACCESSION NUMBER(S): [id number]   ORDERING CLINICIAN: ROSE MARIE LEMOS   FINDINGS: Cardiac silhouette is  mildly enlarged. Moderate atherosclerotic calcifications of the aortic knob are noted. Lungs are clear without consolidative airspace opacities. No large pleural effusions or pneumothorax. No acute osseous findings. Visualized upper abdomen appears grossly unremarkable.       1. Mild cardiomegaly with moderate atherosclerotic calcifications of the aortic knob, similar compared to prior radiograph. 2. Otherwise, no acute cardiopulmonary process.       MACRO: None   Signed by: April Wilson 7/13/2024 10:43 AM Dictation workstation:   [id number]       Cardiac Catheterization Procedure   Final Result      Transthoracic Echo (TTE) Complete   Final Result      CT abdomen pelvis w IV contrast   Final Result   Previous left mastectomy.        Cholelithiasis with borderline gallbladder dilatation but no wall   thickening or adjacent fat stranding. Depending on the clinical   situation, consider gallbladder ultrasound in follow-up.        Multiple parapelvic cysts throughout both kidneys. No hydronephrosis.        Diffuse colonic diverticulosis without acute associated inflammation.        Arthritic changes in the spine and pelvis as described.        MACRO:   None        Signed by: Rigoberto Dunn 7/13/2024 1:30 PM   Dictation workstation:   [id number]      CT angio chest for pulmonary embolism   Final Result   No CT evidence of pulmonary embolism in the current exam.        Cardiomegaly.        Previous left mastectomy.        Thoracic spine scoliosis and DJD as described.        MACRO:   None        Signed by: Rigoberto Dunn 7/13/2024 1:34 PM   Dictation workstation:   [id number]      CT head wo IV contrast   Final Result   No evidence of an acute intracranial process.        MACRO:   None.        Signed by: Teodoro Gerard 7/13/2024 11:47 AM   Dictation workstation:   [id number]      XR chest 1 view   Final Result   1. Mild cardiomegaly with moderate atherosclerotic calcifications of   the aortic knob, similar compared to  prior radiograph.   2. Otherwise, no acute cardiopulmonary process.                  MACRO:   None        Signed by: April Wilson 7/13/2024 10:43 AM   Dictation workstation:   [id number]            RADIOLOGY:     Cardiac Catheterization Procedure   Final Result      Transthoracic Echo (TTE) Complete   Final Result      CT abdomen pelvis w IV contrast   Final Result   Previous left mastectomy.        Cholelithiasis with borderline gallbladder dilatation but no wall   thickening or adjacent fat stranding. Depending on the clinical   situation, consider gallbladder ultrasound in follow-up.        Multiple parapelvic cysts throughout both kidneys. No hydronephrosis.        Diffuse colonic diverticulosis without acute associated inflammation.        Arthritic changes in the spine and pelvis as described.        MACRO:   None        Signed by: Rigoberto Dunn 7/13/2024 1:30 PM   Dictation workstation:   [id number]      CT angio chest for pulmonary embolism   Final Result   No CT evidence of pulmonary embolism in the current exam.        Cardiomegaly.        Previous left mastectomy.        Thoracic spine scoliosis and DJD as described.        MACRO:   None        Signed by: Rigoberto Dunn 7/13/2024 1:34 PM   Dictation workstation:   [id number]      CT head wo IV contrast   Final Result   No evidence of an acute intracranial process.        MACRO:   None.        Signed by: Teodoro Gerard 7/13/2024 11:47 AM   Dictation workstation:   [id number]      XR chest 1 view   Final Result   1. Mild cardiomegaly with moderate atherosclerotic calcifications of   the aortic knob, similar compared to prior radiograph.   2. Otherwise, no acute cardiopulmonary process.                  MACRO:   None        Signed by: April Wilson 7/13/2024 10:43 AM   Dictation workstation:   [id number]          PROBLEM LIST     Patient Active Problem List   Diagnosis    Arthritis of foot, left    History of breast cancer    Constipation    Vertigo     GERD (gastroesophageal reflux disease)    Left bundle branch block    Mild vitamin D deficiency    Actinic keratosis    Seborrheic keratosis    Varicose veins of both lower extremities with inflammation    Ocular migraine    Altered mental status, unspecified altered mental status type    NSTEMI (non-ST elevated myocardial infarction) (Multi)    Urinary tract infection without hematuria       ASSESSMENT:   Elevated troponin  Hypertension  UTI  GERD  Hyperlipidemia  PLAN:   Admitted to medicine.  Continue telemetry monitoring.  Telemetry shows normal sinus rhythm, left bundle branch block without change from prior ECGs.  Monitor electrolytes, keep potassium greater than 4 and magnesium greater than 2  Supplemental O2  Echo slow slightly reduced LV function with an EF of 40%.  Mild LVH.  Hypokinesis of inferior lateral wall.  Mild thickening of mitral valve leaflets with 1+ MR.  Normal aortic valve.  Underwent successful PCI to proximal LAD with Dr. Davison  Hypertension currently controlled, continue to monitor BP.    Continue DAPT with recent PCI  Continue Toprol, statin, MRA, Entresto.  Plan to hold off on SGLT2 inhibitor due to recurrent UTIs.  Will evaluate at a later date.  Message sent to schedulers for follow-up in office  Okay to discharge from cardiology perspective  BMP in 1 week  General cardiology to sign off            Wilber Nolan [facility]  Adult Gerontology Acute Care Nurse Practitioner  [facility] [place]   [facility] [place] [facility]  [phone]          Of note, this documentation is completed using the Dragon Dictation system (voice recognition software). There may be spelling and/or grammatical errors that were not corrected prior to final submission.    Please do not hesitate to call with questions.  Electronically signed by Wilber Nolan, YECENIA-CNP, on 7/16/2024 at 9:07 AM

## 2024-07-16 NOTE — CARE PLAN
The patient's goals for the shift include  maintain safety    The clinical goals for the shift include monitor cardiac function    Over the shift, the patient did not make progress toward the following goals. Barriers to progression include heart block. Recommendations to address these barriers include continuous cardiac monitoring.

## 2024-07-17 ENCOUNTER — PATIENT OUTREACH (OUTPATIENT)
Dept: HOME HEALTH SERVICES | Age: 84
End: 2024-07-17
Payer: COMMERCIAL

## 2024-07-17 ENCOUNTER — PATIENT OUTREACH (OUTPATIENT)
Dept: PRIMARY CARE | Facility: CLINIC | Age: 84
End: 2024-07-17
Payer: COMMERCIAL

## 2024-07-17 ENCOUNTER — PHARMACY VISIT (OUTPATIENT)
Dept: PHARMACY | Facility: CLINIC | Age: 84
End: 2024-07-17

## 2024-07-17 DIAGNOSIS — I21.4 NSTEMI (NON-ST ELEVATED MYOCARDIAL INFARCTION) (MULTI): Primary | ICD-10-CM

## 2024-07-17 DIAGNOSIS — I50.9 CONGESTIVE HEART FAILURE, UNSPECIFIED HF CHRONICITY, UNSPECIFIED HEART FAILURE TYPE (MULTI): ICD-10-CM

## 2024-07-17 LAB
ATRIAL RATE: 64 BPM
BACTERIA BLD CULT: NORMAL
BACTERIA BLD CULT: NORMAL
P AXIS: -4 DEGREES
P OFFSET: 176 MS
P ONSET: 130 MS
PR INTERVAL: 152 MS
Q ONSET: 206 MS
QRS COUNT: 10 BEATS
QRS DURATION: 152 MS
QT INTERVAL: 482 MS
QTC CALCULATION(BAZETT): 497 MS
QTC FREDERICIA: 492 MS
R AXIS: -12 DEGREES
T AXIS: 155 DEGREES
T OFFSET: 447 MS
VENTRICULAR RATE: 64 BPM

## 2024-07-17 NOTE — PROGRESS NOTES
Daughter called to ask if pt's medications could be causing her to feel tired. She took her medications and then felt a little lightheaded and maybe like her head was a little heavy. No CP. She took a hour long nap and seems to be better now. No symptoms now. I asked if they had a BP machine. Brother was bring on over this afternoon. I suggested that they take a few records of BP and pulse. When he gets there and then this evening to start getting a baseline of where she is. Daughter aware to call Cardio office if anymore symptoms. Has appt with pharmacist and provider tomorrow morning through [facility] program so she will share the info with them and ask if she should spread her medication times out and let them know what BP is running at.

## 2024-07-17 NOTE — PROGRESS NOTES
Daily Call Note:     Daily call completed with the patient. She states she is doing okay. She hasn't gotten her BP yet.  Her weight was 148 lbs.  Pt states she has cardio appt on 7/25.  Advised that per her discharge paper work she needs to follow up with her PCP 1-2 weeks post discharge from hospital.   Her next PCP appt isn't until 3/2025.  [facility]  Pt had no questions or concerns during the call.      Pt Education:   Barriers:   Topics for Daily Review:   Pt demonstrates clear understanding:     Daily Weight:  There were no vitals filed for this visit.   Last 3 Weights:  Wt Readings from Last 7 Encounters:   07/13/24 67.9 kg (149 lb 11.1 oz)   03/08/24 66.7 kg (147 lb)   12/08/23 65.8 kg (145 lb)   04/12/23 66 kg (145 lb 6.4 oz)   10/05/22 64.9 kg (143 lb)   07/07/22 66.7 kg (147 lb)   05/25/22 66.2 kg (146 lb)       Masimo Device:    Masimo Clinical Impression:    Virtual Visits--Scheduled (Most Recent Date at Top)  Follow up Appointments  Recent Visits  No visits were found meeting these conditions.  Showing recent visits within past 30 days and meeting all other requirements  Future Appointments  No visits were found meeting these conditions.  Showing future appointments within next 90 days and meeting all other requirements       Frequency of RN Calls & Virtual Visits per Team Agreement:    Medication issues Addressed (what was done):     Follow up appointments scheduled by [facility] Staff:   Referrals made by [facility] staff:

## 2024-07-17 NOTE — PROGRESS NOTES
Discharge Facility:  [facility]     Discharge Diagnosis:  Altered mental status, unspecified altered mental status type   HTN   NSTEMI    Admission Date:7/13/24  Discharge Date: 7/16/24    PCP Appointment Date:TBD     Specialist Appointment Date:   7/25/2024 12:45 PM  [facility]    CARDIOLOGY HOSP DISCHARGE   [id number] ([place])   Wilber Nolan, YECENIA-CNP     10/23/2024 3:30 PM  [facility]    CARDIOLOGY HOSP DISCHARGE   [id number] ([place])   Adrian Echavarria MD     Hospital Encounter and Summary Linked: Yes  See discharge assessment below for further details  Engagement  Call Start Time: 0000 (spoke with daughter Camryn) (7/17/2024 11:39 AM)    Medications  Medications reviewed with patient/caregiver?: Yes (7/17/2024 11:39 AM)  Is the patient having any side effects they believe may be caused by any medication additions or changes?: No (7/17/2024 11:39 AM)  Does the patient have all medications ordered at discharge?: Yes (daughter was able to  the missing RX of Keflex this morning.) (7/17/2024 11:39 AM)  Care Management Interventions: Provided patient education (7/17/2024 11:39 AM)  Prescription Comments: START taking:  atorvastatin (Lipitor)   cephalexin (Keflex)   clopidogrel (Plavix)   Entresto (sacubitriL-valsartan)   metoprolol succinate XL (Toprol-XL)   spironolactone (Aldactone) (7/17/2024 11:39 AM)  Is the patient taking all medications as directed (includes completed medication regime)?: Yes (7/17/2024 11:39 AM)  Care Management Interventions: Provided patient education (7/17/2024 11:39 AM)  Medication Comments: CM discussed referencing discharge paperwork to follow detailed daily medication schedule. Daughter asked if patient can start meds slowly/one at a time since she is not used to taking a lot of medications. I explained that she was already getting this medications in the hospital and it even listed on discharge med list her last doses in the hospital so we need to keep with  that schedule. Daughter expressed understand and verbally agreed to give all meds today as scheduled. (7/17/2024 11:39 AM)    Appointments  Does the patient have a primary care provider?: Yes (7/17/2024 11:39 AM)  Has the patient kept scheduled appointments due by today?: Yes (7/17/2024 11:39 AM)  Care Management Interventions: Educated on importance of keeping appointment; Advised to schedule with specialist (Reviewed up comingcardio appts) (7/17/2024 11:39 AM)    Self Management  Has home health visited the patient within 72 hours of discharge?: Not applicable (7/17/2024 11:39 AM)  What Durable Medical Equipment (DME) was ordered?: n/a (7/17/2024 11:39 AM)    Patient Teaching  Does the patient have access to their discharge instructions?: Yes (7/17/2024 11:39 AM)  Care Management Interventions: Reviewed instructions with patient (7/17/2024 11:39 AM)  What is the patient's perception of their health status since discharge?: Improving (7/17/2024 11:39 AM)  Is the patient/caregiver able to teach back the hierarchy of who to call/visit for symptoms/problems? PCP, Specialist, Home Health nurse, Urgent Care, ED, 911: Yes (7/17/2024 11:39 AM)  Patient/Caregiver Education Comments: I introduced myself and the TCM program to Laurel Pugh and daughter Camryn. Reviewed hospital stay and answered any questions. I gave my contact information and encouraged to call me if needing assistance or has any further questions prior to my next outreach. Pt indentified for [facility] program. CM sent message to [facility] staff for handoff and requested notification when pt has completed program. (7/17/2024 11:39 AM)    Wrap Up  Wrap Up Additional Comments: Patients next appt with [facility] provider, nurse and pharm is tomorrow. (7/17/2024 11:39 AM)  Call End Time: 1200 (7/17/2024 11:39 AM)

## 2024-07-17 NOTE — PROGRESS NOTES
2860- Patient & daughter called into [facility] at this time. Stated they had just got home from hospital & received meds to bed, however they did not receive PO Keflex. Reviewed discharge notes, asked that they call [facility] pharmacy tomorrow to inquire about missing medication & provided phone number & hours. Instructed to reach out to us if they need medication ordered & we can obtain from provider on call.     Patient & daughter asked if she can slowly start taking medications one at a time throughout the week. Advised against this as she was receiving in hospital & instructed them to take medications as prescribed & reach out to us with any concerns regarding s/s of adverse reaction should they arise.     Patient was enrolled into our program during this call. Scheduled initial [facility] 7/18 @ 0800.

## 2024-07-18 ENCOUNTER — TELEMEDICINE (OUTPATIENT)
Dept: CARE COORDINATION | Age: 84
End: 2024-07-18
Payer: COMMERCIAL

## 2024-07-18 ENCOUNTER — TELEMEDICINE (OUTPATIENT)
Dept: PHARMACY | Facility: HOSPITAL | Age: 84
End: 2024-07-18
Payer: COMMERCIAL

## 2024-07-18 ENCOUNTER — PATIENT OUTREACH (OUTPATIENT)
Dept: HOME HEALTH SERVICES | Age: 84
End: 2024-07-18

## 2024-07-18 VITALS — SYSTOLIC BLOOD PRESSURE: 92 MMHG | BODY MASS INDEX: 26.98 KG/M2 | DIASTOLIC BLOOD PRESSURE: 52 MMHG | WEIGHT: 147.5 LBS

## 2024-07-18 DIAGNOSIS — I50.9 CONGESTIVE HEART FAILURE, UNSPECIFIED HF CHRONICITY, UNSPECIFIED HEART FAILURE TYPE (MULTI): ICD-10-CM

## 2024-07-18 DIAGNOSIS — I21.4 NSTEMI (NON-ST ELEVATED MYOCARDIAL INFARCTION) (MULTI): ICD-10-CM

## 2024-07-18 DIAGNOSIS — I25.10 CORONARY ARTERY DISEASE INVOLVING NATIVE HEART WITHOUT ANGINA PECTORIS, UNSPECIFIED VESSEL OR LESION TYPE: Primary | ICD-10-CM

## 2024-07-18 NOTE — PROGRESS NOTES
[facility] Call Note:   Spoke to patient & Dtr, she reports feeling pretty good. Reports bp readings. Denies sob, c/p, n/v, fever. Denies lightheaded and dizziness. Endorses mild headache. Denies swelling.   Reports weight, denies any symptoms with urination. States felt a bit tired and off yesterday when she took all pills.   Pharmacy discussed timing of some meds in evening.   C/o some side pain, improved with movement. Discussion of gallstones by provider for likely future work up.  PCP apt- daughter will call today and schedule.  Discussed labs prior to cardio visit, she will go 7/23.  PAP to be discussed next call. Next [facility] scheduled.     Pt Education: POC  Barriers: na  Topics for Daily Review: POC  Pt demonstrates clear understanding: Yes    Daily Weight:  There were no vitals filed for this visit.   Last 3 Weights:  Wt Readings from Last 7 Encounters:   07/17/24 67.1 kg (148 lb)   07/13/24 67.9 kg (149 lb 11.1 oz)   03/08/24 66.7 kg (147 lb)   12/08/23 65.8 kg (145 lb)   04/12/23 66 kg (145 lb 6.4 oz)   10/05/22 64.9 kg (143 lb)   07/07/22 66.7 kg (147 lb)       Masimo Device: No   Masimo Clinical Impression: na    Virtual Visits--Scheduled (Most Recent Date at Top)  Follow up Appointments  Recent Visits  No visits were found meeting these conditions.  Showing recent visits within past 30 days and meeting all other requirements  Future Appointments  No visits were found meeting these conditions.  Showing future appointments within next 90 days and meeting all other requirements       Frequency of RN Calls & Virtual Visits per Team Agreement: [facility] Frequency: Daily    Medication issues Addressed (what was done): discussed freq    Follow up appointments scheduled by [facility] Staff: na  Referrals made by [facility] staff: angle

## 2024-07-18 NOTE — PROGRESS NOTES
Pharmacy Post-Discharge Visit    Laurel Pugh is a 84 y.o. female was referred to Clinical Pharmacy Team to complete a post-discharge medication optimization and monitoring visit.  The patient was referred for their CHF management while now recently enrolled in our [facility].     Admission Date: 7/13/24  Discharge Date: 7/16/24    Referring Provider: Juan Carlos العلي DO  PCP: Chong Houston DO - next visit: 3/10/25      Subjective   Allergies   Allergen Reactions    Haloperidol Hallucinations    Zofran [Ondansetron Hcl] Hallucinations    Amoxicillin-Pot Clavulanate Rash    Levofloxacin Rash    Sulfa (Sulfonamide Antibiotics) Rash       [id number] - [place], [place] - [address]  [address] [address]  Phone: [phone] Fax: [phone]      Medication System Management:  Affordability/Accessibility: try to enroll into PAP  Adherence/Organization: no issues       Social History     Social History Narrative    Not on file        Notable Medication changes following discharge:  Start: lipitor, keflex, plavix, entresto, metoprolol and spironolactone   Stop: none  Change: none    HPI  CHF ASSESSMENT  Staging:  Most recent ejection fraction: 40%  NYHA Stage: II  ACC/AHA Stage: C    Symptom Assessment:  Weight changes/edema?: Yes - down 3 lbs from admission   Dyspnea?: None  Dizziness/syncope/palpitations?: Yes - happened yesterday morning after morning meds     Current Regimen:  ARNI/ACEi/ARB: Yes - entresto  Beta Blocker: Yes - metoprolol  MRA: Yes - spironolactone   SGLT2i: No    Other therapy:  Plavix     Secondary Prevention:  The ASCVD Risk score (Adriana BOWMAN, et al., 2019) failed to calculate for the following reasons:    The 2019 ASCVD risk score is only valid for ages 40 to 79    The patient has a prior MI or stroke diagnosis    Aspirin 81mg? yes  Statin?: Yes - atorvastatin  HTN?: No     Review of Systems        Objective     There were  no vitals taken for this visit.   BP Readings from Last 4 Encounters:   07/16/24 135/62   03/08/24 117/77   12/08/23 142/82   04/12/23 126/76      There were no vitals filed for this visit.     LAB  Lab Results   Component Value Date    BILITOT 0.6 07/13/2024    CALCIUM 8.7 07/16/2024    CO2 24 07/16/2024     (H) 07/16/2024    CREATININE 0.82 07/16/2024    GLUCOSE 105 (H) 07/16/2024    ALKPHOS 48 07/13/2024    K 3.9 07/16/2024    PROT 7.2 07/13/2024     07/16/2024    AST 16 07/13/2024    ALT 10 07/13/2024    BUN 15 07/16/2024    ANIONGAP 12 07/16/2024    MG 1.98 07/13/2024    ALBUMIN 4.0 07/13/2024    GFRF 71 04/12/2023     Lab Results   Component Value Date    TRIG 74 07/14/2024    CHOL 197 07/14/2024    LDLCALC 129 (H) 07/14/2024    HDL 52.9 07/14/2024     Lab Results   Component Value Date    HGBA1C 5.8 (H) 07/13/2024         Current Outpatient Medications   Medication Instructions    aspirin 81 mg EC tablet 1 tablet, oral, Daily    atorvastatin (LIPITOR) 40 mg, oral, Daily    cephalexin (KEFLEX) 500 mg, oral, 2 times daily    cholecalciferol (Vitamin D-3) 25 MCG (1000 UT) tablet oral    clopidogrel (Plavix) 75 mg tablet Take 1 tablet (75 mg) by mouth once daily.    cranberry 400 mg capsule oral    cyanocobalamin (VITAMIN B-12) 2,000 mcg, oral, Daily    docusate sodium (STOOL SOFTENER ORAL) oral, Daily    fluticasone (Flonase) 50 mcg/actuation nasal spray 1 spray, Each Nostril, Daily, Shake gently. Before first use, prime pump. After use, clean tip and replace cap.    metoprolol succinate XL (TOPROL-XL) 25 mg, oral, Daily, Do not crush or chew.    sacubitriL-valsartan (Entresto) 24-26 mg tablet 1 tablet, oral, 2 times daily    spironolactone (ALDACTONE) 12.5 mg, oral, Daily        HISTORICAL PHARMACOTHERAPY  Was not taking any medications prior to this admission besides OTC meds     DRUG INTERACTIONS  None at time of review      Assessment/Plan   Problem List Items Addressed This Visit       NSTEMI  (non-ST elevated myocardial infarction) (Multi)     Other Visit Diagnoses       Congestive heart failure, unspecified HF chronicity, unspecified heart failure type (Multi)              CHF/NSTEMI  Most recent EF was 40% while admitted   Current GDMT --> metoprolol, entresto and spironolactone   No SGLT2i since also being treated for UTI  No other daily diuretic   Checking weights and BP's daily --> told to continue to do this and keep log for future visits   BP today 96/58  DAPT --> aspirin, plavix and statin   Also discussed to split up some of the medications because has had some episodes of dizziness and not feeling well  Recommended to continue with spironolactone and plavix in the morning with morning dose of entresto and then take the lipitor and metoprolol in the evening with second dose of entresto   UTI   No current UTI symptoms  No trouble with urinating   Still taking the Keflex BID     PAP:  -did not have time to discuss today with it being her initial visit  -will discuss next week because she did receive a free trial for first 30 days of entresto but with insurance will be $42.00+ each month     Follow Up: 1 week     Continue all meds under the continuation of care with the referring provider and clinical pharmacy team.    Brett Del Valle, Christos     Verbal consent to manage patient's drug therapy was obtained from the patient and an individual authorized to act on behalf of a patient. They were informed they may decline to participate or withdraw from participation in pharmacy services at any time.

## 2024-07-19 ENCOUNTER — PATIENT OUTREACH (OUTPATIENT)
Dept: HOME HEALTH SERVICES | Age: 84
End: 2024-07-19
Payer: COMMERCIAL

## 2024-07-19 VITALS — WEIGHT: 147 LBS | BODY MASS INDEX: 26.89 KG/M2 | DIASTOLIC BLOOD PRESSURE: 62 MMHG | SYSTOLIC BLOOD PRESSURE: 99 MMHG

## 2024-07-19 NOTE — PROGRESS NOTES
Daily Call Note:   Patient and daughter phoned in @ 2am and again now for her awakening due to skin itchiness and non raised red patches and to her arms, legs and truck/back. no trouble breathing, no facial or angioedema. She hasn't taken any allergy pills, only applied biofreeze? helped. she started po Keflex at discharge BID, started Wednesday 7/17 morning and last time she took was yesterday evening, Provider made aware. No new detergents or soaps.  Advised cortisone cream. Keflex stopped, patient advised and to monitor for worsening symptoms.   Reports weight and vitals.       Pt Education: POC  Barriers: na  Topics for Daily Review: POC  Pt demonstrates clear understanding: Yes    Daily Weight:  Vitals:    07/19/24 0900   Weight: 66.7 kg (147 lb)      Last 3 Weights:  Wt Readings from Last 7 Encounters:   07/19/24 66.7 kg (147 lb)   07/18/24 66.9 kg (147 lb 8 oz)   07/17/24 67.1 kg (148 lb)   07/13/24 67.9 kg (149 lb 11.1 oz)   03/08/24 66.7 kg (147 lb)   12/08/23 65.8 kg (145 lb)   04/12/23 66 kg (145 lb 6.4 oz)       Masimo Device: No   Masimo Clinical Impression: na    Virtual Visits--Scheduled (Most Recent Date at Top)  Follow up Appointments  Recent Visits  No visits were found meeting these conditions.  Showing recent visits within past 30 days and meeting all other requirements  Future Appointments  Date Type Provider Dept   08/05/24 Appointment Madeline Hameed PA-C [id number]   Showing future appointments within next 90 days and meeting all other requirements       Frequency of RN Calls & Virtual Visits per Team Agreement: [facility] Frequency: Daily    Medication issues Addressed (what was done): reviewed    Follow up appointments scheduled by [facility] Staff: angle  Referrals made by [facility] staff: angle              Nora Medrano is at the bedside.  Patient is agreeable to go to a skilled nursing floor for IV antibiotics and physical therapy.  ID is recommending 6 weeks    IV antibiotics  Objective   Patinet is awake and alert oriented x 3  Lungs clear  Heart regular  Ext no edema   Last Recorded Vitals  /88 (BP Location: Left arm, Patient Position: Lying)   Pulse 77   Temp 37.1 °C (98.8 °F) (Temporal)   Resp 16   Wt 78.4 kg (172 lb 13.5 oz)   SpO2 97%   Intake/Output last 3 Shifts:    Intake/Output Summary (Last 24 hours) at 12/6/2023 0008  Last data filed at 12/5/2023 1700  Gross per 24 hour   Intake 1277 ml   Output 1400 ml   Net -123 ml       Admission Weight  Weight: 78.4 kg (172 lb 13.5 oz) (11/30/23 1330)    Daily Weight  12/01/23 : 78.4 kg (172 lb 13.5 oz)    Image Results  ECG 12 lead  Normal sinus rhythm  Normal ECG  When compared with ECG of 28-NOV-2023 13:10, (unconfirmed)  No significant change was found      Physical Exam    Relevant Results               Assessment/Plan      1 s/p Lumbar  surgery   2 pseudomeningocele with infection   Patient is going to be on IV antibiotics  Patient is going to be discharged to snf when bed available               Principal Problem:    Acute osteomyelitis of lumbar spine (CMS/HCC)                  Blayne Penn MD

## 2024-07-19 NOTE — PROGRESS NOTES
[facility] Note: Itching/Rash    Daughter: Camryn called to report symptoms  Woke up 1 hour ago: with red rash on arm and foot and now on forehead and on the leg  Itching, but no hives  Breathing okay, no sore throat or tightening, no distress  No other symptoms  No changes in medications or food intake or fluids, was not outside today  No new soaps or laundry detergent  No distress  Anti itch cream with Benadryl placed 20 minutes ago and also put on biofreeze on it and it seemed to help  Getting better  Advised if she feels breathing declines, tightening in throat, cannot swallow, if bad hives go ED tonight or urgent care during the day   Call back if symptoms worsens and RN can reassess  Had [facility] Initial appointment today  The RN to do Daily RN Outreach tomorrow at 0900 - they can reassess  They agreed

## 2024-07-20 ENCOUNTER — PATIENT OUTREACH (OUTPATIENT)
Dept: CARE COORDINATION | Age: 84
End: 2024-07-20
Payer: COMMERCIAL

## 2024-07-20 NOTE — PROGRESS NOTES
"Daily Call Note: Daily call completed with pt and dtr on speaker phone this morning. Pt reports that she is feeling \"pretty good\" today. Endorses a \"little lightheadedness\" when she woke up this morning that has resolved. She denies pain, SOB and edema. The pruritus continues - she is using zyrtec and cortisone cream mixed with BioFreeze. Encouraged to continue the zyrtec and cortisone cream as ordered until the itching and rash are gone - pt verbalized understanding.   Dtr was able to get a follow up appt with the CNP at the PCP's office for 8/5 (much sooner than 3/10/2025).  Next [facility] scheduled for 7/24 at 0900 - verbalized understanding.  No other questions or concerns at this time.    Pt Education: per POC  Barriers: none  Topics for Daily Review: PCP appt; weight; VS; pruritis  Pt demonstrates clear understanding: Yes    Daily Weight:  There were no vitals filed for this visit.   Last 3 Weights:  Wt Readings from Last 7 Encounters:   07/19/24 66.7 kg (147 lb)   07/18/24 66.9 kg (147 lb 8 oz)   07/17/24 67.1 kg (148 lb)   07/13/24 67.9 kg (149 lb 11.1 oz)   03/08/24 66.7 kg (147 lb)   12/08/23 65.8 kg (145 lb)   04/12/23 66 kg (145 lb 6.4 oz)       Masimo Device: No   Masimo Clinical Impression: n/a    Virtual Visits--Scheduled (Most Recent Date at Top)  Follow up Appointments  Recent Visits  Date Type Provider Dept   07/18/24 Telemedicine Juan Carlos العلي DO [facility]   Showing recent visits within past 30 days and meeting all other requirements  Future Appointments  Date Type Provider Dept   08/05/24 Appointment Madeline Hmaeed PA-C [id number]   Showing future appointments within next 90 days and meeting all other requirements       Frequency of RN Calls & Virtual Visits per Team Agreement: [facility] Frequency: Daily    Medication issues Addressed (what was done): meds reivewed    Follow up appointments scheduled by [facility] Staff: none  Referrals made by [facility] staff: none        "

## 2024-07-22 ENCOUNTER — PATIENT OUTREACH (OUTPATIENT)
Dept: HOME HEALTH SERVICES | Age: 84
End: 2024-07-22
Payer: COMMERCIAL

## 2024-07-22 VITALS
HEART RATE: 64 BPM | SYSTOLIC BLOOD PRESSURE: 111 MMHG | BODY MASS INDEX: 27.07 KG/M2 | DIASTOLIC BLOOD PRESSURE: 59 MMHG | WEIGHT: 148 LBS

## 2024-07-22 DIAGNOSIS — N30.00 ACUTE CYSTITIS WITHOUT HEMATURIA: Primary | ICD-10-CM

## 2024-07-22 RX ORDER — NITROFURANTOIN 25; 75 MG/1; MG/1
100 CAPSULE ORAL 2 TIMES DAILY
Qty: 6 CAPSULE | Refills: 0 | Status: SHIPPED | OUTPATIENT
Start: 2024-07-22 | End: 2024-07-25

## 2024-07-22 NOTE — PROGRESS NOTES
Daily Call Note:   Patient states she gain 1/2 pound.  Weight 148 lb   Patient denies swelling and or shortness of breath    /59  HR 64    Patient has no questions, concerns, or needs at this time.      Advised patient to contact the [facility] Team for question or concerns 24/7.     Patient stated Keflex was stopped due to itching.  Patient was taking Keflex for an UTI.    I will contact the [facility] provider and Brett to see if they would like to prescribe another medication.    UPDATE:  Dr. Wyatt prescribed Bactrim 100 mg BID.  Notified patient.      Pt Education:   Barriers:   Topics for Daily Review:   Pt demonstrates clear understanding:     Daily Weight:  There were no vitals filed for this visit.   Last 3 Weights:  Wt Readings from Last 7 Encounters:   07/19/24 66.7 kg (147 lb)   07/18/24 66.9 kg (147 lb 8 oz)   07/17/24 67.1 kg (148 lb)   07/13/24 67.9 kg (149 lb 11.1 oz)   03/08/24 66.7 kg (147 lb)   12/08/23 65.8 kg (145 lb)   04/12/23 66 kg (145 lb 6.4 oz)       Masimo Device:    Masimo Clinical Impression:     Virtual Visits--Scheduled (Most Recent Date at Top)  Follow up Appointments  Recent Visits  No visits were found meeting these conditions.  Showing recent visits within past 30 days and meeting all other requirements  Future Appointments  Date Type Provider Dept   08/05/24 Appointment Madeline [facility]ed PA-C [id number]   Showing future appointments within next 90 days and meeting all other requirements       Frequency of RN Calls & Virtual Visits per Team Agreement:     Medication issues Addressed (what was done):     Follow up appointments scheduled by [facility] Staff:   Referrals made by [facility] staff:

## 2024-07-22 NOTE — DOCUMENTATION CLARIFICATION NOTE
"    PATIENT:               MAGDI DICKEY  ACCT #:                  [id number]  MRN:                       [id number]  :                       1940  ADMIT DATE:       2024 9:01 AM  DISCH DATE:        2024 3:40 PM  RESPONDING PROVIDER #:        [id number]          PROVIDER RESPONSE TEXT:    Metabolic encephalopathy 2/2 UTI    CDI QUERY TEXT:    Clarification    Instruction:    Based on your assessment of the patient and the clinical information, please provide the requested documentation by clicking on the appropriate radio button and enter any additional information if prompted.    Question: Please further clarify the most likely etiology of the altered mental status as    When answering this query, please exercise your independent professional judgment. The fact that a question is being asked, does not imply that any particular answer is desired or expected.    The patient's clinical indicators include:  Clinical Information:  The patient is an 84 year old female who presented to the ED with dizziness.  She was found to have a UTI and an NSTEMI.  Her PMH includes HTN, CAD, chronic LBBB, GERD and hx breast cancer.    Clinical Indicators:    ED Note  \"She is brought in by EMS and they report she is having confusion.  EMS states that per family at home patient is usually alert and oriented x 4, but is only alert and oriented x 2 on their evaluation.  Patient's last known normal was yesterday evening when she went to bed, but is unclear what time this was.  History is somewhat limited due to the patient's confusion.\"  \"Patient oriented to self and place but not time.  She does not recall how she got to the emergency department. She is able to follow simple commands.\"    HP  \"She is alert.  She is confused.\"  \"Altered mental status.\"    Cardiology Consult  \"UA was consistent with infection.  In light of infection/change in mental status enzyme pattern admitted for further " "evaluation.\"    Medicine PN 7/14 \"Patient seen resting quietly in bed.  Patient alert and oriented x 3 back to baseline.  Patient states feeling much better than yesterday.\"    CT Head 7/13 \"No evidence of an acute intracranial process.\"    Treatment:  IV Rocephin 1g daily, head CT    Risk Factors:  UTI, NSTEMI  Options provided:  -- Metabolic encephalopathy 2/2 UTI  -- Other - I will add my own diagnosis  -- Refer to Clinical Documentation Reviewer    Query created by: Lorena Roca on 7/15/2024 10:56 AM      Electronically signed by:  PRADEEP KEENE-CNP 7/22/2024 8:13 AM          "

## 2024-07-23 ENCOUNTER — PATIENT OUTREACH (OUTPATIENT)
Dept: HOME HEALTH SERVICES | Age: 84
End: 2024-07-23

## 2024-07-23 ENCOUNTER — LAB (OUTPATIENT)
Dept: LAB | Facility: LAB | Age: 84
End: 2024-07-23
Payer: COMMERCIAL

## 2024-07-23 VITALS — OXYGEN SATURATION: 97 % | SYSTOLIC BLOOD PRESSURE: 132 MMHG | HEART RATE: 64 BPM | DIASTOLIC BLOOD PRESSURE: 66 MMHG

## 2024-07-23 DIAGNOSIS — I50.31 ACUTE DIASTOLIC (CONGESTIVE) HEART FAILURE (MULTI): ICD-10-CM

## 2024-07-23 LAB
ANION GAP SERPL CALC-SCNC: 10 MMOL/L (ref 10–20)
BUN SERPL-MCNC: 23 MG/DL (ref 6–23)
CALCIUM SERPL-MCNC: 9.1 MG/DL (ref 8.6–10.3)
CHLORIDE SERPL-SCNC: 105 MMOL/L (ref 98–107)
CO2 SERPL-SCNC: 28 MMOL/L (ref 21–32)
CREAT SERPL-MCNC: 1 MG/DL (ref 0.5–1.05)
EGFRCR SERPLBLD CKD-EPI 2021: 55 ML/MIN/1.73M*2
GLUCOSE SERPL-MCNC: 107 MG/DL (ref 74–99)
POTASSIUM SERPL-SCNC: 4.2 MMOL/L (ref 3.5–5.3)
SODIUM SERPL-SCNC: 139 MMOL/L (ref 136–145)

## 2024-07-23 PROCEDURE — 80048 BASIC METABOLIC PNL TOTAL CA: CPT

## 2024-07-23 PROCEDURE — 36415 COLL VENOUS BLD VENIPUNCTURE: CPT

## 2024-07-23 NOTE — NURSING NOTE
Attempted to reach patient for follow up. Called both numbers listed in chart. No answer at either number. Messages left with call back information.  Call made by Congestive Heart Failure Clinical Nurse Navigator.     Patient returned call. States she is doing well. Her daughter has made a graph on the computer with all her medications that she follows. She sates she had blood work done today. She states her shortness of breath is improving everyday. She is active with [facility]. She is aware of her follow up appointment with cardiology on Thursday. She also has an appointment with her PCP on 8/5/24. No questions or concerns at this time. Reminded patient of my contact information should any questions or concerns arise.

## 2024-07-23 NOTE — PROGRESS NOTES
Daily Call Note:     Ms. Pugh had lab work done today.  BMP in process    Patient started Bactrim for UTI.  Keflex was stopped on 7/19 for severe itching, red patches on arms, legs, trunk, and back.     UPDATED ALLERGIES IN EPIC    Wt 147 lb   /66   HR 64     Patient has no questions, concerns, or needs at this time.      Advised patient to contact the [facility] Team for question or concerns 24/7.     Pt Education:   Barriers:   Topics for Daily Review:   Pt demonstrates clear understanding:     Daily Weight:  There were no vitals filed for this visit.   Last 3 Weights:  Wt Readings from Last 7 Encounters:   07/22/24 67.1 kg (148 lb)   07/19/24 66.7 kg (147 lb)   07/18/24 66.9 kg (147 lb 8 oz)   07/17/24 67.1 kg (148 lb)   07/13/24 67.9 kg (149 lb 11.1 oz)   03/08/24 66.7 kg (147 lb)   12/08/23 65.8 kg (145 lb)       Masimo Device:    Masimo Clinical Impression:     Virtual Visits--Scheduled (Most Recent Date at Top)  Follow up Appointments  Recent Visits  No visits were found meeting these conditions.  Showing recent visits within past 30 days and meeting all other requirements  Future Appointments  Date Type Provider Dept   08/05/24 Appointment Madeline Hameed PA-C [id number]   Showing future appointments within next 90 days and meeting all other requirements       Frequency of RN Calls & Virtual Visits per Team Agreement:     Medication issues Addressed (what was done):     Follow up appointments scheduled by [facility] Staff:   Referrals made by [facility] staff:

## 2024-07-24 ENCOUNTER — PATIENT OUTREACH (OUTPATIENT)
Dept: HOME HEALTH SERVICES | Age: 84
End: 2024-07-24

## 2024-07-24 ENCOUNTER — APPOINTMENT (OUTPATIENT)
Dept: CARE COORDINATION | Age: 84
End: 2024-07-24
Payer: COMMERCIAL

## 2024-07-24 ENCOUNTER — APPOINTMENT (OUTPATIENT)
Dept: PHARMACY | Facility: HOSPITAL | Age: 84
End: 2024-07-24
Payer: COMMERCIAL

## 2024-07-24 VITALS
DIASTOLIC BLOOD PRESSURE: 63 MMHG | WEIGHT: 146 LBS | HEART RATE: 64 BPM | SYSTOLIC BLOOD PRESSURE: 115 MMHG | BODY MASS INDEX: 26.7 KG/M2

## 2024-07-24 DIAGNOSIS — I50.9 CONGESTIVE HEART FAILURE, UNSPECIFIED HF CHRONICITY, UNSPECIFIED HEART FAILURE TYPE (MULTI): ICD-10-CM

## 2024-07-24 DIAGNOSIS — I21.4 NSTEMI (NON-ST ELEVATED MYOCARDIAL INFARCTION) (MULTI): Primary | ICD-10-CM

## 2024-07-24 NOTE — PROGRESS NOTES
[facility] Call Note:   Spoke to patient, she states patient is feeling good. She reports her vitals and weight.   Denies SOB and swelling. She states rash is gone and she has one tablet of Macrobid left to take.   Yesterday, she had her first day where she did activities and did not get winded.  She does still endorse restless legs at night since she had procedure. Advised discussing with cardio or PCP at next apt. Also massage and maybe tylenol before bed.  PAP discussed, meds reviewed.  Call frequency changed per patient and next [facility] scheduled.     Pt Education: POC  Barriers: na  Topics for Daily Review: POC  Pt demonstrates clear understanding: Yes    Daily Weight:  There were no vitals filed for this visit.   Last 3 Weights:  Wt Readings from Last 7 Encounters:   07/22/24 67.1 kg (148 lb)   07/19/24 66.7 kg (147 lb)   07/18/24 66.9 kg (147 lb 8 oz)   07/17/24 67.1 kg (148 lb)   07/13/24 67.9 kg (149 lb 11.1 oz)   03/08/24 66.7 kg (147 lb)   12/08/23 65.8 kg (145 lb)       Masimo Device: No   Masimo Clinical Impression: na    Virtual Visits--Scheduled (Most Recent Date at Top)  Follow up Appointments  Recent Visits  No visits were found meeting these conditions.  Showing recent visits within past 30 days and meeting all other requirements  Future Appointments  Date Type Provider Dept   08/05/24 Appointment Madeline Hameed PA-C [id number]   Showing future appointments within next 90 days and meeting all other requirements       Frequency of RN Calls & Virtual Visits per Team Agreement: [facility] Frequency: Daily    Medication issues Addressed (what was done): na    Follow up appointments scheduled by [facility] Staff: na  Referrals made by [facility] staff: na

## 2024-07-24 NOTE — PROGRESS NOTES
"Pharmacy Post-Discharge Visit - Follow Up     Laurel Pugh is a 84 y.o. female was referred to Clinical Pharmacy Team to complete a post-discharge medication optimization and monitoring visit.  The patient was referred for their CHF management while also enrolled in [facility].     Referring Provider: Dexter Moyer*  PCP: Chong Houston, DO - next visit: 8/5      Subjective   Allergies   Allergen Reactions    Haloperidol Hallucinations    Keflex [Cephalexin] Itching and Rash     \"Severe Itching\"    Red patches on arms, trunks, back,  and legs     Zofran [Ondansetron Hcl] Hallucinations    Amoxicillin-Pot Clavulanate Rash    Levofloxacin Rash    Sulfa (Sulfonamide Antibiotics) Rash       [id number] - [place], [place] - [address]  [address] [address]  Phone: [phone] Fax: [phone]      Medication System Management:  Affordability/Accessibility: try to enroll into [facility]  Adherence/Organization: no issues       Social History     Social History Narrative    Not on file          HPI  CHF ASSESSMENT  Staging:  Most recent ejection fraction: 40%  NYHA Stage: II  ACC/AHA Stage: C     Symptom Assessment:  Weight changes/edema?: Yes - down another 1 lb from last week visit   Dyspnea?: None  Dizziness/syncope/palpitations?: No      Current Regimen:  ARNI/ACEi/ARB: Yes - entresto  Beta Blocker: Yes - metoprolol  MRA: Yes - spironolactone   SGLT2i: No     Other therapy:  Plavix      Secondary Prevention:  The ASCVD Risk score (Adriana BOWMAN, et al., 2019) failed to calculate for the following reasons:    The 2019 ASCVD risk score is only valid for ages 40 to 79    The patient has a prior MI or stroke diagnosis     Aspirin 81mg? yes  Statin?: Yes - atorvastatin  HTN?: No     Review of Systems        Objective     There were no vitals taken for this visit.   BP Readings from Last 4 Encounters:   07/23/24 132/66   07/22/24 111/59   07/19/24 99/62   07/18/24 92/52    "   There were no vitals filed for this visit.     LAB  Lab Results   Component Value Date    BILITOT 0.6 07/13/2024    CALCIUM 9.1 07/23/2024    CO2 28 07/23/2024     07/23/2024    CREATININE 1.00 07/23/2024    GLUCOSE 107 (H) 07/23/2024    ALKPHOS 48 07/13/2024    K 4.2 07/23/2024    PROT 7.2 07/13/2024     07/23/2024    AST 16 07/13/2024    ALT 10 07/13/2024    BUN 23 07/23/2024    ANIONGAP 10 07/23/2024    MG 1.98 07/13/2024    ALBUMIN 4.0 07/13/2024    GFRF 71 04/12/2023     Lab Results   Component Value Date    TRIG 74 07/14/2024    CHOL 197 07/14/2024    LDLCALC 129 (H) 07/14/2024    HDL 52.9 07/14/2024     Lab Results   Component Value Date    HGBA1C 5.8 (H) 07/13/2024         Current Outpatient Medications   Medication Instructions    aspirin 81 mg EC tablet 1 tablet, oral, Daily    atorvastatin (LIPITOR) 40 mg, oral, Daily    cephalexin (KEFLEX) 500 mg, oral, 2 times daily    cholecalciferol (Vitamin D-3) 25 MCG (1000 UT) tablet oral    clopidogrel (Plavix) 75 mg tablet Take 1 tablet (75 mg) by mouth once daily.    cranberry 400 mg capsule oral    cyanocobalamin (VITAMIN B-12) 2,000 mcg, oral, Daily    docusate sodium (STOOL SOFTENER ORAL) oral, Daily    fluticasone (Flonase) 50 mcg/actuation nasal spray 1 spray, Each Nostril, Daily, Shake gently. Before first use, prime pump. After use, clean tip and replace cap.    metoprolol succinate XL (TOPROL-XL) 25 mg, oral, Daily, Do not crush or chew.    nitrofurantoin, macrocrystal-monohydrate, (Macrobid) 100 mg capsule 100 mg, oral, 2 times daily    sacubitriL-valsartan (Entresto) 24-26 mg tablet 1 tablet, oral, 2 times daily    spironolactone (ALDACTONE) 12.5 mg, oral, Daily            Assessment/Plan   Problem List Items Addressed This Visit    None    CHF/NSTEMI  Most recent EF was 40% while admitted   Current GDMT --> metoprolol, entresto and spironolactone   No SGLT2i since also being treated for UTI  No other daily diuretic   Checking weights and  BP's daily --> told to continue to do this and keep log for future visits   BP today 115/63, HR 64  DAPT --> aspirin, plavix and statin   Also discussed to split up some of the medications because has had some episodes of dizziness and not feeling well  Recommended to continue with spironolactone and plavix in the morning with morning dose of entresto and then take the lipitor and metoprolol in the evening with second dose of entresto   UTI   No current UTI symptoms  No trouble with urinating   Developed a rash with the keflex that was stopped and then she was started on macrobid 100mg BID x 3 day which she will complete tonight   Rash is completely gone now too      PAP:  -confirmed that she does still have to file taxes  -she is going to check [employer] form to check annual income to see if would be eligible or not for enrollment   -will follow up on next week visit     Follow Up: 1 week     Continue all meds under the continuation of care with the referring provider and clinical pharmacy team.    Brett Del Valle, PharmD     Verbal consent to manage patient's drug therapy was obtained from the patient. They were informed they may decline to participate or withdraw from participation in pharmacy services at any time.

## 2024-07-25 ENCOUNTER — APPOINTMENT (OUTPATIENT)
Dept: CARDIOLOGY | Facility: CLINIC | Age: 84
End: 2024-07-25
Payer: COMMERCIAL

## 2024-07-25 ENCOUNTER — PATIENT OUTREACH (OUTPATIENT)
Dept: PRIMARY CARE | Facility: CLINIC | Age: 84
End: 2024-07-25

## 2024-07-25 VITALS
BODY MASS INDEX: 30.67 KG/M2 | HEIGHT: 58 IN | WEIGHT: 146.1 LBS | DIASTOLIC BLOOD PRESSURE: 68 MMHG | HEART RATE: 64 BPM | SYSTOLIC BLOOD PRESSURE: 118 MMHG

## 2024-07-25 DIAGNOSIS — I21.4 NSTEMI (NON-ST ELEVATED MYOCARDIAL INFARCTION) (MULTI): ICD-10-CM

## 2024-07-25 DIAGNOSIS — I10 ESSENTIAL HYPERTENSION: ICD-10-CM

## 2024-07-25 DIAGNOSIS — I25.118 CORONARY ARTERY DISEASE INVOLVING NATIVE CORONARY ARTERY OF NATIVE HEART WITH OTHER FORM OF ANGINA PECTORIS (CMS-HCC): Primary | ICD-10-CM

## 2024-07-25 PROBLEM — I25.10 CAD (CORONARY ARTERY DISEASE): Status: ACTIVE | Noted: 2024-07-25

## 2024-07-25 PROCEDURE — 3074F SYST BP LT 130 MM HG: CPT | Performed by: NURSE PRACTITIONER

## 2024-07-25 PROCEDURE — 3078F DIAST BP <80 MM HG: CPT | Performed by: NURSE PRACTITIONER

## 2024-07-25 PROCEDURE — 1036F TOBACCO NON-USER: CPT | Performed by: NURSE PRACTITIONER

## 2024-07-25 PROCEDURE — 1157F ADVNC CARE PLAN IN RCRD: CPT | Performed by: NURSE PRACTITIONER

## 2024-07-25 PROCEDURE — 1123F ACP DISCUSS/DSCN MKR DOCD: CPT | Performed by: NURSE PRACTITIONER

## 2024-07-25 PROCEDURE — 1111F DSCHRG MED/CURRENT MED MERGE: CPT | Performed by: NURSE PRACTITIONER

## 2024-07-25 PROCEDURE — 99495 TRANSJ CARE MGMT MOD F2F 14D: CPT | Performed by: NURSE PRACTITIONER

## 2024-07-25 PROCEDURE — 1159F MED LIST DOCD IN RCRD: CPT | Performed by: NURSE PRACTITIONER

## 2024-07-25 ASSESSMENT — ENCOUNTER SYMPTOMS
MUSCULOSKELETAL NEGATIVE: 1
PALPITATIONS: 0
NEAR-SYNCOPE: 0
GASTROINTESTINAL NEGATIVE: 1
ORTHOPNEA: 0
CONSTITUTIONAL NEGATIVE: 1
EYES NEGATIVE: 1
ENDOCRINE NEGATIVE: 1
RESPIRATORY NEGATIVE: 1
DYSPNEA ON EXERTION: 0
PND: 0

## 2024-07-25 NOTE — PROGRESS NOTES
Outreach due for patient and CM confirmed that patient is still enrolled in  [facility] program. Next outreach will be set for 1 mo forward.

## 2024-07-25 NOTE — PROGRESS NOTES
"    CARDIOLOGY OFFICE VISIT      CHIEF COMPLAINT  Chief Complaint   Patient presents with    Hospital Follow-up     TCM from [place] [facility] for altered mental status.  She states yesterday was a good day, had energy, got things done & today she feels \"foggy headed\"       HISTORY OF PRESENT ILLNESS  84 old female who presented to the office today after being hospitalized with change in mental status and found to have a UTI.  In the hospital she had significant troponin elevation and went for left heart catheterization and echocardiogram..  She was found to have an 80% occlusion of LAD with a stent being placed.  She was continued on Keflex for UTI.  Her ejection fraction was found to be slightly down around 40% she was started GDMT for heart failure management.  She currently denies any chest pain or shortness of breath.  She does keep a blood pressure and heart rate dialogue along with weights.  He is tolerating all of her medications appropriately including DAPT.  She has a follow-up for an echocardiogram in 3 months along with cardiologist appointment.          Past Medical History  Past Medical History:   Diagnosis Date    Arthritis     Bitten or stung by nonvenomous insect and other nonvenomous arthropods, initial encounter 05/22/2019    Tick bite, initial encounter    Encounter for general adult medical examination without abnormal findings 10/05/2022    Medicare annual wellness visit, subsequent    Encounter for general adult medical examination without abnormal findings 11/02/2021    Medicare annual wellness visit, subsequent    Personal history of malignant neoplasm of breast     History of malignant neoplasm of breast    Personal history of other diseases of urinary system 08/10/2019    History of hematuria    Personal history of other specified conditions     History of vertigo    Personal history of other specified conditions 08/10/2019    History of urinary frequency    Personal history of urinary (tract) " "infections 08/10/2019    History of acute cystitis    Personal history of urinary (tract) infections 09/03/2019    History of urinary tract infection       Social History  Social History     Tobacco Use    Smoking status: Never     Passive exposure: Never    Smokeless tobacco: Never   Vaping Use    Vaping status: Never Used   Substance Use Topics    Alcohol use: Never    Drug use: Never       Family History     Family History   Problem Relation Name Age of Onset    Stroke Mother      Other (cardiac disorder) Father      Breast cancer Sister      Arthritis Sister          Allergies:  Allergies   Allergen Reactions    Haloperidol Hallucinations    Keflex [Cephalexin] Itching and Rash     \"Severe Itching\"    Red patches on arms, trunks, back,  and legs     Zofran [Ondansetron Hcl] Hallucinations    Amoxicillin-Pot Clavulanate Rash    Levofloxacin Rash    Sulfa (Sulfonamide Antibiotics) Rash        Outpatient Medications:  Current Outpatient Medications   Medication Instructions    aspirin 81 mg EC tablet 1 tablet, oral, Daily    atorvastatin (LIPITOR) 40 mg, oral, Daily    cholecalciferol (Vitamin D-3) 25 MCG (1000 UT) tablet oral    clopidogrel (Plavix) 75 mg tablet Take 1 tablet (75 mg) by mouth once daily.    cranberry 400 mg capsule oral    cyanocobalamin (VITAMIN B-12) 2,000 mcg, oral, Daily    docusate sodium (STOOL SOFTENER ORAL) oral, Daily    fluticasone (Flonase) 50 mcg/actuation nasal spray 1 spray, Each Nostril, Daily, Shake gently. Before first use, prime pump. After use, clean tip and replace cap.    metoprolol succinate XL (TOPROL-XL) 25 mg, oral, Daily, Do not crush or chew.    nitrofurantoin, macrocrystal-monohydrate, (Macrobid) 100 mg capsule 100 mg, oral, 2 times daily    sacubitriL-valsartan (Entresto) 24-26 mg tablet 1 tablet, oral, 2 times daily    spironolactone (ALDACTONE) 12.5 mg, oral, Daily          REVIEW OF SYSTEMS  Review of Systems   Constitutional: Negative.   HENT: Negative.     Eyes: " Negative.    Cardiovascular:  Negative for chest pain, dyspnea on exertion, near-syncope, orthopnea, palpitations and paroxysmal nocturnal dyspnea.   Respiratory: Negative.     Endocrine: Negative.    Skin: Negative.    Musculoskeletal: Negative.    Gastrointestinal: Negative.          VITALS  Vitals:    07/25/24 1259   BP: 118/68   Pulse: 64       PHYSICAL EXAM  Constitutional:       Appearance: Healthy appearance.   Neck:      Vascular: No JVR. JVD normal.   Pulmonary:      Effort: Pulmonary effort is normal.      Breath sounds: No stridor.   Chest:      Chest wall: Not tender to palpatation. No mass.   Cardiovascular:      PMI at left midclavicular line. Normal rate. Regular rhythm. Normal S1. Normal S2.       Murmurs: There is no murmur.   Abdominal:      General: Bowel sounds are normal.      Palpations: Abdomen is soft.   Skin:     General: Skin is warm and dry.           ASSESSMENT AND PLAN  Diagnoses and all orders for this visit:  Coronary artery disease involving native coronary artery of native heart with other form of angina pectoris (CMS-HCC)  NSTEMI (non-ST elevated myocardial infarction) (Multi)  Essential hypertension    Tolerating medications appropriately.  Reviewed blood work, blood pressure and heart rate.  All stable.  Continue GDMT for heart failure management  Repeat echocardiogram in 2 months and follow-up with a cardiologist  Continue DAPT  Patient to follow-up with primary care provider in regards to her UTI      [facility]  Adult Gerontology Acute Care Nurse Practitioner   [facility]  Pager: [phone]    [unfilled]    **Disclaimer: This note was dictated by speech recognition, and every effort has been made to prevent any error in transcription, however minor errors may be present**

## 2024-07-25 NOTE — PATIENT INSTRUCTIONS
Please call 911 and return to local emergency department for any chest pain, shortness of breath or palpitations

## 2024-07-26 ENCOUNTER — PATIENT OUTREACH (OUTPATIENT)
Dept: HOME HEALTH SERVICES | Age: 84
End: 2024-07-26
Payer: COMMERCIAL

## 2024-07-26 ENCOUNTER — TELEPHONE (OUTPATIENT)
Dept: PRIMARY CARE | Facility: CLINIC | Age: 84
End: 2024-07-26
Payer: COMMERCIAL

## 2024-07-26 VITALS
DIASTOLIC BLOOD PRESSURE: 63 MMHG | WEIGHT: 144.5 LBS | BODY MASS INDEX: 30.73 KG/M2 | SYSTOLIC BLOOD PRESSURE: 109 MMHG | HEART RATE: 61 BPM

## 2024-07-26 NOTE — PROGRESS NOTES
Daily Call Note:   Spoke to patient, she states she is doing better than yesterday when her head was foggy, she just woke with a little headache today. She talked with PCP yesterday and they advised monitoring for worsening UTI symptoms.   Reports vitals and weight.  Denies any urinary s/s or fever chills, appetite good.  Advised same, monitor for worsening s/s, call with any issues.   Next call reviewed.    Pt Education: POC  Barriers: POC  Topics for Daily Review: POC  Pt demonstrates clear understanding: Yes    Daily Weight:  There were no vitals filed for this visit.   Last 3 Weights:  Wt Readings from Last 7 Encounters:   07/25/24 66.3 kg (146 lb 1.6 oz)   07/24/24 66.2 kg (146 lb)   07/22/24 67.1 kg (148 lb)   07/19/24 66.7 kg (147 lb)   07/18/24 66.9 kg (147 lb 8 oz)   07/17/24 67.1 kg (148 lb)   07/13/24 67.9 kg (149 lb 11.1 oz)       Masimo Device: No   Masimo Clinical Impression: na    Virtual Visits--Scheduled (Most Recent Date at Top)  Follow up Appointments  Recent Visits  No visits were found meeting these conditions.  Showing recent visits within past 30 days and meeting all other requirements  Future Appointments  Date Type Provider Dept   08/05/24 Appointment Madeline Hameed PA-C [id number]   Showing future appointments within next 90 days and meeting all other requirements       Frequency of RN Calls & Virtual Visits per Team Agreement: [facility] Frequency: Bi-Weekly    Medication issues Addressed (what was done): na    Follow up appointments scheduled by [facility] Staff: na  Referrals made by [facility] staff: angle

## 2024-07-29 ENCOUNTER — PATIENT OUTREACH (OUTPATIENT)
Dept: HOME HEALTH SERVICES | Age: 84
End: 2024-07-29
Payer: COMMERCIAL

## 2024-07-29 VITALS — WEIGHT: 144.5 LBS | BODY MASS INDEX: 30.73 KG/M2

## 2024-07-29 DIAGNOSIS — N39.0 URINARY TRACT INFECTION WITHOUT HEMATURIA, SITE UNSPECIFIED: ICD-10-CM

## 2024-07-29 NOTE — PROGRESS NOTES
Daily Call Note:   Spoke to patient, she reports feeling pretty good. She reports no change in her weight, no swelling. States her breathing and sleep are all normal.  Reviewed med refills, she said she asked how to get them and has enough for another week. Advised to check bottle for refill and if not we can refill on next [facility].  Next call reviewed.   Pt Education: POC  Barriers: na  Topics for Daily Review: POC  Pt demonstrates clear understanding: Yes    Daily Weight:  There were no vitals filed for this visit.   Last 3 Weights:  Wt Readings from Last 7 Encounters:   07/26/24 65.5 kg (144 lb 8 oz)   07/25/24 66.3 kg (146 lb 1.6 oz)   07/24/24 66.2 kg (146 lb)   07/22/24 67.1 kg (148 lb)   07/19/24 66.7 kg (147 lb)   07/18/24 66.9 kg (147 lb 8 oz)   07/17/24 67.1 kg (148 lb)       Masimo Device: No   Masimo Clinical Impression: na    Virtual Visits--Scheduled (Most Recent Date at Top)  Follow up Appointments  Recent Visits  No visits were found meeting these conditions.  Showing recent visits within past 30 days and meeting all other requirements  Future Appointments  Date Type Provider Dept   08/05/24 Appointment Madeline Hameed PA-C [id number]   Showing future appointments within next 90 days and meeting all other requirements       Frequency of RN Calls & Virtual Visits per Team Agreement: [facility] Frequency: Bi-Weekly    Medication issues Addressed (what was done): na    Follow up appointments scheduled by [facility] Staff: angle  Referrals made by [facility] staff: angle

## 2024-07-30 ENCOUNTER — PATIENT OUTREACH (OUTPATIENT)
Dept: HOME HEALTH SERVICES | Age: 84
End: 2024-07-30

## 2024-07-30 ENCOUNTER — LAB (OUTPATIENT)
Dept: LAB | Facility: LAB | Age: 84
End: 2024-07-30
Payer: COMMERCIAL

## 2024-07-30 DIAGNOSIS — N39.0 URINARY TRACT INFECTION WITHOUT HEMATURIA, SITE UNSPECIFIED: ICD-10-CM

## 2024-07-30 DIAGNOSIS — D50.0 IRON DEFICIENCY ANEMIA DUE TO CHRONIC BLOOD LOSS: Primary | ICD-10-CM

## 2024-07-30 LAB
APPEARANCE UR: CLEAR
BACTERIA #/AREA URNS AUTO: ABNORMAL /HPF
BILIRUB UR STRIP.AUTO-MCNC: NEGATIVE MG/DL
COLOR UR: COLORLESS
GLUCOSE UR STRIP.AUTO-MCNC: NORMAL MG/DL
KETONES UR STRIP.AUTO-MCNC: NEGATIVE MG/DL
LEUKOCYTE ESTERASE UR QL STRIP.AUTO: ABNORMAL
NITRITE UR QL STRIP.AUTO: NEGATIVE
PH UR STRIP.AUTO: 5 [PH]
PROT UR STRIP.AUTO-MCNC: NEGATIVE MG/DL
RBC # UR STRIP.AUTO: NEGATIVE /UL
RBC #/AREA URNS AUTO: ABNORMAL /HPF
SP GR UR STRIP.AUTO: 1.01
SQUAMOUS #/AREA URNS AUTO: ABNORMAL /HPF
UROBILINOGEN UR STRIP.AUTO-MCNC: NORMAL MG/DL
WBC #/AREA URNS AUTO: ABNORMAL /HPF

## 2024-07-30 PROCEDURE — 87086 URINE CULTURE/COLONY COUNT: CPT

## 2024-07-30 PROCEDURE — 81001 URINALYSIS AUTO W/SCOPE: CPT

## 2024-07-30 NOTE — PROGRESS NOTES
Patient called in she is concerned she had a dark/ black stool yesterday that was formed today she is still having black stool that is loose. She denies any abdominal pain.no sob or cp. She is on ASA 81 mg and Plavix. Will reach out to provider for any recommendations. She has an HHVC scheduled for tomorrow     /60 HR 68  patient aware that provider ordered labs she will have them drawn tomorrow

## 2024-07-31 ENCOUNTER — APPOINTMENT (OUTPATIENT)
Dept: CARE COORDINATION | Age: 84
End: 2024-07-31
Payer: COMMERCIAL

## 2024-07-31 ENCOUNTER — APPOINTMENT (OUTPATIENT)
Dept: PHARMACY | Facility: HOSPITAL | Age: 84
End: 2024-07-31
Payer: COMMERCIAL

## 2024-07-31 ENCOUNTER — PATIENT OUTREACH (OUTPATIENT)
Dept: HOME HEALTH SERVICES | Age: 84
End: 2024-07-31

## 2024-07-31 ENCOUNTER — LAB (OUTPATIENT)
Dept: LAB | Facility: LAB | Age: 84
End: 2024-07-31
Payer: COMMERCIAL

## 2024-07-31 VITALS
BODY MASS INDEX: 30.52 KG/M2 | WEIGHT: 143.5 LBS | SYSTOLIC BLOOD PRESSURE: 112 MMHG | DIASTOLIC BLOOD PRESSURE: 58 MMHG | HEART RATE: 64 BPM

## 2024-07-31 DIAGNOSIS — I50.9 CONGESTIVE HEART FAILURE, UNSPECIFIED HF CHRONICITY, UNSPECIFIED HEART FAILURE TYPE (MULTI): ICD-10-CM

## 2024-07-31 DIAGNOSIS — I21.4 NSTEMI (NON-ST ELEVATED MYOCARDIAL INFARCTION) (MULTI): Primary | ICD-10-CM

## 2024-07-31 DIAGNOSIS — D50.0 IRON DEFICIENCY ANEMIA DUE TO CHRONIC BLOOD LOSS: ICD-10-CM

## 2024-07-31 LAB
BACTERIA UR CULT: NORMAL
ERYTHROCYTE [DISTWIDTH] IN BLOOD BY AUTOMATED COUNT: 15.5 % (ref 11.5–14.5)
HCT VFR BLD AUTO: 37.1 % (ref 36–46)
HGB BLD-MCNC: 11.6 G/DL (ref 12–16)
MCH RBC QN AUTO: 27.4 PG (ref 26–34)
MCHC RBC AUTO-ENTMCNC: 31.3 G/DL (ref 32–36)
MCV RBC AUTO: 88 FL (ref 80–100)
NRBC BLD-RTO: 0 /100 WBCS (ref 0–0)
PLATELET # BLD AUTO: 277 X10*3/UL (ref 150–450)
RBC # BLD AUTO: 4.23 X10*6/UL (ref 4–5.2)
WBC # BLD AUTO: 5.3 X10*3/UL (ref 4.4–11.3)

## 2024-07-31 PROCEDURE — 85027 COMPLETE CBC AUTOMATED: CPT

## 2024-07-31 PROCEDURE — 36415 COLL VENOUS BLD VENIPUNCTURE: CPT

## 2024-07-31 NOTE — PROGRESS NOTES
[facility] Call Note:   Spoke to patient, she reports feeling pretty good. Denies swelling or SOB.  She had cardio apt, she said went well, her arm is healing well.   Reports vitals and weight.   Discussed lab/CBC for complaints of dark stools and diarrhea.   She said she feels ok today so far. Energy is better, no more bloody stools or diarrhea. Denies fever or chills.   She will have lab draw complete today. Advised if any new symptoms.   Next call scheduled.     Pt Education: POC  Barriers: na  Topics for Daily Review: POC  Pt demonstrates clear understanding: Yes    Daily Weight:  There were no vitals filed for this visit.   Last 3 Weights:  Wt Readings from Last 7 Encounters:   07/29/24 65.5 kg (144 lb 8 oz)   07/26/24 65.5 kg (144 lb 8 oz)   07/25/24 66.3 kg (146 lb 1.6 oz)   07/24/24 66.2 kg (146 lb)   07/22/24 67.1 kg (148 lb)   07/19/24 66.7 kg (147 lb)   07/18/24 66.9 kg (147 lb 8 oz)       Masimo Device: No   Masimo Clinical Impression: na    Virtual Visits--Scheduled (Most Recent Date at Top)  Follow up Appointments  Recent Visits  No visits were found meeting these conditions.  Showing recent visits within past 30 days and meeting all other requirements  Future Appointments  Date Type Provider Dept   08/05/24 Appointment Madeline Hameed PA-C Do [id number]   Showing future appointments within next 90 days and meeting all other requirements       Frequency of RN Calls & Virtual Visits per Team Agreement: [facility] Frequency: Bi-Weekly    Medication issues Addressed (what was done): na    Follow up appointments scheduled by [facility] Staff: na  Referrals made by [facility] staff: angle

## 2024-07-31 NOTE — PROGRESS NOTES
"Pharmacy Post-Discharge Visit - Follow Up     Laurel Pugh is a 84 y.o. female was referred to Clinical Pharmacy Team to complete a post-discharge medication optimization and monitoring visit.  The patient was referred for their CHF management while also enrolled in [facility].     Referring Provider: Dexter Moyer*  PCP: Chong Houston, DO - next visit: 8/5      Subjective   Allergies   Allergen Reactions    Haloperidol Hallucinations    Keflex [Cephalexin] Itching and Rash     \"Severe Itching\"    Red patches on arms, trunks, back,  and legs     Zofran [Ondansetron Hcl] Hallucinations    Amoxicillin-Pot Clavulanate Rash    Levofloxacin Rash    Sulfa (Sulfonamide Antibiotics) Rash       [id number] - [place], [place] - [address]  [address] [address]  Phone: [phone] Fax: [phone]      Medication System Management:  Affordability/Accessibility: try to enroll into [facility]  Adherence/Organization: no issues       Social History     Social History Narrative    Not on file          HPI  CHF ASSESSMENT  Staging:  Most recent ejection fraction: 40%  NYHA Stage: II  ACC/AHA Stage: C     Symptom Assessment:  Weight changes/edema?: Yes - down another 3 lbs from last week visit   Dyspnea?: None  Dizziness/syncope/palpitations?: No      Current Regimen:  ARNI/ACEi/ARB: Yes - entresto  Beta Blocker: Yes - metoprolol  MRA: Yes - spironolactone   SGLT2i: No     Other therapy:  Plavix      Secondary Prevention:  The ASCVD Risk score (Adriana BOWMAN, et al., 2019) failed to calculate for the following reasons:    The 2019 ASCVD risk score is only valid for ages 40 to 79    The patient has a prior MI or stroke diagnosis     Aspirin 81mg? yes  Statin?: Yes - atorvastatin  HTN?: No     Review of Systems        Objective     There were no vitals taken for this visit.   BP Readings from Last 4 Encounters:   07/31/24 112/58   07/26/24 109/63   07/25/24 118/68   07/24/24 115/63    "   There were no vitals filed for this visit.     LAB  Lab Results   Component Value Date    BILITOT 0.6 07/13/2024    CALCIUM 9.1 07/23/2024    CO2 28 07/23/2024     07/23/2024    CREATININE 1.00 07/23/2024    GLUCOSE 107 (H) 07/23/2024    ALKPHOS 48 07/13/2024    K 4.2 07/23/2024    PROT 7.2 07/13/2024     07/23/2024    AST 16 07/13/2024    ALT 10 07/13/2024    BUN 23 07/23/2024    ANIONGAP 10 07/23/2024    MG 1.98 07/13/2024    ALBUMIN 4.0 07/13/2024    GFRF 71 04/12/2023     Lab Results   Component Value Date    TRIG 74 07/14/2024    CHOL 197 07/14/2024    LDLCALC 129 (H) 07/14/2024    HDL 52.9 07/14/2024     Lab Results   Component Value Date    HGBA1C 5.8 (H) 07/13/2024         Current Outpatient Medications   Medication Instructions    aspirin 81 mg EC tablet 1 tablet, oral, Daily    atorvastatin (LIPITOR) 40 mg, oral, Daily    cholecalciferol (Vitamin D-3) 25 MCG (1000 UT) tablet oral    clopidogrel (Plavix) 75 mg tablet Take 1 tablet (75 mg) by mouth once daily.    cranberry 400 mg capsule oral    cyanocobalamin (VITAMIN B-12) 2,000 mcg, oral, Daily    docusate sodium (STOOL SOFTENER ORAL) oral, Daily    fluticasone (Flonase) 50 mcg/actuation nasal spray 1 spray, Each Nostril, Daily, Shake gently. Before first use, prime pump. After use, clean tip and replace cap.    metoprolol succinate XL (TOPROL-XL) 25 mg, oral, Daily, Do not crush or chew.    sacubitriL-valsartan (Entresto) 24-26 mg tablet 1 tablet, oral, 2 times daily    spironolactone (ALDACTONE) 12.5 mg, oral, Daily            Assessment/Plan   Problem List Items Addressed This Visit    None    CHF/NSTEMI  Most recent EF was 40% while admitted   Current GDMT --> metoprolol, entresto and spironolactone   No SGLT2i since also being treated for UTI  No other daily diuretic   Checking weights and BP's daily --> told to continue to do this and keep log for future visits   BP today 112/58, HR 64  DAPT --> aspirin, plavix and statin   Also  discussed to split up some of the medications because has had some episodes of dizziness and not feeling well  Recommended to continue with spironolactone and plavix in the morning with morning dose of entresto and then take the lipitor and metoprolol in the evening with second dose of entresto   UTI   No current UTI symptoms  No trouble with urinating   Developed a rash with the keflex that was stopped and then she was started on macrobid 100mg BID x 3 day which has been completed   Rash is completely gone now too   Was having some darker stools but now having diarrhea   Told to stop OTC stool softeners/laxatives   Dr. Urban ordered for her to get CBC done --> she is able to go this afternoon to have drawn      PAP:  -confirmed that she does still have to file taxes  -she is going to check [employer] form to check annual income to see if would be eligible or not for enrollment     Follow Up: 1 week     Continue all meds under the continuation of care with the referring provider and clinical pharmacy team.    Brett Del Valle, Christos     Verbal consent to manage patient's drug therapy was obtained from the patient. They were informed they may decline to participate or withdraw from participation in pharmacy services at any time.

## 2024-08-01 ENCOUNTER — PATIENT OUTREACH (OUTPATIENT)
Dept: HOME HEALTH SERVICES | Age: 84
End: 2024-08-01
Payer: COMMERCIAL

## 2024-08-01 DIAGNOSIS — R19.7 DIARRHEA, UNSPECIFIED TYPE: Primary | ICD-10-CM

## 2024-08-01 RX ORDER — LOPERAMIDE HCL 2 MG
2 TABLET ORAL 4 TIMES DAILY PRN
Qty: 30 TABLET | Refills: 0 | Status: SHIPPED | OUTPATIENT
Start: 2024-08-01 | End: 2024-08-11

## 2024-08-01 NOTE — PROGRESS NOTES
"Daily Call Note:     Ms. Pugh called stating she had 2 episodes of diarrhea today. Patient was incontinent of stool when she woke up and then had another bowel movement about 1 hour ago. Stool was \"brown and sort of watery.\" Denies fever, chills, or abd pain. Patient is tolerating food and liquids. Patient notified Dr. Urban yesterday during [facility] that she had several episodes of dark stools.     Notified Dr. Jason via secure chat.     UPDATE:  Per Dr. Jason.  I reviewed notes looks like from [place] and Dr Urban diarrhea and dark stools resolved. H/H is stable. If its still persisting or new we can try some over the counter imodium and see if that helps    UPDATE:  Pt states she had 4 small \"brown, loose, liquid stools over the last 2.5 hours.   I asked the Dr. Jason and/or Brett to prescribe the Imodium and send it to [facility] in [place], per patient's request.      UPDATE:    Imodium A-D 2 mg by mouth 4 times a day for diarrhea up to 10 days sent to [facility].  Patient notified.       Pt Education:   Barriers:   Topics for Daily Review:   Pt demonstrates clear understanding:     Daily Weight:  There were no vitals filed for this visit.   Last 3 Weights:  Wt Readings from Last 7 Encounters:   07/31/24 65.1 kg (143 lb 8 oz)   07/29/24 65.5 kg (144 lb 8 oz)   07/26/24 65.5 kg (144 lb 8 oz)   07/25/24 66.3 kg (146 lb 1.6 oz)   07/24/24 66.2 kg (146 lb)   07/22/24 67.1 kg (148 lb)   07/19/24 66.7 kg (147 lb)       Masimo Device:    Masimo Clinical Impression:     Virtual Visits--Scheduled (Most Recent Date at Top)  Follow up Appointments  Recent Visits  No visits were found meeting these conditions.  Showing recent visits within past 30 days and meeting all other requirements  Future Appointments  Date Type Provider Dept   08/05/24 Appointment [id number]   Showing future appointments within next 90 days and meeting all other requirements       Frequency of RN Calls & Virtual " Visits per Team Agreement:     Medication issues Addressed (what was done):     Follow up appointments scheduled by [facility] Staff:   Referrals made by [facility] staff:

## 2024-08-01 NOTE — PROGRESS NOTES
Per nursing on routine RN call with healthy at home noted to have diarrhea this AM. Per chart review had few episodes after discharge but on provider call on 7/31 symptoms had resolved. CBC was done and H/H was stable and no WBC count. Will prescribe imodium and see if this helps if no improvement may need stool studies sent as she recently hospitalized and short coarse of abx for UTI.

## 2024-08-02 ENCOUNTER — PATIENT OUTREACH (OUTPATIENT)
Dept: CARE COORDINATION | Age: 84
End: 2024-08-02
Payer: COMMERCIAL

## 2024-08-02 VITALS
WEIGHT: 143 LBS | SYSTOLIC BLOOD PRESSURE: 104 MMHG | DIASTOLIC BLOOD PRESSURE: 61 MMHG | BODY MASS INDEX: 30.41 KG/M2 | HEART RATE: 70 BPM

## 2024-08-02 NOTE — PROGRESS NOTES
Daily Call Note: Daily call completed with pt today. States that she is doing much better than yesterday - last liquid stool was last evening. Denies pain,  and edema. Endorses fatigue. Encouraged to rest as much as needed; encouraged to stay hydrated. Pt verbalized understanding.   /61   Pulse 70   Wt 64.9 kg (143 lb)   BMI 30.41 kg/m²   Next [facility] scheduled for 8/10 a t0900 - pt verbalized understanding.  No other questions or concerns at this time.    Pt Education: per POC  Barriers: none  Topics for Daily Review: diarrhea; weight; fatigue; VS  Pt demonstrates clear understanding: Yes    Daily Weight:  Vitals:    24 0700   Weight: 64.9 kg (143 lb)      Last 3 Weights:  Wt Readings from Last 7 Encounters:   24 64.9 kg (143 lb)   24 65.1 kg (143 lb 8 oz)   24 65.5 kg (144 lb 8 oz)   24 65.5 kg (144 lb 8 oz)   24 66.3 kg (146 lb 1.6 oz)   24 66.2 kg (146 lb)   24 67.1 kg (148 lb)       Masimo Device: No   Masimo Clinical Impression: n/a    Virtual Visits--Scheduled (Most Recent Date at Top)  Follow up Appointments  Recent Visits  Date Type Provider Dept   24 Telemedicine Michelle Moyer MD [facility]   Showing recent visits within past 30 days and meeting all other requirements  Future Appointments  Date Type Provider Dept   24 Appointment Madeline Hameed PA-C Do [id number]   Showing future appointments within next 90 days and meeting all other requirements       Frequency of RN Calls & Virtual Visits per Team Agreement: [facility] Frequency: MWF    Medication issues Addressed (what was done): immodium    Follow up appointments scheduled by [facility] Staff: none  Referrals made by [facility] staff: none

## 2024-08-05 ENCOUNTER — APPOINTMENT (OUTPATIENT)
Dept: PRIMARY CARE | Facility: CLINIC | Age: 84
End: 2024-08-05
Payer: COMMERCIAL

## 2024-08-05 ENCOUNTER — PATIENT OUTREACH (OUTPATIENT)
Dept: CARE COORDINATION | Age: 84
End: 2024-08-05

## 2024-08-05 ENCOUNTER — TELEPHONE (OUTPATIENT)
Dept: CARDIOLOGY | Facility: CLINIC | Age: 84
End: 2024-08-05

## 2024-08-05 VITALS
DIASTOLIC BLOOD PRESSURE: 82 MMHG | HEIGHT: 58 IN | TEMPERATURE: 97.3 F | WEIGHT: 142 LBS | OXYGEN SATURATION: 96 % | HEART RATE: 66 BPM | SYSTOLIC BLOOD PRESSURE: 118 MMHG | RESPIRATION RATE: 18 BRPM | BODY MASS INDEX: 29.81 KG/M2

## 2024-08-05 VITALS
SYSTOLIC BLOOD PRESSURE: 108 MMHG | WEIGHT: 142.5 LBS | DIASTOLIC BLOOD PRESSURE: 59 MMHG | HEART RATE: 64 BPM | BODY MASS INDEX: 30.3 KG/M2

## 2024-08-05 DIAGNOSIS — Z09 HOSPITAL DISCHARGE FOLLOW-UP: ICD-10-CM

## 2024-08-05 DIAGNOSIS — I21.4 NSTEMI (NON-ST ELEVATED MYOCARDIAL INFARCTION) (MULTI): Primary | ICD-10-CM

## 2024-08-05 PROCEDURE — 3079F DIAST BP 80-89 MM HG: CPT | Performed by: PHYSICIAN ASSISTANT

## 2024-08-05 PROCEDURE — 1111F DSCHRG MED/CURRENT MED MERGE: CPT | Performed by: PHYSICIAN ASSISTANT

## 2024-08-05 PROCEDURE — 3074F SYST BP LT 130 MM HG: CPT | Performed by: PHYSICIAN ASSISTANT

## 2024-08-05 PROCEDURE — 1123F ACP DISCUSS/DSCN MKR DOCD: CPT | Performed by: PHYSICIAN ASSISTANT

## 2024-08-05 PROCEDURE — 1157F ADVNC CARE PLAN IN RCRD: CPT | Performed by: PHYSICIAN ASSISTANT

## 2024-08-05 PROCEDURE — 99214 OFFICE O/P EST MOD 30 MIN: CPT | Performed by: PHYSICIAN ASSISTANT

## 2024-08-05 PROCEDURE — 1159F MED LIST DOCD IN RCRD: CPT | Performed by: PHYSICIAN ASSISTANT

## 2024-08-05 ASSESSMENT — ENCOUNTER SYMPTOMS
DIARRHEA: 1
MYALGIAS: 1

## 2024-08-05 NOTE — PROGRESS NOTES
"Subjective   Patient ID: Laurel Pugh is a 84 y.o. female who presents for Hospital Follow-up (Dr Houston pt here today for a hospital follow up/Discharge Facility:/[facility] /Discharge Diagnosis:/Altered mental status, HTN, NSTEMI/Admission Date:7/13/24/Discharge Date: 7/16/24//Pt states altered mental status; had a UTI and had blockage, so had to get a stent put in. They put her on new meds but having a headache, when she walks around a lot she gets pain in her back of neck and back; has been happening since being out. ).    HPI     Polypharmacy   - fatigue and tightnesss inback of neck and upper back with movement since coming home from the hospital. Fatigue present before hospital stay but tightness is new. Compliant with meds.   -Diahrrea on 8/01. Prescribed Loperamide. No longer having symptoms.  -No SOB or chest pain  UTI  -Allergic reaction ( widespread rash and pruritus) to Cephalexin, took 3 days Macrobid. Urinalysis repeated last week. No dysruia currently.   Review of Systems   Gastrointestinal:  Positive for diarrhea.   Musculoskeletal:  Positive for myalgias.       Objective   /82   Pulse 66   Temp 36.3 °C (97.3 °F)   Resp 18   Ht 1.461 m (4' 9.5\")   Wt 64.4 kg (142 lb)   SpO2 96%   BMI 30.20 kg/m²     Physical Exam  Constitutional:       General: She is not in acute distress.     Appearance: Normal appearance. She is not ill-appearing.   Cardiovascular:      Rate and Rhythm: Normal rate and regular rhythm.      Heart sounds: No murmur heard.  Pulmonary:      Effort: Pulmonary effort is normal.      Breath sounds: Normal breath sounds.   Musculoskeletal:      Right lower leg: No edema.      Left lower leg: No edema.   Neurological:      Mental Status: She is alert.   Psychiatric:         Mood and Affect: Mood normal.         Speech: Speech normal.         Behavior: Behavior normal. Behavior is cooperative.         Thought Content: Thought content " normal.         Cognition and Memory: Cognition normal. Memory is not impaired.       Assessment/Plan     Problem List Items Addressed This Visit       NSTEMI (non-ST elevated myocardial infarction) (Multi) - Primary    Overview     - Hospitalized at [facility] 7/13/24 - 7/16/24 - brought to ER by family due to altered mental status and dizzy x 1 day. Found to be hypertensive 203/96 mmHg (not on meds). UA positive for leuks, troponin 427 > 617. EKG showed NSR with BBB. CXR mild cardiomegaly. CT angio neg for PE. Mental status improved in ER. Cardio consulted and recommended admission (no emergent cath). Cardio performed echo showing EF 40% so was taken to cath lab with 80% occlusion LAD - stent x 1. Given Keflex for UTI.   - Meds started for secondary prevention - metoprolol xl 25 mg, atorvastatin 40 mg, spironolactone 12.5 mg, Entresto 24-26 mg BID, Plavix 75 mg, aspirin 81 mg   - Outpatient follow up with cardio 7/25/24 [facility] Nolan - recommended continue meds and repeat echo 3 months.          Current Assessment & Plan     - Fortunately, pt is doing well without too many concerns   - She does report myalgias since starting the new medications at her discharge.   - Mostly likely myalgias are due to atorvastatin. Since side effect is bothersome, discussed risks vs benefits of continuing this rx. Ok to d/c for 1-2 weeks and see if sxs resolve. If so, discuss alternative options with cardio team.   - Long discussion today educating the pt and her daughter about the dx and about her new medications. Answered all of their questions.   - Encouraged she continue current medications (except as discussed) and follow up with cardiologist regularly as scheduled   - All of the pt and her daughter's questions were answered, they expressed understanding and agreed with the plan          Other Visit Diagnoses       Hospital discharge follow-up               UTI has resolved, recent repeat urine culture showed no growth     Follow  up with Dr. Houston next regularly scheduled appt     Vidya Solis, PA-S2       I was present with the PA student who participated in the documentation of this note. I have personally seen and re-examined the patient and performed the medical decision-making components (assessment and plan of care). I have reviewed the PA student documentation and verified the findings in the note as written with additions or exceptions as stated in the body of this note.    ZORAIDA Leon-C

## 2024-08-05 NOTE — ASSESSMENT & PLAN NOTE
- Fortunately, pt is doing well without too many concerns   - She does report myalgias since starting the new medications at her discharge.   - Mostly likely myalgias are due to atorvastatin. Since side effect is bothersome, discussed risks vs benefits of continuing this rx. Ok to d/c for 1-2 weeks and see if sxs resolve. If so, discuss alternative options with cardio team.   - Long discussion today educating the pt and her daughter about the dx and about her new medications. Answered all of their questions.   - Encouraged she continue current medications (except as discussed) and follow up with cardiologist regularly as scheduled   - All of the pt and her daughter's questions were answered, they expressed understanding and agreed with the plan

## 2024-08-05 NOTE — ASSESSMENT & PLAN NOTE
>>ASSESSMENT AND PLAN FOR HISTORY OF NON-ST ELEVATION MYOCARDIAL INFARCTION (NSTEMI) WRITTEN ON 8/5/2024 12:52 PM BY KOKO MEZA PA-C    - Fortunately, pt is doing well without too many concerns   - She does report myalgias since starting the new medications at her discharge.   - Mostly likely myalgias are due to atorvastatin. Since side effect is bothersome, discussed risks vs benefits of continuing this rx. Ok to d/c for 1-2 weeks and see if sxs resolve. If so, discuss alternative options with cardio team.   - Long discussion today educating the pt and her daughter about the dx and about her new medications. Answered all of their questions.   - Encouraged she continue current medications (except as discussed) and follow up with cardiologist regularly as scheduled   - All of the pt and her daughter's questions were answered, they expressed understanding and agreed with the plan

## 2024-08-05 NOTE — PROGRESS NOTES
"Subjective   Patient ID: Laurel Pugh is a 84 y.o. female who presents for Hospital Follow-up (Dr Houston pt here today for a hospital follow up/Discharge Facility:/[facility] /Discharge Diagnosis:/Altered mental status, HTN, NSTEMI/Admission Date:7/13/24/Discharge Date: 7/16/24//Pt states altered mental status; had a UTI and had blockage, so had to get a stent put in. They put her on new meds but having a headache, when she walks around a lot she gets pain in her back of neck and back; has been happening since being out. ).    HPI     Review of Systems    Objective   /82   Pulse 66   Temp 36.3 °C (97.3 °F)   Resp 18   Ht 1.461 m (4' 9.5\")   Wt 64.4 kg (142 lb)   SpO2 96%   BMI 30.20 kg/m²     Physical Exam    Assessment/Plan     Problem List Items Addressed This Visit       NSTEMI (non-ST elevated myocardial infarction) (Multi) - Primary    Overview     - Hospitalized at [facility] 7/13/24 - 7/16/24 - brought to ER by family due to altered mental status and dizzy x 1 day. Found to by hypertensive 203/96 mmHg (not on meds). UA positive for leuks, troponin 427 > 617. EKG showed NSR with BBB. CXR mild cardiomegaly. CT angio neg for PE. Mental status improved in ER. Cardio consulted and recommended admission (no emergent cath). Cardio performed echo EF 40% so was taken to cath lab with 80% occlusion LAD - stent x 1. Given Keflex for UTI.   - Meds for secondary prevention - metoprolol xl 25 mg, atorvastatin 40 mg, spironolactone 12.5 mg, Entresto 24-26 mg, Plavix 75 mg, aspirin 81 mg   - Outpatient follow up with cardio 7/25/24 IF[facility] Nolan - joseph meds, repeat echo 3 months.           Other Visit Diagnoses       Hospital discharge follow-up                "

## 2024-08-05 NOTE — PROGRESS NOTES
"Daily Call Note: Daily call completed with pt today. She states that she is feeling \"headachy\" today. Some neck/ back discomfort that is relieved with rest. No further diarrhea.   /59   Pulse 64   Wt 64.6 kg (142 lb 8 oz)   BMI 30.30 kg/m²   Next [facility] scheduled for 8/10 at 0900 - verbalied understanding.  No other questions or concerns.    Pt Education: POC  Barriers: none  Topics for Daily Review: headache; achy neck; diarrhea; VS  Pt demonstrates clear understanding: Yes    Daily Weight:  Vitals:    08/05/24 0700   Weight: 64.6 kg (142 lb 8 oz)      Last 3 Weights:  Wt Readings from Last 7 Encounters:   08/05/24 64.6 kg (142 lb 8 oz)   08/02/24 64.9 kg (143 lb)   07/31/24 65.1 kg (143 lb 8 oz)   07/29/24 65.5 kg (144 lb 8 oz)   07/26/24 65.5 kg (144 lb 8 oz)   07/25/24 66.3 kg (146 lb 1.6 oz)   07/24/24 66.2 kg (146 lb)       Masimo Device: No   Masimo Clinical Impression: n/a    Virtual Visits--Scheduled (Most Recent Date at Top)  Follow up Appointments  Recent Visits  No visits were found meeting these conditions.  Showing recent visits within past 30 days and meeting all other requirements  Today's Visits  Date Type Provider Dept   08/05/24 Appointment Madeline Hameed PA-C Do [id number]   Showing today's visits and meeting all other requirements  Future Appointments  No visits were found meeting these conditions.  Showing future appointments within next 90 days and meeting all other requirements       Frequency of RN Calls & Virtual Visits per Team Agreement: [facility] Frequency: MWF    Medication issues Addressed (what was done): none    Follow up appointments scheduled by [facility] Staff: none  Referrals made by [facility] staff: none        "

## 2024-08-05 NOTE — TELEPHONE ENCOUNTER
Patient called and stated she followed up with her PCP following her hospitalization and she is having some muscle aches. He recommended that she holds her atorvastatin for 1 week to see if these aches resolve. She would like to know If this is OK with Wilber DIA. Will route to him for review.

## 2024-08-05 NOTE — TELEPHONE ENCOUNTER
Returned call to patient and informed her she may hold her atorvastatin for 1 week to see if the muscle aches resolve per Wilber DIA.

## 2024-08-09 ENCOUNTER — PATIENT OUTREACH (OUTPATIENT)
Dept: HOME HEALTH SERVICES | Age: 84
End: 2024-08-09
Payer: COMMERCIAL

## 2024-08-09 NOTE — PROGRESS NOTES
Daily Call Note:     Daily call completed with the patient.  She states that she experienced some dizziness this morning.  She thinks it may have been attributed to getting out of bed quickly to answer a call.  Also, mentioned that when she walks over  ft, she gets dizzy and SOB.  Pt doesn't wear oxygen and hasn't in the past.  Her /60, HR 60, and wt 142 lbs.  She does have a pulse oximetry and states her oxygen is usually in the 90's.  Pt states this started happening once she had stent placed.  She hasn't had any changes to meds, except was told by Cardiologist on 8/5 to hold her Atorvastatin for a week due to complaints of muscle aches.  She did endorse having some muscle aches yesterday.  Advised to mention on next [facility] call which is tomorrow.  Also, advised to change positions slowly, take it easy, and not over exert herself.  Pt had no add'l questions or concerns.  Aware of upcoming appts.       Pt Education:   Barriers:   Topics for Daily Review:  Pt demonstrates clear understanding:     Daily Weight:  There were no vitals filed for this visit.   Last 3 Weights:  Wt Readings from Last 7 Encounters:   08/05/24 64.4 kg (142 lb)   08/05/24 64.6 kg (142 lb 8 oz)   08/02/24 64.9 kg (143 lb)   07/31/24 65.1 kg (143 lb 8 oz)   07/29/24 65.5 kg (144 lb 8 oz)   07/26/24 65.5 kg (144 lb 8 oz)   07/25/24 66.3 kg (146 lb 1.6 oz)       Masimo Device:    Masimo Clinical Impression:     Virtual Visits--Scheduled (Most Recent Date at Top)  Follow up Appointments  Recent Visits  Date Type Provider Dept   08/05/24 Office Visit Madeline Hameed PA-C Do [id number]   Showing recent visits within past 30 days and meeting all other requirements  Future Appointments  No visits were found meeting these conditions.  Showing future appointments within next 90 days and meeting all other requirements       Frequency of RN Calls & Virtual Visits per Team Agreement:     Medication issues Addressed (what was done):    Follow  up appointments scheduled by [facility] Staff:   Referrals made by [facility] staff:

## 2024-08-10 ENCOUNTER — APPOINTMENT (OUTPATIENT)
Dept: CARE COORDINATION | Age: 84
End: 2024-08-10
Payer: COMMERCIAL

## 2024-08-10 ENCOUNTER — PATIENT OUTREACH (OUTPATIENT)
Dept: HOME HEALTH SERVICES | Age: 84
End: 2024-08-10

## 2024-08-10 VITALS
WEIGHT: 140 LBS | SYSTOLIC BLOOD PRESSURE: 112 MMHG | DIASTOLIC BLOOD PRESSURE: 59 MMHG | BODY MASS INDEX: 29.77 KG/M2 | HEART RATE: 51 BPM

## 2024-08-10 DIAGNOSIS — I50.40 COMBINED SYSTOLIC AND DIASTOLIC CONGESTIVE HEART FAILURE, UNSPECIFIED HF CHRONICITY (MULTI): Primary | ICD-10-CM

## 2024-08-10 NOTE — PROGRESS NOTES
Weekly hhvc completed with wendy, pt and RN. Pt states she has been feeling pretty good. Denies dizziness but endorses SOB when walking any distance. Pt states prior to the stent placement she was able to walk around 100 feet if she pushed herself, now it is 50-75 feet when she gets short of breath. Pt was told to stop taking her atorvastatin for one week due to muscle aches, she completed the week and began taking the medication again. Pt states the muscle aches never changed much- per wendy pt to continue the medication. Pt verbalizes understanding. Pt inquired about how to prevent the shortness of breath, pt encouraged to continue short intervals of moving around and exercise and taking periods of rest. Pt to speak to provider next week regarding cardiac rehab (Dr. Kat per current schedule) Verbalizes understanding, task to be created. Pt denies any further questions/concerns/needs at this time. Aware of upcoming appts. [facility] scheduled 8/17 at 1600. Graduate pending shortness of breathing/lightheadedness.     126/59  HR 65    112/59  HR 51    140lb    Patient's Address:   [address] [place] [id number]  **  If this is not the address patient will receive services - alert team and address in EMR**       Patient Contacts:  Extended Emergency Contact Information  Primary Emergency Contact: Camryn Pro  Home Phone: [phone]  Relation: Daughter   needed? No  Secondary Emergency Contact: Drew Pugh  Mobile Phone: [phone]  Relation: Son  Preferred language: English   needed? No                                Patient's Preferred Phone: [phone]  Patient's E-mail: No e-mail address on record

## 2024-08-12 ENCOUNTER — PATIENT OUTREACH (OUTPATIENT)
Dept: CARE COORDINATION | Age: 84
End: 2024-08-12
Payer: COMMERCIAL

## 2024-08-12 VITALS
SYSTOLIC BLOOD PRESSURE: 112 MMHG | DIASTOLIC BLOOD PRESSURE: 62 MMHG | BODY MASS INDEX: 29.88 KG/M2 | WEIGHT: 140.5 LBS | HEART RATE: 63 BPM

## 2024-08-12 NOTE — PROGRESS NOTES
Daily Call Note: 1020 - Daily call completed with pt. She states that she had a good day yesterday and intends to do the same today.  /62   Pulse 63   Wt 63.7 kg (140 lb 8 oz)   BMI 29.88 kg/m²   Pt denies muscle aches today and she did restart her atorvastatin, as instructed. No edema and her dizziness has improved some. When asked about her SOB - she proudly shared that when she went to "[employer]" yesterday, she walked all the way in the back to the pharmacy without any SOB.  Pt did ask why she has had the SOB since having the stent and she had not had it prior. Explained that it can take some time to recover from having the stent placed and it was really only 4 weeks ago, which is not much time. Encouraged her that she is improving steadily and that her VS are good, too. She verbalized understanding.  Next [facility] is 8/17 at 1600 - pt verbalized understanding.  No other questions or concerns    Pt Education: per POC  Barriers: none  Topics for Daily Review: VS; SOB; dizziness; MORRIS; muscle aches  Pt demonstrates clear understanding: Yes    Daily Weight:  Vitals:    08/12/24 0900   Weight: 63.7 kg (140 lb 8 oz)      Last 3 Weights:  Wt Readings from Last 7 Encounters:   08/12/24 63.7 kg (140 lb 8 oz)   08/10/24 63.5 kg (140 lb)   08/05/24 64.4 kg (142 lb)   08/05/24 64.6 kg (142 lb 8 oz)   08/02/24 64.9 kg (143 lb)   07/31/24 65.1 kg (143 lb 8 oz)   07/29/24 65.5 kg (144 lb 8 oz)       Masimo Device: No   Masimo Clinical Impression: n/a    Virtual Visits--Scheduled (Most Recent Date at Top)  Follow up Appointments  Recent Visits  Date Type Provider Dept   08/05/24 Office Visit Madeline Hameed PA-C Do [id number]   Showing recent visits within past 30 days and meeting all other requirements  Future Appointments  No visits were found meeting these conditions.  Showing future appointments within next 90 days and meeting all other requirements       Frequency of RN Calls & Virtual Visits per Team Agreement:  [facility] Frequency: MWF    Medication issues Addressed (what was done): none    Follow up appointments scheduled by [facility] Staff: none  Referrals made by [facility] staff: none

## 2024-08-14 ENCOUNTER — PATIENT OUTREACH (OUTPATIENT)
Dept: HOME HEALTH SERVICES | Age: 84
End: 2024-08-14
Payer: COMMERCIAL

## 2024-08-14 VITALS
HEART RATE: 62 BPM | DIASTOLIC BLOOD PRESSURE: 66 MMHG | BODY MASS INDEX: 29.77 KG/M2 | SYSTOLIC BLOOD PRESSURE: 128 MMHG | WEIGHT: 140 LBS

## 2024-08-14 NOTE — PROGRESS NOTES
Daily Call Note: Daily call completed. The patient stated that she is doing much better. She stated that she is finding herself less SOB now with activity. She has learned to pace herself and allow appropriate rest periods between activity. She was instructed on how antihypertensive medications affect blood pressure, and to practice slow position changes from lying to sitting, sitting to standing, and standing to make sure there is no dizziness or weakness before ambulating. She was also instructed to maintain good hydration - how dehydration can affect the body combined with the medications, and also to assist with prevention of UTIs. The patient reported that she has much less muscle ache after resumption of the atorvastatin.  She is aware of upcoming appointment with [facility] 8/17 and feels that she is ready to graduate from the program at that time.    Pt demonstrates clear understanding: Yes  /66   Pulse 62   Wt 63.5 kg (140 lb)   BMI 29.77 kg/m²     Daily Weight:  There were no vitals filed for this visit.   Last 3 Weights:  Wt Readings from Last 7 Encounters:   08/12/24 63.7 kg (140 lb 8 oz)   08/10/24 63.5 kg (140 lb)   08/05/24 64.4 kg (142 lb)   08/05/24 64.6 kg (142 lb 8 oz)   08/02/24 64.9 kg (143 lb)   07/31/24 65.1 kg (143 lb 8 oz)   07/29/24 65.5 kg (144 lb 8 oz)         Virtual Visits--Scheduled (Most Recent Date at Top)  Follow up Appointments  Recent Visits  Date Type Provider Dept   08/05/24 Office Visit Madeline Hameed PA-C Do [id number] [place] [id number]   Showing recent visits within past 30 days and meeting all other requirements  Future Appointments  No visits were found meeting these conditions.  Showing future appointments within next 90 days and meeting all other requirements       Frequency of RN Calls & Virtual Visits per Team Agreement: [facility] Frequency: MWF    Medication issues Addressed (what was done):     Follow up appointments scheduled by [facility] Staff:   Referrals made by [facility]  staff:

## 2024-08-16 ENCOUNTER — PATIENT OUTREACH (OUTPATIENT)
Dept: CARE COORDINATION | Age: 84
End: 2024-08-16
Payer: COMMERCIAL

## 2024-08-16 VITALS
HEART RATE: 69 BPM | BODY MASS INDEX: 29.88 KG/M2 | SYSTOLIC BLOOD PRESSURE: 119 MMHG | DIASTOLIC BLOOD PRESSURE: 68 MMHG | WEIGHT: 140.5 LBS

## 2024-08-16 NOTE — PROGRESS NOTES
"Daily Call Note:   0910 - Daily call to pt this morning. She states that she is feeling \"pretty good\" today. She denies pain, denies edema. Endorses occasional SOB and dizziness - but has identified that this occurs with over exertion or rapid positions changes. She reports that she is learning to pace herself with activity, etc.   /68   Pulse 69   Wt 63.7 kg (140 lb 8 oz)   BMI 29.88 kg/m²   She is aware of her [facility] scheduled for 8/17 at 1600, and is aware that she will be graduating - she states that she is looking forward to being able to \"sleep in,\" but she also expressed gratitude for the program.    No other questions or concerns at this time.    Pt Education: per POC  Barriers: none  Topics for Daily Review: SOB, dizziness, VS  Pt demonstrates clear understanding: Yes    Daily Weight:  Vitals:    08/16/24 0900   Weight: 63.7 kg (140 lb 8 oz)      Last 3 Weights:  Wt Readings from Last 7 Encounters:   08/16/24 63.7 kg (140 lb 8 oz)   08/14/24 63.5 kg (140 lb)   08/12/24 63.7 kg (140 lb 8 oz)   08/10/24 63.5 kg (140 lb)   08/05/24 64.4 kg (142 lb)   08/05/24 64.6 kg (142 lb 8 oz)   08/02/24 64.9 kg (143 lb)       Masimo Device: No   Masimo Clinical Impression: n/a    Virtual Visits--Scheduled (Most Recent Date at Top)  Follow up Appointments  Recent Visits  Date Type Provider Dept   08/05/24 Office Visit Madeline Hameed PA-C Do [id number] [place] [address]   Showing recent visits within past 30 days and meeting all other requirements  Future Appointments  No visits were found meeting these conditions.  Showing future appointments within next 90 days and meeting all other requirements       Frequency of RN Calls & Virtual Visits per Team Agreement: [facility] Frequency: MWF    Medication issues Addressed (what was done): none    Follow up appointments scheduled by [facility] Staff: none  Referrals made by [facility] staff: none        "

## 2024-08-17 ENCOUNTER — APPOINTMENT (OUTPATIENT)
Dept: CARE COORDINATION | Age: 84
End: 2024-08-17
Payer: COMMERCIAL

## 2024-08-17 ENCOUNTER — PATIENT OUTREACH (OUTPATIENT)
Dept: HOME HEALTH SERVICES | Age: 84
End: 2024-08-17

## 2024-08-17 DIAGNOSIS — I25.10 CORONARY ARTERY DISEASE INVOLVING NATIVE HEART WITHOUT ANGINA PECTORIS, UNSPECIFIED VESSEL OR LESION TYPE: Primary | ICD-10-CM

## 2024-08-17 NOTE — PROGRESS NOTES
Daily Call Note:     [facility] call today with the patient,  North, and the Nurse.  Pt states she is doing pretty good.  She had no complaints of SOB, and is learning to pace herself.  She still has bouts of dizziness which happens on and off.  She had no dizziness today.  Pt is set to graduate the program today.  States she is comfortable doing. No questions or concerns during the call.  Pt advised to give us a call if she needs anything.  Pt is [employer] member sent message to them and pt's provider about graduating.      Pt Education:   Barriers:   Topics for Daily Review:   Pt demonstrates clear understanding:     Daily Weight:  There were no vitals filed for this visit.   Last 3 Weights:  Wt Readings from Last 7 Encounters:   08/16/24 63.7 kg (140 lb 8 oz)   08/14/24 63.5 kg (140 lb)   08/12/24 63.7 kg (140 lb 8 oz)   08/10/24 63.5 kg (140 lb)   08/05/24 64.4 kg (142 lb)   08/05/24 64.6 kg (142 lb 8 oz)   08/02/24 64.9 kg (143 lb)       Masimo Device:    Masimo Clinical Impression:     Virtual Visits--Scheduled (Most Recent Date at Top)  Follow up Appointments  Recent Visits  Date Type Provider Dept   08/05/24 Office Visit Madeline Hameed PA-C [id number]   Showing recent visits within past 30 days and meeting all other requirements  Future Appointments  No visits were found meeting these conditions.  Showing future appointments within next 90 days and meeting all other requirements       Frequency of RN Calls & Virtual Visits per Team Agreement:     Medication issues Addressed (what was done):     Follow up appointments scheduled by [facility] Staff:   Referrals made by [facility] staff:

## 2024-08-28 ENCOUNTER — PATIENT OUTREACH (OUTPATIENT)
Dept: CARDIOLOGY | Facility: CLINIC | Age: 84
End: 2024-08-28
Payer: COMMERCIAL

## 2024-08-28 NOTE — PROGRESS NOTES
Unable to reach patient for discharge follow up call.   LVM with call back number for patient to call if needed to assist with any questions or concerns patient may have.

## 2024-10-04 ENCOUNTER — PATIENT OUTREACH (OUTPATIENT)
Dept: PRIMARY CARE | Facility: CLINIC | Age: 84
End: 2024-10-04
Payer: COMMERCIAL

## 2024-10-16 ENCOUNTER — HOSPITAL ENCOUNTER (OUTPATIENT)
Dept: CARDIOLOGY | Facility: CLINIC | Age: 84
Discharge: HOME | End: 2024-10-16
Payer: COMMERCIAL

## 2024-10-16 VITALS — WEIGHT: 140 LBS | BODY MASS INDEX: 29.39 KG/M2 | HEIGHT: 58 IN

## 2024-10-16 DIAGNOSIS — I50.21 ACUTE SYSTOLIC (CONGESTIVE) HEART FAILURE: ICD-10-CM

## 2024-10-16 DIAGNOSIS — I50.31 ACUTE DIASTOLIC (CONGESTIVE) HEART FAILURE: ICD-10-CM

## 2024-10-16 LAB
AORTIC VALVE MEAN GRADIENT: 3 MMHG
AORTIC VALVE PEAK VELOCITY: 1.11 M/S
AV PEAK GRADIENT: 4.9 MMHG
AVA (PEAK VEL): 2.42 CM2
AVA (VTI): 2.23 CM2
EJECTION FRACTION APICAL 4 CHAMBER: 53.2
EJECTION FRACTION: 50 %
LEFT VENTRICLE INTERNAL DIMENSION DIASTOLE: 4.4 CM (ref 3.5–6)
LEFT VENTRICULAR OUTFLOW TRACT DIAMETER: 2 CM
LV EJECTION FRACTION BIPLANE: 53 %
MITRAL VALVE E/A RATIO: 0.68
MITRAL VALVE E/E' RATIO: 24.4
RIGHT VENTRICLE PEAK SYSTOLIC PRESSURE: 36.9 MMHG

## 2024-10-16 PROCEDURE — 93306 TTE W/DOPPLER COMPLETE: CPT | Performed by: INTERNAL MEDICINE

## 2024-10-16 PROCEDURE — 93306 TTE W/DOPPLER COMPLETE: CPT

## 2024-10-23 ENCOUNTER — APPOINTMENT (OUTPATIENT)
Dept: CARDIOLOGY | Facility: CLINIC | Age: 84
End: 2024-10-23
Payer: COMMERCIAL

## 2024-10-23 VITALS
HEIGHT: 59 IN | HEART RATE: 66 BPM | WEIGHT: 142 LBS | DIASTOLIC BLOOD PRESSURE: 68 MMHG | BODY MASS INDEX: 28.63 KG/M2 | SYSTOLIC BLOOD PRESSURE: 124 MMHG

## 2024-10-23 DIAGNOSIS — I50.31 ACUTE DIASTOLIC (CONGESTIVE) HEART FAILURE: ICD-10-CM

## 2024-10-23 DIAGNOSIS — I50.32 CHRONIC DIASTOLIC HEART FAILURE: ICD-10-CM

## 2024-10-23 DIAGNOSIS — R06.02 SHORTNESS OF BREATH: ICD-10-CM

## 2024-10-23 DIAGNOSIS — N39.0 URINARY TRACT INFECTION WITHOUT HEMATURIA, SITE UNSPECIFIED: ICD-10-CM

## 2024-10-23 DIAGNOSIS — I44.7 LEFT BUNDLE BRANCH BLOCK: ICD-10-CM

## 2024-10-23 DIAGNOSIS — I10 ESSENTIAL HYPERTENSION: ICD-10-CM

## 2024-10-23 DIAGNOSIS — I25.10 ATHEROSCLEROSIS OF NATIVE CORONARY ARTERY OF NATIVE HEART WITHOUT ANGINA PECTORIS: Primary | ICD-10-CM

## 2024-10-23 DIAGNOSIS — I21.4 NSTEMI (NON-ST ELEVATED MYOCARDIAL INFARCTION) (MULTI): ICD-10-CM

## 2024-10-23 DIAGNOSIS — Z98.61 HX OF PERCUTANEOUS TRANSLUMINAL CORONARY ANGIOPLASTY: ICD-10-CM

## 2024-10-23 PROCEDURE — 3078F DIAST BP <80 MM HG: CPT | Performed by: INTERNAL MEDICINE

## 2024-10-23 PROCEDURE — 1123F ACP DISCUSS/DSCN MKR DOCD: CPT | Performed by: INTERNAL MEDICINE

## 2024-10-23 PROCEDURE — 3074F SYST BP LT 130 MM HG: CPT | Performed by: INTERNAL MEDICINE

## 2024-10-23 PROCEDURE — 99214 OFFICE O/P EST MOD 30 MIN: CPT | Performed by: INTERNAL MEDICINE

## 2024-10-23 PROCEDURE — 1157F ADVNC CARE PLAN IN RCRD: CPT | Performed by: INTERNAL MEDICINE

## 2024-10-23 PROCEDURE — 1159F MED LIST DOCD IN RCRD: CPT | Performed by: INTERNAL MEDICINE

## 2024-10-23 RX ORDER — SPIRONOLACTONE 25 MG/1
12.5 TABLET ORAL DAILY
Qty: 45 TABLET | Refills: 1 | Status: SHIPPED | OUTPATIENT
Start: 2024-10-23

## 2024-10-23 RX ORDER — METOPROLOL SUCCINATE 25 MG/1
25 TABLET, EXTENDED RELEASE ORAL DAILY
Qty: 90 TABLET | Refills: 1 | Status: SHIPPED | OUTPATIENT
Start: 2024-10-23

## 2024-10-23 RX ORDER — CLOPIDOGREL BISULFATE 75 MG/1
75 TABLET ORAL DAILY
Qty: 90 TABLET | Refills: 1 | Status: SHIPPED | OUTPATIENT
Start: 2024-10-23

## 2024-10-23 NOTE — PROGRESS NOTES
Patient:  Laurel Pugh  YOB: 1940  MRN: [id number]       HPI:       Laurel Pugh is a 84 y.o. female who returns today for cardiac follow-up.    She was seen in consultation by Dr. Rosa at [facility] on July 14, 2024.  No prior history of atherosclerotic heart or valvular heart disease.  She presented with lightheadedness, nausea, and confusion.  CT scan of the chest was negative for pulmonary embolus.  Chest x-ray did not show any active disease.  CBC and CMP were normal.  BNP was 373.  High-sensitivity troponin levels 427, 614, 1196, and 519.  EKG showed normal sinus rhythm with complete left bundle branch block.  No change from previous remote EKG.  CT scan of the brain was negative.  She was diagnosed with a urinary tract infection.  No reports of chest pain or shortness of breath.  No history of hypertension or diabetes mellitus.  She is a non-smoker.  She has a history of hyperlipidemia.     Echocardiogram July 15, 2024 showed an estimated LV ejection fraction of 40%.  Mild asymmetric septal hypertrophy.  Mild mitral and tricuspid regurgitation.  Cardiac catheterization done the same day showed 80% stenosis of the LAD.  Mild irregularities of the circumflex and right coronary artery.  She underwent angioplasty and stenting of the LAD.  Estimated LV ejection fraction was 45%.  She was started on GDMT and continued on DAPT.    She has been doing reasonly well since her discharge.  She does note some persistent mild exertional shortness of breath.  Repeat echocardiogram October 16, 2024 showed an estimated LV ejection fraction of 50%.  There was mild mitral regurgitation and mild to moderate tricuspid regurgitation.  She denies any recurrent chest discomfort.  She denies any orthopnea, PND, or increasing peripheral edema.  She denies any palpitations, lightheadedness, near-syncope, or syncope.  She denies any fever, chills, or cough.  She denies any nausea, vomiting, or diaphoresis.  She  "denies any hemoptysis, hematemesis, melena, or hematochezia.  She does not have any definite anginal symptoms.  She has some shortness of breath which I suspect is related to some mild deconditioning.  She does not have any overt congestive heart failure.  She will continue on her same medications.  Her blood pressures are well-controlled.  Other details as noted below.      Objective:     Vitals:    10/23/24 1535   BP: 124/68   Pulse: 66       Wt Readings from Last 4 Encounters:   10/16/24 63.5 kg (140 lb)   08/16/24 63.7 kg (140 lb 8 oz)   08/14/24 63.5 kg (140 lb)   08/12/24 63.7 kg (140 lb 8 oz)       Allergies:     Allergies   Allergen Reactions    Haloperidol Hallucinations    Keflex [Cephalexin] Itching and Rash     \"Severe Itching\"    Red patches on arms, trunks, back,  and legs     Zofran [Ondansetron Hcl] Hallucinations    Amoxicillin-Pot Clavulanate Rash    Levofloxacin Rash    Sulfa (Sulfonamide Antibiotics) Rash        Medications:     Current Outpatient Medications   Medication Instructions    aspirin 81 mg EC tablet 1 tablet, oral, Daily    atorvastatin (LIPITOR) 40 mg, oral, Daily    cholecalciferol (VITAMIN D-3) 1,000 Units, oral, Daily    clopidogrel (Plavix) 75 mg tablet Take 1 tablet (75 mg) by mouth once daily.    cranberry 400 mg capsule oral, 2 times daily    cyanocobalamin (VITAMIN B-12) 2,000 mcg, oral, Daily    docusate sodium (STOOL SOFTENER ORAL) oral, Daily PRN    fluticasone (Flonase) 50 mcg/actuation nasal spray 1 spray, Each Nostril, Daily, Shake gently. Before first use, prime pump. After use, clean tip and replace cap.    metoprolol succinate XL (TOPROL-XL) 25 mg, oral, Daily, Do not crush or chew.    sacubitriL-valsartan (Entresto) 24-26 mg tablet 1 tablet, oral, 2 times daily    spironolactone (ALDACTONE) 12.5 mg, oral, Daily       Physical Examination:   GENERAL:  Well developed, well nourished, in no acute distress.  CHEST:  Symmetric and nontender.  NEURO/PSYCH:  Alert and " oriented times three with approppriate behavior and responses.  NECK:  Supple, no JVD, no bruit.  LUNGS:  Clear to auscultation bilaterally, normal respiratory effort.  HEART:  Rate and rhythm regular with no evident murmur, no gallop appreciated.        There are no rubs, clicks or heaves.  EXTREMITIES:  Warm with good color, no clubbing or cyanosis.  There is no edema noted.  PERIPHERAL VASCULAR:  Pulses present and equally palpable; 2+ throughout.      Lab:     CBC:   Lab Results   Component Value Date    WBC 5.3 07/31/2024    RBC 4.23 07/31/2024    HGB 11.6 (L) 07/31/2024    HCT 37.1 07/31/2024     07/31/2024        CMP:    Lab Results   Component Value Date     07/23/2024    K 4.2 07/23/2024     07/23/2024    CO2 28 07/23/2024    BUN 23 07/23/2024    CREATININE 1.00 07/23/2024    GLUCOSE 107 (H) 07/23/2024    CALCIUM 9.1 07/23/2024       Lipid Profile:    Lab Results   Component Value Date    TRIG 74 07/14/2024    HDL 52.9 07/14/2024    LDLCALC 129 (H) 07/14/2024       BMP:  Lab Results   Component Value Date     07/23/2024     07/16/2024     07/15/2024    K 4.2 07/23/2024    K 3.9 07/16/2024    K 3.9 07/15/2024     07/23/2024     (H) 07/16/2024     (H) 07/15/2024    CO2 28 07/23/2024    CO2 24 07/16/2024    CO2 26 07/15/2024    BUN 23 07/23/2024    BUN 15 07/16/2024    BUN 21 07/15/2024    CREATININE 1.00 07/23/2024    CREATININE 0.82 07/16/2024    CREATININE 1.02 07/15/2024       CBC:  Lab Results   Component Value Date    WBC 5.3 07/31/2024    WBC 7.0 07/16/2024    WBC 4.2 (L) 07/15/2024    RBC 4.23 07/31/2024    RBC 4.39 07/16/2024    RBC 4.16 07/15/2024    HGB 11.6 (L) 07/31/2024    HGB 12.1 07/16/2024    HGB 11.5 (L) 07/15/2024    HCT 37.1 07/31/2024    HCT 37.2 07/16/2024    HCT 36.1 07/15/2024    MCV 88 07/31/2024    MCV 85 07/16/2024    MCV 87 07/15/2024    MCH 27.4 07/31/2024    MCH 27.6 07/16/2024    MCH 27.6 07/15/2024    MCHC 31.3 (L)  07/31/2024    MCHC 32.5 07/16/2024    MCHC 31.9 (L) 07/15/2024    RDW 15.5 (H) 07/31/2024    RDW 15.3 (H) 07/16/2024    RDW 15.5 (H) 07/15/2024     07/31/2024     07/16/2024     07/15/2024       Hepatic Function Panel:    Lab Results   Component Value Date    ALKPHOS 48 07/13/2024    ALT 10 07/13/2024    AST 16 07/13/2024    PROT 7.2 07/13/2024    BILITOT 0.6 07/13/2024    BILIDIR 0.1 05/09/2019       HgBA1c:    Lab Results   Component Value Date    HGBA1C 5.8 (H) 07/13/2024       Magnesium:    Lab Results   Component Value Date    MG 1.98 07/13/2024       TSH:    Lab Results   Component Value Date    TSH 0.61 07/13/2024       BNP:   Lab Results   Component Value Date     (H) 07/13/2024        PT/INR:    Lab Results   Component Value Date    PROTIME 12.5 07/13/2024    INR 1.1 07/13/2024       Cardiac Enzymes:    Lab Results   Component Value Date    TROPHS 245 (HH) 07/15/2024    TROPHS 519 (HH) 07/14/2024    TROPHS 1,196 (HH) 07/14/2024         Diagnostic Studies:     Transthoracic echo (TTE) complete  Result Date: 10/16/2024  [facility]     CONCLUSIONS:  1. The left ventricular systolic function is mildly decreased, with a visually estimated ejection fraction of 50%.  2. Basal inferolateral segment and basal inferior segment are abnormal.  3. Abnormal wall motion.  4. Spectral Doppler shows an impaired relaxation pattern of left ventricular diastolic filling.  5. There is normal right ventricular global systolic function.  6. There is no evidence of mitral valve stenosis.  7. Mild mitral valve regurgitation.  8. Mild to moderate tricuspid regurgitation.  9. Aortic valve stenosis is not present.       Problem List:     Patient Active Problem List   Diagnosis    Arthritis of foot, left    History of breast cancer    Constipation    Vertigo    GERD (gastroesophageal reflux disease)    Left bundle branch block    Mild vitamin D deficiency    Actinic keratosis    Seborrheic  keratosis    Varicose veins of both lower extremities with inflammation    Ocular migraine    Altered mental status, unspecified altered mental status type    NSTEMI (non-ST elevated myocardial infarction) (Multi)    Urinary tract infection without hematuria    CAD (coronary artery disease)    Essential hypertension       Asessment:     Problem List Items Addressed This Visit             ICD-10-CM    Left bundle branch block I44.7    Shortness of breath R06.02    NSTEMI (non-ST elevated myocardial infarction) (Multi) I21.4    Relevant Medications    sacubitriL-valsartan (Entresto) 24-26 mg tablet    metoprolol succinate XL (Toprol-XL) 25 mg 24 hr tablet    clopidogrel (Plavix) 75 mg tablet    Urinary tract infection without hematuria N39.0    Relevant Medications    clopidogrel (Plavix) 75 mg tablet    Other Relevant Orders    CBC    Comprehensive metabolic panel    Lipid panel    Follow Up In Cardiology    Atherosclerosis of native coronary artery of native heart without angina pectoris - Primary I25.10    Relevant Medications    sacubitriL-valsartan (Entresto) 24-26 mg tablet    metoprolol succinate XL (Toprol-XL) 25 mg 24 hr tablet    clopidogrel (Plavix) 75 mg tablet    Essential hypertension I10    Hx of percutaneous transluminal coronary angioplasty Z98.61    Chronic diastolic heart failure I50.32    Relevant Medications    sacubitriL-valsartan (Entresto) 24-26 mg tablet    metoprolol succinate XL (Toprol-XL) 25 mg 24 hr tablet    clopidogrel (Plavix) 75 mg tablet     Other Visit Diagnoses         Codes    Acute diastolic (congestive) heart failure     I50.31    Relevant Medications    spironolactone (Aldactone) 25 mg tablet    sacubitriL-valsartan (Entresto) 24-26 mg tablet    metoprolol succinate XL (Toprol-XL) 25 mg 24 hr tablet    clopidogrel (Plavix) 75 mg tablet    Other Relevant Orders    CBC    Comprehensive metabolic panel    Lipid panel    Follow Up In Cardiology

## 2024-10-23 NOTE — PATIENT INSTRUCTIONS
PLEASE BRING ALL MEDICATION BOTTLES TO OFFICE VISITS.     HAVE LABS DONE BEFORE NEXT OFFICE VISIT (FASTING LABS)    HOLD ATORVASTATIN FOR TWO WEEKS AND THEN RESUME

## 2024-10-24 PROBLEM — I50.31 ACUTE DIASTOLIC (CONGESTIVE) HEART FAILURE: Status: ACTIVE | Noted: 2024-10-24

## 2024-10-24 PROBLEM — I50.32 CHRONIC DIASTOLIC HEART FAILURE: Status: ACTIVE | Noted: 2024-10-24

## 2024-10-24 PROBLEM — Z98.61 HX OF PERCUTANEOUS TRANSLUMINAL CORONARY ANGIOPLASTY: Status: ACTIVE | Noted: 2024-10-24

## 2024-11-02 ENCOUNTER — OFFICE VISIT (OUTPATIENT)
Dept: URGENT CARE | Age: 84
End: 2024-11-02
Payer: COMMERCIAL

## 2024-11-02 VITALS
BODY MASS INDEX: 28.22 KG/M2 | RESPIRATION RATE: 16 BRPM | WEIGHT: 140 LBS | TEMPERATURE: 97.6 F | OXYGEN SATURATION: 99 % | HEART RATE: 60 BPM | DIASTOLIC BLOOD PRESSURE: 70 MMHG | HEIGHT: 59 IN | SYSTOLIC BLOOD PRESSURE: 146 MMHG

## 2024-11-02 DIAGNOSIS — R30.0 DYSURIA: ICD-10-CM

## 2024-11-02 LAB
POC APPEARANCE, URINE: CLEAR
POC BILIRUBIN, URINE: NEGATIVE
POC BLOOD, URINE: ABNORMAL
POC COLOR, URINE: ABNORMAL
POC GLUCOSE, URINE: NEGATIVE MG/DL
POC KETONES, URINE: NEGATIVE MG/DL
POC LEUKOCYTES, URINE: NEGATIVE
POC NITRITE,URINE: NEGATIVE
POC PH, URINE: 6 PH
POC PROTEIN, URINE: NEGATIVE MG/DL
POC SPECIFIC GRAVITY, URINE: <=1.005
POC UROBILINOGEN, URINE: 0.2 EU/DL

## 2024-11-02 PROCEDURE — 87086 URINE CULTURE/COLONY COUNT: CPT

## 2024-11-02 ASSESSMENT — ENCOUNTER SYMPTOMS
ABDOMINAL PAIN: 0
WHEEZING: 0
DIARRHEA: 0
VOMITING: 0
CHEST TIGHTNESS: 0
FEVER: 0
COUGH: 0
FREQUENCY: 1
NAUSEA: 0
CONSTIPATION: 0
DYSURIA: 0
SHORTNESS OF BREATH: 0
CHILLS: 1

## 2024-11-06 ENCOUNTER — TELEPHONE (OUTPATIENT)
Dept: URGENT CARE | Age: 84
End: 2024-11-06

## 2024-11-06 LAB — BACTERIA UR CULT: ABNORMAL

## 2024-11-12 ENCOUNTER — TELEPHONE (OUTPATIENT)
Dept: CARDIOLOGY | Facility: CLINIC | Age: 84
End: 2024-11-12
Payer: COMMERCIAL

## 2024-11-12 DIAGNOSIS — I25.10 ATHEROSCLEROSIS OF NATIVE CORONARY ARTERY OF NATIVE HEART WITHOUT ANGINA PECTORIS: ICD-10-CM

## 2024-11-12 DIAGNOSIS — E78.5 HYPERLIPIDEMIA, UNSPECIFIED HYPERLIPIDEMIA TYPE: ICD-10-CM

## 2024-11-12 NOTE — TELEPHONE ENCOUNTER
Patient called and LM stating she stopped taking atorvastatin d/t muscle pain as instructed and stated that the pain has since gone away and would like to take something else. Routed to Marla Vides RN

## 2024-11-14 RX ORDER — ROSUVASTATIN CALCIUM 5 MG/1
5 TABLET, COATED ORAL DAILY
Qty: 90 TABLET | Refills: 3 | Status: SHIPPED | OUTPATIENT
Start: 2024-11-14 | End: 2025-11-14

## 2024-11-14 NOTE — TELEPHONE ENCOUNTER
Per Dr. Adrian Echavarria patient may stop Lipitor. After 3-4 weeks patient is to start Crestor 5 mg and follow up as needed/scheduled.  I called and l/m for patient to call back with preferred pharmacy.  Please discontinue her Lipitor and add Crestor to her medication list once she calls back and agrees.

## 2025-02-11 ENCOUNTER — APPOINTMENT (OUTPATIENT)
Dept: RADIOLOGY | Facility: HOSPITAL | Age: 85
DRG: 922 | End: 2025-02-11
Payer: MEDICARE

## 2025-02-11 ENCOUNTER — HOSPITAL ENCOUNTER (INPATIENT)
Facility: HOSPITAL | Age: 85
LOS: 1 days | Discharge: HOME | DRG: 922 | End: 2025-02-13
Attending: EMERGENCY MEDICINE
Payer: MEDICARE

## 2025-02-11 ENCOUNTER — APPOINTMENT (OUTPATIENT)
Dept: CARDIOLOGY | Facility: HOSPITAL | Age: 85
DRG: 922 | End: 2025-02-11
Payer: MEDICARE

## 2025-02-11 DIAGNOSIS — T68.XXXA HYPOTHERMIA, INITIAL ENCOUNTER: ICD-10-CM

## 2025-02-11 DIAGNOSIS — R79.89 ELEVATED TROPONIN: ICD-10-CM

## 2025-02-11 DIAGNOSIS — I50.20 HFREF (HEART FAILURE WITH REDUCED EJECTION FRACTION): ICD-10-CM

## 2025-02-11 DIAGNOSIS — N30.00 ACUTE CYSTITIS WITHOUT HEMATURIA: ICD-10-CM

## 2025-02-11 DIAGNOSIS — I21.4 NSTEMI (NON-ST ELEVATED MYOCARDIAL INFARCTION) (MULTI): ICD-10-CM

## 2025-02-11 DIAGNOSIS — I44.7 LEFT BUNDLE BRANCH BLOCK: ICD-10-CM

## 2025-02-11 DIAGNOSIS — I50.32 CHRONIC DIASTOLIC HEART FAILURE: ICD-10-CM

## 2025-02-11 DIAGNOSIS — I50.31 ACUTE DIASTOLIC (CONGESTIVE) HEART FAILURE: ICD-10-CM
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
## 2025-02-11 DIAGNOSIS — I10 ESSENTIAL HYPERTENSION: ICD-10-CM

## 2025-02-11 LAB
ALBUMIN SERPL BCP-MCNC: 4.2 G/DL (ref 3.4–5)
ALP SERPL-CCNC: 44 U/L (ref 33–136)
ALT SERPL W P-5'-P-CCNC: 15 U/L (ref 7–45)
ANION GAP BLDA CALCULATED.4IONS-SCNC: 13 MMO/L (ref 10–25)
ANION GAP SERPL CALC-SCNC: 15 MMOL/L (ref 10–20)
AST SERPL W P-5'-P-CCNC: 29 U/L (ref 9–39)
BASE EXCESS BLDA CALC-SCNC: -8.4 MMOL/L (ref -2–3)
BASOPHILS # BLD AUTO: 0.05 X10*3/UL (ref 0–0.1)
BASOPHILS NFR BLD AUTO: 0.4 %
BILIRUB SERPL-MCNC: 0.6 MG/DL (ref 0–1.2)
BNP SERPL-MCNC: 379 PG/ML (ref 0–99)
BODY TEMPERATURE: ABNORMAL
BUN SERPL-MCNC: 45 MG/DL (ref 6–23)
CA-I BLDA-SCNC: 1.21 MMOL/L (ref 1.1–1.33)
CALCIUM SERPL-MCNC: 9.5 MG/DL (ref 8.6–10.3)
CARDIAC TROPONIN I PNL SERPL HS: 3071 NG/L (ref 0–13)
CARDIAC TROPONIN I PNL SERPL HS: 4468 NG/L (ref 0–13)
CHLORIDE BLDA-SCNC: 109 MMOL/L (ref 98–107)
CHLORIDE SERPL-SCNC: 106 MMOL/L (ref 98–107)
CK SERPL-CCNC: 391 U/L (ref 0–215)
CO2 SERPL-SCNC: 23 MMOL/L (ref 21–32)
CREAT SERPL-MCNC: 0.95 MG/DL (ref 0.5–1.05)
CRITICAL CALL TIME: 2113
CRITICAL CALLED BY: ABNORMAL
CRITICAL CALLED TO: ABNORMAL
CRITICAL READ BACK: ABNORMAL
EGFRCR SERPLBLD CKD-EPI 2021: 58 ML/MIN/1.73M*2
EOSINOPHIL # BLD AUTO: 0.03 X10*3/UL (ref 0–0.4)
EOSINOPHIL NFR BLD AUTO: 0.2 %
ERYTHROCYTE [DISTWIDTH] IN BLOOD BY AUTOMATED COUNT: 14.3 % (ref 11.5–14.5)
GLUCOSE BLDA-MCNC: 167 MG/DL (ref 74–99)
GLUCOSE SERPL-MCNC: 166 MG/DL (ref 74–99)
HCO3 BLDA-SCNC: 18.4 MMOL/L (ref 22–26)
HCT VFR BLD AUTO: 39.1 % (ref 36–46)
HCT VFR BLD EST: 36 % (ref 36–46)
HGB BLD-MCNC: 12.3 G/DL (ref 12–16)
HGB BLDA-MCNC: 12 G/DL (ref 12–16)
HOLD SPECIMEN: NORMAL
IMM GRANULOCYTES # BLD AUTO: 0.09 X10*3/UL (ref 0–0.5)
IMM GRANULOCYTES NFR BLD AUTO: 0.7 % (ref 0–0.9)
INHALED O2 CONCENTRATION: 21 %
INR PPP: 1.2 (ref 0.9–1.1)
LACTATE BLDA-SCNC: 2.5 MMOL/L (ref 0.4–2)
LACTATE SERPL-SCNC: 2 MMOL/L (ref 0.4–2)
LACTATE SERPL-SCNC: 2.6 MMOL/L (ref 0.4–2)
LYMPHOCYTES # BLD AUTO: 1.75 X10*3/UL (ref 0.8–3)
LYMPHOCYTES NFR BLD AUTO: 14.4 %
MAGNESIUM SERPL-MCNC: 2.3 MG/DL (ref 1.6–2.4)
MCH RBC QN AUTO: 28.1 PG (ref 26–34)
MCHC RBC AUTO-ENTMCNC: 31.5 G/DL (ref 32–36)
MCV RBC AUTO: 90 FL (ref 80–100)
MONOCYTES # BLD AUTO: 0.67 X10*3/UL (ref 0.05–0.8)
MONOCYTES NFR BLD AUTO: 5.5 %
NEUTROPHILS # BLD AUTO: 9.54 X10*3/UL (ref 1.6–5.5)
NEUTROPHILS NFR BLD AUTO: 78.8 %
NRBC BLD-RTO: 0 /100 WBCS (ref 0–0)
OXYHGB MFR BLDA: 98.2 % (ref 94–98)
PCO2 BLDA: 42 MM HG (ref 38–42)
PH BLDA: 7.25 PH (ref 7.38–7.42)
PLATELET # BLD AUTO: 247 X10*3/UL (ref 150–450)
PO2 BLDA: 119 MM HG (ref 85–95)
POTASSIUM BLDA-SCNC: 5.1 MMOL/L (ref 3.5–5.3)
POTASSIUM SERPL-SCNC: 4.4 MMOL/L (ref 3.5–5.3)
PROT SERPL-MCNC: 7.2 G/DL (ref 6.4–8.2)
PROTHROMBIN TIME: 13.1 SECONDS (ref 9.8–12.8)
RBC # BLD AUTO: 4.37 X10*6/UL (ref 4–5.2)
SAO2 % BLDA: 100 % (ref 94–100)
SODIUM BLDA-SCNC: 135 MMOL/L (ref 136–145)
SODIUM SERPL-SCNC: 140 MMOL/L (ref 136–145)
WBC # BLD AUTO: 12.1 X10*3/UL (ref 4.4–11.3)

## 2025-02-11 PROCEDURE — 72125 CT NECK SPINE W/O DYE: CPT | Performed by: RADIOLOGY

## 2025-02-11 PROCEDURE — 83605 ASSAY OF LACTIC ACID: CPT | Performed by: NURSE PRACTITIONER

## 2025-02-11 PROCEDURE — 72128 CT CHEST SPINE W/O DYE: CPT | Mod: RCN | Performed by: RADIOLOGY

## 2025-02-11 PROCEDURE — 36600 WITHDRAWAL OF ARTERIAL BLOOD: CPT

## 2025-02-11 PROCEDURE — 96374 THER/PROPH/DIAG INJ IV PUSH: CPT

## 2025-02-11 PROCEDURE — 99285 EMERGENCY DEPT VISIT HI MDM: CPT | Mod: 25 | Performed by: EMERGENCY MEDICINE

## 2025-02-11 PROCEDURE — 83036 HEMOGLOBIN GLYCOSYLATED A1C: CPT | Mod: ELYLAB

## 2025-02-11 PROCEDURE — 84132 ASSAY OF SERUM POTASSIUM: CPT | Performed by: NURSE PRACTITIONER

## 2025-02-11 PROCEDURE — 85610 PROTHROMBIN TIME: CPT | Performed by: NURSE PRACTITIONER

## 2025-02-11 PROCEDURE — 71045 X-RAY EXAM CHEST 1 VIEW: CPT | Performed by: RADIOLOGY

## 2025-02-11 PROCEDURE — 71250 CT THORAX DX C-: CPT | Mod: FOREIGN READ | Performed by: RADIOLOGY

## 2025-02-11 PROCEDURE — 93005 ELECTROCARDIOGRAM TRACING: CPT

## 2025-02-11 PROCEDURE — 96361 HYDRATE IV INFUSION ADD-ON: CPT

## 2025-02-11 PROCEDURE — 74176 CT ABD & PELVIS W/O CONTRAST: CPT | Mod: FOREIGN READ | Performed by: RADIOLOGY

## 2025-02-11 PROCEDURE — 96375 TX/PRO/DX INJ NEW DRUG ADDON: CPT

## 2025-02-11 PROCEDURE — 72125 CT NECK SPINE W/O DYE: CPT

## 2025-02-11 PROCEDURE — 70450 CT HEAD/BRAIN W/O DYE: CPT | Performed by: RADIOLOGY

## 2025-02-11 PROCEDURE — 82550 ASSAY OF CK (CPK): CPT | Performed by: NURSE PRACTITIONER

## 2025-02-11 PROCEDURE — 83735 ASSAY OF MAGNESIUM: CPT | Performed by: NURSE PRACTITIONER

## 2025-02-11 PROCEDURE — 72131 CT LUMBAR SPINE W/O DYE: CPT | Mod: RCN | Performed by: RADIOLOGY

## 2025-02-11 PROCEDURE — 80053 COMPREHEN METABOLIC PANEL: CPT | Performed by: NURSE PRACTITIONER

## 2025-02-11 PROCEDURE — 72131 CT LUMBAR SPINE W/O DYE: CPT | Mod: RCN

## 2025-02-11 PROCEDURE — 71045 X-RAY EXAM CHEST 1 VIEW: CPT

## 2025-02-11 PROCEDURE — 71250 CT THORAX DX C-: CPT

## 2025-02-11 PROCEDURE — 72128 CT CHEST SPINE W/O DYE: CPT | Mod: RCN

## 2025-02-11 PROCEDURE — 85025 COMPLETE CBC W/AUTO DIFF WBC: CPT | Performed by: NURSE PRACTITIONER

## 2025-02-11 PROCEDURE — 84484 ASSAY OF TROPONIN QUANT: CPT | Performed by: NURSE PRACTITIONER

## 2025-02-11 PROCEDURE — 83880 ASSAY OF NATRIURETIC PEPTIDE: CPT | Performed by: NURSE PRACTITIONER

## 2025-02-11 PROCEDURE — 2500000004 HC RX 250 GENERAL PHARMACY W/ HCPCS (ALT 636 FOR OP/ED): Performed by: NURSE PRACTITIONER

## 2025-02-11 PROCEDURE — 70450 CT HEAD/BRAIN W/O DYE: CPT

## 2025-02-11 RX ORDER — ASPIRIN 300 MG/1
300 SUPPOSITORY RECTAL ONCE
Status: DISCONTINUED | OUTPATIENT
Start: 2025-02-11 | End: 2025-02-12

## 2025-02-11 RX ORDER — FENTANYL CITRATE 50 UG/ML
50 INJECTION, SOLUTION INTRAMUSCULAR; INTRAVENOUS ONCE
Status: COMPLETED | OUTPATIENT
Start: 2025-02-11 | End: 2025-02-11

## 2025-02-11 RX ORDER — HEPARIN SODIUM 10000 [USP'U]/100ML
0-4000 INJECTION, SOLUTION INTRAVENOUS CONTINUOUS
Status: DISCONTINUED | OUTPATIENT
Start: 2025-02-11 | End: 2025-02-12

## 2025-02-11 RX ADMIN — SODIUM CHLORIDE 1000 ML: 9 INJECTION, SOLUTION INTRAVENOUS at 21:12

## 2025-02-11 RX ADMIN — FENTANYL CITRATE 50 MCG: 50 INJECTION INTRAMUSCULAR; INTRAVENOUS at 22:13

## 2025-02-11 RX ADMIN — PROMETHAZINE HYDROCHLORIDE 12.5 MG: 25 INJECTION INTRAMUSCULAR; INTRAVENOUS at 23:11

## 2025-02-11 ASSESSMENT — COLUMBIA-SUICIDE SEVERITY RATING SCALE - C-SSRS
2. HAVE YOU ACTUALLY HAD ANY THOUGHTS OF KILLING YOURSELF?: NO
6. HAVE YOU EVER DONE ANYTHING, STARTED TO DO ANYTHING, OR PREPARED TO DO ANYTHING TO END YOUR LIFE?: NO
1. IN THE PAST MONTH, HAVE YOU WISHED YOU WERE DEAD OR WISHED YOU COULD GO TO SLEEP AND NOT WAKE UP?: NO

## 2025-02-11 ASSESSMENT — PAIN SCALES - WONG BAKER: WONGBAKER_NUMERICALRESPONSE: HURTS WHOLE LOT

## 2025-02-12 ENCOUNTER — APPOINTMENT (OUTPATIENT)
Dept: CARDIOLOGY | Facility: HOSPITAL | Age: 85
DRG: 922 | End: 2025-02-12
Payer: MEDICARE

## 2025-02-12 PROBLEM — R79.89 ELEVATED TROPONIN: Status: ACTIVE | Noted: 2025-02-11

## 2025-02-12 PROBLEM — T68.XXXA HYPOTHERMIA: Status: ACTIVE | Noted: 2025-02-11
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
## 2025-02-12 LAB
ALBUMIN SERPL BCP-MCNC: 3.6 G/DL (ref 3.4–5)
ALP SERPL-CCNC: 36 U/L (ref 33–136)
ALT SERPL W P-5'-P-CCNC: 16 U/L (ref 7–45)
ANION GAP SERPL CALC-SCNC: 14 MMOL/L (ref 10–20)
APPEARANCE UR: ABNORMAL
AST SERPL W P-5'-P-CCNC: 39 U/L (ref 9–39)
ATRIAL RATE: 357 BPM
ATRIAL RATE: 76 BPM
ATRIAL RATE: 76 BPM
BACTERIA #/AREA URNS AUTO: ABNORMAL /HPF
BASOPHILS # BLD AUTO: 0.01 X10*3/UL (ref 0–0.1)
BASOPHILS NFR BLD AUTO: 0.1 %
BILIRUB SERPL-MCNC: 0.6 MG/DL (ref 0–1.2)
BILIRUB UR STRIP.AUTO-MCNC: NEGATIVE MG/DL
BUN SERPL-MCNC: 41 MG/DL (ref 6–23)
CALCIUM SERPL-MCNC: 8.9 MG/DL (ref 8.6–10.3)
CARDIAC TROPONIN I PNL SERPL HS: 3060 NG/L (ref 0–13)
CARDIAC TROPONIN I PNL SERPL HS: 6501 NG/L (ref 0–13)
CARDIAC TROPONIN I PNL SERPL HS: 7123 NG/L (ref 0–13)
CARDIAC TROPONIN I PNL SERPL HS: 7405 NG/L (ref 0–13)
CHLORIDE SERPL-SCNC: 107 MMOL/L (ref 98–107)
CO2 SERPL-SCNC: 22 MMOL/L (ref 21–32)
COLOR UR: YELLOW
CREAT SERPL-MCNC: 0.95 MG/DL (ref 0.5–1.05)
EGFRCR SERPLBLD CKD-EPI 2021: 58 ML/MIN/1.73M*2
EJECTION FRACTION APICAL 4 CHAMBER: 38.8
EJECTION FRACTION: 40 %
EOSINOPHIL # BLD AUTO: 0 X10*3/UL (ref 0–0.4)
EOSINOPHIL NFR BLD AUTO: 0 %
ERYTHROCYTE [DISTWIDTH] IN BLOOD BY AUTOMATED COUNT: 14.3 % (ref 11.5–14.5)
EST. AVERAGE GLUCOSE BLD GHB EST-MCNC: 117 MG/DL
FLUAV RNA RESP QL NAA+PROBE: NOT DETECTED
FLUBV RNA RESP QL NAA+PROBE: NOT DETECTED
GLUCOSE SERPL-MCNC: 106 MG/DL (ref 74–99)
GLUCOSE UR STRIP.AUTO-MCNC: ABNORMAL MG/DL
HBA1C MFR BLD: 5.7 %
HCT VFR BLD AUTO: 31.7 % (ref 36–46)
HGB BLD-MCNC: 10.2 G/DL (ref 12–16)
HOLD SPECIMEN: NORMAL
IMM GRANULOCYTES # BLD AUTO: 0.02 X10*3/UL (ref 0–0.5)
IMM GRANULOCYTES NFR BLD AUTO: 0.2 % (ref 0–0.9)
KETONES UR STRIP.AUTO-MCNC: ABNORMAL MG/DL
LACTATE SERPL-SCNC: 1.4 MMOL/L (ref 0.4–2)
LEFT VENTRICLE INTERNAL DIMENSION DIASTOLE: 4.2 CM (ref 3.5–6)
LEUKOCYTE ESTERASE UR QL STRIP.AUTO: ABNORMAL
LV EJECTION FRACTION BIPLANE: 41 %
LYMPHOCYTES # BLD AUTO: 0.37 X10*3/UL (ref 0.8–3)
LYMPHOCYTES NFR BLD AUTO: 3.3 %
MAGNESIUM SERPL-MCNC: 1.98 MG/DL (ref 1.6–2.4)
MCH RBC QN AUTO: 28.1 PG (ref 26–34)
MCHC RBC AUTO-ENTMCNC: 32.2 G/DL (ref 32–36)
MCV RBC AUTO: 87 FL (ref 80–100)
MONOCYTES # BLD AUTO: 0.76 X10*3/UL (ref 0.05–0.8)
MONOCYTES NFR BLD AUTO: 6.9 %
MUCOUS THREADS #/AREA URNS AUTO: ABNORMAL /LPF
NEUTROPHILS # BLD AUTO: 9.92 X10*3/UL (ref 1.6–5.5)
NEUTROPHILS NFR BLD AUTO: 89.5 %
NITRITE UR QL STRIP.AUTO: ABNORMAL
NRBC BLD-RTO: 0 /100 WBCS (ref 0–0)
P AXIS: 44 DEGREES
P AXIS: 7 DEGREES
P OFFSET: 177 MS
P OFFSET: 180 MS
P ONSET: 117 MS
P ONSET: 127 MS
PH UR STRIP.AUTO: 5.5 [PH]
PHOSPHATE SERPL-MCNC: 3.4 MG/DL (ref 2.5–4.9)
PLATELET # BLD AUTO: 176 X10*3/UL (ref 150–450)
POTASSIUM SERPL-SCNC: 4.3 MMOL/L (ref 3.5–5.3)
PR INTERVAL: 170 MS
PR INTERVAL: 192 MS
PROT SERPL-MCNC: 6.1 G/DL (ref 6.4–8.2)
PROT UR STRIP.AUTO-MCNC: ABNORMAL MG/DL
Q ONSET: 210 MS
Q ONSET: 212 MS
Q ONSET: 213 MS
QRS COUNT: 11 BEATS
QRS COUNT: 12 BEATS
QRS COUNT: 12 BEATS
QRS DURATION: 132 MS
QRS DURATION: 134 MS
QRS DURATION: 144 MS
QT INTERVAL: 470 MS
QT INTERVAL: 478 MS
QT INTERVAL: 502 MS
QTC CALCULATION(BAZETT): 528 MS
QTC CALCULATION(BAZETT): 537 MS
QTC CALCULATION(BAZETT): 542 MS
QTC FREDERICIA: 508 MS
QTC FREDERICIA: 517 MS
QTC FREDERICIA: 528 MS
R AXIS: -11 DEGREES
R AXIS: 1 DEGREES
R AXIS: 14 DEGREES
RBC # BLD AUTO: 3.63 X10*6/UL (ref 4–5.2)
RBC # UR STRIP.AUTO: ABNORMAL MG/DL
RBC #/AREA URNS AUTO: >20 /HPF
SARS-COV-2 RNA RESP QL NAA+PROBE: NOT DETECTED
SODIUM SERPL-SCNC: 139 MMOL/L (ref 136–145)
SP GR UR STRIP.AUTO: 1.02
SQUAMOUS #/AREA URNS AUTO: ABNORMAL /HPF
T AXIS: 138 DEGREES
T AXIS: 142 DEGREES
T AXIS: 90 DEGREES
T OFFSET: 447 MS
T OFFSET: 452 MS
T OFFSET: 461 MS
UFH PPP CHRO-ACNC: 0.6 IU/ML
UFH PPP CHRO-ACNC: 0.8 IU/ML
UROBILINOGEN UR STRIP.AUTO-MCNC: NORMAL MG/DL
VENTRICULAR RATE: 70 BPM
VENTRICULAR RATE: 76 BPM
VENTRICULAR RATE: 76 BPM
WBC # BLD AUTO: 11.1 X10*3/UL (ref 4.4–11.3)
WBC #/AREA URNS AUTO: ABNORMAL /HPF

## 2025-02-12 PROCEDURE — 99233 SBSQ HOSP IP/OBS HIGH 50: CPT | Performed by: STUDENT IN AN ORGANIZED HEALTH CARE EDUCATION/TRAINING PROGRAM

## 2025-02-12 PROCEDURE — 4A023N7 MEASUREMENT OF CARDIAC SAMPLING AND PRESSURE, LEFT HEART, PERCUTANEOUS APPROACH: ICD-10-PCS | Performed by: STUDENT IN AN ORGANIZED HEALTH CARE EDUCATION/TRAINING PROGRAM

## 2025-02-12 PROCEDURE — 36415 COLL VENOUS BLD VENIPUNCTURE: CPT

## 2025-02-12 PROCEDURE — 2500000004 HC RX 250 GENERAL PHARMACY W/ HCPCS (ALT 636 FOR OP/ED): Performed by: STUDENT IN AN ORGANIZED HEALTH CARE EDUCATION/TRAINING PROGRAM

## 2025-02-12 PROCEDURE — 93005 ELECTROCARDIOGRAM TRACING: CPT

## 2025-02-12 PROCEDURE — 2500000001 HC RX 250 WO HCPCS SELF ADMINISTERED DRUGS (ALT 637 FOR MEDICARE OP)

## 2025-02-12 PROCEDURE — 99152 MOD SED SAME PHYS/QHP 5/>YRS: CPT | Performed by: STUDENT IN AN ORGANIZED HEALTH CARE EDUCATION/TRAINING PROGRAM

## 2025-02-12 PROCEDURE — 81001 URINALYSIS AUTO W/SCOPE: CPT | Performed by: NURSE PRACTITIONER

## 2025-02-12 PROCEDURE — 94762 N-INVAS EAR/PLS OXIMTRY CONT: CPT

## 2025-02-12 PROCEDURE — 93308 TTE F-UP OR LMTD: CPT | Performed by: INTERNAL MEDICINE

## 2025-02-12 PROCEDURE — 99024 POSTOP FOLLOW-UP VISIT: CPT | Performed by: NURSE PRACTITIONER

## 2025-02-12 PROCEDURE — 2780000003 HC OR 278 NO HCPCS: Performed by: STUDENT IN AN ORGANIZED HEALTH CARE EDUCATION/TRAINING PROGRAM

## 2025-02-12 PROCEDURE — 85520 HEPARIN ASSAY: CPT

## 2025-02-12 PROCEDURE — 2500000004 HC RX 250 GENERAL PHARMACY W/ HCPCS (ALT 636 FOR OP/ED): Performed by: NURSE PRACTITIONER

## 2025-02-12 PROCEDURE — 2500000002 HC RX 250 W HCPCS SELF ADMINISTERED DRUGS (ALT 637 FOR MEDICARE OP, ALT 636 FOR OP/ED): Performed by: STUDENT IN AN ORGANIZED HEALTH CARE EDUCATION/TRAINING PROGRAM

## 2025-02-12 PROCEDURE — C1760 CLOSURE DEV, VASC: HCPCS | Performed by: STUDENT IN AN ORGANIZED HEALTH CARE EDUCATION/TRAINING PROGRAM

## 2025-02-12 PROCEDURE — 2500000001 HC RX 250 WO HCPCS SELF ADMINISTERED DRUGS (ALT 637 FOR MEDICARE OP): Performed by: STUDENT IN AN ORGANIZED HEALTH CARE EDUCATION/TRAINING PROGRAM

## 2025-02-12 PROCEDURE — B2111ZZ FLUOROSCOPY OF MULTIPLE CORONARY ARTERIES USING LOW OSMOLAR CONTRAST: ICD-10-PCS | Performed by: STUDENT IN AN ORGANIZED HEALTH CARE EDUCATION/TRAINING PROGRAM

## 2025-02-12 PROCEDURE — 1100000001 HC PRIVATE ROOM DAILY

## 2025-02-12 PROCEDURE — 87086 URINE CULTURE/COLONY COUNT: CPT | Mod: ELYLAB | Performed by: NURSE PRACTITIONER

## 2025-02-12 PROCEDURE — 93010 ELECTROCARDIOGRAM REPORT: CPT | Performed by: INTERNAL MEDICINE

## 2025-02-12 PROCEDURE — 85520 HEPARIN ASSAY: CPT | Performed by: STUDENT IN AN ORGANIZED HEALTH CARE EDUCATION/TRAINING PROGRAM

## 2025-02-12 PROCEDURE — 84100 ASSAY OF PHOSPHORUS: CPT

## 2025-02-12 PROCEDURE — 2550000001 HC RX 255 CONTRASTS: Performed by: STUDENT IN AN ORGANIZED HEALTH CARE EDUCATION/TRAINING PROGRAM

## 2025-02-12 PROCEDURE — C1894 INTRO/SHEATH, NON-LASER: HCPCS | Performed by: STUDENT IN AN ORGANIZED HEALTH CARE EDUCATION/TRAINING PROGRAM

## 2025-02-12 PROCEDURE — 85025 COMPLETE CBC W/AUTO DIFF WBC: CPT

## 2025-02-12 PROCEDURE — 80053 COMPREHEN METABOLIC PANEL: CPT

## 2025-02-12 PROCEDURE — 87040 BLOOD CULTURE FOR BACTERIA: CPT | Mod: ELYLAB

## 2025-02-12 PROCEDURE — 84484 ASSAY OF TROPONIN QUANT: CPT

## 2025-02-12 PROCEDURE — 2500000004 HC RX 250 GENERAL PHARMACY W/ HCPCS (ALT 636 FOR OP/ED): Performed by: EMERGENCY MEDICINE

## 2025-02-12 PROCEDURE — B2151ZZ FLUOROSCOPY OF LEFT HEART USING LOW OSMOLAR CONTRAST: ICD-10-PCS | Performed by: STUDENT IN AN ORGANIZED HEALTH CARE EDUCATION/TRAINING PROGRAM

## 2025-02-12 PROCEDURE — 93458 L HRT ARTERY/VENTRICLE ANGIO: CPT | Performed by: STUDENT IN AN ORGANIZED HEALTH CARE EDUCATION/TRAINING PROGRAM

## 2025-02-12 PROCEDURE — C8924 2D TTE W OR W/O FOL W/CON,FU: HCPCS

## 2025-02-12 PROCEDURE — 99223 1ST HOSP IP/OBS HIGH 75: CPT | Performed by: INTERNAL MEDICINE

## 2025-02-12 PROCEDURE — 87636 SARSCOV2 & INF A&B AMP PRB: CPT

## 2025-02-12 PROCEDURE — 51702 INSERT TEMP BLADDER CATH: CPT

## 2025-02-12 PROCEDURE — 2720000007 HC OR 272 NO HCPCS: Performed by: STUDENT IN AN ORGANIZED HEALTH CARE EDUCATION/TRAINING PROGRAM

## 2025-02-12 PROCEDURE — 83605 ASSAY OF LACTIC ACID: CPT | Performed by: STUDENT IN AN ORGANIZED HEALTH CARE EDUCATION/TRAINING PROGRAM

## 2025-02-12 PROCEDURE — 83735 ASSAY OF MAGNESIUM: CPT

## 2025-02-12 PROCEDURE — 99291 CRITICAL CARE FIRST HOUR: CPT

## 2025-02-12 PROCEDURE — 84484 ASSAY OF TROPONIN QUANT: CPT | Performed by: STUDENT IN AN ORGANIZED HEALTH CARE EDUCATION/TRAINING PROGRAM

## 2025-02-12 RX ORDER — ASPIRIN 81 MG/1
81 TABLET ORAL DAILY
Status: DISCONTINUED | OUTPATIENT
Start: 2025-02-12 | End: 2025-02-13 | Stop reason: HOSPADM

## 2025-02-12 RX ORDER — CHOLECALCIFEROL (VITAMIN D3) 25 MCG
1000 TABLET ORAL DAILY
Status: DISCONTINUED | OUTPATIENT
Start: 2025-02-12 | End: 2025-02-13 | Stop reason: HOSPADM

## 2025-02-12 RX ORDER — LIDOCAINE HYDROCHLORIDE 20 MG/ML
INJECTION, SOLUTION INFILTRATION; PERINEURAL AS NEEDED
Status: DISCONTINUED | OUTPATIENT
Start: 2025-02-12 | End: 2025-02-12 | Stop reason: HOSPADM

## 2025-02-12 RX ORDER — DOCUSATE SODIUM 100 MG/1
100 CAPSULE, LIQUID FILLED ORAL DAILY
Status: DISCONTINUED | OUTPATIENT
Start: 2025-02-12 | End: 2025-02-13 | Stop reason: HOSPADM

## 2025-02-12 RX ORDER — ACETAMINOPHEN 160 MG/5ML
650 SOLUTION ORAL EVERY 4 HOURS PRN
Status: DISCONTINUED | OUTPATIENT
Start: 2025-02-12 | End: 2025-02-13 | Stop reason: HOSPADM

## 2025-02-12 RX ORDER — CLOPIDOGREL BISULFATE 75 MG/1
75 TABLET ORAL DAILY
Status: DISCONTINUED | OUTPATIENT
Start: 2025-02-12 | End: 2025-02-13 | Stop reason: HOSPADM

## 2025-02-12 RX ORDER — ROSUVASTATIN CALCIUM 10 MG/1
5 TABLET, COATED ORAL NIGHTLY
Status: DISCONTINUED | OUTPATIENT
Start: 2025-02-12 | End: 2025-02-13 | Stop reason: HOSPADM

## 2025-02-12 RX ORDER — SACUBITRIL AND VALSARTAN 24; 26 MG/1; MG/1
1 TABLET, FILM COATED ORAL 2 TIMES DAILY
Status: DISCONTINUED | OUTPATIENT
Start: 2025-02-12 | End: 2025-02-13 | Stop reason: HOSPADM

## 2025-02-12 RX ORDER — FENTANYL CITRATE 50 UG/ML
INJECTION, SOLUTION INTRAMUSCULAR; INTRAVENOUS AS NEEDED
Status: DISCONTINUED | OUTPATIENT
Start: 2025-02-12 | End: 2025-02-12 | Stop reason: HOSPADM

## 2025-02-12 RX ORDER — NITROFURANTOIN 25; 75 MG/1; MG/1
100 CAPSULE ORAL 2 TIMES DAILY
Status: DISCONTINUED | OUTPATIENT
Start: 2025-02-12 | End: 2025-02-13 | Stop reason: HOSPADM

## 2025-02-12 RX ORDER — VIT C/E/ZN/COPPR/LUTEIN/ZEAXAN 250MG-90MG
1000 CAPSULE ORAL DAILY
Status: DISCONTINUED | OUTPATIENT
Start: 2025-02-12 | End: 2025-02-13 | Stop reason: HOSPADM

## 2025-02-12 RX ORDER — NITROFURANTOIN 25; 75 MG/1; MG/1
100 CAPSULE ORAL 2 TIMES DAILY
Status: DISCONTINUED | OUTPATIENT
Start: 2025-02-12 | End: 2025-02-12

## 2025-02-12 RX ORDER — METOPROLOL SUCCINATE 25 MG/1
25 TABLET, EXTENDED RELEASE ORAL DAILY
Status: DISCONTINUED | OUTPATIENT
Start: 2025-02-12 | End: 2025-02-13 | Stop reason: HOSPADM

## 2025-02-12 RX ORDER — MIDAZOLAM HYDROCHLORIDE 1 MG/ML
INJECTION, SOLUTION INTRAMUSCULAR; INTRAVENOUS AS NEEDED
Status: DISCONTINUED | OUTPATIENT
Start: 2025-02-12 | End: 2025-02-12 | Stop reason: HOSPADM

## 2025-02-12 RX ORDER — ACETAMINOPHEN 325 MG/1
650 TABLET ORAL EVERY 4 HOURS PRN
Status: DISCONTINUED | OUTPATIENT
Start: 2025-02-12 | End: 2025-02-13 | Stop reason: HOSPADM

## 2025-02-12 RX ORDER — NAPROXEN SODIUM 220 MG/1
325 TABLET, FILM COATED ORAL DAILY
Status: DISCONTINUED | OUTPATIENT
Start: 2025-02-12 | End: 2025-02-12

## 2025-02-12 RX ADMIN — PERFLUTREN 6 ML OF DILUTION: 6.52 INJECTION, SUSPENSION INTRAVENOUS at 08:44

## 2025-02-12 RX ADMIN — SODIUM CHLORIDE, POTASSIUM CHLORIDE, SODIUM LACTATE AND CALCIUM CHLORIDE 500 ML: 600; 310; 30; 20 INJECTION, SOLUTION INTRAVENOUS at 19:03

## 2025-02-12 RX ADMIN — ROSUVASTATIN CALCIUM 5 MG: 10 TABLET, FILM COATED ORAL at 20:38

## 2025-02-12 RX ADMIN — ACETAMINOPHEN 650 MG: 325 TABLET ORAL at 10:03

## 2025-02-12 RX ADMIN — SODIUM CHLORIDE, POTASSIUM CHLORIDE, SODIUM LACTATE AND CALCIUM CHLORIDE 500 ML: 600; 310; 30; 20 INJECTION, SOLUTION INTRAVENOUS at 15:32

## 2025-02-12 RX ADMIN — HEPARIN SODIUM 800 UNITS/HR: 10000 INJECTION, SOLUTION INTRAVENOUS at 02:52

## 2025-02-12 RX ADMIN — ACETAMINOPHEN 650 MG: 325 TABLET ORAL at 21:17

## 2025-02-12 RX ADMIN — NITROFURANTOIN (MONOHYDRATE/MACROCRYSTALS) 100 MG: 75; 25 CAPSULE ORAL at 20:38

## 2025-02-12 SDOH — ECONOMIC STABILITY: FOOD INSECURITY: WITHIN THE PAST 12 MONTHS, THE FOOD YOU BOUGHT JUST DIDN'T LAST AND YOU DIDN'T HAVE MONEY TO GET MORE.: NEVER TRUE

## 2025-02-12 SDOH — ECONOMIC STABILITY: FOOD INSECURITY: WITHIN THE PAST 12 MONTHS, YOU WORRIED THAT YOUR FOOD WOULD RUN OUT BEFORE YOU GOT THE MONEY TO BUY MORE.: NEVER TRUE

## 2025-02-12 SDOH — SOCIAL STABILITY: SOCIAL INSECURITY: HAS ANYONE EVER THREATENED TO HURT YOUR FAMILY OR YOUR PETS?: NO

## 2025-02-12 SDOH — SOCIAL STABILITY: SOCIAL INSECURITY: HAVE YOU HAD ANY THOUGHTS OF HARMING ANYONE ELSE?: NO

## 2025-02-12 SDOH — SOCIAL STABILITY: SOCIAL INSECURITY: WITHIN THE LAST YEAR, HAVE YOU BEEN AFRAID OF YOUR PARTNER OR EX-PARTNER?: NO

## 2025-02-12 SDOH — SOCIAL STABILITY: SOCIAL INSECURITY: HAVE YOU HAD THOUGHTS OF HARMING ANYONE ELSE?: NO

## 2025-02-12 SDOH — SOCIAL STABILITY: SOCIAL INSECURITY: ARE THERE ANY APPARENT SIGNS OF INJURIES/BEHAVIORS THAT COULD BE RELATED TO ABUSE/NEGLECT?: NO

## 2025-02-12 SDOH — ECONOMIC STABILITY: HOUSING INSECURITY: AT ANY TIME IN THE PAST 12 MONTHS, WERE YOU HOMELESS OR LIVING IN A SHELTER (INCLUDING NOW)?: NO

## 2025-02-12 SDOH — SOCIAL STABILITY: SOCIAL INSECURITY: WITHIN THE LAST YEAR, HAVE YOU BEEN HUMILIATED OR EMOTIONALLY ABUSED IN OTHER WAYS BY YOUR PARTNER OR EX-PARTNER?: NO

## 2025-02-12 SDOH — SOCIAL STABILITY: SOCIAL INSECURITY
WITHIN THE LAST YEAR, HAVE YOU BEEN RAPED OR FORCED TO HAVE ANY KIND OF SEXUAL ACTIVITY BY YOUR PARTNER OR EX-PARTNER?: NO

## 2025-02-12 SDOH — ECONOMIC STABILITY: HOUSING INSECURITY: IN THE LAST 12 MONTHS, WAS THERE A TIME WHEN YOU WERE NOT ABLE TO PAY THE MORTGAGE OR RENT ON TIME?: NO

## 2025-02-12 SDOH — SOCIAL STABILITY: SOCIAL INSECURITY: ARE YOU OR HAVE YOU BEEN THREATENED OR ABUSED PHYSICALLY, EMOTIONALLY, OR SEXUALLY BY ANYONE?: NO

## 2025-02-12 SDOH — ECONOMIC STABILITY: INCOME INSECURITY: IN THE PAST 12 MONTHS HAS THE ELECTRIC, GAS, OIL, OR WATER COMPANY THREATENED TO SHUT OFF SERVICES IN YOUR HOME?: NO

## 2025-02-12 SDOH — SOCIAL STABILITY: SOCIAL INSECURITY: WERE YOU ABLE TO COMPLETE ALL THE BEHAVIORAL HEALTH SCREENINGS?: YES

## 2025-02-12 SDOH — ECONOMIC STABILITY: HOUSING INSECURITY: IN THE PAST 12 MONTHS, HOW MANY TIMES HAVE YOU MOVED WHERE YOU WERE LIVING?: 0

## 2025-02-12 SDOH — ECONOMIC STABILITY: TRANSPORTATION INSECURITY: IN THE PAST 12 MONTHS, HAS LACK OF TRANSPORTATION KEPT YOU FROM MEDICAL APPOINTMENTS OR FROM GETTING MEDICATIONS?: NO

## 2025-02-12 SDOH — SOCIAL STABILITY: SOCIAL INSECURITY: DO YOU FEEL ANYONE HAS EXPLOITED OR TAKEN ADVANTAGE OF YOU FINANCIALLY OR OF YOUR PERSONAL PROPERTY?: NO

## 2025-02-12 SDOH — ECONOMIC STABILITY: FOOD INSECURITY: HOW HARD IS IT FOR YOU TO PAY FOR THE VERY BASICS LIKE FOOD, HOUSING, MEDICAL CARE, AND HEATING?: NOT HARD AT ALL

## 2025-02-12 SDOH — SOCIAL STABILITY: SOCIAL INSECURITY: ABUSE: ADULT

## 2025-02-12 SDOH — SOCIAL STABILITY: SOCIAL INSECURITY: DOES ANYONE TRY TO KEEP YOU FROM HAVING/CONTACTING OTHER FRIENDS OR DOING THINGS OUTSIDE YOUR HOME?: NO

## 2025-02-12 SDOH — SOCIAL STABILITY: SOCIAL INSECURITY: DO YOU FEEL UNSAFE GOING BACK TO THE PLACE WHERE YOU ARE LIVING?: NO

## 2025-02-12 ASSESSMENT — COGNITIVE AND FUNCTIONAL STATUS - GENERAL
PATIENT BASELINE BEDBOUND: NO
DAILY ACTIVITIY SCORE: 24
MOBILITY SCORE: 22
MOBILITY SCORE: 22
CLIMB 3 TO 5 STEPS WITH RAILING: A LITTLE
CLIMB 3 TO 5 STEPS WITH RAILING: A LITTLE
TURNING FROM BACK TO SIDE WHILE IN FLAT BAD: A LITTLE
TURNING FROM BACK TO SIDE WHILE IN FLAT BAD: A LITTLE
DAILY ACTIVITIY SCORE: 24

## 2025-02-12 ASSESSMENT — ACTIVITIES OF DAILY LIVING (ADL)
JUDGMENT_ADEQUATE_SAFELY_COMPLETE_DAILY_ACTIVITIES: YES
WALKS IN HOME: INDEPENDENT
LACK_OF_TRANSPORTATION: NO
HEARING - RIGHT EAR: FUNCTIONAL
ASSISTIVE_DEVICE: CANE;DENTURES UPPER
FEEDING YOURSELF: INDEPENDENT
HEARING - LEFT EAR: FUNCTIONAL
BATHING: INDEPENDENT
ADEQUATE_TO_COMPLETE_ADL: YES
DRESSING YOURSELF: INDEPENDENT
GROOMING: INDEPENDENT
TOILETING: INDEPENDENT
LACK_OF_TRANSPORTATION: NO
PATIENT'S MEMORY ADEQUATE TO SAFELY COMPLETE DAILY ACTIVITIES?: YES
LACK_OF_TRANSPORTATION: NO

## 2025-02-12 ASSESSMENT — LIFESTYLE VARIABLES
AUDIT-C TOTAL SCORE: 0
AUDIT-C TOTAL SCORE: 0
HOW OFTEN DO YOU HAVE A DRINK CONTAINING ALCOHOL: NEVER
HOW OFTEN DO YOU HAVE 6 OR MORE DRINKS ON ONE OCCASION: NEVER
SKIP TO QUESTIONS 9-10: 1
HOW MANY STANDARD DRINKS CONTAINING ALCOHOL DO YOU HAVE ON A TYPICAL DAY: PATIENT DOES NOT DRINK

## 2025-02-12 ASSESSMENT — PAIN - FUNCTIONAL ASSESSMENT
PAIN_FUNCTIONAL_ASSESSMENT: 0-10
PAIN_FUNCTIONAL_ASSESSMENT: 0-10

## 2025-02-12 ASSESSMENT — PAIN DESCRIPTION - LOCATION: LOCATION: BACK

## 2025-02-12 ASSESSMENT — PATIENT HEALTH QUESTIONNAIRE - PHQ9
SUM OF ALL RESPONSES TO PHQ9 QUESTIONS 1 & 2: 3
1. LITTLE INTEREST OR PLEASURE IN DOING THINGS: NEARLY EVERY DAY
2. FEELING DOWN, DEPRESSED OR HOPELESS: NOT AT ALL

## 2025-02-12 ASSESSMENT — PAIN SCALES - GENERAL
PAINLEVEL_OUTOF10: 0 - NO PAIN
PAINLEVEL_OUTOF10: 5 - MODERATE PAIN
PAINLEVEL_OUTOF10: 3

## 2025-02-12 NOTE — PROGRESS NOTES
02/12/25 1122   Discharge Planning   Living Arrangements Children  (pt son lives with her but works several jobs)   Support Systems Children;Family members   Assistance Needed none, PTA independent ADLS and IADLS no AD in home, uses cane for long distances if needed, drives, fall PTA, denies other falls last 3 months   Type of Residence Private residence  (1 level tr[place] + basement (freezer/canned foods, pt showers in basement), laundry on 1st floor)   Number of Stairs to Enter Residence 4   Number of Stairs Within Residence 13   Do you have animals or pets at home? No   Home or Post Acute Services None   Expected Discharge Disposition Home   Does the patient need discharge transport arranged? No   Financial Resource Strain   How hard is it for you to pay for the very basics like food, housing, medical care, and heating? Not hard   Housing Stability   In the last 12 months, was there a time when you were not able to pay the mortgage or rent on time? N   In the past 12 months, how many times have you moved where you were living? 0   At any time in the past 12 months, were you homeless or living in a shelter (including now)? N   Transportation Needs   In the past 12 months, has lack of transportation kept you from medical appointments or from getting medications? no   In the past 12 months, has lack of transportation kept you from meetings, work, or from getting things needed for daily living? No   Stroke Family Assessment   Stroke Family Assessment Needed No   Intensity of Service   Intensity of Service 0-30 min     Per interdisciplinary rounding report with team, pt fell PTA when slipped on ice going out to refill bird feeder and was found outside in grass by family, estimated that she laid outside for several hours before being found and was hypothermic. Cardiology is on consult, pt may need heart cath.  Met with pt and family who is in room visiting, pt gives permission to speak with family present. Pt has a large  "family support system that checks in on her frequently and have talked about putting cameras in home also to monitor. They confirm that pt is usually completely independent and still drives. Pt/family state that discharge preference is home and do not anticipate any home going needs, are open to HHC only If needed. Pt PCP is Dr. Chong Houston, Pharmacy is [facility], [address], [place], pt denies barriers to appointments or medications. Pt jokingly says \" I was almost a popsicle if my family didn't find me. I almost went to join my  in [facility] but I guess he didn't want me there yet. \" CT team will continue monitoring case for progression and potential DC needs.  "

## 2025-02-12 NOTE — CONSULTS
Inpatient consult to Cardiology  Consult performed by: YECENIA Horn-CNP  Consult ordered by: YECENIA Baxter-CNP  Reason for consult: nstemi                                                          Date:   2/12/2025  Patient name:  Laurel Pugh  Date of admission:  2/11/2025  8:52 PM  MRN:   [id number]  YOB: 1940  Time of Consult:  8:30 AM    Consulting Cardiologist/TARYN: YECENIA Sheets, CNP  Primary Cardiologist:  Dr. Adrian Echavarria    Referring Provider: Dr. Fiore      Admission Diagnosis:     Fall, initial encounter      History of Present Illness:      84-year-old female with past medical history of breast cancer with left mastectomy, vertigo, GERD, chronic left bundle branch block, diastolic congestive heart failure, previous NSTEMI with PCI to LAD in July 2024 who presented to [facility] emergency department yesterday after having a mechanical fall at home.  She reports that she was going outside to feed the birds in which she normally does when she slipped on a patch of ice and fell to the ground.  Unsure if she lost consciousness or not.  She was too weak to get herself up and attempted to crawl to a tree to pull her self up but was unsuccessful.  She was hoping the neighbors would find her however she was out side for about 5-1/2 hours before her family members came to her aid.  They brought her inside and called 911.  She denies any current chest pain or previous chest pain.  She denies any increased shortness of breath.  In the emergency department she was found to be severely hypothermic with a temperature of 30 °C.  She was also found to be in atrial fibrillation which she does not have a prior history of.  Rewarming was initiated in the emergency department.  CT scan of the head, cervical spine and abdomen and pelvis were completed.  All were negative for any acute pathology.  Urinalysis was positive for  UTI.  She does have significant troponin elevation initially at 3000 and is currently 7000.  She was placed on a heparin infusion for NSTEMI protocol.  Initial lactate was 2.6 and BNP was 379.  CK of 300.  Initial EKG in the emergency department does show atrial fibrillation with chronic left bundle branch block.  Nonspecific ST wave abnormality.  No STEMI criteria.  Repeat EKG early's morning showed sinus rhythm with occasional PVCs.  Left bundle branch block.  Nonspecific ST-T wave abnormality.  No STEMI criteria.  The patient was admitted to the ICU and general cardiology was consulted for NSTEMI.     Last heart catheterization was in July 2024, left main was normal, left anterior descending was significantly obstructed with a mid LAD lesion stenosed at 80%.  PCI was completed to that lesion.  Circumflex showed luminal irregularities and right coronary showed luminal irregularities.  Last echocardiogram was performed in October 2024, LV function was mildly decreased with an estimated EF of 50%, basal inferior lateral segment and basal inferior segments were abnormal.  She had normal right ventricular global systolic function.  No evidence of mitral valve stenosis.  Mild mitral valve regurgitation and mild to moderate tricuspid regurgitation.    Previous Cardiac Testing:    Echocardiogram:   Recent Labs     10/16/24  1100 07/15/24  1001   EF 50 40   LVIDD 4.40 4.47   RV 36.9 26.8   RVFRWALLPKSP  --  13.10   TAPSE  --  2.0       TRANSTHORACIC ECHOCARDIOGRAM REPORT  Patient Name:      MAGDI Strickland Physician:    [id number] Austin Burden DO  Study Date:        10/16/2024           Ordering Provider:    [id number] SNOW SUNG  MRN/PID:           [id number]             Fellow:  Accession#:        [id number]         Nurse:  Date of Birth/Age: 1940 / 84 years Sonographer:          Austin Renee  Gender:            F                    Additional Staff:  Height:            147.32 cm            Admit  Date:           10/16/2024  Weight:            63.50 kg             Admission Status:     Outpatient  BSA / BMI:         1.57 m2 / 29.26      Department Location:  [facility]  kg/m2                                      Delicia Duggan  Blood Pressure: 120 /60 mmHg    PHYSICIAN INTERPRETATION:  Left Ventricle: The left ventricular systolic function is mildly decreased, with a visually estimated ejection fraction of 50%. Wall motion is abnormal. The left ventricular cavity size is normal. The left ventricular septal wall thickness is mildly increased. There is mild concentric left ventricular hypertrophy. Spectral Doppler shows an impaired relaxation pattern of left ventricular diastolic filling.  LV Wall Scoring:  The basal inferolateral segment and basal inferior segment are hypokinetic. All  remaining scored segments are normal.    Left Atrium: The left atrium is normal in size.  Right Ventricle: The right ventricle is normal in size. There is normal right ventricular global systolic function.  Right Atrium: The right atrium is normal in size.  Aortic Valve: The aortic valve appears structurally normal. There is mild aortic valve cusp calcification. There is no evidence of aortic valve stenosis.  The aortic valve dimensionless index is 0.71. There is trace aortic valve regurgitation. The peak instantaneous gradient of the aortic valve is 4.9 mmHg. The mean gradient of the aortic valve is 3.0 mmHg.  Mitral Valve: The mitral valve is normal in structure. There is mild thickening and calcification of the anterior and posterior mitral valve leaflets. There is no evidence of mitral valve stenosis. The doppler estimated mean and peak diastolic pressure gradients are 3.0 mmHg and 7.5 mmHg respectively. There is normal mitral valve leaflet mobility. There is mild to moderate mitral annular calcification. There is mild mitral valve regurgitation.  Tricuspid Valve: The tricuspid valve is structurally normal. There is  normal tricuspid valve leaflet mobility. There is mild to moderate tricuspid regurgitation.  Pulmonic Valve: The pulmonic valve is structurally normal. There is mild pulmonic valve regurgitation.  Pericardium: Trivial pericardial effusion.  Aorta: The aortic root is normal.  In comparison to the previous echocardiogram(s): The left ventricular function has improved. The left ventricular hypertrophy is unchanged. The left ventricular diastolic function is unchanged.      CONCLUSIONS:  1. The left ventricular systolic function is mildly decreased, with a visually estimated ejection fraction of 50%.  2. Basal inferolateral segment and basal inferior segment are abnormal.  3. Abnormal wall motion.  4. Spectral Doppler shows an impaired relaxation pattern of left ventricular diastolic filling.  5. There is normal right ventricular global systolic function.  6. There is no evidence of mitral valve stenosis.  7. Mild mitral valve regurgitation.  8. Mild to moderate tricuspid regurgitation.  9. Aortic valve stenosis is not present.      Coronary Angiography:   Left Heart Cath, With LV, Left Heart Cath, With LV 07/15/2024    [facility], Cath Lab  [address] [place] [address]    Cardiovascular Catheterization Report    Patient Name:     MAGDI DICKEY   Performing Physician:  Apryl Davison MD  Study Date:       7/15/2024           Verifying Physician:   Apryl Davison MD  MRN/PID:          [id number]            Cardiologist/Co-Scrub:  Accession#:       [id number]        Ordering Provider:     [id number] SNOW SUNG  Date of           1940 / 84      Cardiologist:  Birth/Age:        years  Gender:           F                   Fellow:  Encounter#:       [id number]          Surgeon:    Procedure Description:  After infiltration with 2% Lidocaine, the right radial artery was cannulated with a modified Seldinger technique. Subsequently a 5/6 slender sheath was placed in the right  radial artery. Multiple injections of contrast were made into the left and right coronary arteries with angiograms recorded in multiple projections. Retrograde left heart catheterizion was accomplished with a 5 Fr. pigtail catheter. A single plane left ventriculogram was recorded in the 30 degree TERRELL projection. The contrast dose was 20 ml injected at 10 ml/sec. The catheter was then withdrawn across the aortic valve under continuous pressure monitoring and removed.    Coronary Angiography:  The coronary circulation is right dominant.    Left Main Coronary Artery:  The left main coronary artery is free of atherosclerotic disease.    Left Anterior Descending Coronary Artery Distribution:  The Left Anterior Descending artery is a large vessel. There is 80% stenosis in the mid left anterior descending artery. Hemodynamically significant obstruction is noted in this vessel. The devices advanced to the mid LAD lesion were: a balloon was inflated for pre-dilation Resolute Alli 2.5x18 stent was deployed in the lesion. Residual stenosis is 0 %.    Circumflex Coronary Artery Distribution:  The Left Circumflex artery is a large vessel. Presents luminal irregularities.    Right Coronary Artery Distribution:    The Right Coronary Artery is a large vessel. Presents luminal irregularities.      Left Ventriculography:  The estimated left ventricular ejection fraction is abnormal at 45%.    Coronary Interventions:  Angiography reveals a 80% stenosis of the mid left anterior descending coronary artery. Pre-intervention AMADOR flow was 3. Percutaneous coronary intervention was performed within the mid left anterior descending. The vessel was pre-dilated using a compliant balloon 2.0 mm x 15 mm at 12 NATHANIEL. [place] drug-eluting stent 2.5 mm x 18 mm was advanced to the lesion and implanted at 12 NATHAINEL. The stenosis was successfully reduced from 80% to 0%. Post-intervention AMADOR flow was 3.    Hemo  Personnel:  +---------------+---------+  Name           Duty       +---------------+---------+  Tressa Davison MD 1  +---------------+---------+      Hemodynamic Pressures:    +----+---------------------+----------+-------------+--------------+---------+  Site      Date Time      Phase NameSystolic mmHgDiastolic mmHgMean mmHg  +----+---------------------+----------+-------------+--------------+---------+    AO7/15/2024 12:42:26 PM  AIR REST          155            80      111  +----+---------------------+----------+-------------+--------------+---------+    AO7/15/2024 12:46:57 PM  AIR REST          145            61       91  +----+---------------------+----------+-------------+--------------+---------+    AO7/15/2024 12:49:31 PM  AIR REST          126            63       89  +----+---------------------+----------+-------------+--------------+---------+      Cardiac Cath Post Procedure Notes:  Post Procedure Diagnosis: KHUSHI of LAD.  Blood Loss:               Estimated blood loss during the procedure was 5 mls.  Specimens Removed:        Number of specimen(s) removed: none.    ____________________________________________________________________________________  CONCLUSIONS:  1. Left Main Coronary Artery: This artery is normal.  2. Left Anterior Descending Artery: is significantly obstructed.  3. Mid LAD Lesion: The percent stenosis is 80%.  4. Mid LAD Lesion: pre-dilation Resolute [place] 2.5x18 : 0% residual stenosis. LAD: pre-procedure AMADOR flow was 3(complete perfusion) and post-procedure AMADOR flow was 3(complete perfusion).  5. Circumflex Coronary Artery: presents luminal irregularities.  6. Right Coronary Artery: presents luminal irregularities.  7. The Left Ventricular Ejection Fraction is 45%.    [id number] Davison MD  Performing Physician  Electronically signed by Apryl Davison MD on 7/15/2024 at 1:18:23 PM        Allergies:     Allergies   Allergen Reactions     "Haloperidol Hallucinations    Keflex [Cephalexin] Itching and Rash     \"Severe Itching\"    Red patches on arms, trunks, back,  and legs     Zofran [Ondansetron Hcl] Hallucinations    Amoxicillin-Pot Clavulanate Rash    Levofloxacin Rash    Sulfa (Sulfonamide Antibiotics) Rash       Past Medical History:     Past Medical History:   Diagnosis Date    Arthritis     Bitten or stung by nonvenomous insect and other nonvenomous arthropods, initial encounter 05/22/2019    Tick bite, initial encounter    Encounter for general adult medical examination without abnormal findings 10/05/2022    Medicare annual wellness visit, subsequent    Encounter for general adult medical examination without abnormal findings 11/02/2021    Medicare annual wellness visit, subsequent    Personal history of malignant neoplasm of breast     History of malignant neoplasm of breast    Personal history of other diseases of urinary system 08/10/2019    History of hematuria    Personal history of other specified conditions     History of vertigo    Personal history of other specified conditions 08/10/2019    History of urinary frequency    Personal history of urinary (tract) infections 08/10/2019    History of acute cystitis    Personal history of urinary (tract) infections 09/03/2019    History of urinary tract infection       Past Surgical History:     Past Surgical History:   Procedure Laterality Date    CARDIAC CATHETERIZATION N/A 7/15/2024    Procedure: Left Heart Cath, With LV;  Surgeon: Tressa Davison MD;  Location: [facility];  Service: Cardiovascular;  Laterality: N/A;  radial approach please    CARDIAC CATHETERIZATION N/A 7/15/2024    Procedure: PCI KHUSHI Stent- Coronary;  Surgeon: Tressa Davison MD;  Location: [facility];  Service: Cardiovascular;  Laterality: N/A;    OTHER SURGICAL HISTORY  04/30/2019    Hysterectomy    OTHER SURGICAL HISTORY  04/30/2019    Mastectomy    OTHER SURGICAL HISTORY  04/30/2019    Tooth " extraction       Family History:     Family History   Problem Relation Name Age of Onset    Stroke Mother      Other (cardiac disorder) Father      Breast cancer Sister      Arthritis Sister         Social History:     Social History     Tobacco Use    Smoking status: Never     Passive exposure: Never    Smokeless tobacco: Never   Vaping Use    Vaping status: Never Used   Substance Use Topics    Alcohol use: Never    Drug use: Never       CURRENT INPATIENT MEDICATIONS    aspirin, 324 mg, oral, Daily  [Held by provider] aspirin, 81 mg, oral, Daily  [Held by provider] cholecalciferol, 1,000 Units, oral, Daily  [Held by provider] clopidogrel, 75 mg, oral, Daily  [Held by provider] cyanocobalamin, 1,000 mcg, oral, Daily  [Held by provider] docusate sodium, 100 mg, oral, Daily  [Held by provider] metoprolol succinate XL, 25 mg, oral, Daily  nitrofurantoin (macrocrystal-monohydrate), 100 mg, oral, BID  [Held by provider] rosuvastatin, 5 mg, oral, Nightly  [Held by provider] sacubitriL-valsartan, 1 tablet, oral, BID      heparin, 0-4,000 Units/hr, Last Rate: 800 Units/hr (02/12/25 0600)      Current Outpatient Medications   Medication Instructions    aspirin 81 mg EC tablet 1 tablet, Daily    cholecalciferol (VITAMIN D-3) 1,000 Units, Daily    clopidogrel (Plavix) 75 mg tablet Take 1 tablet (75 mg) by mouth once daily.    cyanocobalamin (VITAMIN B-12) 2,000 mcg, oral, Daily    docusate sodium (STOOL SOFTENER ORAL) Daily PRN    metoprolol succinate XL (TOPROL-XL) 25 mg, oral, Daily    rosuvastatin (CRESTOR) 5 mg, oral, Daily    sacubitriL-valsartan (Entresto) 24-26 mg tablet 1 tablet, oral, 2 times daily        Review of Systems:      12 point review of systems was obtained in detail and is negative other than that detailed above.    Vital Signs:     Vitals:    02/12/25 0539 02/12/25 0600 02/12/25 0700 02/12/25 0800   BP:  98/51 (!) 87/49 (!) 103/47   BP Location:       Patient Position:       Pulse:  104 104 80   Resp:  23  22 24   Temp:  37.4 °C (99.3 °F) 37.4 °C (99.3 °F) 37.6 °C (99.7 °F)   TempSrc:       SpO2:  100% 99% 100%   Weight: 64.5 kg (142 lb 3.2 oz)      Height:           Intake/Output Summary (Last 24 hours) at 2/12/2025 0830  Last data filed at 2/12/2025 0600  Gross per 24 hour   Intake --   Output 175 ml   Net -175 ml       Wt Readings from Last 4 Encounters:   02/12/25 64.5 kg (142 lb 3.2 oz)   11/02/24 63.5 kg (140 lb)   10/23/24 64.4 kg (142 lb)   10/16/24 63.5 kg (140 lb)       Physical Examination:     Physical Exam  Vitals and nursing note reviewed.   HENT:      Head: Normocephalic.      Mouth/Throat:      Mouth: Mucous membranes are moist.   Eyes:      Pupils: Pupils are equal, round, and reactive to light.   Cardiovascular:      Rate and Rhythm: Normal rate and regular rhythm.      Pulses: Normal pulses.   Pulmonary:      Effort: Pulmonary effort is normal.      Breath sounds: Wheezing present.   Abdominal:      Palpations: Abdomen is soft.   Musculoskeletal:         General: Normal range of motion.   Skin:     General: Skin is warm.      Capillary Refill: Capillary refill takes less than 2 seconds.   Neurological:      General: No focal deficit present.      Mental Status: She is alert and oriented to person, place, and time.   Psychiatric:         Mood and Affect: Mood normal.           Lab:     CBC:   Results from last 7 days   Lab Units 02/12/25  0344 02/11/25  2108   WBC AUTO x10*3/uL 11.1 12.1*   RBC AUTO x10*6/uL 3.63* 4.37   HEMOGLOBIN g/dL 10.2* 12.3   HEMATOCRIT % 31.7* 39.1   MCV fL 87 90   MCH pg 28.1 28.1   MCHC g/dL 32.2 31.5*   RDW % 14.3 14.3   PLATELETS AUTO x10*3/uL 176 247     CMP:    Results from last 7 days   Lab Units 02/12/25  0344 02/11/25  2108   SODIUM mmol/L 139 140   POTASSIUM mmol/L 4.3 4.4   CHLORIDE mmol/L 107 106   CO2 mmol/L 22 23   BUN mg/dL 41* 45*   CREATININE mg/dL 0.95 0.95   GLUCOSE mg/dL 106* 166*   PROTEIN TOTAL g/dL 6.1* 7.2   CALCIUM mg/dL 8.9 9.5   BILIRUBIN TOTAL  mg/dL 0.6 0.6   ALK PHOS U/L 36 44   AST U/L 39 29   ALT U/L 16 15     BMP:    Results from last 7 days   Lab Units 02/12/25  0344 02/11/25 2108   SODIUM mmol/L 139 140   POTASSIUM mmol/L 4.3 4.4   CHLORIDE mmol/L 107 106   CO2 mmol/L 22 23   BUN mg/dL 41* 45*   CREATININE mg/dL 0.95 0.95   CALCIUM mg/dL 8.9 9.5   GLUCOSE mg/dL 106* 166*     Magnesium:  Results from last 7 days   Lab Units 02/12/25  0344 02/11/25  2108   MAGNESIUM mg/dL 1.98 2.30     Troponin:    Results from last 7 days   Lab Units 02/12/25  0344 02/11/25 2207 02/11/25 2108   TROPHS ng/L 7,405* 4,468* 3,071*     BNP:   Results from last 7 days   Lab Units 02/11/25 2108   BNP pg/mL 379*       Radiology:     CT thoracic spine wo IV contrast   Final Result   1. No evidence for acute injury to the thoracic or lumbar spine. There   is stable mild chronic compression of the superior endplate of T8.   2. There is mild degenerative change throughout the thoracic and   lumbar spine with a transitional vertebral body.   3. There is a mild grade 1 spondylolisthesis at L4-5.   Signed by Oliverio Galicia MD      CT lumbar spine wo IV contrast   Final Result   1. No evidence for acute injury to the thoracic or lumbar spine. There   is stable mild chronic compression of the superior endplate of T8.   2. There is mild degenerative change throughout the thoracic and   lumbar spine with a transitional vertebral body.   3. There is a mild grade 1 spondylolisthesis at L4-5.   Signed by Oliverio Galicia MD      CT chest abdomen pelvis wo IV contrast   Final Result   CHEST:   Both lungs are hyperinflated with centriacinar and paraseptal   emphysema..  Bibasal atelectasis.   Abdomen and pelvis:   Distended gallbladder with multiple cholelithiasis and thickened wall   measure 4 mm.  No evidence of pericholecystic fluid collection.    Findings might suggest acute cholecystitis, clinical correlation may   be helpful.   Moderate amount of stool burden within the rectum and  colon.    Scattered diverticulosis without diverticulitis.   Nonspecific lucent area within the sacrum on the right side measures 1   x 1.7 cm.   Signed by Titi White MD      CT head wo IV contrast   Final Result   1.  No acute intracranial hemorrhage or depressed calvarial fracture.   2.  No acute fracture at the cervical spine. Degenerative changes.                  MACRO:   None.        Signed by: Pat Chavarria 2/12/2025 12:50 AM   Dictation workstation:   [id number]      CT cervical spine wo IV contrast   Final Result   1.  No acute intracranial hemorrhage or depressed calvarial fracture.   2.  No acute fracture at the cervical spine. Degenerative changes.                  MACRO:   None.        Signed by: Pat Chavarria 2/12/2025 12:50 AM   Dictation workstation:   [id number]      XR chest 1 view   Final Result   No acute cardiopulmonary process.        MACRO:   None        Signed by: Abi Barreto 2/11/2025 9:59 PM   Dictation workstation:   [id number]      Transthoracic Echo (TTE) Limited    (Results Pending)       Problem List:     Patient Active Problem List   Diagnosis    Arthritis of foot, left    History of breast cancer    Constipation    Vertigo    GERD (gastroesophageal reflux disease)    Left bundle branch block    Mild vitamin D deficiency    Actinic keratosis    Seborrheic keratosis    Shortness of breath    Varicose veins of both lower extremities with inflammation    Ocular migraine    Altered mental status, unspecified altered mental status type    NSTEMI (non-ST elevated myocardial infarction) (Multi)    Urinary tract infection without hematuria    Atherosclerosis of native coronary artery of native heart without angina pectoris    Essential hypertension    Hx of percutaneous transluminal coronary angioplasty    Chronic diastolic heart failure    Acute diastolic (congestive) heart failure    Fall, initial encounter       Assessment:   NSTEMI type I versus type II  CAD with prior PCI  to LAD in 2024  Vertigo  GERD  Mechanical fall  Chronic left bundle branch block  UTI  Hypertension  Chronic diastolic congestive heart failure NYHA class II  Hypothermia        Plan:   Admitted to medicine.  Remains in the ICU.  Telemetry shows normal sinus rhythm.  Reviewed EKG which initial EKG was concerning for A-fib however very poor EKG.  Repeat EKG showed normal sinus rhythm with occasional PACs and PVCs.  There is a chronic left bundle branch block which is identified.  Nonspecific ST wave abnormality.  No STEMI criteria.  No prior history of A-fib.  May be related to hypothermia.  Will hold off on anticoagulation for atrial fibrillation for now.  She remains on a heparin infusion for NSTEMI protocol.  Supplemental O2  Monitor electrolytes  Low to moderate suspicion for ACS although patient was also found down outside.  Probable NSTEMI type II however cannot rule out ischemia with a 7000 troponin and apex is down on ECHO. Troponin elevation could be secondary to hypothermia and rhabdomyolysis vs true ischemia.  She does have elevated CK as well.  EKGs are nonspecific.  She denies any chest pain or chest pressure.  She has been taking all of her medications appropriately up until yesterday.  Will discuss with her about C and proceed if agreeable.  Keep n.p.o. for now.  Continue heparin infusion for now, trend assays.  Monitor for signs of bleeding.  Echocardiogram pending  Continue normal cardiac medications  Repeat EKG today  UTI per primary medicine team  Does not seem overtly fluid overloaded.  Monitor strict I's and O's and daily weights  Further recommendations to follow      Wilber Nolan [facility]  Adult Gerontology Acute Care Nurse Practitioner  [facility] [place]   [facility]  [phone]    Of note, this documentation is completed using the Dragon Dictation system (voice recognition software). There may be spelling and/or grammatical  errors that were not corrected prior to final submission.    Electronically signed by RYLAN Horn, on 2/12/2025 at 8:30 AM     I have personally interviewed and examined the patient.   I have personally and independently reviewed labs and diagnostic testing.  I have personally verified the elements of the history and physical listed above and changes, if any, are noted.   I have personally reviewed the assessment and plan as documented by SANJAY Pereira, CNP and concur.    In summary, Mrs. Laurel Pugh has a history of atherosclerotic heart disease with angioplasty and stenting of the LAD July 15, 2024.  There were mild irregularities of the circumflex and right coronary artery.  Mild ischemic cardiomyopathy with estimated LV ejection action 40 to 45%.  A follow-up echocardiogram October 16, 2024 showed an estimated LV ejection fraction of 50%.  She has been maintained on dual antiplatelet therapy along with Toprol-XL, Entresto, and Aldactone.  She was brought to emergency department by EMS last evening after being found outside on the ground by some neighbors.  She was noted to be hypothermic.  Initial temperature in the ED was 30.4 (86.7).  Vital signs were otherwise stable.  She subsequent developed some mild hypotension with systolic blood pressures in the 80s to low 100s.  CBC was normal with exception of WBC 12.1.  Basic metabolic profile was normal with exception of BUN 45 and glucose 166.  CPK was 391.  BNP level 379.  Initial high-sensitivity troponin level was 3,071 with subsequent levels of 4,468, 7,405, and 7,123.  Chest x-ray did not show any acute disease.  CT scan of the brain was negative for any acute findings.  No fractures identified.  Initial EKG showed significant baseline artifact with probable atrial fibrillation.  Repeat EKG showed normal sinus rhythm with occasional PVCs.  Known complete left bundle much block.    She was treated with warm IV fluids and a Fariha hugger.   She had some nausea and vomiting and generalized discomfort.  She had slow improvement of her temperature as well as her mental status.  She was admitted to the ICU.  She currently is resting comfortably.  She is visiting with her daughter at the bedside.  She denies any chest pain or shortness of breath.  She being treated with intravenous heparin.  Echocardiogram done today shows an estimated LV ejection fraction of 40%.  Apical hypokinesis.  Diagnostic and treatment options were discussed.  In light of her known history of ASHD, syncope, severely elevated troponin, and wall motion abnormalities on echo we will plan to proceed with repeat card catheterization to delineate her coronary anatomy.  Risk and benefits were discussed in detail with the patient and her daughter and they are agreeable to proceed.  Further recommendations pending these results.

## 2025-02-12 NOTE — POST-PROCEDURE NOTE
Physician Transition of Care Summary  Invasive Cardiovascular Lab    Procedure Date: 2/12/2025  Attending:    * Elvin Means - Primary  Resident/Fellow/Other Assistant: Surgeons and Role:  * No surgeons found with a matching role *    Indications:   Pre-op Diagnosis      * Fall, initial encounter [W19.XXXA]     * Hypothermia, initial encounter [T68.XXXA]     * Elevated troponin [R79.89]     * NSTEMI (non-ST elevated myocardial infarction) (Multi) [I21.4]     * Chronic diastolic heart failure [I50.32]     * Essential hypertension [I10]     * Left bundle branch block [I44.7]    Post-procedure diagnosis:   Post-op Diagnosis     * Fall, initial encounter [W19.XXXA]     * Hypothermia, initial encounter [T68.XXXA]     * Elevated troponin [R79.89]     * NSTEMI (non-ST elevated myocardial infarction) (Multi) [I21.4]     * Chronic diastolic heart failure [I50.32]     * Essential hypertension [I10]     * Left bundle branch block [I44.7]    Procedure(s):   Left Heart Cath  76647 - CO CATH PLMT L HRT & ARTS W/NJX & ANGIO IMG S&I    Procedure Findings:   Single vessel coronary artery disease  Patent stent to mid LAD  Ostial 1st Dg 50% lesion (residual lesion from PCI in bifurcation)  Moderately reduced LV systolic function ~40% due to apical akinesia  Compared to the previous left heart catheterization (2024), the findings are similar, with patent stent to LAD and similar post-bifurcation PCI lesion to ostial 1st Dg      Description of the Procedure:     R common femoral artery access with micropuncture and 6F sheath inserted above the bifurcation under ultrasound guidance. LV and Ao hemodynamic measurements. L and R coronaries engaged using 5F JL-4 and JR-3.5 catheters respectively. Pigtail for ventriculogram. S/P Left Heart Catheterization that showed single vessel obstructive coronary artery disease.     Single vessel coronary artery disease  Patent stent to mid LAD  Ostial 1st Dg 50% lesion (residual lesion from  PCI in bifurcation)  Moderately reduced LV systolic function ~40% due to apical akinesia    Patient remained stable during the entire procedure. No complications. Hemostasis with 6F Vascade device and manual compression.    Plan: Patient may be discharged home after bed rest for 3 hours. Constant check for bleeding. After the patient resumes walking, observe for 15 minutes and check for signs of bleeding. Patient counseled to keep clean dressing and avoid bathing for the next 24 hours. Avoid bath tube and swimming pool for 7 days.  Patient advised to go to the Emergency Department in case of bleeding.     Cardiology follow up. Would suggest to keep conservative treatment and guideline-directed medical therapy (GDMT).     Complications:   No    Stents/Implants:   Implants       No implant documentation for this case.          Anticoagulation/Antiplatelet Plan:   ASA    Estimated Blood Loss:   0 mL    Anesthesia: Moderate Sedation Anesthesia Staff: No anesthesia staff entered.    Any Specimen(s) Removed:   No specimens collected during this procedure.    Disposition:   Floor    Electronically signed by: Elvin Means MD, 2/12/2025 1:38 PM

## 2025-02-12 NOTE — PROGRESS NOTES
Patient underwent cardiac catheterization performed by Dr. Means.  Procedure findings include single-vessel coronary artery disease with patent stent to the mid LAD and 50% ostial first diagonal lesion which is residual lesion from PCI and bifurcation.  LV systolic function approximately 40%.  He notes that compared to previous heart catheterization in 2024 findings are similar.  These findings were discussed with patient and her granddaughter in the intensive care unit where she was resting comfortably and had no complaint except for being sore from her fall.  Pictures of coronary arteries were reviewed and provided to her.  She verbalized understanding of information. She is maintaining sinus rhythm therefore IV heparin was discontinued after her heart catheterization.  Continue to monitor heart rhythm, daily EKGs, and laboratory studies.  Further cardiology recommendation per general cardiology team.

## 2025-02-12 NOTE — CARE PLAN
The patient's goals for the shift include      The clinical goals for the shift include patient will remain hemodynamically stable for the duration of the shift    Over the shift, the patient did not make progress toward the following goals. Barriers to progression include . Recommendations to address these barriers include .

## 2025-02-12 NOTE — PROGRESS NOTES
Occupational Therapy                 Therapy Communication Note    Patient Name: Laurel Pugh  MRN: [id number]  Department: [facility]  Room: 12/12-A  Today's Date: 2/12/2025     Discipline: Occupational Therapy    OT Missed Visit: Yes     Missed Visit Reason: Missed Visit Reason: Patient placed on medical hold    Missed Time: Attempt    Comment: OT order received and chart reviewed. BP at time of attempt 85/40. Plan for heart cath this date. Will hold and re attempt as medically appropriate. Discussed with RN.

## 2025-02-12 NOTE — ED PROVIDER NOTES
HPI   No chief complaint on file.      84-year-old female presents emergency departments, presents by EMS, found outside on the ground by neighbors.  According to family they last talked to her at 4 PM.  Hypothermic.  EMS reports awake only to physical stimulus initially, although started arousing more to her name and route.    Family presents, they are concerned that the last person that had contact with this patient was around 1 PM when her son left her home.  States that when they found her on the ground this evening all the lights were off indicating the patient had gone outside during daylight hours.      History provided by:  Patient   used: No            Patient History   Past Medical History:   Diagnosis Date    Arthritis     Bitten or stung by nonvenomous insect and other nonvenomous arthropods, initial encounter 05/22/2019    Tick bite, initial encounter    Encounter for general adult medical examination without abnormal findings 10/05/2022    Medicare annual wellness visit, subsequent    Encounter for general adult medical examination without abnormal findings 11/02/2021    Medicare annual wellness visit, subsequent    Personal history of malignant neoplasm of breast     History of malignant neoplasm of breast    Personal history of other diseases of urinary system 08/10/2019    History of hematuria    Personal history of other specified conditions     History of vertigo    Personal history of other specified conditions 08/10/2019    History of urinary frequency    Personal history of urinary (tract) infections 08/10/2019    History of acute cystitis    Personal history of urinary (tract) infections 09/03/2019    History of urinary tract infection     Past Surgical History:   Procedure Laterality Date    CARDIAC CATHETERIZATION N/A 7/15/2024    Procedure: Left Heart Cath, With LV;  Surgeon: Tressa Davison MD;  Location: [facility];  Service: Cardiovascular;  Laterality: N/A;   radial approach please    CARDIAC CATHETERIZATION N/A 7/15/2024    Procedure: PCI KHUSHI Stent- Coronary;  Surgeon: Tressa Davison MD;  Location: [facility];  Service: Cardiovascular;  Laterality: N/A;    OTHER SURGICAL HISTORY  04/30/2019    Hysterectomy    OTHER SURGICAL HISTORY  04/30/2019    Mastectomy    OTHER SURGICAL HISTORY  04/30/2019    Tooth extraction     Family History   Problem Relation Name Age of Onset    Stroke Mother      Other (cardiac disorder) Father      Breast cancer Sister      Arthritis Sister       Social History     Tobacco Use    Smoking status: Never     Passive exposure: Never    Smokeless tobacco: Never   Vaping Use    Vaping status: Never Used   Substance Use Topics    Alcohol use: Never    Drug use: Never       Physical Exam   ED Triage Vitals   Temp Pulse Resp BP   -- -- -- --      SpO2 Temp src Heart Rate Source Patient Position   -- -- -- --      BP Location FiO2 (%)     -- --       Physical Exam  Gen.: Vitals noted, positive distress. Hypothermic   Head: Normocephalic. Pupils PERRL EOMI. TMs clear no hemotympanum.   Neck: C-collar applied on arrival  Cardiac: Regular rate rhythm no murmur.   Lungs: Clear to auscultation bilaterally with good aeration and no adventitious breath sounds.   Abdomen: Soft nontender nonsurgical. Normoactive bowel sounds.   Back: No midline or paraspinal tenderness.   Extremities: Erythema noted left hip  Skin: No rash.   Neuro:  GCS is 6      ED Course & MDM   Diagnoses as of 02/12/25 0104   Fall, initial encounter   Hypothermia, initial encounter   Elevated troponin          Labs Reviewed   CBC WITH AUTO DIFFERENTIAL - Abnormal       Result Value    WBC 12.1 (*)     nRBC 0.0      RBC 4.37      Hemoglobin 12.3      Hematocrit 39.1      MCV 90      MCH 28.1      MCHC 31.5 (*)     RDW 14.3      Platelets 247      Neutrophils % 78.8      Immature Granulocytes %, Automated 0.7      Lymphocytes % 14.4      Monocytes % 5.5      Eosinophils % 0.2       Basophils % 0.4      Neutrophils Absolute 9.54 (*)     Immature Granulocytes Absolute, Automated 0.09      Lymphocytes Absolute 1.75      Monocytes Absolute 0.67      Eosinophils Absolute 0.03      Basophils Absolute 0.05     COMPREHENSIVE METABOLIC PANEL - Abnormal    Glucose 166 (*)     Sodium 140      Potassium 4.4      Chloride 106      Bicarbonate 23      Anion Gap 15      Urea Nitrogen 45 (*)     Creatinine 0.95      eGFR 58 (*)     Calcium 9.5      Albumin 4.2      Alkaline Phosphatase 44      Total Protein 7.2      AST 29      Bilirubin, Total 0.6      ALT 15     LACTATE - Abnormal    Lactate 2.6 (*)     Narrative:     Venipuncture immediately after or during the administration of Metamizole may lead to falsely low results. Testing should be performed immediately prior to Metamizole dosing.   PROTIME-INR - Abnormal    Protime 13.1 (*)     INR 1.2 (*)    TROPONIN I, HIGH SENSITIVITY - Abnormal    Troponin I, High Sensitivity 3,071 (*)     Narrative:     Less than 99th percentile of normal range cutoff-  Female and children under 18 years old <14 ng/L; Male <21 ng/L: Negative  Repeat testing should be performed if clinically indicated.     Female and children under 18 years old 14-50 ng/L; Male 21-50 ng/L:  Consistent with possible cardiac damage and possible increased clinical   risk. Serial measurements may help to assess extent of myocardial damage.     >50 ng/L: Consistent with cardiac damage, increased clinical risk and  myocardial infarction. Serial measurements may help assess extent of   myocardial damage.      NOTE: Children less than 1 year old may have higher baseline troponin   levels and results should be interpreted in conjunction with the overall   clinical context.     NOTE: Troponin I testing is performed using a different   testing methodology at [facility] than at other   [facility] hospitals. Direct result comparisons should only   be made within the same method.   B-TYPE  NATRIURETIC PEPTIDE - Abnormal     (*)     Narrative:        <100 pg/mL - Heart failure unlikely  100-299 pg/mL - Intermediate probability of acute heart                  failure exacerbation. Correlate with clinical                  context and patient history.    >=300 pg/mL - Heart Failure likely. Correlate with clinical                  context and patient history.    BNP testing is performed using different testing methodology at [facility] than at other [facility]. Direct result comparisons should only be made within the same method.      BLOOD GAS ARTERIAL FULL PANEL - Abnormal    POCT pH, Arterial 7.25 (*)     POCT pCO2, Arterial 42      POCT pO2, Arterial 119 (*)     POCT SO2, Arterial 100      POCT Oxy Hemoglobin, Arterial 98.2 (*)     POCT Hematocrit Calculated, Arterial 36.0      POCT Sodium, Arterial 135 (*)     POCT Potassium, Arterial 5.1      POCT Chloride, Arterial 109 (*)     POCT Ionized Calcium, Arterial 1.21      POCT Glucose, Arterial 167 (*)     POCT Lactate, Arterial 2.5 (*)     POCT Base Excess, Arterial -8.4 (*)     POCT HCO3 Calculated, Arterial 18.4 (*)     POCT Hemoglobin, Arterial 12.0      POCT Anion Gap, Arterial 13      Patient Temperature        FiO2 21      Critical Called By RT  KD      Critical Called To NP VRABEL      Critical Call Time 2113      Critical Read Back Y     TROPONIN I, HIGH SENSITIVITY - Abnormal    Troponin I, High Sensitivity 4,468 (*)     Narrative:     Less than 99th percentile of normal range cutoff-  Female and children under 18 years old <14 ng/L; Male <21 ng/L: Negative  Repeat testing should be performed if clinically indicated.     Female and children under 18 years old 14-50 ng/L; Male 21-50 ng/L:  Consistent with possible cardiac damage and possible increased clinical   risk. Serial measurements may help to assess extent of myocardial damage.     >50 ng/L: Consistent with cardiac damage, increased clinical risk and  myocardial  infarction. Serial measurements may help assess extent of   myocardial damage.      NOTE: Children less than 1 year old may have higher baseline troponin   levels and results should be interpreted in conjunction with the overall   clinical context.     NOTE: Troponin I testing is performed using a different   testing methodology at [facility] than at other   [facility]. Direct result comparisons should only   be made within the same method.   CREATINE KINASE - Abnormal    Creatine Kinase 391 (*)    MAGNESIUM - Normal    Magnesium 2.30     LACTATE - Normal    Lactate 2.0      Narrative:     Venipuncture immediately after or during the administration of Metamizole may lead to falsely low results. Testing should be performed immediately prior to Metamizole dosing.   BLOOD GAS LACTIC ACID, VENOUS   BLOOD GAS ARTERIAL FULL PANEL   URINALYSIS WITH REFLEX CULTURE AND MICROSCOPIC    Narrative:     The following orders were created for panel order Urinalysis with Reflex Culture and Microscopic.  Procedure                               Abnormality         Status                     ---------                               -----------         ------                     Urinalysis with Reflex C...[[id number]]                                                 Extra Urine Gray Tube[[id number]]                                                         Please view results for these tests on the individual orders.   URINALYSIS WITH REFLEX CULTURE AND MICROSCOPIC   EXTRA URINE GRAY TUBE        CT head wo IV contrast   Final Result   1.  No acute intracranial hemorrhage or depressed calvarial fracture.   2.  No acute fracture at the cervical spine. Degenerative changes.                  MACRO:   None.        Signed by: Pat Chavarria 2/12/2025 12:50 AM   Dictation workstation:   [id number]      CT cervical spine wo IV contrast   Final Result   1.  No acute intracranial hemorrhage or depressed calvarial fracture.   2.   No acute fracture at the cervical spine. Degenerative changes.                  MACRO:   None.        Signed by: Pat Chavarria 2/12/2025 12:50 AM   Dictation workstation:   [id number]      XR chest 1 view   Final Result   No acute cardiopulmonary process.        MACRO:   None        Signed by: Abilillian Barreto 2/11/2025 9:59 PM   Dictation workstation:   [id number]      CT thoracic spine wo IV contrast    (Results Pending)   CT lumbar spine wo IV contrast    (Results Pending)   CT chest abdomen pelvis wo IV contrast    (Results Pending)          No data recorded                         Medical Decision Making  Patient initially presents in poor condition, GCS of 6, moaning and withdrawing from pain.  She is hypothermic.  Started on warm IV fluids x 1 L, warmed blankets, bear hugger.  Patient did have a fall, unsure of any significant trauma with this fall so was placed in a c-collar on arrival    Workup initiated, initially ABG was obtained, pH of 7.25 with pCO2 of 42 and pO2 of 119.    Patient did see to be in discomfort, moaning, given 50 mcg of fentanyl, she did have some vomiting so was additionally given 12.5 mg of Phenergan    CBC with a slightly elevated white count, lactate level 2.6, metabolic panel unremarkable, troponin elevated to 3000.    Initial EKG at 2119 with ventricular rate of 70, poor quality given the patient's shivering and tremoring, although appears to be a sinus rhythm with left bundle branch block which is unchanged from previous, nonspecific ST and T wave patterns, no evidence of acute ischemia.    Chest x-ray obtained, unremarkable.    Patient continued to be warm and, had slow improvement of her temperature as well as her mental status.    Sent to CT imaging for pan scan given evidence of fall.  CT head and C-spine unremarkable for evidence of acute trauma.    Delta troponin elevated to 4400.  Repeat EKG at 00 44 with ventricular of 76, as interpreted me, showed sinus rhythm with  PVCs, left bundle branch block noted, nonspecific ST and T wave patterns, no evidence of acute ischemia.    Patient will be placed on heparin if the remainder of the scans are atraumatic.    Patient mental status completely back to her baseline, she is able to tell me that she went out to fill the bird feeder up today, slipped on the ice, landed on her left shoulder.  She was unable to get up on her own, subsequently became cold and confused.    Discussed with the ICU team, accepted to the intensivist service.    Procedure  Procedures     YECENIA Maradiaga-CNP  02/12/25 0125

## 2025-02-12 NOTE — PROGRESS NOTES
[facility] Critical Care Medicine Progress Note    Date:  2/12/2025  Patient:  Laurel Pugh  YOB: 1940  MRN:  [id number]   Admit Date:  2/11/2025  ========================================================================================================    Chief Complaint   Patient presents with    Fall    Cold Exposure     Pt arrived by squad. Found outside in the grass by family. Estimated fall and laying outside x about 3 hours     Interval ICU Events:  2/12:  Admitted to the ICU for further monitoring. Pt with continued up trending troponin to 7k this morning. Pt otherwise not hypothermic or febrile anymore. Pt only stating she has some right hip soreness. She denies chest pain, shortness of breath, abdominal pain, nausea, vomiting, and pre-syncope or syncope prior to falling     Medical History:  Past Medical History:   Diagnosis Date    Arthritis     Bitten or stung by nonvenomous insect and other nonvenomous arthropods, initial encounter 05/22/2019    Tick bite, initial encounter    Encounter for general adult medical examination without abnormal findings 10/05/2022    Medicare annual wellness visit, subsequent    Encounter for general adult medical examination without abnormal findings 11/02/2021    Medicare annual wellness visit, subsequent    Personal history of malignant neoplasm of breast     History of malignant neoplasm of breast    Personal history of other diseases of urinary system 08/10/2019    History of hematuria    Personal history of other specified conditions     History of vertigo    Personal history of other specified conditions 08/10/2019    History of urinary frequency    Personal history of urinary (tract) infections 08/10/2019    History of acute cystitis    Personal history of urinary (tract) infections 09/03/2019    History of urinary tract infection     Past Surgical History:   Procedure Laterality Date    CARDIAC CATHETERIZATION N/A 7/15/2024    Procedure: Left Heart  Cath, With LV;  Surgeon: Tressa Davison MD;  Location: [facility];  Service: Cardiovascular;  Laterality: N/A;  radial approach please    CARDIAC CATHETERIZATION N/A 7/15/2024    Procedure: PCI KHUSHI Stent- Coronary;  Surgeon: Tressa Davison MD;  Location: [facility];  Service: Cardiovascular;  Laterality: N/A;    OTHER SURGICAL HISTORY  04/30/2019    Hysterectomy    OTHER SURGICAL HISTORY  04/30/2019    Mastectomy    OTHER SURGICAL HISTORY  04/30/2019    Tooth extraction     Medications Prior to Admission   Medication Sig Dispense Refill Last Dose/Taking    cholecalciferol (Vitamin D-3) 25 MCG (1000 UT) tablet Take 1 tablet (1,000 Units) by mouth once daily.   2/11/2025 Morning    clopidogrel (Plavix) 75 mg tablet Take 1 tablet (75 mg) by mouth once daily. 90 tablet 1 2/11/2025 Morning    cyanocobalamin (Vitamin B-12) 2,000 mcg tablet Take 1 tablet (2,000 mcg) by mouth once daily. 30 tablet 11 2/11/2025 Morning    docusate sodium (STOOL SOFTENER ORAL) Take by mouth once daily as needed.   2/11/2025 Morning    sacubitriL-valsartan (Entresto) 24-26 mg tablet Take 1 tablet by mouth 2 times a day. 180 tablet 1 2/11/2025 Morning    aspirin 81 mg EC tablet Take 1 tablet (81 mg) by mouth once daily.   2/10/2025 Bedtime    metoprolol succinate XL (Toprol-XL) 25 mg 24 hr tablet Take 1 tablet (25 mg) by mouth once daily. 90 tablet 1 2/10/2025 Bedtime    rosuvastatin (Crestor) 5 mg tablet Take 1 tablet (5 mg) by mouth once daily. 90 tablet 3 2/10/2025 Bedtime     Haloperidol, Keflex [cephalexin], Zofran [ondansetron hcl], Amoxicillin-pot clavulanate, Levofloxacin, and Sulfa (sulfonamide antibiotics)  Social History     Tobacco Use    Smoking status: Never     Passive exposure: Never    Smokeless tobacco: Never   Vaping Use    Vaping status: Never Used   Substance Use Topics    Alcohol use: Never    Drug use: Never     Family History   Problem Relation Name Age of Onset    Stroke Mother      Other (cardiac  "disorder) Father      Breast cancer Sister      Arthritis Sister         Review of Systems:  14 point review of systems was completed and negative except for those specially mention in my HPI    Physical Exam:    Heart Rate:  []   Temp:  [30.4 °C (86.7 °F)-37.6 °C (99.7 °F)]   Resp:  [11-25]   BP: ()/(47-74)   Height:  [170.2 cm (5' 7\")]   Weight:  [63.5 kg (140 lb)-64.5 kg (142 lb 3.2 oz)]   SpO2:  [94 %-100 %]     Physical Exam  Constitutional:       General: She is not in acute distress.     Appearance: Normal appearance. She is not ill-appearing.   HENT:      Mouth/Throat:      Mouth: Mucous membranes are moist.   Eyes:      General: No scleral icterus.     Extraocular Movements: Extraocular movements intact.      Pupils: Pupils are equal, round, and reactive to light.   Cardiovascular:      Rate and Rhythm: Normal rate and regular rhythm.      Pulses: Normal pulses.      Heart sounds: No murmur heard.  Pulmonary:      Effort: Pulmonary effort is normal. No respiratory distress.      Breath sounds: No wheezing or rales.   Abdominal:      General: Abdomen is flat. Bowel sounds are normal. There is no distension.      Palpations: Abdomen is soft.      Tenderness: There is no abdominal tenderness.   Musculoskeletal:      Right lower leg: No edema.      Left lower leg: No edema.   Skin:     General: Skin is warm and dry.      Capillary Refill: Capillary refill takes less than 2 seconds.      Findings: No rash.   Neurological:      General: No focal deficit present.      Mental Status: She is alert and oriented to person, place, and time.      Cranial Nerves: No cranial nerve deficit.      Motor: No weakness.   Psychiatric:         Mood and Affect: Mood normal.         Behavior: Behavior normal.       Objective:  I have reviewed all medications, laboratory results, and imaging pertinent for today's encounter    Assessment/Plan:  Pt is an 83 y/o F w/ a PMHx of left mastectomy, vertigo, GERD, LBBB, " diastolic heart failure, previous NSTEMI s/p PCI with stent placement who presents s/p mechanical fall with hypothermia in the setting of environmental exposure complicated by elevated troponin possibly in the setting of an NSTEMI. Pt currently not hypothermic and appears very well but on heparin gtt.    Neuro/Psych/Pain Ctrl/Sedation:  #Hypothermia due to environmental exposure  #Hallucinations-likely 2/2 UTI  #Hx Vertigo  - Patient currently normothermic  - Pain control as needed with PRN Tylenol  - CAM-ICU & Delirium Precautions  - PT/OT evaluation once clarifying cath plan     Respiratory/ENT:  -No current active issues  -Supplemental Oxygen as needed to maintain an SpO2>90%  -Incentive spirometry and Early mobilization for lung volume expansion      Cardiovascular:  #New onset atrial fibrillation  #NSTEMI  #HFpEF -EF 50% 10/23  - Will discuss with cardiology for need for cath  - Order TTE  - Continue heparin drip  - Trend troponins to peak  - Hold GDMT for now except will resume ASA and plavix  - EKG daily  - Tele monitoring     Renal/Volume Status (Intra & Extravascular):  #Mild CK elevation - likely due to fall  - No current active issues  - Monitor UOP Q4H and Cr daily  - Avoid nephrotoxic drugs and renally dose all medications   - Replete Electrolytes to K+ of 4, Mg2+ of 2, Phos of 3    GI:  #Hx GERD  - No active issues  - Cardiac Diet once clarifying cath plan but NPO for now  - No indication for PPI or GI Ppx at this time  - C/W bowel regimen     Endocrine  - No current active issues  - No need for finger sticks at this time  - Add finger sticks and ISS or dextrose in fluids as needed  - Finger sticks goals 100-180      Infectious Disease:  #Urinary Tract Infection  - Hx of e.coli UTI   - Follow-up urine culture  - C/W Nitrofurantoin 100 mg BID X5 days  - Follow blood cultures - low suspicion for sepsis and patient even asymptomatic without urinary symptoms and could be asymptomatic bacteruria       Heme/Onc:  #Left Hip/Thigh hematoma   - Resume ASA and plavix  -Monitor hematoma for expansion  -Daily CBC  -Monitor for s/s of bleeding  - Heparin gtt   - Monitor Hgb Daily and transfuse for Hgb<8 or bleeding with hemodynamic instability      OBGYN/MSK/SKIN:  -Ambulatory at baseline, no ambulatory assistive devices used   -Will re-evaluate PT/OT after possible LHC     Ethics/Code Status:  - Full Code      :  DVT Prophylaxis: SCD  GI Prophylaxis: none  Bowel Regimen: none  Diet: NPO  CVC: none  Sarah: none  Olvera: yes - can remove today possibly after cath  Restraints: none  Dispo: ICU    Rashaad Fiore MD

## 2025-02-12 NOTE — PROGRESS NOTES
Physical Therapy                 Therapy Communication Note    Patient Name: Laurel Pugh  MRN: [id number]  Department: [facility]  Room: 12/12-A  Today's Date: 2/12/2025     Discipline: Physical Therapy    PT Missed Visit: Yes     Missed Visit Reason: Missed Visit Reason: Patient placed on medical hold    Missed Time: Attempt    Comment: Received order for P.T. eval. Chart reviewed. Attempted to see Pt. for P.T. eval. but BP was 85/40 and Pt. Is scheduled for a cardiac cath today. Will hold P.T. at this time and re-attempt P.T. eval when Pt. is medically appropriate or available. Home info obtained.

## 2025-02-12 NOTE — H&P
[facility] Critical Care Medicine       Date:  2/12/2025  Patient:  Laurel Pugh  YOB: 1940  MRN:  [id number]   Admit Date:  2/11/2025  ========================================================================================================    Chief Complaint   Patient presents with    Fall    Cold Exposure     Pt arrived by squad. Found outside in the grass by family. Estimated fall and laying outside x about 3 hours         History of Present Illness:  Laurel Pugh is a 84 y.o. year old female patient with Past Medical History of breast cancer with left mastectomy, vertigo, GERD, LBBB, diastolic heart failure, previous NSTEMI s/p PCI with stent placement.  Patient lives independently at home alone, states that she went outside to refill her bird feeder this afternoon when she had to the bottom of her porch stairs she slipped on a patch of ice and fell.  States that she does not remember falling.  States that she did not injure herself, or hit her head, or lose consciousness.  But states that she was unable to get up.  Patient was found outside by her neighbors who called EMS.  And then she presented to the emergency department.  She was hypothermic upon arrival with a temperature of 30 °C, and patient was also found to be in atrial fibrillation which she does not have a prior history of.  Rewarming issues were initiated in the emergency department. She was sent for CT scan of head, cervical spine, abdomen and pelvis.  They were all negative for acute findings.  She does have a slight leukocytosis on her CBC, and urinalysis is positive for UTI.  Initial troponin 3,070 repeat troponin 4,468. EKG negative for STEMI, patient denies chest pain.  Heparin drip started while in the emergency department.      Interval ICU Events:  2/12: Admitted to the ICU for further monitoring    Medical History:  Past Medical History:   Diagnosis Date    Arthritis     Bitten or stung by nonvenomous insect and other  nonvenomous arthropods, initial encounter 05/22/2019    Tick bite, initial encounter    Encounter for general adult medical examination without abnormal findings 10/05/2022    Medicare annual wellness visit, subsequent    Encounter for general adult medical examination without abnormal findings 11/02/2021    Medicare annual wellness visit, subsequent    Personal history of malignant neoplasm of breast     History of malignant neoplasm of breast    Personal history of other diseases of urinary system 08/10/2019    History of hematuria    Personal history of other specified conditions     History of vertigo    Personal history of other specified conditions 08/10/2019    History of urinary frequency    Personal history of urinary (tract) infections 08/10/2019    History of acute cystitis    Personal history of urinary (tract) infections 09/03/2019    History of urinary tract infection     Past Surgical History:   Procedure Laterality Date    CARDIAC CATHETERIZATION N/A 7/15/2024    Procedure: Left Heart Cath, With LV;  Surgeon: Tressa Davison MD;  Location: [facility];  Service: Cardiovascular;  Laterality: N/A;  radial approach please    CARDIAC CATHETERIZATION N/A 7/15/2024    Procedure: PCI KHUSHI Stent- Coronary;  Surgeon: Tressa Davison MD;  Location: [facility];  Service: Cardiovascular;  Laterality: N/A;    OTHER SURGICAL HISTORY  04/30/2019    Hysterectomy    OTHER SURGICAL HISTORY  04/30/2019    Mastectomy    OTHER SURGICAL HISTORY  04/30/2019    Tooth extraction     (Not in a hospital admission)    Haloperidol, Keflex [cephalexin], Zofran [ondansetron hcl], Amoxicillin-pot clavulanate, Levofloxacin, and Sulfa (sulfonamide antibiotics)  Social History     Tobacco Use    Smoking status: Never     Passive exposure: Never    Smokeless tobacco: Never   Vaping Use    Vaping status: Never Used   Substance Use Topics    Alcohol use: Never    Drug use: Never     Family History   Problem Relation Name  "Age of Onset    Stroke Mother      Other (cardiac disorder) Father      Breast cancer Sister      Arthritis Sister         Review of Systems:  14 point review of systems was completed and negative except for those specially mention in my HPI    Physical Exam:    Heart Rate:  [65-81]   Temperature:  [30.4 °C (86.7 °F)-36.5 °C (97.7 °F)]   Respirations:  [16-21]   BP: (110-163)/(58-74)   Height:  [170.2 cm (5' 7\")]   Weight:  [63.5 kg (140 lb)]   Pulse Ox:  [94 %-97 %]     Physical Exam  Vitals reviewed.   Constitutional:       General: She is awake. She is not in acute distress.     Appearance: She is not ill-appearing.   HENT:      Head: Normocephalic and atraumatic.      Right Ear: External ear normal.      Left Ear: External ear normal.      Nose: Nose normal.      Mouth/Throat:      Mouth: Mucous membranes are dry.      Pharynx: Oropharynx is clear.   Eyes:      Pupils: Pupils are equal, round, and reactive to light.   Cardiovascular:      Rate and Rhythm: Normal rate. Rhythm irregular.      Pulses: Normal pulses.      Heart sounds: Normal heart sounds.   Pulmonary:      Effort: Pulmonary effort is normal.      Breath sounds: Normal breath sounds.   Abdominal:      General: Bowel sounds are normal.      Palpations: Abdomen is soft.   Genitourinary:     Comments: Indwelling urinary catheter   Musculoskeletal:         General: Normal range of motion.      Cervical back: Normal range of motion.      Right lower leg: No edema.      Left lower leg: No edema.   Skin:     General: Skin is warm and dry.      Capillary Refill: Capillary refill takes less than 2 seconds.      Findings: Bruising and ecchymosis present.             Comments: Left hip/thigh hematoma   Neurological:      General: No focal deficit present.      Mental Status: She is alert. Mental status is at baseline.   Psychiatric:         Speech: Speech normal.         Behavior: Behavior is cooperative.         Objective:    I have reviewed all medications, " laboratory results, and imaging pertinent for today's encounter    Assessment/Plan:    I am currently managing this critically ill patient for the following problems:    Neuro/Psych/Pain Ctrl/Sedation:  #Hypothermia  #Hallucinations-likely 2/2 UTI  #Hx Vertigo  -Rewarming measures   -Hourly Temperature checks via indwelling temperature fo provide OB/GYN following temperature Jignesh   -CAM ICU/Delirium protocol  -Tylenol PRN pain  -Neuro checks     Respiratory/ENT:  -No acute issues  -Maintain O2 >92%    Cardiovascular:  #New onset atrial fibrillation  #NSTEMI  #HFpEF -EF 50% 10/23  -Consult cardiology  -Continue heparin drip  -Trend troponins to peak  -Hold GDMT  -Consider echo, per cardiology recommendation   -EKG daily  -Continuous cardiac monitoring    GI:  #Hx GERD  -NPO     Renal/Volume Status (Intra & Extravascular):  #Mild CK elevation  -, received 1 L fluids in ED  -Daily BMP  -Monitor and replete electrolytes as needed  -Strict I&O    Endocrine  -No acute issues  -Monitor s/s hypoglycemia while NPO  -No history of diabetes or thyroid    Infectious Disease:  #Urinary Tract Infection  -Hx of e.coli UTI   -Send urine for culture  -Start Nitrofurantoin 100 mg BID X5 days  -Follow blood cultures   -Will check flu/covid    Heme/Onc:  #Left Hip/Thigh hematoma   -Holding ASA and plavix  -Monitor hematoma for expansion  -Daily CBC  -Monitor for s/s of bleeding    OBGYN/MSK/SKIN:  -Ambulatory at baseline, no ambulatory assistive devices used   -Will re-evaluate PT/OT    Ethics/Code Status:  Full Code     :  DVT Prophylaxis: SCD  GI Prophylaxis: none  Bowel Regimen: none  Diet: NPO  CVC: none  Sarah: none  Olvera: yes  Restraints: none  Dispo: ICU    Critical Care Time:  50 minutes spent in preparing to see patient (I.e. review of medical records), evaluation of diagnostics (I.e. labs, imaging, etc.), documentation, discussing plan of care with patient/ family/ caregiver, and/ or coordination of  care with multidisciplinary team. Time does not include completion of procedure time.     YECENIA Mayberry-CNP  Critical Care Medicine  [facility]

## 2025-02-13 ENCOUNTER — APPOINTMENT (OUTPATIENT)
Dept: CARDIOLOGY | Facility: HOSPITAL | Age: 85
DRG: 922 | End: 2025-02-13
Payer: MEDICARE

## 2025-02-13 VITALS
BODY MASS INDEX: 22.21 KG/M2 | SYSTOLIC BLOOD PRESSURE: 110 MMHG | RESPIRATION RATE: 23 BRPM | OXYGEN SATURATION: 100 % | DIASTOLIC BLOOD PRESSURE: 47 MMHG | HEIGHT: 67 IN | HEART RATE: 76 BPM | WEIGHT: 141.54 LBS | TEMPERATURE: 100 F

## 2025-02-13 LAB
ALBUMIN SERPL BCP-MCNC: 3.6 G/DL (ref 3.4–5)
ALP SERPL-CCNC: 35 U/L (ref 33–136)
ALT SERPL W P-5'-P-CCNC: 23 U/L (ref 7–45)
ANION GAP SERPL CALC-SCNC: 11 MMOL/L (ref 10–20)
AST SERPL W P-5'-P-CCNC: 54 U/L (ref 9–39)
ATRIAL RATE: 73 BPM
BASOPHILS # BLD AUTO: 0.05 X10*3/UL (ref 0–0.1)
BASOPHILS NFR BLD AUTO: 0.8 %
BILIRUB SERPL-MCNC: 0.8 MG/DL (ref 0–1.2)
BUN SERPL-MCNC: 35 MG/DL (ref 6–23)
CALCIUM SERPL-MCNC: 8.9 MG/DL (ref 8.6–10.3)
CHLORIDE SERPL-SCNC: 109 MMOL/L (ref 98–107)
CO2 SERPL-SCNC: 24 MMOL/L (ref 21–32)
CREAT SERPL-MCNC: 1.09 MG/DL (ref 0.5–1.05)
EGFRCR SERPLBLD CKD-EPI 2021: 50 ML/MIN/1.73M*2
EOSINOPHIL # BLD AUTO: 0.07 X10*3/UL (ref 0–0.4)
EOSINOPHIL NFR BLD AUTO: 1.1 %
ERYTHROCYTE [DISTWIDTH] IN BLOOD BY AUTOMATED COUNT: 14.6 % (ref 11.5–14.5)
GLUCOSE SERPL-MCNC: 82 MG/DL (ref 74–99)
HCT VFR BLD AUTO: 31.7 % (ref 36–46)
HGB BLD-MCNC: 10 G/DL (ref 12–16)
IMM GRANULOCYTES # BLD AUTO: 0.01 X10*3/UL (ref 0–0.5)
IMM GRANULOCYTES NFR BLD AUTO: 0.2 % (ref 0–0.9)
LYMPHOCYTES # BLD AUTO: 1.19 X10*3/UL (ref 0.8–3)
LYMPHOCYTES NFR BLD AUTO: 18.1 %
MAGNESIUM SERPL-MCNC: 2.1 MG/DL (ref 1.6–2.4)
MCH RBC QN AUTO: 27.7 PG (ref 26–34)
MCHC RBC AUTO-ENTMCNC: 31.5 G/DL (ref 32–36)
MCV RBC AUTO: 88 FL (ref 80–100)
MONOCYTES # BLD AUTO: 0.58 X10*3/UL (ref 0.05–0.8)
MONOCYTES NFR BLD AUTO: 8.8 %
NEUTROPHILS # BLD AUTO: 4.68 X10*3/UL (ref 1.6–5.5)
NEUTROPHILS NFR BLD AUTO: 71 %
NRBC BLD-RTO: 0 /100 WBCS (ref 0–0)
P AXIS: 65 DEGREES
P OFFSET: 183 MS
P ONSET: 119 MS
PHOSPHATE SERPL-MCNC: 3.1 MG/DL (ref 2.5–4.9)
PLATELET # BLD AUTO: 181 X10*3/UL (ref 150–450)
POTASSIUM SERPL-SCNC: 4 MMOL/L (ref 3.5–5.3)
PR INTERVAL: 184 MS
PROT SERPL-MCNC: 6.1 G/DL (ref 6.4–8.2)
Q ONSET: 211 MS
QRS COUNT: 12 BEATS
QRS DURATION: 140 MS
QT INTERVAL: 514 MS
QTC CALCULATION(BAZETT): 566 MS
QTC FREDERICIA: 549 MS
R AXIS: 10 DEGREES
RBC # BLD AUTO: 3.61 X10*6/UL (ref 4–5.2)
SODIUM SERPL-SCNC: 140 MMOL/L (ref 136–145)
T AXIS: 149 DEGREES
T OFFSET: 468 MS
VENTRICULAR RATE: 73 BPM
WBC # BLD AUTO: 6.6 X10*3/UL (ref 4.4–11.3)

## 2025-02-13 PROCEDURE — 84100 ASSAY OF PHOSPHORUS: CPT | Performed by: STUDENT IN AN ORGANIZED HEALTH CARE EDUCATION/TRAINING PROGRAM

## 2025-02-13 PROCEDURE — 93010 ELECTROCARDIOGRAM REPORT: CPT | Performed by: INTERNAL MEDICINE

## 2025-02-13 PROCEDURE — 2500000001 HC RX 250 WO HCPCS SELF ADMINISTERED DRUGS (ALT 637 FOR MEDICARE OP): Performed by: STUDENT IN AN ORGANIZED HEALTH CARE EDUCATION/TRAINING PROGRAM

## 2025-02-13 PROCEDURE — 97165 OT EVAL LOW COMPLEX 30 MIN: CPT | Mod: GO

## 2025-02-13 PROCEDURE — 99233 SBSQ HOSP IP/OBS HIGH 50: CPT | Performed by: NURSE PRACTITIONER

## 2025-02-13 PROCEDURE — 99292 CRITICAL CARE ADDL 30 MIN: CPT | Performed by: EMERGENCY MEDICINE

## 2025-02-13 PROCEDURE — 97161 PT EVAL LOW COMPLEX 20 MIN: CPT | Mod: GP | Performed by: PHYSICAL THERAPIST

## 2025-02-13 PROCEDURE — 36415 COLL VENOUS BLD VENIPUNCTURE: CPT | Performed by: STUDENT IN AN ORGANIZED HEALTH CARE EDUCATION/TRAINING PROGRAM

## 2025-02-13 PROCEDURE — 85025 COMPLETE CBC W/AUTO DIFF WBC: CPT | Performed by: STUDENT IN AN ORGANIZED HEALTH CARE EDUCATION/TRAINING PROGRAM

## 2025-02-13 PROCEDURE — 93005 ELECTROCARDIOGRAM TRACING: CPT

## 2025-02-13 PROCEDURE — 2500000002 HC RX 250 W HCPCS SELF ADMINISTERED DRUGS (ALT 637 FOR MEDICARE OP, ALT 636 FOR OP/ED): Performed by: STUDENT IN AN ORGANIZED HEALTH CARE EDUCATION/TRAINING PROGRAM

## 2025-02-13 PROCEDURE — 99239 HOSP IP/OBS DSCHRG MGMT >30: CPT | Performed by: STUDENT IN AN ORGANIZED HEALTH CARE EDUCATION/TRAINING PROGRAM

## 2025-02-13 PROCEDURE — 80053 COMPREHEN METABOLIC PANEL: CPT | Performed by: STUDENT IN AN ORGANIZED HEALTH CARE EDUCATION/TRAINING PROGRAM

## 2025-02-13 PROCEDURE — 83735 ASSAY OF MAGNESIUM: CPT | Performed by: STUDENT IN AN ORGANIZED HEALTH CARE EDUCATION/TRAINING PROGRAM

## 2025-02-13 RX ORDER — NITROFURANTOIN 25; 75 MG/1; MG/1
100 CAPSULE ORAL 2 TIMES DAILY
Qty: 7 CAPSULE | Refills: 0 | Status: SHIPPED | OUTPATIENT
Start: 2025-02-13 | End: 2025-02-17

## 2025-02-13 RX ORDER — METOPROLOL TARTRATE 25 MG/1
12.5 TABLET, FILM COATED ORAL 2 TIMES DAILY
Qty: 30 TABLET | Refills: 0 | Status: SHIPPED | OUTPATIENT
Start: 2025-02-13 | End: 2025-03-15

## 2025-02-13 RX ADMIN — Medication 1000 MCG: at 08:29

## 2025-02-13 RX ADMIN — NITROFURANTOIN (MONOHYDRATE/MACROCRYSTALS) 100 MG: 75; 25 CAPSULE ORAL at 08:29

## 2025-02-13 RX ADMIN — ASPIRIN 81 MG: 81 TABLET, COATED ORAL at 08:29

## 2025-02-13 RX ADMIN — ACETAMINOPHEN 650 MG: 325 TABLET ORAL at 03:03

## 2025-02-13 RX ADMIN — CLOPIDOGREL BISULFATE 75 MG: 75 TABLET, FILM COATED ORAL at 08:29

## 2025-02-13 RX ADMIN — DOCUSATE SODIUM 100 MG: 100 CAPSULE, LIQUID FILLED ORAL at 08:29

## 2025-02-13 RX ADMIN — Medication 1000 UNITS: at 08:29

## 2025-02-13 ASSESSMENT — COGNITIVE AND FUNCTIONAL STATUS - GENERAL
DRESSING REGULAR UPPER BODY CLOTHING: A LITTLE
TURNING FROM BACK TO SIDE WHILE IN FLAT BAD: A LITTLE
TOILETING: A LITTLE
EATING MEALS: A LITTLE
STANDING UP FROM CHAIR USING ARMS: A LITTLE
HELP NEEDED FOR BATHING: A LITTLE
DRESSING REGULAR LOWER BODY CLOTHING: A LITTLE
MOBILITY SCORE: 17
MOVING FROM LYING ON BACK TO SITTING ON SIDE OF FLAT BED WITH BEDRAILS: A LITTLE
PERSONAL GROOMING: A LITTLE
MOVING TO AND FROM BED TO CHAIR: A LITTLE
WALKING IN HOSPITAL ROOM: A LITTLE
CLIMB 3 TO 5 STEPS WITH RAILING: A LOT
DAILY ACTIVITIY SCORE: 18

## 2025-02-13 ASSESSMENT — PAIN - FUNCTIONAL ASSESSMENT
PAIN_FUNCTIONAL_ASSESSMENT: 0-10

## 2025-02-13 ASSESSMENT — PAIN SCALES - GENERAL
PAINLEVEL_OUTOF10: 5 - MODERATE PAIN
PAINLEVEL_OUTOF10: 8
PAINLEVEL_OUTOF10: 5 - MODERATE PAIN
PAINLEVEL_OUTOF10: 4

## 2025-02-13 ASSESSMENT — PAIN DESCRIPTION - LOCATION: LOCATION: HIP

## 2025-02-13 ASSESSMENT — ACTIVITIES OF DAILY LIVING (ADL): BATHING_ASSISTANCE: MINIMAL

## 2025-02-13 NOTE — PROGRESS NOTES
[facility] Critical Care Medicine Progress Note    Date:  2/13/2025  Patient:  Laurel Pugh  YOB: 1940  MRN:  [id number]   Admit Date:  2/11/2025  ========================================================================================================    Chief Complaint   Patient presents with    Fall    Cold Exposure     Pt arrived by squad. Found outside in the grass by family. Estimated fall and laying outside x about 3 hours     Interval ICU Events:  2/12:  Admitted to the ICU for further monitoring. Pt with continued up trending troponin to 7k this morning. Pt otherwise not hypothermic or febrile anymore. Pt only stating she has some right hip soreness. She denies chest pain, shortness of breath, abdominal pain, nausea, vomiting, and pre-syncope or syncope prior to falling     2/13:  Pt in evening yesterday with low blood pressures to 70s s/p IVF bolus with response. Pt with normal lactic acid and down trending trops and a clean LHC. Pt with UOP of 860cc and net negative 860cc with slight increase in Cr to 1.09 from 0.95. Otherwise pt afebrile and hemodynamically stable. No issues with right groin cath site. Pt states she feels great and with no groin pain, no chest pain or shortness of breath or dizziness at all even during episodes of low blood pressure. Pt eating and drinking well and without nausea or vomiting.     Medical History:  Past Medical History:   Diagnosis Date    Arthritis     Bitten or stung by nonvenomous insect and other nonvenomous arthropods, initial encounter 05/22/2019    Tick bite, initial encounter    Encounter for general adult medical examination without abnormal findings 10/05/2022    Medicare annual wellness visit, subsequent    Encounter for general adult medical examination without abnormal findings 11/02/2021    Medicare annual wellness visit, subsequent    Personal history of malignant neoplasm of breast     History of malignant neoplasm of breast    Personal  history of other diseases of urinary system 08/10/2019    History of hematuria    Personal history of other specified conditions     History of vertigo    Personal history of other specified conditions 08/10/2019    History of urinary frequency    Personal history of urinary (tract) infections 08/10/2019    History of acute cystitis    Personal history of urinary (tract) infections 09/03/2019    History of urinary tract infection     Past Surgical History:   Procedure Laterality Date    CARDIAC CATHETERIZATION N/A 7/15/2024    Procedure: Left Heart Cath, With LV;  Surgeon: Tressa Davison MD;  Location: [facility];  Service: Cardiovascular;  Laterality: N/A;  radial approach please    CARDIAC CATHETERIZATION N/A 7/15/2024    Procedure: PCI KHUSHI Stent- Coronary;  Surgeon: Tressa Davison MD;  Location: [facility];  Service: Cardiovascular;  Laterality: N/A;    OTHER SURGICAL HISTORY  04/30/2019    Hysterectomy    OTHER SURGICAL HISTORY  04/30/2019    Mastectomy    OTHER SURGICAL HISTORY  04/30/2019    Tooth extraction     Medications Prior to Admission   Medication Sig Dispense Refill Last Dose/Taking    cholecalciferol (Vitamin D-3) 25 MCG (1000 UT) tablet Take 1 tablet (1,000 Units) by mouth once daily.   2/11/2025 Morning    clopidogrel (Plavix) 75 mg tablet Take 1 tablet (75 mg) by mouth once daily. 90 tablet 1 2/11/2025 Morning    cyanocobalamin (Vitamin B-12) 2,000 mcg tablet Take 1 tablet (2,000 mcg) by mouth once daily. 30 tablet 11 2/11/2025 Morning    docusate sodium (STOOL SOFTENER ORAL) Take by mouth once daily as needed.   2/11/2025 Morning    sacubitriL-valsartan (Entresto) 24-26 mg tablet Take 1 tablet by mouth 2 times a day. 180 tablet 1 2/11/2025 Morning    aspirin 81 mg EC tablet Take 1 tablet (81 mg) by mouth once daily.   2/10/2025 Bedtime    metoprolol succinate XL (Toprol-XL) 25 mg 24 hr tablet Take 1 tablet (25 mg) by mouth once daily. 90 tablet 1 2/10/2025 Bedtime     rosuvastatin (Crestor) 5 mg tablet Take 1 tablet (5 mg) by mouth once daily. 90 tablet 3 2/10/2025 Bedtime     Haloperidol, Keflex [cephalexin], Zofran [ondansetron hcl], Amoxicillin-pot clavulanate, Levofloxacin, and Sulfa (sulfonamide antibiotics)  Social History     Tobacco Use    Smoking status: Never     Passive exposure: Never    Smokeless tobacco: Never   Vaping Use    Vaping status: Never Used   Substance Use Topics    Alcohol use: Never    Drug use: Never     Family History   Problem Relation Name Age of Onset    Stroke Mother      Other (cardiac disorder) Father      Breast cancer Sister      Arthritis Sister         Review of Systems:  14 point review of systems was completed and negative except for those specially mention in my HPI    Physical Exam:    Heart Rate:  [58-80]   Temp:  [37.4 °C (99.3 °F)-37.8 °C (100 °F)]   Resp:  [0-32]   BP: ()/(36-63)   Weight:  [64.2 kg (141 lb 8.6 oz)]   SpO2:  [96 %-100 %]     Physical Exam  Constitutional:       General: She is not in acute distress.     Appearance: Normal appearance. She is not ill-appearing.   HENT:      Mouth/Throat:      Mouth: Mucous membranes are moist.   Eyes:      General: No scleral icterus.     Extraocular Movements: Extraocular movements intact.      Pupils: Pupils are equal, round, and reactive to light.   Cardiovascular:      Rate and Rhythm: Normal rate and regular rhythm.      Pulses: Normal pulses.      Heart sounds: No murmur heard.  Pulmonary:      Effort: Pulmonary effort is normal. No respiratory distress.      Breath sounds: No wheezing or rales.   Abdominal:      General: Abdomen is flat. Bowel sounds are normal. There is no distension.      Palpations: Abdomen is soft.      Tenderness: There is no abdominal tenderness.   Musculoskeletal:      Right lower leg: No edema.      Left lower leg: No edema.   Skin:     General: Skin is warm and dry.      Capillary Refill: Capillary refill takes less than 2 seconds.      Findings:  No rash.   Neurological:      General: No focal deficit present.      Mental Status: She is alert and oriented to person, place, and time.      Cranial Nerves: No cranial nerve deficit.      Motor: No weakness.   Psychiatric:         Mood and Affect: Mood normal.         Behavior: Behavior normal.       Objective:  I have reviewed all medications, laboratory results, and imaging pertinent for today's encounter    Assessment/Plan:  Pt is an 85 y/o F w/ a PMHx of left mastectomy, vertigo, GERD, LBBB, diastolic heart failure, previous NSTEMI s/p PCI with stent placement who presents s/p mechanical fall with hypothermia in the setting of environmental exposure complicated by elevated troponin likely in the setting of hypothermia vs demand from possible hypotension currently doing well.     Neuro/Psych/Pain Ctrl/Sedation:  #Hypothermia due to environmental exposure  #Hallucinations-likely 2/2 UTI  #Hx Vertigo  - Pain control as needed with PRN Tylenol  - CAM-ICU & Delirium Precautions  - PT/OT evaluation and ambulation today     Respiratory/ENT:  -No current active issues  -Supplemental Oxygen as needed to maintain an SpO2>90%  -Incentive spirometry and Early mobilization for lung volume expansion      Cardiovascular:  #New onset atrial fibrillation  #NSTEMI  #HFpEF -EF 50% 10/23  - TTE with new HFrEF now s/p LHC without need for intervention  - C/W ASA, Plavix, Statrin  - Will discuss with cardiology on need for AC given new afib  - Will discuss with cardiology possibly restarting home metoprolol at lowest dose and posisbly holding entresto for now given low Bps while sleeping and sometimes while awake  - EKG daily  - Tele monitoring     Renal/Volume Status (Intra & Extravascular):  #Mild CK elevation - likely due to fall  - No current active issues  - Monitor UOP Q4H and Cr daily  - Avoid nephrotoxic drugs and renally dose all medications   - Replete Electrolytes to K+ of 4, Mg2+ of 2, Phos of 3    GI:  #Hx GERD  - No  active issues  - Cardiac Diet  - No indication for PPI or GI Ppx at this time  - C/W bowel regimen     Endocrine  - No current active issues  - No need for finger sticks at this time  - Add finger sticks and ISS or dextrose in fluids as needed  - Finger sticks goals 100-180      Infectious Disease:  #Urinary Tract Infection  - Hx of e.coli UTI   - Follow-up urine culture  - C/W Nitrofurantoin 100 mg BID X5 days (Day 2 or 5 currently)  - Blood Cultures with NG at 24 hours     Heme/Onc:  #Left Hip/Thigh hematoma   - pain control  -Daily CBC  - Monitor Hgb Daily and transfuse for Hgb<8 or bleeding with hemodynamic instability   - Patient ambulating and will discuss need for AC given new atrial fibrillation     OBGYN/MSK/SKIN:  -Ambulatory at baseline, no ambulatory assistive devices used   - PT/OT eval now and ambulation today     Ethics/Code Status:  - Full Code      :  DVT Prophylaxis: SCDs and ambulation  GI Prophylaxis: none  Bowel Regimen: none  Diet: Cardiac  CVC: none  [place]: none  [place]: None  Restraints: none  Dispo: Will discuss with cardiology about possible discharge today as patient would like to go home    Rashaad Fiore MD

## 2025-02-13 NOTE — CARE PLAN
The patient's goals for the shift include      The clinical goals for the shift include patient will remain hemodynamically stable throughout shift    Over the shift, the patient did not make progress toward the following goals. Barriers to progression include . Recommendations to address these barriers include .

## 2025-02-13 NOTE — DISCHARGE SUMMARY
Discharge Diagnosis  Fall, initial encounter    Issues Requiring Follow-Up  Follow-up with Your cardiologist and discuss restarting your Entresto  Stop your normal home metoprolol and start a different type of metoprolol now at a lower dose at 12.5mg twice a day  Continue to follow-up with your Cardiologist on the 26th for further evaluation and management    Test Results Pending At Discharge  Pending Labs       Order Current Status    Blood Culture Preliminary result    Blood Culture Preliminary result    Urine Culture Preliminary result          Hospital Course   Pt is an 83 y/o F w/ a PMHx of left mastectomy, vertigo, GERD, LBBB, diastolic heart failure, previous NSTEMI s/p PCI with stent placement who presents s/p mechanical fall she was confused and found to be hypothermic to 80 degress. Patient was actively re-warmed with much improvement but also had hsTroponins in the 7000s. Patient had a TTE which showed an EF of 40-45% with apical hypokinesis. She underwent a Left Heart cath and was without any lesions. Patient had intermittent asymptomaticly with low blood pressures and so in consultation with cardiology we changed her metoprolol succinate to metoprolol tartrate at a reduced dose of 12.5mg twice a day and she was instructed to hold her entresto until being evaluated by her cardiologist. Also of note the patient was diagnosed with a possible UTI and was given macrobid and discharged to complete a few more days of oral abx but had no symptoms on discharge and was afebrile. She was discharged after being explained her medication adjustments and confirming her cardiology appointment. She also was given red flag symptoms for return and all questions were answered. I called patient's cardiologist and discussed the plan with him on the day of discharge.    Pertinent Physical Exam At Time of Discharge  Constitutional:       General: She is not in acute distress.     Appearance: Normal appearance. She is not  ill-appearing.   HENT:      Mouth/Throat:      Mouth: Mucous membranes are moist.   Eyes:      General: No scleral icterus.     Extraocular Movements: Extraocular movements intact.      Pupils: Pupils are equal, round, and reactive to light.   Cardiovascular:      Rate and Rhythm: Normal rate and regular rhythm.      Pulses: Normal pulses.      Heart sounds: No murmur heard.  Pulmonary:      Effort: Pulmonary effort is normal. No respiratory distress.      Breath sounds: No wheezing or rales.   Abdominal:      General: Abdomen is flat. Bowel sounds are normal. There is no distension.      Palpations: Abdomen is soft.      Tenderness: There is no abdominal tenderness.   Musculoskeletal:      Right lower leg: No edema. Right Groin without hematoma and no tenderness to palpation     Left lower leg: No edema.   Skin:     General: Skin is warm and dry.      Capillary Refill: Capillary refill takes less than 2 seconds.      Findings: No rash.   Neurological:      General: No focal deficit present.      Mental Status: She is alert and oriented to person, place, and time.      Cranial Nerves: No cranial nerve deficit.      Motor: No weakness.   Psychiatric:         Mood and Affect: Mood normal.         Behavior: Behavior normal.     Home Medications     Medication List      ASK your doctor about these medications     aspirin 81 mg EC tablet   cholecalciferol 25 MCG (1000 UT) tablet; Commonly known as: Vitamin D-3   clopidogrel 75 mg tablet; Commonly known as: Plavix; Take 1 tablet (75   mg) by mouth once daily.   cyanocobalamin 2,000 mcg tablet; Commonly known as: Vitamin B-12; Take 1   tablet (2,000 mcg) by mouth once daily.   metoprolol succinate XL 25 mg 24 hr tablet; Commonly known as:   Toprol-XL; Take 1 tablet (25 mg) by mouth once daily.   rosuvastatin 5 mg tablet; Commonly known as: Crestor; Take 1 tablet (5   mg) by mouth once daily.   sacubitriL-valsartan 24-26 mg tablet; Commonly known as: Entresto; Take   1  tablet by mouth 2 times a day.   STOOL SOFTENER ORAL       Outpatient Follow-Up  Future Appointments   Date Time Provider Department Center   2/26/2025  2:30 PM Adrian Echavarria MD [id number] [place]   3/10/2025 10:30 AM Chong Houston DO [id number] [place]     Rashaad Fiore MD

## 2025-02-13 NOTE — PROGRESS NOTES
Rounding TARYN/Cardiologist:  Wilber Nolan, APRN-CNP,  Primary Cardiologist: Dr. Adrian Echavarria    Date:  2/13/2025  Patient:  Laurel Pugh  YOB: 1940  MRN:  [id number]   Admit Date:  2/11/2025      SUBJECTIVE:    Laurel Pugh was seen and examined today at bedside.     She denies any chest pain or shortness of breath.     LHC showed single-vessel coronary artery disease with patent stent to the mid LAD and 50% ostial first diagonal lesion which is residual lesion from PCI and bifurcation. LV systolic function approximately 40%.         VITALS:     Vitals:    02/13/25 0600 02/13/25 0700 02/13/25 0800 02/13/25 0900   BP: 132/60 106/55 133/67 (!) 78/39   Pulse: 70 66 74 76   Resp: 16 (!) 0 20 23   Temp:       TempSrc:       SpO2: 100% 100% 100% 100%   Weight: 64.2 kg (141 lb 8.6 oz)      Height:           Intake/Output Summary (Last 24 hours) at 2/13/2025 0943  Last data filed at 2/13/2025 0500  Gross per 24 hour   Intake --   Output 860 ml   Net -860 ml       Wt Readings from Last 4 Encounters:   02/13/25 64.2 kg (141 lb 8.6 oz)   11/02/24 63.5 kg (140 lb)   10/23/24 64.4 kg (142 lb)   10/16/24 63.5 kg (140 lb)       CURRENT HOSPITAL MEDICATIONS:   aspirin, 81 mg, oral, Daily  cholecalciferol, 1,000 Units, oral, Daily  clopidogrel, 75 mg, oral, Daily  cyanocobalamin, 1,000 mcg, oral, Daily  docusate sodium, 100 mg, oral, Daily  [Held by provider] metoprolol succinate XL, 25 mg, oral, Daily  nitrofurantoin (macrocrystal-monohydrate), 100 mg, oral, BID  rosuvastatin, 5 mg, oral, Nightly  [Held by provider] sacubitriL-valsartan, 1 tablet, oral, BID         Current Outpatient Medications   Medication Instructions    aspirin 81 mg EC tablet 1 tablet, Daily    cholecalciferol (VITAMIN D-3) 1,000 Units, Daily    clopidogrel (Plavix) 75 mg tablet Take 1 tablet (75 mg) by mouth once daily.    cyanocobalamin (VITAMIN B-12) 2,000 mcg, oral, Daily    docusate sodium (STOOL  SOFTENER ORAL) Daily PRN    metoprolol succinate XL (TOPROL-XL) 25 mg, oral, Daily    rosuvastatin (CRESTOR) 5 mg, oral, Daily    sacubitriL-valsartan (Entresto) 24-26 mg tablet 1 tablet, oral, 2 times daily        PHYSICAL EXAMINATION:     Physical Exam  Vitals and nursing note reviewed.   Constitutional:       Appearance: Normal appearance.   HENT:      Head: Normocephalic.      Nose: Nose normal.      Mouth/Throat:      Mouth: Mucous membranes are moist.   Eyes:      Pupils: Pupils are equal, round, and reactive to light.   Cardiovascular:      Rate and Rhythm: Normal rate and regular rhythm.   Pulmonary:      Effort: Pulmonary effort is normal.      Breath sounds: Wheezing present.   Abdominal:      Palpations: Abdomen is soft.   Skin:     General: Skin is warm and dry.      Capillary Refill: Capillary refill takes less than 2 seconds.   Neurological:      General: No focal deficit present.      Mental Status: She is alert and oriented to person, place, and time. Mental status is at baseline.   Psychiatric:         Mood and Affect: Mood normal.         Behavior: Behavior is cooperative.         LAB DATA:     CBC:   Results from last 7 days   Lab Units 02/13/25 0416 02/12/25  0344 02/11/25  2108   WBC AUTO x10*3/uL 6.6 11.1 12.1*   RBC AUTO x10*6/uL 3.61* 3.63* 4.37   HEMOGLOBIN g/dL 10.0* 10.2* 12.3   HEMATOCRIT % 31.7* 31.7* 39.1   MCV fL 88 87 90   MCH pg 27.7 28.1 28.1   MCHC g/dL 31.5* 32.2 31.5*   RDW % 14.6* 14.3 14.3   PLATELETS AUTO x10*3/uL 181 176 247     CMP:    Results from last 7 days   Lab Units 02/13/25 0416 02/12/25  0344 02/11/25  2108   SODIUM mmol/L 140 139 140   POTASSIUM mmol/L 4.0 4.3 4.4   CHLORIDE mmol/L 109* 107 106   CO2 mmol/L 24 22 23   BUN mg/dL 35* 41* 45*   CREATININE mg/dL 1.09* 0.95 0.95   GLUCOSE mg/dL 82 106* 166*   PROTEIN TOTAL g/dL 6.1* 6.1* 7.2   CALCIUM mg/dL 8.9 8.9 9.5   BILIRUBIN TOTAL mg/dL 0.8 0.6 0.6   ALK PHOS U/L 35 36 44   AST U/L 54* 39 29   ALT U/L 23 16 15      BMP:    Results from last 7 days   Lab Units 02/13/25  0416 02/12/25  0344 02/11/25  2108   SODIUM mmol/L 140 139 140   POTASSIUM mmol/L 4.0 4.3 4.4   CHLORIDE mmol/L 109* 107 106   CO2 mmol/L 24 22 23   BUN mg/dL 35* 41* 45*   CREATININE mg/dL 1.09* 0.95 0.95   CALCIUM mg/dL 8.9 8.9 9.5   GLUCOSE mg/dL 82 106* 166*     Magnesium:  Results from last 7 days   Lab Units 02/13/25  0416 02/12/25  0344 02/11/25  2108   MAGNESIUM mg/dL 2.10 1.98 2.30     Troponin:    Results from last 7 days   Lab Units 02/12/25  1811 02/12/25  1208 02/12/25  0844   TROPHS ng/L 3,060* 6,501* 7,123*     BNP:   Results from last 7 days   Lab Units 02/11/25  2108   BNP pg/mL 379*     Lipid Panel:         DIAGNOSTIC TESTING:   @No results found for this or any previous visit.    Echocardiogram: Results for orders placed during the hospital encounter of 02/11/25    Transthoracic Echo (TTE) Limited    [place] [id number]  Tel [phone] Fax [phone]    TRANSTHORACIC ECHOCARDIOGRAM REPORT    Patient Name:       MAGDI Strickland Physician:    [id number] Austin Burden DO  Study Date:         2/12/2025            Ordering Provider:    [id number] SNOW SUNG  MRN/PID:            [id number]             Fellow:  Accession#:         [id number]         Nurse:  Date of Birth/Age:  1940 / 84 years Sonographer:          Tere Pittman RDCS  Gender Assigned at  F                    Additional Staff:  Birth:  Height:             170.18 cm            Admit Date:           2/11/2025  Weight:             64.41 kg             Admission Status:     Inpatient -  Routine  BSA / BMI:          1.75 m2 / 22.24      Department Location:  [facility]  kg/m2  Blood Pressure: 87 /49 mmHg    Study Type:    TRANSTHORACIC ECHO (TTE) LIMITED  Diagnosis/ICD: Non ST elevation (NSTEMI) myocardial infarction-I21.4  Indication:    NSTEMI  CPT Codes:     Echo Limited-25667; Color  Doppler-92302    Patient History:  Pertinent History: CAD, HTN, Chest Pain, CHF, Dyspnea and Cancer.    Study Detail: The following Echo studies were performed: 2D and color flow.  Technically challenging study due to body habitus and prominent  lung artifact. Definity used as a contrast agent for endocardial  border definition. Total contrast used for this procedure was 6 mL  via IV push.      PHYSICIAN INTERPRETATION:  Left Ventricle: Left ventricular ejection fraction is moderately decreased, by visual estimate at 40%. Wall motion is abnormal. The left ventricular cavity size is normal. There is mild increased septal and normal posterior left ventricular wall thickness. No dynamic left ventricular outflow tract obstruction observed. There is left ventricular concentric remodeling. Left ventricular diastolic filling was not assessed.  LV Wall Scoring:  The entire apex is hypokinetic.    Left Atrium: The left atrial size is normal.  Right Ventricle: The right ventricle is normal in size. There is normal right ventricular global systolic function.  Right Atrium: The right atrial size is upper limits of normal.  Aortic Valve: The aortic valve appears structurally normal. Aortic valve regurgitation was not assessed.  Mitral Valve: The mitral valve is normal in structure. There is no evidence of mitral valve stenosis. There is normal mitral valve leaflet mobility. Mitral valve regurgitation was not assessed.  Tricuspid Valve: The tricuspid valve is structurally normal. There is normal tricuspid valve leaflet mobility. Tricuspid regurgitation was not assessed.  Pulmonic Valve: The pulmonic valve is not well visualized. The pulmonic valve regurgitation was not assessed.  Pericardium: Trivial pericardial effusion.  Aorta: The aortic root is normal.  Pulmonary Artery: The pulmonary artery is not well visualized.  Systemic Veins: The inferior vena cava was not assessed.  In comparison to the previous echocardiogram(s): The  left ventricular function is worse.      CONCLUSIONS:  1. Entire apex is abnormal.  2. Left ventricular ejection fraction is moderately decreased, by visual estimate at 40%.  3. Abnormal wall motion.  4. There is normal right ventricular global systolic function.  5. Normal sized right ventricle.  6. The pulmonary artery is not well visualized.    RECOMMENDATIONS:  Technically suboptimal and limited study, therefore accuracy of above interpretation could be substantially diminished. Clinical correlation is advised. Consider additional imaging modalities if clinically indicated. Repeat full study if clinically indicated.      QUANTITATIVE DATA SUMMARY:    2D MEASUREMENTS:             Normal Ranges:  IVSd:            1.26 cm     (0.6-1.1cm)  LVPWd:           0.93 cm     (0.6-1.1cm)  LVIDd:           4.20 cm     (3.9-5.9cm)  LVIDs:           3.39 cm  LV Mass Index:   89.3 g/m2  LVEDV Index:     60.01 ml/m2  LV % FS          19.3 %      LV SYSTOLIC FUNCTION:  Normal Ranges:  EF-A4C View:    39 % (>=55%)  EF-A2C View:    42 %  EF-Biplane:     41 %  EF-Visual:      40 %  LV EF Reported: 40 %      [id number] Austin Burden DO  Electronically signed on 2/12/2025 at 9:24:09 AM      Wall Scoring        ** Final **      Coronary Angiography:   Left Heart Cath 02/12/2025    [facility], Cath Lab  [address] [place] [address]    Cardiovascular Catheterization Report    Patient Name:      MAGDI DICKEY    Performing Physician:  [id number] Means MD  Study Date:        2/12/2025            Verifying Physician:   [id number] Means MD  MRN/PID:           [id number]             Cardiologist/Co-Scrub:  Accession#:        [id number]         Ordering Provider:     [id number] SNOW SUNG  Date of Birth/Age: 1940 / 84 years Cardiologist:  Gender:            F                    Fellow:  Encounter#:        [id number]           Surgeon:      Study: Left Heart  Cath      Indications:  MAGDI DICKEY is a 85 year old female who presents with dyslipidemia, hypertension, prior myocardial infarction, prior percutaneous coronary intervention, atrial fibrillation, CHF and an anginal equivalent chest pain assessment (i.e. dyspnea on exertion believed to be from ischemia). NSTE - ACS.    Procedure Description:  After infiltration with 2% Lidocaine, the right femoral artery was cannulated with a modified Seldinger technique. Subsequently a 5 Japanese sheath was placed antegrade in the right femoral artery. Selective coronary catheterization was performed using a 5 Fr catheter(s) exchanged over a guide wire to cannulate the coronary arteries. A JL 3.5 tip catheter was used for left coronary injections.  Additional catheter(s) used to visualize the coronary arteries were: 3DRC. Multiple injections of contrast were made into the left and right coronary arteries with angiograms recorded in multiple projections. Retrograde left heart catheterizion was accomplished with a 5 Fr. pigtail catheter. A single plane left ventriculogram was recorded in the 30 degree TERRELL projection. The contrast dose was 20 ml injected at 10 ml/sec. The catheter was then withdrawn across the aortic valve under continuous pressure monitoring and removed. After completion of the procedure, femoral artery angiography was performed. This demonstrated a common femoral artery puncture appropriate for closure. A Vascade 5F vascular closure Device was placed per protocol the arterial sheath was pulled and pressure was applied to the site.    Coronary Angiography:  The coronary circulation is right dominant.    Left Main Coronary Artery:  The left main coronary artery is a normal caliber vessel. The left main arises normally from the left coronary sinus of Valsalva and bifurcates into the LAD and circumflex coronary arteries. The left main coronary artery showed no significant disease or stenosis greater than  30%.    Left Anterior Descending Coronary Artery Distribution:  The left anterior descending coronary artery is a normal caliber vessel. The LAD arises normally from the left main coronary artery. The LAD demonstrated atherosclerotic disease. The mid left anterior descending coronary artery showed 40% stenosis. This lesion was irregular. Patent stent to mid LAD. The 1st diagonal branch is a normal caliber vessel. The 1st diagonal branch showed atherosclerotic disease. The ostial 1st diagonal branch revealed 50% stenosis. This lesion was irregular.    Circumflex Coronary Artery Distribution:  The circumflex coronary artery is a normal caliber vessel. The circumflex arises normally from the left main coronary artery and terminates in the AV groove. The circumflex revealed no significant disease or stenosis greater than 30%. The 1st obtuse marginal branch is a normal caliber vessel. The 1st obtuse marginal branch showed a normal vessel. The 2nd obtuse marginal branch is a small caliber vessel. The 2nd obtuse marginal branch demonstrated a normal vessel.    Right Coronary Artery Distribution:    The right coronary artery is a normal caliber vessel. The RCA arises normally from the right sinus of Valsalva. The RCA showed no significant disease or stenosis greater than 30%. The right posterolateral branch is a normal caliber vessel. The right posterolateral branch showed a normal vessel. The right posterior descending artery is a normal caliber vessel. The right posterior descending artery showed a normal vessel.      Left Ventriculography:  Global left ventricular systolic function was moderately reduced. The LV ejection fraction was 40 to 45%.    Coronary Lesion Summary:  Vessel         Stenosis   Vessel Segment  LAD          40% stenosis      mid  1st Diagonal 50% stenosis     ostial      Hemo Personnel:  +-----------------------------+---------+  Name                         Duty        +-----------------------------+---------+  Elvin Vargas MD 1  +-----------------------------+---------+      Hemodynamic Pressures:    +----+-------------------+---------+------------+-------------+------+---------+  Site     Date Time       Phase    Systolic    Diastolic    ED  Mean mmHg                           Name       mmHg        mmHg      mmHg            +----+-------------------+---------+------------+-------------+------+---------+    AO  2/12/2025 1:15:38 AIR REST         106           47             67                       PM                                                   +----+-------------------+---------+------------+-------------+------+---------+    AO  2/12/2025 1:18:14 AIR REST         106           61             81                       PM                                                   +----+-------------------+---------+------------+-------------+------+---------+    LV  2/12/2025 1:20:40 AIR REST         126           15    18                                PM                                                   +----+-------------------+---------+------------+-------------+------+---------+   LVp  2/12/2025 1:20:55 AIR REST         107           31    32                                PM                                                   +----+-------------------+---------+------------+-------------+------+---------+      Complications:  No in-lab complications observed.    Cardiac Cath Post Procedure Notes:  Post Procedure Diagnosis: Single vessel disease.  Blood Loss:               Estimated blood loss during the procedure was 5 mls.  Specimens Removed:        Number of specimen(s) removed: none.      Recommendations:  Maximize medical therapy.  Agressive risk factor modification efforts.  Follow-up with cardiology clinic.  Medical management of coronary artery  disease.    ____________________________________________________________________________________  CONCLUSIONS:  1. Right coronary artery system dominance.  2. Single vessel coronary artery disease.  3. Patent stent to mid LAD.  4. Ostial 1st Dg 50% lesion (residual lesion from PCI in bifurcation lesion).  5. Moderately reduced LV systolic function ~40% due to apical akinesia.  6. Compared to the previous left heart catheterization (07/2024), the findings are similar, with patent stent to LAD and similar post-bifurcation PCI lesion to ostial 1st Dg.    ICD 10 Codes:  Non ST elevation (NSTEMI) myocardial infarction-I21.4    CPT Codes:  Left Heart Cath (visualization of coronaries) and LV-64746; Moderate Sedation Services initial 15 minutes patient >5 years-00399; Ultrasound guidance for needle placement-38813; Ultrasound guidance for vascular access-36598; Angiography, Extremity,uni,S&I (PER)-84315    [id number] Elvin Maens MD  Performing Physician  Electronically signed by [id number] Elvin Means MD on 2/12/2025 at  2:03:18 PM          ** Final **            RADIOLOGY:     Cardiac Catheterization Procedure   Final Result      Transthoracic Echo (TTE) Limited   Final Result      CT thoracic spine wo IV contrast   Final Result   1. No evidence for acute injury to the thoracic or lumbar spine. There   is stable mild chronic compression of the superior endplate of T8.   2. There is mild degenerative change throughout the thoracic and   lumbar spine with a transitional vertebral body.   3. There is a mild grade 1 spondylolisthesis at L4-5.   Signed by Oliverio Galicia MD      CT lumbar spine wo IV contrast   Final Result   1. No evidence for acute injury to the thoracic or lumbar spine. There   is stable mild chronic compression of the superior endplate of T8.   2. There is mild degenerative change throughout the thoracic and   lumbar spine with a transitional vertebral body.   3. There is a mild grade 1  spondylolisthesis at L4-5.   Signed by Oliverio Galicia MD      CT chest abdomen pelvis wo IV contrast   Final Result   CHEST:   Both lungs are hyperinflated with centriacinar and paraseptal   emphysema..  Bibasal atelectasis.   Abdomen and pelvis:   Distended gallbladder with multiple cholelithiasis and thickened wall   measure 4 mm.  No evidence of pericholecystic fluid collection.    Findings might suggest acute cholecystitis, clinical correlation may   be helpful.   Moderate amount of stool burden within the rectum and colon.    Scattered diverticulosis without diverticulitis.   Nonspecific lucent area within the sacrum on the right side measures 1   x 1.7 cm.   Signed by Titi White MD      CT head wo IV contrast   Final Result   1.  No acute intracranial hemorrhage or depressed calvarial fracture.   2.  No acute fracture at the cervical spine. Degenerative changes.                  MACRO:   None.        Signed by: Pat Chavarria 2/12/2025 12:50 AM   Dictation workstation:   [id number]      CT cervical spine wo IV contrast   Final Result   1.  No acute intracranial hemorrhage or depressed calvarial fracture.   2.  No acute fracture at the cervical spine. Degenerative changes.                  MACRO:   None.        Signed by: Pat Chavarria 2/12/2025 12:50 AM   Dictation workstation:   [id number]      XR chest 1 view   Final Result   No acute cardiopulmonary process.        MACRO:   None        Signed by: Abi Barreto 2/11/2025 9:59 PM   Dictation workstation:   [id number]          PROBLEM LIST     Patient Active Problem List   Diagnosis    Arthritis of foot, left    History of breast cancer    Constipation    Vertigo    GERD (gastroesophageal reflux disease)    Left bundle branch block    Mild vitamin D deficiency    Actinic keratosis    Seborrheic keratosis    Shortness of breath    Varicose veins of both lower extremities with inflammation    Ocular migraine    Altered mental status, unspecified  altered mental status type    NSTEMI (non-ST elevated myocardial infarction) (Multi)    Urinary tract infection without hematuria    Atherosclerosis of native coronary artery of native heart without angina pectoris    Essential hypertension    Hx of percutaneous transluminal coronary angioplasty    Chronic diastolic heart failure    Acute diastolic (congestive) heart failure    Fall, initial encounter    Hypothermia    Elevated troponin       ASSESSMENT:   NSTEMI type II  CAD with prior PCI to LAD in 2024  Vertigo  GERD  Mechanical fall  Chronic left bundle branch block  UTI  Hypertension  Chronic diastolic congestive heart failure NYHA class II  Hypothermia        PLAN:   Admitted to medicine.  Remains in the ICU.  Telemetry shows normal sinus rhythm.  Reviewed EKG which initial EKG was concerning for A-fib however very poor EKG.  Repeat EKG showed normal sinus rhythm with occasional PACs and PVCs.  There is a chronic left bundle branch block which is identified.  Nonspecific ST wave abnormality.  No STEMI criteria.  No prior history of A-fib.  May be related to hypothermia.  Will hold off on anticoagulation for atrial fibrillation for now.  Supplemental O2  Monitor electrolytes  Low to moderate suspicion for ACS although patient was also found down outside.  Probable NSTEMI type II however cannot rule out ischemia with a 7000 troponin and apex is down on ECHO. Troponin elevation could be secondary to hypothermia and rhabdomyolysis vs true ischemia.  She does have elevated CK as well.  EKGs are nonspecific.    Underwent LHC yesterday, LHC showed single-vessel coronary artery disease with patent stent to the mid LAD and 50% ostial first diagonal lesion which is residual lesion from PCI and bifurcation. LV systolic function approximately 40%.   Echocardiogram showed apex to be abnormal.  LV function to be moderately decreased with estimated EF of 40%.  Optimize GDMT for diastolic CHF.  Continue normal cardiac  medications  UTI per primary medicine team  Does not seem overtly fluid overloaded.  Monitor strict I's and O's and daily weights  No further cardiology recommendations, patient will follow-up outpatient with Dr. Echavarria.  Will sign off        Wilber Nolan [facility]  Adult Gerontology Acute Care Nurse Practitioner  [facility] [place]   [facility]  [phone]          Of note, this documentation is completed using the Dragon Dictation system (voice recognition software). There may be spelling and/or grammatical errors that were not corrected prior to final submission.    Please do not hesitate to call with questions.  Electronically signed by Wilber Nolan, YECENIA-CNP, on 2/13/2025 at 9:43 AM

## 2025-02-13 NOTE — PROGRESS NOTES
Occupational Therapy    Evaluation    Patient Name: Laurel Pugh  MRN: [id number]  Department: [facility]  Room: 12/12-A  Today's Date: 2/13/2025  Time Calculation  Start Time: 0959  Stop Time: 1013  Time Calculation (min): 14 min      Assessment:  OT Assessment: Limited by balance deficits, pain and decreased strength which are limiting ability to complete ADLs, transfers and functional mobility.  Prognosis: Good  End of Session Communication: Bedside nurse  End of Session Patient Position: Up in chair, Alarm off, not on at start of session (Call light within reach.)  OT Assessment Results: Decreased ADL status, Decreased endurance, Decreased functional mobility  Prognosis: Good  Plan:  Treatment Interventions: ADL retraining, Functional transfer training, Endurance training  OT Frequency: 2 times per week  OT Discharge Recommendations: Low intensity level of continued care (24/7 (patients grand daughter reports that family will be available to provide assist upon d/c))  OT - OK to Discharge: Yes (Once medically appropriate.)  Treatment Interventions: ADL retraining, Functional transfer training, Endurance training    Subjective   General:  General  Reason for Referral: ADLs  Referred By: DIO Story CNP (PT/OT 2/12)  Past Medical History Relevant to Rehab: includes: OA, MI, CHF, GERD, vertigo, cystitis, UTI, L mastectomy, LBBB, PCI  Family/Caregiver Present: Yes  Caregiver Feedback: Grand daughter present - supportive.  Co-Treatment: PT  Co-Treatment Reason: To maximize functional outcomes and patient safety.  Prior to Session Communication: Bedside nurse (Cleared for therapy evaluation by RN.)  Patient Position Received: Up in chair, Alarm off, not on at start of session  General Comment: Pt. is an 83yo who presented to [facility] ED on 2/11/2025 after being found on the ground outside. Pt. had gone out to fill birdfeeder, fell and wa on ground for extended length of time. Body temp was 30°C. In ED, LActate = 2.6,  "Troponin = 3071, 4468. Pt. was found to be in A-fib and was (+) for UTI. EKG (-) MI. Pt. was started on a heparin drip.  Pt. underwent  cardiac cath on 2/12/2025 that was (-). Imaging 2/11/2025: T-spine CT (-) acute findings, (+) chronic, stabe T8 compression fracture  L-spine CT (-) acute findings, (+) L4-5 spondylolithesis  C-spine CT (-) acute findings  Chest/ABD/Pelvic CT (+) bibasilar atelectasis, (+) hyperinflated lungs, (+) cholelithiasis  Head CT (-) acute findings  CXR (-) acute findings  Hgb (2/13) 10.0 trending down  Dx: hypothermia, fall, elevated Troponin  Precautions:  Medical Precautions: Fall precautions      Vital Signs Comment: RA, O2 remained 99% on RA while amb.     Pain:  Pain Assessment  Pain Assessment: 0-10  0-10 (Numeric) Pain Score: 5 - Moderate pain  Pain Type: Acute pain  Pain Location:  (\"All over\")  Pain Interventions: Repositioned    Objective   Cognition:  Overall Cognitive Status: Within Functional Limits  Orientation Level: Oriented X4           Home Living:  Home Living Comments: Patient lives witih her son (who works 2 jobs and is rarely home) in a 1 level house with 1+2 PHILLIP without HR. Bedroom on 1st floor. Patient uses walk in shower in basement with a seat abailable but no grab bars. 10 steps with HR to basement. Full bath and laundry on 1st floor.  Prior Function:  Prior Function Comments: Patient amb with cane for extended distances. Patient owns a FWW and a W/C. Patient stated she was I with ADLS and IADLs PTA. Patient denied any other falls in last 3 months. Patient drove PTA.     ADL:  Eating Assistance:  (Setup)  Grooming Assistance:  (CGA level for standing balance.)  Bathing Assistance: Minimal  UE Dressing Assistance:  (Setup)  LE Dressing Assistance: Minimal  Toileting Assistance with Device:  (CGA for standing balance.)     Bed Mobility/Transfers:    Transfer 1  Technique 1: Sit to stand, Stand to sit  Trials/Comments 1: Patient completed sit <> stand from the " recliner with a SC and FWW; CGA level for balance.    Functional Mobility:  Functional Mobility  Functional Mobility Performed: Yes  Functional Mobility 1  Comments 1: Patient completed functional mobility with a SC and with a FWW; CGA level with a FWW and min A with a cane.     Standing Balance:  Dynamic Standing Balance  Dynamic Standing-Comments: Fair      Strength:  Strength Comments: B/L UE MMT grossly WFL throughout.     Extremities: RUE   RUE : Within Functional Limits and LUE   LUE: Within Functional Limits    Outcome Measures:[facility] Daily Activity  Putting on and taking off regular lower body clothing: A little  Bathing (including washing, rinsing, drying): A little  Putting on and taking off regular upper body clothing: A little  Toileting, which includes using toilet, bedpan or urinal: A little  Taking care of personal grooming such as brushing teeth: A little  Eating Meals: A little  Daily Activity - Total Score: 18    Education Documentation  Body Mechanics, taught by Rain Smith OT at 2/13/2025 12:30 PM.  Learner: Patient  Readiness: Acceptance  Method: Explanation  Response: Needs Reinforcement    EDUCATION:  Education  Individual(s) Educated: Patient  Education Provided: POC discussed and agreed upon, Risk and benefits of OT discussed with patient or other, Fall precautons  Patient Response to Education: Patient/Caregiver Verbalized Understanding of Information    Goals:  Encounter Problems       Encounter Problems (Active)       OT Goals       Patient will complete household distance functional mobility with a FWW at mod I level.  (Progressing)       Start:  02/13/25    Expected End:  02/27/25            Patient will complete functional transfers with a FWW at mod I level.  (Progressing)       Start:  02/13/25    Expected End:  02/27/25            Patient will demonstrate fair + dynamic standing balance during functional tasks. (Progressing)       Start:  02/13/25    Expected End:  02/27/25             Patient will tolerate standing greater than 5 minutes during functional tasks. (Progressing)       Start:  02/13/25    Expected End:  02/27/25            Patient will complete lower body dressing at a SBA level.  (Progressing)       Start:  02/13/25    Expected End:  02/27/25

## 2025-02-13 NOTE — PROGRESS NOTES
Physical Therapy    Physical Therapy Evaluation    Patient Name: Laurel Pugh  MRN: [id number]  Today's Date: 2/13/2025   Time Calculation  Start Time: 0958  Stop Time: 1013  Time Calculation (min): 15 min  12/12-A    Assessment/Plan   PT Assessment  PT Assessment Results: Decreased endurance, Impaired balance, Decreased mobility  Rehab Prognosis: Good  Barriers to Discharge Home: No anticipated barriers (Family plans on staying with Pt. afte D/C)  Evaluation/Treatment Tolerance: Patient tolerated treatment well  Medical Staff Made Aware: Yes  Strengths: Ability to acquire knowledge, Access to adaptive/assistive products, Attitude of self, Coping skills, Housing layout, Insight into problems, Living arrangement secure, Support of Caregivers  End of Session Communication: Bedside nurse  Assessment Comment: Recommend Pt. amb wtih DFWW at home and have 24/7 A available initially. Recommend continued therapy at a low intensity level following D/C.  End of Session Patient Position: Up in chair, Alarm off, not on at start of session (Call light within reach)  IP OR SWING BED PT PLAN  Inpatient or Swing Bed: Inpatient  PT Plan  Treatment/Interventions: Bed mobility, Transfer training, Gait training, Balance training, Strengthening, Endurance training, Therapeutic exercise  PT Plan: Ongoing PT  PT Frequency: 2 times per week  PT Discharge Recommendations: Low intensity level of continued care  PT Recommended Transfer Status: Assist x1, Assistive device  Physical Therapy eval completed per MD requisition. P.T. recommendations as outlined above. Recommend D/C from acute care when medically appropriate as deemed by medical staff.            General Visit Information:  General  Reason for Referral: impaired mobility  Referred By: DIO Story CNP (PT/OT 2/12)  Past Medical History Relevant to Rehab: includes: OA, MI, CHF, GERD, vertigo, cystitis, UTI, L mastectomy, LBBB, PCI  PT Missed Visit: Yes  Missed Visit Reason: Patient  placed on medical hold  Family/Caregiver Present: Yes  Caregiver Feedback: Granddtr supportive  Co-Treatment: OT  Co-Treatment Reason: Pt. seen with OT to maximize safety and function  Prior to Session Communication: Bedside nurse  Patient Position Received: Up in chair, Alarm off, not on at start of session  Preferred Learning Style: auditory, verbal  General Comment: Pt. is an 83yo who presented to [facility] ED on 2/11/2025 after being found on the ground outside. Pt. had gone out to fill birdfeeder, fell and wa on ground for extended length of time. Body temp was 30°C. In ED, Lactate = 2.6, Troponin = 3071, 4468. Pt. was found to be in A-fib and was (+) for UTI. EKG (-) MI. Pt. was started on a heparin drip.    Pt. underwent  cardiac cath on 2/12/2025 that was (-).   Imaging 2/11/2025:   T-spine CT (-) acute findings, (+) chronic, stable T8 compression fracture   L-spine CT (-) acute findings, (+) L4-5 spondylolithesis    C-spine CT (-) acute findings    Chest/ABD/Pelvic CT (+) bibasilar atelectasis, (+) hyperinflated lungs, (+) cholelithiasis      Head CT (-) acute findings    CXR (-) acute findings  Hgb (2/13) 10.0 trending down    Dx: hypothermia, fall, elevated Troponin    Home Living:  Home Living  Home Living Comments: Pt. lives witih her son (who works 2 jobs and is rarely home) in a 1 level house with 1+2 PHILLIP without HR. Bedroom on 1st floor. Pt. uses walkin shower in basement with a seat abailable but no grab bars. 10 steps with HR to basement. Full bath and laundry on 1st floor.    Prior Level of Function:  Prior Function Per Pt/Caregiver Report  Prior Function Comments: Pt. amb with cane for extended distances. Pt. owns a FWW and a W/C. Pt. stated she was I with ADLS and IADLs PTA. Pt. denied any other falls in last 3 months. Pt. drove PTA.    Precautions:  Precautions  Medical Precautions:  (Activity order: No restrictions)  Precautions Comment: Per EMR: High fall risk    Vital Signs:  Vital Signs  Heart  "Rate: 68  SpO2: 100 % (RA, O2 remained 99% on RA while amb.)  Objective     Pain:  Pain Assessment  Pain Assessment: 0-10  0-10 (Numeric) Pain Score: 5 - Moderate pain  Pain Type: Acute pain  Pain Location:  (\"All over soreness from fall\")  Pain Interventions: Repositioned    Cognition:  Cognition  Overall Cognitive Status: Within Functional Limits  Orientation Level: Oriented X4    General Assessments:  General Observation  General Observation: Tele   Activity Tolerance  Endurance: Tolerates less than 10 min exercise, no significant change in vital signs                 Dynamic Sitting Balance  Dynamic Sitting-Comments: Good static and dynamic sitting balance  Dynamic Standing Balance  Dynamic Standing-Comments: Fair+ static and dynamic standing balance    Functional Assessments:     Bed Mobility  Bed Mobility: No  Transfers  Transfer: Yes  Transfer 1  Technique 1: Sit to stand, Stand to sit  Transfer Device 1:  (FWW then cane)  Transfer Level of Assistance 1: Contact guard  Ambulation/Gait Training  Ambulation/Gait Training Performed: Yes  Ambulation/Gait Training 1  Surface 1: Level tile  Device 1: Rolling walker  Assistance 1: Contact guard  Quality of Gait 1:  (slow, steady reciprocal gait with no LOB)  Comments/Distance (ft) 1: 60' with CGA for safety  Ambulation/Gait Training 2  Surface 2: Level tile  Device 2: Single point cane  Assistance 2: Minimum assistance  Quality of Gait 2: Narrow base of support (slower, reciprocal gait wtih shorted step lengths and occasional path deviation)  Comments/Distance (ft) 2: 60'; A for balance, safety  Stairs  Stairs: No       Extremity/Trunk Assessments:  RUE   RUE : Within Functional Limits  LUE   LUE: Within Functional Limits  RLE   RLE : Within Functional Limits  LLE   LLE : Within Functional Limits    Outcome Measures:     [facility] Basic Mobility  Turning from your back to your side while in a flat bed without using bedrails: A little  Moving from lying on your back to " sitting on the side of a flat bed without using bedrails: A little  Moving to and from bed to chair (including a wheelchair): A little  Standing up from a chair using your arms (e.g. wheelchair or bedside chair): A little  To walk in hospital room: A little  Climbing 3-5 steps with railing: A lot  Basic Mobility - Total Score: 17                                                             Goals:  Encounter Problems       Encounter Problems (Active)       PT Problem       Pt. will transfer supine/sit with MOD I (Progressing)       Start:  02/13/25    Expected End:  02/27/25            Pt. will transfer sit/stand with FWW with MOD I (Progressing)       Start:  02/13/25    Expected End:  02/27/25            Pt.will ambulate 100' with FWW with MOD I (Progressing)       Start:  02/13/25    Expected End:  02/27/25            Pt. will amb up/down 3 steps with HR and cane with CGA  (Not Progressing)       Start:  02/13/25    Expected End:  02/27/25            Pt. will perform 2 x 15 B LE AROM exercises  (Not Progressing)       Start:  02/13/25    Expected End:  02/27/25                 Education Documentation  Mobility Training, taught by Jeff De La Rosa, PT at 2/13/2025 12:25 PM.  Learner: Family, Patient  Readiness: Acceptance  Method: Explanation  Response: Verbalizes Understanding, Needs Reinforcement  Comment: Role of Pt, transfers, amb, safety, PT POC

## 2025-02-13 NOTE — DOCUMENTATION CLARIFICATION NOTE
"    PATIENT:               MAGDI DICKEY  ACCT #:                  [id number]  MRN:                       [id number]  :                       1940  ADMIT DATE:       2025 8:52 PM  DISCH DATE:        2025 4:10 PM  RESPONDING PROVIDER #:        [id number]          PROVIDER RESPONSE TEXT:    TypeII MI    CDI QUERY TEXT:    Clarification        Instruction:    Based on your assessment of the patient and the clinical information, please provide the requested documentation by clicking on the appropriate radio button and enter any additional information if prompted.    Question: Based on your medical judgment, can you please clarify which of these conditions is the most clinically supported    When answering this query, please exercise your independent professional judgment. The fact that a question is being asked, does not imply that any particular answer is desired or expected.    The patient's clinical indicators include:  Clinical Information: There is conflicting documentation in the medical record which requires clarification.    -The diagnosis of Type II MI  was documented on25  by  Wilber FAGAN    -The diagnosis of NSTEMI was documented on 25 by Dr Fiore    Clinical Indicators:   vital signs:  T30.4 HR65 R 21 /69 97% RA   Troponins 6501,3060   \" EKG at 00 44 with ventricular of 76, as interpreted me, showed sinus rhythm with PVCs, left bundle branch block noted, nonspecific ST and T wave patterns, no evidence of acute ischemia.\"   cardiac cath:\" 1.Right coronary artery system dominance.  2.Single vessel coronary artery disease.  3.Patent stent to mid LAD.  4.Ostial 1st Dg 50% lesion (residual lesion from PCI in bifurcation lesion).  5.Moderately reduced LV systolic function   40% due to apical akinesia.  6.Compared to the previous left heart catheterization (2024), the findings are similar, with patent stent to LAD and similar post-bifurcation PCI lesion " "to ostial 1st Dg.\"    Treatment:  aspirin  Lipitor  Cardiac catherization      Risk Factors: Hypothermia, Atrial fib, fall, UTI, CHF  Options provided:  -- NSTEMI  -- TypeII MI  -- Other - I will add my own diagnosis  -- Refer to Clinical Documentation Reviewer    Query created by: Veronica Gonzales on 2/13/2025 4:17 PM      Electronically signed by:  ALIVIA DANIELLE MD 2/13/2025 4:58 PM          "

## 2025-02-13 NOTE — DISCHARGE INSTRUCTIONS
You were seen at [facility] after a fall and was found to be very hypothermic (low body temperature) due to prolonged exposure to the cold. You were actively re-warmed with warming blankets and warmed IV fluids. You also had CT scans which showed no injuries from your fall. You had elevation in your heart enzymes which was concerning for a possible heart attack but was found to have new congestive heart failure. You had a heart cath which did not show any evidence of heart attach and did not have any stents placed. Your blood pressure was slightly low and so your metoprolol was changed to a different form.     You also were diagnosed with a UTI and are to continue to take the antibiotic twice a day (Nitrofurantoin) for 7 more doses.     You are to continue taking all your medications es prescribed at home with these exceptions:  Stop your normal home metoprolol and start a different type of metoprolol now at a lower dose at 12.5mg twice a day   Stop your Entresto and at your follow-up appointment with your cardiologist discuss restarting it at a possibly lower dose  Follow-up with your cardiologist on 2/26/25 as previously scheduled    You should follow-up with your cardiologist on 2/26/25 as scheduled to consider further testing, work-up as needed, or medication adjustments as needed.     You should return to the ER for persistent and worsening chest pain, especially if it radiates to the jaw or back and is associated with shortness of breath and increase or new lower extremity swelling, or for any other concerns.     Below is reference to the American Heart Association for additional information on chest pain symptoms.   https://www.heart.org/en/health-topics/house-calls/what-does-chest-pain-mean#:~:text=The%20most%20important%20thing%20to,dizziness%2C%20or%20shortness%20of%20breath.

## 2025-02-14 ENCOUNTER — PATIENT OUTREACH (OUTPATIENT)
Dept: PRIMARY CARE | Facility: CLINIC | Age: 85
End: 2025-02-14
Payer: MEDICARE

## 2025-02-14 LAB — BACTERIA UR CULT: ABNORMAL

## 2025-02-14 NOTE — PROGRESS NOTES
Discharge Facility: [facility]  Discharge Diagnosis: Fall, initial encounter   Admission Date: 2/11/25  Discharge Date: 2/13/25    PCP Appointment Date: tasked to office  Specialist Appointment Date: Cardiology Dr Echavarria 2/26/25  Hospital Encounter and Summary Linked: Yes    ED to Hosp-Admission (Discharged) with Rashaad Fiore MD; Charanjit Fuller MD (02/11/2025)     Two attempts were made to reach patient within two business days after discharge. Voicemail left with contact information for patient to call back with any non-emergent questions or concerns.

## 2025-02-16 LAB
BACTERIA BLD CULT: NORMAL
BACTERIA BLD CULT: NORMAL

## 2025-02-19 NOTE — ED PROCEDURE NOTE
Procedure  Critical Care    Performed by: Charanjit Fuller MD  Authorized by: Charanjit Fuller MD    Critical care provider statement:     Critical care time (minutes):  95    Critical care time was exclusive of:  Separately billable procedures and treating other patients    Critical care was necessary to treat or prevent imminent or life-threatening deterioration of the following conditions:  Cardiac failure, metabolic crisis and CNS failure or compromise    Critical care was time spent personally by me on the following activities:  Blood draw for specimens, development of treatment plan with patient or surrogate, evaluation of patient's response to treatment, examination of patient, ordering and performing treatments and interventions, ordering and review of laboratory studies, ordering and review of radiographic studies, pulse oximetry and re-evaluation of patient's condition    Care discussed with: admitting provider                 Charanjit Fuller MD  02/18/25 0807

## 2025-02-26 ENCOUNTER — APPOINTMENT (OUTPATIENT)
Dept: CARDIOLOGY | Facility: CLINIC | Age: 85
End: 2025-02-26
Payer: COMMERCIAL

## 2025-02-26 VITALS
DIASTOLIC BLOOD PRESSURE: 67 MMHG | BODY MASS INDEX: 22.26 KG/M2 | HEIGHT: 67 IN | WEIGHT: 141.8 LBS | HEART RATE: 62 BPM | SYSTOLIC BLOOD PRESSURE: 119 MMHG

## 2025-02-26 DIAGNOSIS — I50.31 ACUTE DIASTOLIC (CONGESTIVE) HEART FAILURE: ICD-10-CM

## 2025-02-26 DIAGNOSIS — I44.7 LEFT BUNDLE BRANCH BLOCK: ICD-10-CM

## 2025-02-26 DIAGNOSIS — R79.89 ELEVATED TROPONIN: ICD-10-CM

## 2025-02-26 DIAGNOSIS — I10 ESSENTIAL HYPERTENSION: ICD-10-CM

## 2025-02-26 DIAGNOSIS — I25.10 ATHEROSCLEROSIS OF NATIVE CORONARY ARTERY OF NATIVE HEART WITHOUT ANGINA PECTORIS: ICD-10-CM

## 2025-02-26 DIAGNOSIS — Z98.61 HX OF PERCUTANEOUS TRANSLUMINAL CORONARY ANGIOPLASTY: ICD-10-CM

## 2025-02-26 DIAGNOSIS — I25.5 CARDIOMYOPATHY, ISCHEMIC: Primary | ICD-10-CM

## 2025-02-26 DIAGNOSIS — I50.22 HEART FAILURE WITH MILDLY REDUCED EJECTION FRACTION (HFMREF): ICD-10-CM

## 2025-02-26 DIAGNOSIS — N39.0 RECURRENT UTI (URINARY TRACT INFECTION): ICD-10-CM

## 2025-02-26 DIAGNOSIS — N39.0 URINARY TRACT INFECTION WITHOUT HEMATURIA, SITE UNSPECIFIED: ICD-10-CM

## 2025-02-26 PROBLEM — I50.32 CHRONIC DIASTOLIC HEART FAILURE: Status: RESOLVED | Noted: 2024-10-24 | Resolved: 2025-02-26

## 2025-02-26 PROCEDURE — 1157F ADVNC CARE PLAN IN RCRD: CPT | Performed by: INTERNAL MEDICINE

## 2025-02-26 PROCEDURE — 3078F DIAST BP <80 MM HG: CPT | Performed by: INTERNAL MEDICINE

## 2025-02-26 PROCEDURE — 99214 OFFICE O/P EST MOD 30 MIN: CPT | Performed by: INTERNAL MEDICINE

## 2025-02-26 PROCEDURE — 1123F ACP DISCUSS/DSCN MKR DOCD: CPT | Performed by: INTERNAL MEDICINE

## 2025-02-26 PROCEDURE — 1159F MED LIST DOCD IN RCRD: CPT | Performed by: INTERNAL MEDICINE

## 2025-02-26 PROCEDURE — 1111F DSCHRG MED/CURRENT MED MERGE: CPT | Performed by: INTERNAL MEDICINE

## 2025-02-26 PROCEDURE — 3074F SYST BP LT 130 MM HG: CPT | Performed by: INTERNAL MEDICINE

## 2025-02-26 RX ORDER — CLOPIDOGREL BISULFATE 75 MG/1
75 TABLET ORAL DAILY
Qty: 90 TABLET | Refills: 1 | Status: SHIPPED | OUTPATIENT
Start: 2025-02-26

## 2025-02-26 RX ORDER — SACUBITRIL AND VALSARTAN 24; 26 MG/1; MG/1
0.5 TABLET, FILM COATED ORAL 2 TIMES DAILY
Qty: 90 TABLET | Refills: 0 | Status: SHIPPED | OUTPATIENT
Start: 2025-02-26

## 2025-02-26 NOTE — PROGRESS NOTES
Continue  Patient:  Laurel Pugh  YOB: 1940  MRN: [id number]       HPI:       Laurel Pugh is a 84 y.o. female who returns today for cardiac follow-up after a recent hospitalization.  She is accompanied by her daughter.  She was initially seen in consultation by Dr. Rosa at [facility] on July 14, 2024.  No prior history of atherosclerotic heart or valvular heart disease.  She presented with lightheadedness, nausea, and confusion.  CT scan of the chest was negative for pulmonary embolus.  Chest x-ray did not show any active disease.  CBC and CMP were normal.  BNP was 373.  High-sensitivity troponin levels 427, 614, 1196, and 519.  EKG showed normal sinus rhythm with complete left bundle branch block.  No change from previous remote EKG.  CT scan of the brain was negative.  She was diagnosed with a urinary tract infection.  No reports of chest pain or shortness of breath.  No history of hypertension or diabetes mellitus.  She is a non-smoker.  She has a history of hyperlipidemia.     An echocardiogram July 15, 2024 showed an estimated LV ejection fraction of 40% with apical hypokinesis.  There was mild asymmetric septal hypertrophy.  Mild mitral and tricuspid regurgitation.  Cardiac catheterization done the same day showed 80% stenosis of the LAD.  There were mild irregularities of the circumflex and right coronary artery.  She underwent angioplasty and stenting of the LAD.  Estimated LV ejection fraction was 45%.  She was started on GDMT and continued on DAPT.    She was brought to emergency department by EMS February 11, 2025 after being found outside on the ground by some neighbors.  The patient recalls that she had slipped on some icy steps and fell backwards.  She was had been lying outside for over 5 hours when her son found her.  She was noted to be hypothermic.  Initial temperature in the ED was 30.4 (86.7).  Vital signs were otherwise stable.  She subsequent developed some mild hypotension  with systolic blood pressures in the 80s to low 100s.  CBC was normal with exception of WBC 12.1.  Basic metabolic profile was normal with exception of BUN 45 and glucose 166.  CPK was 391.  BNP level 379.  Initial high-sensitivity troponin level was 3,071 with subsequent levels of 4,468, 7,405, and 7,123.  Chest x-ray did not show any acute disease.  CT scan of the brain was negative for any acute findings.  No fractures identified.  Initial EKG showed significant baseline artifact with probable atrial fibrillation.  Repeat EKG showed normal sinus rhythm with occasional PVCs.  Known complete left bundle much block.     She was treated with warm IV fluids and a Fariha hugger.  She had some nausea and vomiting and generalized discomfort.  She had slow improvement of her temperature as well as her mental status.  She was admitted to the ICU.  She denied any chest pain or shortness of breath.  Repeat echocardiogram showed estimated LV ejection fraction of 40% with apical hypokinesis.    Cardiac catheterization on February 12, 2025 showed a patent stent in the mid LAD, 50% stenosis of the ostial first diagonal branch, and otherwise minimal disease.  There was apical akinesis with estimated LV ejection fraction 40%.  The diagonal stenosis was similar to a post bifurcation PCI lesion on a previous study.  Medical therapy is recommended.  Echocardiogram February 12, 2025 also showed apical akinesis and estimated LV ejection fraction 40%.  Normal right ventricular chamber size and function.  Valvular structure and function were not assessed on that study.  It was felt her elevated cardiac enzymes for secondary to a type II myocardial infarction secondary to possible cardiac contusion as well as myocardial stress in the setting of hypothermia.  Prior to her discharge she was experiencing some hypotension and she was changed from Toprol-XL to metoprolol tartrate 12.5 mg twice daily.  Entresto was held.  We held off on starting  "her on an SGLT2 inhibitor secondary to recurrent urinary tract infections.     She has been doing quite well since her discharge.  Her blood pressures at home and been running in the 1 20-1 40s systolic range.  She denies any chest pain or shortness of breath.  She denies any orthopnea, PND, or increasing peripheral edema.  She denies any palpitations, lightheadedness, near-syncope, or syncope.  She denies any fever, chills, or cough.  She denies any nausea, vomiting, or diaphoresis.  She denies any hemoptysis, hematemesis, melena, or hematochezia.  She does not have any definite anginal symptoms.  She has underlying ischemic heart disease but has not developed any overt congestive heart failure.  Her blood pressures are well-controlled.  I advised her to restart low-dose Entresto 24/26 mg one half tab twice daily.  Continue her current dose of metoprolol to tartrate.  She will also continue on aspirin, Plavix, and atorvastatin.  Other details as noted below.      Objective:     Vitals:    02/26/25 1430   BP: 119/67   Pulse: 62       Wt Readings from Last 4 Encounters:   02/13/25 64.2 kg (141 lb 8.6 oz)   11/02/24 63.5 kg (140 lb)   10/23/24 64.4 kg (142 lb)   10/16/24 63.5 kg (140 lb)       Allergies:     Allergies   Allergen Reactions    Haloperidol Hallucinations    Keflex [Cephalexin] Itching and Rash     \"Severe Itching\"    Red patches on arms, trunks, back,  and legs     Zofran [Ondansetron Hcl] Hallucinations    Amoxicillin-Pot Clavulanate Rash    Levofloxacin Rash    Sulfa (Sulfonamide Antibiotics) Rash        Medications:     Current Outpatient Medications   Medication Instructions    aspirin 81 mg EC tablet 1 tablet, Daily    cholecalciferol (VITAMIN D-3) 1,000 Units, Daily    clopidogrel (Plavix) 75 mg tablet Take 1 tablet (75 mg) by mouth once daily.    cyanocobalamin (VITAMIN B-12) 2,000 mcg, oral, Daily    docusate sodium (STOOL SOFTENER ORAL) Daily PRN    metoprolol tartrate (LOPRESSOR) 12.5 mg, oral, " 2 times daily    rosuvastatin (CRESTOR) 5 mg, oral, Daily       Physical Examination:   GENERAL:  Well developed, well nourished, in no acute distress.  CHEST:  Symmetric and nontender.  NEURO/PSYCH:  Alert and oriented times three with approppriate behavior and responses.  NECK:  Supple, no JVD, no bruit.  LUNGS:  Clear to auscultation bilaterally, normal respiratory effort.  HEART:  Rate and rhythm regular with I/VI KAYLA LSB, no gallop appreciated.        There are no rubs, clicks or heaves.  EXTREMITIES:  Warm with good color, no clubbing or cyanosis.  There is no edema noted.  PERIPHERAL VASCULAR:  Pulses present and equally palpable; 2+ throughout.      Lab:     CBC:   Lab Results   Component Value Date    WBC 6.6 02/13/2025    RBC 3.61 (L) 02/13/2025    HGB 10.0 (L) 02/13/2025    HCT 31.7 (L) 02/13/2025     02/13/2025        CMP:    Lab Results   Component Value Date     02/13/2025    K 4.0 02/13/2025     (H) 02/13/2025    CO2 24 02/13/2025    BUN 35 (H) 02/13/2025    CREATININE 1.09 (H) 02/13/2025    GLUCOSE 82 02/13/2025    CALCIUM 8.9 02/13/2025       Lipid Profile:    Lab Results   Component Value Date    TRIG 74 07/14/2024    HDL 52.9 07/14/2024    LDLCALC 129 (H) 07/14/2024       BMP:  Lab Results   Component Value Date     02/13/2025     02/12/2025     02/11/2025    K 4.0 02/13/2025    K 4.3 02/12/2025    K 4.4 02/11/2025     (H) 02/13/2025     02/12/2025     02/11/2025    CO2 24 02/13/2025    CO2 22 02/12/2025    CO2 23 02/11/2025    BUN 35 (H) 02/13/2025    BUN 41 (H) 02/12/2025    BUN 45 (H) 02/11/2025    CREATININE 1.09 (H) 02/13/2025    CREATININE 0.95 02/12/2025    CREATININE 0.95 02/11/2025       CBC:  Lab Results   Component Value Date    WBC 6.6 02/13/2025    WBC 11.1 02/12/2025    WBC 12.1 (H) 02/11/2025    RBC 3.61 (L) 02/13/2025    RBC 3.63 (L) 02/12/2025    RBC 4.37 02/11/2025    HGB 10.0 (L) 02/13/2025    HGB 10.2 (L) 02/12/2025    HGB  12.3 02/11/2025    HCT 31.7 (L) 02/13/2025    HCT 31.7 (L) 02/12/2025    HCT 39.1 02/11/2025    MCV 88 02/13/2025    MCV 87 02/12/2025    MCV 90 02/11/2025    MCH 27.7 02/13/2025    MCH 28.1 02/12/2025    MCH 28.1 02/11/2025    MCHC 31.5 (L) 02/13/2025    MCHC 32.2 02/12/2025    MCHC 31.5 (L) 02/11/2025    RDW 14.6 (H) 02/13/2025    RDW 14.3 02/12/2025    RDW 14.3 02/11/2025     02/13/2025     02/12/2025     02/11/2025       Hepatic Function Panel:    Lab Results   Component Value Date    ALKPHOS 35 02/13/2025    ALT 23 02/13/2025    AST 54 (H) 02/13/2025    PROT 6.1 (L) 02/13/2025    BILITOT 0.8 02/13/2025    BILIDIR 0.1 05/09/2019       HgBA1c:    Lab Results   Component Value Date    HGBA1C 5.7 (H) 02/11/2025       Magnesium:    Lab Results   Component Value Date    MG 2.10 02/13/2025       TSH:    Lab Results   Component Value Date    TSH 0.61 07/13/2024       BNP:   Lab Results   Component Value Date     (H) 02/11/2025        PT/INR:    Lab Results   Component Value Date    PROTIME 13.1 (H) 02/11/2025    INR 1.2 (H) 02/11/2025       Cardiac Enzymes:    Lab Results   Component Value Date    TROPHS 3,060 (HH) 02/12/2025    TROPHS 6,501 (HH) 02/12/2025    TROPHS 7,123 (HH) 02/12/2025       Diagnostic Studies:     ECG 12 Lead  Result Date: 2/12/2025    Normal sinus rhythm Left bundle branch block Abnormal ECG When compared with ECG of 12-FEB-2025 00:44, (unconfirmed) Premature ventricular complexes are no longer Present Nonspecific T wave abnormality no longer evident in Inferior leads T wave inversion no longer evident in Lateral leads Confirmed by Remington De León ([id number]) on 2/12/2025 6:55:57 PM      Cardiac Catheterization Procedure  Result Date: 2/12/2025  [facility], Cath Lab        CONCLUSIONS:  1. Right coronary artery system dominance.  2. Single vessel coronary artery disease.  3. Patent stent to mid LAD.  4. Ostial 1st Dg 50% lesion (residual lesion from PCI in  bifurcation lesion).  5. Moderately reduced LV systolic function ~40% due to apical akinesia.  6. Compared to the previous left heart catheterization (07/2024), the findings are similar, with patent stent to LAD and similar post-bifurcation PCI lesion to ostial 1st Dg. ICD 10 Codes: Non ST elevation (NSTEMI) myocardial infarction-I21.4  CPT Codes: Left Heart Cath (visualization of coronaries) and LV-64668; Moderate Sedation Services initial 15 minutes patient >5 years-[id number]; Ultrasound guidance for needle placement-[id number]; Ultrasound guidance for vascular access-[id number]; Angiography, Extremity,uni,S&I (PER)-[id number]  [id number] Elvin Means MD Performing Physician Electronically signed by [id number] Elvin Means MD on 2/12/2025 at 2:03:18 PM  ** Final **       ECG 12 lead  Result Date: 2/12/2025    Atrial fibrillation Left bundle branch block Abnormal ECG When compared with ECG of 15-JUL-2024 14:12, Atrial fibrillation has replaced Sinus rhythm See ED provider note for full interpretation and clinical correlation Confirmed by Riya Bobo ([id number]) on 2/12/2025 10:04:20 AM    ECG 12 Lead  Result Date: 2/12/2025    Sinus rhythm with occasional Premature ventricular complexes Left bundle branch block Abnormal ECG When compared with ECG of 11-FEB-2025 21:19, (unconfirmed) Sinus rhythm has replaced Atrial fibrillation See ED provider note for full interpretation and clinical correlation Confirmed by Riya Bobo ([id number]) on 2/12/2025 10:00:45 AM      Transthoracic Echo (TTE) Limited  Result Date: 2/12/2025   [facility]    CONCLUSIONS:  1. Entire apex is abnormal.  2. Left ventricular ejection fraction is moderately decreased, by visual estimate at 40%.  3. Abnormal wall motion.  4. There is normal right ventricular global systolic function.  5. Normal sized right ventricle.  6. The pulmonary artery is not well visualized. RECOMMENDATIONS: Technically suboptimal and limited study, therefore  accuracy of above interpretation could be substantially diminished. Clinical correlation is advised. Consider additional imaging modalities if clinically indicated. Repeat full study if clinically indicated.        CT chest abdomen pelvis wo IV contrast  Result Date: 2/12/2025    CHEST: Both lungs are hyperinflated with centriacinar and paraseptal emphysema..  Bibasal atelectasis. Abdomen and pelvis: Distended gallbladder with multiple cholelithiasis and thickened wall measure 4 mm.  No evidence of pericholecystic fluid collection. Findings might suggest acute cholecystitis, clinical correlation may be helpful. Moderate amount of stool burden within the rectum and colon. Scattered diverticulosis without diverticulitis. Nonspecific lucent area within the sacrum on the right side measures 1 x 1.7 cm.   Signed by Titi White MD      CT thoracic spine wo IV contrast  Result Date: 2/12/2025    1. No evidence for acute injury to the thoracic or lumbar spine. There is stable mild chronic compression of the superior endplate of T8. 2. There is mild degenerative change throughout the thoracic and lumbar spine with a transitional vertebral body. 3. There is a mild grade 1 spondylolisthesis at L4-5.   Signed by Oliverio Galicia MD      CT lumbar spine wo IV contrast  Result Date: 2/12/2025    1. No evidence for acute injury to the thoracic or lumbar spine. There is stable mild chronic compression of the superior endplate of T8. 2. There is mild degenerative change throughout the thoracic and lumbar spine with a transitional vertebral body. 3. There is a mild grade 1 spondylolisthesis at L4-5.   Signed by Oliverio Galicia MD      CT head wo IV contrast  Result Date: 2/12/2025    1.  No acute intracranial hemorrhage or depressed calvarial fracture. 2.  No acute fracture at the cervical spine. Degenerative changes.         Signed by: Pat Chavarria 2/12/2025 12:50 AM      CT cervical spine wo IV contrast  Result Date:  2/12/2025    1.  No acute intracranial hemorrhage or depressed calvarial fracture. 2.  No acute fracture at the cervical spine. Degenerative changes.        Signed by: Pat Chavarria 2/12/2025 12:50 AM       XR chest 1 view  Result Date: 2/11/2025    No acute cardiopulmonary process.   Signed by: Abi Barreto 2/11/2025 9:59 PM         Problem List:     Patient Active Problem List   Diagnosis    Arthritis of foot, left    History of breast cancer    Constipation    Vertigo    GERD (gastroesophageal reflux disease)    Left bundle branch block    Mild vitamin D deficiency    Actinic keratosis    Seborrheic keratosis    Shortness of breath    Varicose veins of both lower extremities with inflammation    Ocular migraine    Altered mental status, unspecified altered mental status type    NSTEMI (non-ST elevated myocardial infarction) (Multi)    Urinary tract infection without hematuria    Atherosclerosis of native coronary artery of native heart without angina pectoris    Essential hypertension    Hx of percutaneous transluminal coronary angioplasty    Chronic diastolic heart failure    Acute diastolic (congestive) heart failure    Fall, initial encounter    Hypothermia    Elevated troponin       Asessment:     Problem List Items Addressed This Visit             ICD-10-CM    Left bundle branch block I44.7    Recurrent UTI (urinary tract infection) N39.0    Relevant Medications    clopidogrel (Plavix) 75 mg tablet    Atherosclerosis of native coronary artery of native heart without angina pectoris I25.10    Relevant Medications    sacubitriL-valsartan (Entresto) 24-26 mg tablet    clopidogrel (Plavix) 75 mg tablet    Other Relevant Orders    Follow Up In Cardiology    CBC    Lipid panel    Comprehensive metabolic panel    Essential hypertension I10    Relevant Orders    Follow Up In Cardiology    CBC    Lipid panel    Comprehensive metabolic panel    Hx of percutaneous transluminal coronary angioplasty Z98.61    Acute  diastolic (congestive) heart failure I50.31    Relevant Medications    sacubitriL-valsartan (Entresto) 24-26 mg tablet    clopidogrel (Plavix) 75 mg tablet    Other Relevant Orders    Follow Up In Cardiology    CBC    Lipid panel    Comprehensive metabolic panel    Elevated troponin R79.89    Cardiomyopathy, ischemic - Primary I25.5    Relevant Medications    sacubitriL-valsartan (Entresto) 24-26 mg tablet    clopidogrel (Plavix) 75 mg tablet    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

## 2025-02-26 NOTE — PATIENT INSTRUCTIONS
Please bring all medicines, vitamins, and herbal supplements with you in original bottles to every appointment!!!!    Prescriptions will not be filled unless you are compliant with your follow up appointments or have a follow up appointment scheduled as per instruction of your physician. Refills should be requested at the time of your visit.     FASTING LABS, FASTING FROM MIDNIGHT THE NIGHT BEFORE TO BE DONE 1 WEEK PRIOR TO YOUR APPOINTMENT WITH DR IRBY    RESTART: ENTRESTO 24-26- TAKE 1/2 TABLET TWICE DAILY

## 2025-02-28 ENCOUNTER — PATIENT OUTREACH (OUTPATIENT)
Dept: PRIMARY CARE | Facility: CLINIC | Age: 85
End: 2025-02-28
Payer: MEDICARE

## 2025-03-06 LAB
25(OH)D3+25(OH)D2 SERPL-MCNC: 74 NG/ML (ref 30–100)
ALBUMIN SERPL-MCNC: 3.8 G/DL (ref 3.6–5.1)
ALP SERPL-CCNC: 40 U/L (ref 37–153)
ALT SERPL-CCNC: 12 U/L (ref 6–29)
ANION GAP SERPL CALCULATED.4IONS-SCNC: 8 MMOL/L (CALC) (ref 7–17)
AST SERPL-CCNC: 20 U/L (ref 10–35)
BASOPHILS # BLD AUTO: 49 CELLS/UL (ref 0–200)
BASOPHILS NFR BLD AUTO: 1.4 %
BILIRUB SERPL-MCNC: 0.5 MG/DL (ref 0.2–1.2)
BUN SERPL-MCNC: 25 MG/DL (ref 7–25)
CALCIUM SERPL-MCNC: 8.6 MG/DL (ref 8.6–10.4)
CHLORIDE SERPL-SCNC: 107 MMOL/L (ref 98–110)
CHOLEST SERPL-MCNC: 127 MG/DL
CHOLEST/HDLC SERPL: 2.4 (CALC)
CO2 SERPL-SCNC: 25 MMOL/L (ref 20–32)
CREAT SERPL-MCNC: 0.83 MG/DL (ref 0.6–0.95)
EGFRCR SERPLBLD CKD-EPI 2021: 69 ML/MIN/1.73M2
EOSINOPHIL # BLD AUTO: 140 CELLS/UL (ref 15–500)
EOSINOPHIL NFR BLD AUTO: 4 %
ERYTHROCYTE [DISTWIDTH] IN BLOOD BY AUTOMATED COUNT: 14 % (ref 11–15)
GLUCOSE SERPL-MCNC: 98 MG/DL (ref 65–99)
HCT VFR BLD AUTO: 33.9 % (ref 35–45)
HDLC SERPL-MCNC: 52 MG/DL
HGB BLD-MCNC: 10.5 G/DL (ref 11.7–15.5)
LDLC SERPL CALC-MCNC: 60 MG/DL (CALC)
LYMPHOCYTES # BLD AUTO: 1012 CELLS/UL (ref 850–3900)
LYMPHOCYTES NFR BLD AUTO: 28.9 %
MAGNESIUM SERPL-MCNC: 2.2 MG/DL (ref 1.5–2.5)
MCH RBC QN AUTO: 28.2 PG (ref 27–33)
MCHC RBC AUTO-ENTMCNC: 31 G/DL (ref 32–36)
MCV RBC AUTO: 91.1 FL (ref 80–100)
MONOCYTES # BLD AUTO: 455 CELLS/UL (ref 200–950)
MONOCYTES NFR BLD AUTO: 13 %
NEUTROPHILS # BLD AUTO: 1845 CELLS/UL (ref 1500–7800)
NEUTROPHILS NFR BLD AUTO: 52.7 %
NONHDLC SERPL-MCNC: 75 MG/DL (CALC)
PLATELET # BLD AUTO: 235 THOUSAND/UL (ref 140–400)
PMV BLD REES-ECKER: 10.5 FL (ref 7.5–12.5)
POTASSIUM SERPL-SCNC: 4 MMOL/L (ref 3.5–5.3)
PROT SERPL-MCNC: 6.2 G/DL (ref 6.1–8.1)
RBC # BLD AUTO: 3.72 MILLION/UL (ref 3.8–5.1)
SODIUM SERPL-SCNC: 140 MMOL/L (ref 135–146)
TRIGL SERPL-MCNC: 72 MG/DL
TSH SERPL-ACNC: 0.98 MIU/L (ref 0.4–4.5)
VIT B12 SERPL-MCNC: 1783 PG/ML (ref 200–1100)
WBC # BLD AUTO: 3.5 THOUSAND/UL (ref 3.8–10.8)

## 2025-03-10 ENCOUNTER — APPOINTMENT (OUTPATIENT)
Dept: PRIMARY CARE | Facility: CLINIC | Age: 85
End: 2025-03-10
Payer: COMMERCIAL

## 2025-03-10 VITALS
TEMPERATURE: 97.3 F | HEART RATE: 58 BPM | WEIGHT: 143 LBS | OXYGEN SATURATION: 99 % | RESPIRATION RATE: 16 BRPM | HEIGHT: 67 IN | DIASTOLIC BLOOD PRESSURE: 55 MMHG | SYSTOLIC BLOOD PRESSURE: 137 MMHG | BODY MASS INDEX: 22.44 KG/M2

## 2025-03-10 DIAGNOSIS — I50.20 HFREF (HEART FAILURE WITH REDUCED EJECTION FRACTION): ICD-10-CM

## 2025-03-10 DIAGNOSIS — I10 ESSENTIAL HYPERTENSION: ICD-10-CM

## 2025-03-10 DIAGNOSIS — Z71.89 ADVANCED DIRECTIVES, COUNSELING/DISCUSSION: ICD-10-CM

## 2025-03-10 DIAGNOSIS — E53.8 B12 DEFICIENCY: ICD-10-CM

## 2025-03-10 DIAGNOSIS — E78.2 MODERATE MIXED HYPERLIPIDEMIA NOT REQUIRING STATIN THERAPY: ICD-10-CM

## 2025-03-10 DIAGNOSIS — Z00.00 ROUTINE GENERAL MEDICAL EXAMINATION AT HEALTH CARE FACILITY: Primary | ICD-10-CM

## 2025-03-10 DIAGNOSIS — E55.9 MILD VITAMIN D DEFICIENCY: ICD-10-CM

## 2025-03-10 PROBLEM — N39.0 RECURRENT UTI (URINARY TRACT INFECTION): Status: RESOLVED | Noted: 2024-07-13 | Resolved: 2025-03-10

## 2025-03-10 PROBLEM — I25.2 HISTORY OF NON-ST ELEVATION MYOCARDIAL INFARCTION (NSTEMI): Status: ACTIVE | Noted: 2024-07-13

## 2025-03-10 PROBLEM — R06.02 SHORTNESS OF BREATH: Status: RESOLVED | Noted: 2023-03-01 | Resolved: 2025-03-10
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
## 2025-03-10 PROBLEM — I25.10 ATHEROSCLEROSIS OF NATIVE CORONARY ARTERY OF NATIVE HEART WITHOUT ANGINA PECTORIS: Status: ACTIVE | Noted: 2024-07-13

## 2025-03-10 PROBLEM — R41.82 ALTERED MENTAL STATUS, UNSPECIFIED ALTERED MENTAL STATUS TYPE: Status: RESOLVED | Noted: 2024-07-13 | Resolved: 2025-03-10

## 2025-03-10 PROBLEM — I50.42 CHRONIC COMBINED SYSTOLIC AND DIASTOLIC HEART FAILURE: Status: ACTIVE | Noted: 2024-10-24

## 2025-03-10 PROBLEM — I50.22 HEART FAILURE WITH MILDLY REDUCED EJECTION FRACTION (HFMREF): Status: ACTIVE | Noted: 2024-10-24

## 2025-03-10 PROBLEM — R79.89 ELEVATED TROPONIN: Status: RESOLVED | Noted: 2025-02-11 | Resolved: 2025-03-10

## 2025-03-10 PROBLEM — R42 VERTIGO: Status: RESOLVED | Noted: 2023-03-01 | Resolved: 2025-03-10

## 2025-03-10 PROBLEM — T68.XXXA HYPOTHERMIA: Status: RESOLVED | Noted: 2025-02-11 | Resolved: 2025-03-10

## 2025-03-10 PROCEDURE — 1157F ADVNC CARE PLAN IN RCRD: CPT | Performed by: FAMILY MEDICINE

## 2025-03-10 PROCEDURE — 1160F RVW MEDS BY RX/DR IN RCRD: CPT | Performed by: FAMILY MEDICINE

## 2025-03-10 PROCEDURE — 99497 ADVNCD CARE PLAN 30 MIN: CPT | Performed by: FAMILY MEDICINE

## 2025-03-10 PROCEDURE — 1111F DSCHRG MED/CURRENT MED MERGE: CPT | Performed by: FAMILY MEDICINE

## 2025-03-10 PROCEDURE — 1036F TOBACCO NON-USER: CPT | Performed by: FAMILY MEDICINE

## 2025-03-10 PROCEDURE — 1170F FXNL STATUS ASSESSED: CPT | Performed by: FAMILY MEDICINE

## 2025-03-10 PROCEDURE — 1158F ADVNC CARE PLAN TLK DOCD: CPT | Performed by: FAMILY MEDICINE

## 2025-03-10 PROCEDURE — 99214 OFFICE O/P EST MOD 30 MIN: CPT | Performed by: FAMILY MEDICINE

## 2025-03-10 PROCEDURE — 1159F MED LIST DOCD IN RCRD: CPT | Performed by: FAMILY MEDICINE

## 2025-03-10 PROCEDURE — 3078F DIAST BP <80 MM HG: CPT | Performed by: FAMILY MEDICINE

## 2025-03-10 PROCEDURE — 3075F SYST BP GE 130 - 139MM HG: CPT | Performed by: FAMILY MEDICINE

## 2025-03-10 PROCEDURE — G0439 PPPS, SUBSEQ VISIT: HCPCS | Performed by: FAMILY MEDICINE

## 2025-03-10 PROCEDURE — 1123F ACP DISCUSS/DSCN MKR DOCD: CPT | Performed by: FAMILY MEDICINE

## 2025-03-10 RX ORDER — METOPROLOL TARTRATE 25 MG/1
12.5 TABLET, FILM COATED ORAL 2 TIMES DAILY
Qty: 30 TABLET | Refills: 11 | Status: SHIPPED | OUTPATIENT
Start: 2025-03-10 | End: 2026-03-05

## 2025-03-10 RX ORDER — ACETAMINOPHEN 500 MG
TABLET ORAL
Qty: 1 EACH | Refills: 0 | Status: SHIPPED | OUTPATIENT
Start: 2025-03-10

## 2025-03-10 ASSESSMENT — ENCOUNTER SYMPTOMS
SLEEP DISTURBANCE: 0
FLANK PAIN: 0
SPEECH DIFFICULTY: 0
NERVOUS/ANXIOUS: 0
COUGH: 0
DYSPHORIC MOOD: 0
ACTIVITY CHANGE: 0
FREQUENCY: 0
ABDOMINAL DISTENTION: 0
HALLUCINATIONS: 0
VOMITING: 0
BRUISES/BLEEDS EASILY: 0
EYE DISCHARGE: 0
FACIAL ASYMMETRY: 0
CHILLS: 0
HYPERTENSION: 1
OCCASIONAL FEELINGS OF UNSTEADINESS: 1
LOSS OF SENSATION IN FEET: 0
DIZZINESS: 0
WEAKNESS: 0
SHORTNESS OF BREATH: 0
CONSTIPATION: 0
BLOOD IN STOOL: 0
WOUND: 0
DIARRHEA: 0
FEVER: 0
EYE ITCHING: 0
AGITATION: 0
CONFUSION: 0
TREMORS: 0
LIGHT-HEADEDNESS: 1
ADENOPATHY: 0
APPETITE CHANGE: 0
NUMBNESS: 0
DYSURIA: 0
PALPITATIONS: 0
CHEST TIGHTNESS: 0
NECK STIFFNESS: 0
DEPRESSION: 0
NAUSEA: 0
HEMATURIA: 0

## 2025-03-10 ASSESSMENT — ACTIVITIES OF DAILY LIVING (ADL)
GROCERY_SHOPPING: INDEPENDENT
BATHING: INDEPENDENT
DRESSING: INDEPENDENT
DOING_HOUSEWORK: INDEPENDENT
MANAGING_FINANCES: INDEPENDENT
TAKING_MEDICATION: INDEPENDENT

## 2025-03-10 ASSESSMENT — PATIENT HEALTH QUESTIONNAIRE - PHQ9
2. FEELING DOWN, DEPRESSED OR HOPELESS: NOT AT ALL
SUM OF ALL RESPONSES TO PHQ9 QUESTIONS 1 AND 2: 0
1. LITTLE INTEREST OR PLEASURE IN DOING THINGS: NOT AT ALL

## 2025-03-10 NOTE — PROGRESS NOTES
Subjective   Reason for Visit: Laurel Pugh is an 84 y.o. female    Patient states she has sharp pain at the L lateral malleolus when the covers rub against it at night, wakes her up from sleep. Nontender to touch otherwise. Started one week ago. Has been putting neosporin on it. This morning, she noticed a rash in the area. Denies numbness, tingling.     Patient also states she gets lightheaded in the morning, attributes it to medication. She states she also has issues with balance, especially with turning. States these symptoms came on within the last 6 months. Has past hx of vertigo but states this feels different. Denies falls, tinnitus, hearing loss, chest pain, palpitations, changes in PO intake, nausea/vomiting, diarrhea, constipation.    Hyperlipidemia  This is a chronic problem. The current episode started more than 1 year ago. The problem is controlled. Recent lipid tests were reviewed and are high. She has no history of chronic renal disease, diabetes, hypothyroidism, liver disease, obesity or nephrotic syndrome. There are no known factors aggravating her hyperlipidemia. Pertinent negatives include no chest pain or shortness of breath. She is currently on no antihyperlipidemic treatment. The current treatment provides mild improvement of lipids. There are no compliance problems.  Risk factors for coronary artery disease include dyslipidemia, post-menopausal and a sedentary lifestyle.   Hypertension  This is a chronic problem. The current episode started more than 1 year ago. The problem is unchanged. The problem is controlled. Pertinent negatives include no chest pain, palpitations or shortness of breath. Past treatments include angiotensin blockers and beta blockers. The current treatment provides significant improvement. Hypertensive end-organ damage includes CAD/MI and heart failure. There is no history of chronic renal disease.       Patient Care Team:  Chong Houston DO as PCP - General  "(Family Medicine)  Wilber Nolan APRN-CNP as Nurse Practitioner (Cardiology)  Lazara Cortez, SE as Care Manager (Case Management)     Review of Systems   Constitutional:  Negative for activity change, appetite change, chills and fever.   HENT:  Negative for hearing loss and tinnitus.    Eyes:  Negative for discharge, itching and visual disturbance.   Respiratory:  Negative for cough, chest tightness and shortness of breath.    Cardiovascular:  Negative for chest pain and palpitations.   Gastrointestinal:  Negative for abdominal distention, blood in stool, constipation, diarrhea, nausea and vomiting.   Endocrine: Negative for cold intolerance and heat intolerance.   Genitourinary:  Negative for dysuria, flank pain, frequency, hematuria and urgency.   Musculoskeletal:  Negative for neck stiffness.   Skin:  Positive for rash (L lateral malleolus). Negative for wound.   Allergic/Immunologic: Negative for immunocompromised state.   Neurological:  Positive for light-headedness (Upon standing). Negative for dizziness, tremors, syncope, facial asymmetry, speech difficulty, weakness and numbness.   Hematological:  Negative for adenopathy. Does not bruise/bleed easily.   Psychiatric/Behavioral:  Negative for agitation, behavioral problems, confusion, dysphoric mood, hallucinations, self-injury, sleep disturbance and suicidal ideas. The patient is not nervous/anxious.        Objective   Vitals:  /55 (BP Location: Right arm, BP Cuff Size: Large adult)   Pulse 58   Temp 36.3 °C (97.3 °F) (Temporal)   Resp 16   Ht 1.702 m (5' 7\")   Wt 64.9 kg (143 lb)   SpO2 99%   BMI 22.40 kg/m²       Physical Exam  Constitutional:       General: She is not in acute distress.     Appearance: She is not ill-appearing or diaphoretic.   HENT:      Head: Normocephalic and atraumatic.      Right Ear: External ear normal.      Left Ear: External ear normal.      Nose: Nose normal. No rhinorrhea.   Eyes:      General: Lids are normal. No " scleral icterus.        Right eye: No discharge.         Left eye: No discharge.      Conjunctiva/sclera: Conjunctivae normal.   Cardiovascular:      Rate and Rhythm: Normal rate and regular rhythm.      Pulses: Normal pulses.      Heart sounds: S1 normal. No murmur heard.     Comments: Split S2  Pulmonary:      Effort: Pulmonary effort is normal. No respiratory distress.      Breath sounds: No decreased breath sounds, wheezing, rhonchi or rales.   Abdominal:      General: Bowel sounds are normal. There is no distension.      Palpations: Abdomen is soft. There is no mass.      Tenderness: There is no abdominal tenderness. There is no guarding or rebound.   Musculoskeletal:         General: No swelling, tenderness or deformity.      Cervical back: No rigidity or tenderness.      Right lower leg: No edema.      Left lower leg: No edema.   Lymphadenopathy:      Cervical: No cervical adenopathy.      Upper Body:      Right upper body: No supraclavicular adenopathy.      Left upper body: No supraclavicular adenopathy.   Skin:     General: Skin is warm and dry.      Coloration: Skin is not jaundiced or pale.      Findings: No erythema, lesion or rash (Faint nonerythematous rash at L lateral malleolus).   Neurological:      General: No focal deficit present.      Mental Status: She is alert and oriented to person, place, and time.      Sensory: No sensory deficit.      Motor: No weakness or tremor.      Coordination: Coordination normal.      Gait: Gait normal.   Psychiatric:         Mood and Affect: Mood normal. Affect is not inappropriate.         Behavior: Behavior normal.         Assessment/Plan   Problem List Items Addressed This Visit       Mild vitamin D deficiency    Relevant Orders    CBC and Auto Differential (Completed)    Comprehensive Metabolic Panel (Completed)    Lipid Panel (Completed)    Magnesium (Completed)    TSH with reflex to Free T4 if abnormal (Completed)    Vitamin B12 (Completed)    Vitamin D  25-Hydroxy,Total (for eval of Vitamin D levels) (Completed)    Comprehensive Metabolic Panel    Magnesium    Vitamin D 25-Hydroxy,Total (for eval of Vitamin D levels)    Follow Up In [facility] - PCP - Established    Essential hypertension    Relevant Medications    blood pressure test kit-large kit    Other Relevant Orders    CBC and Auto Differential (Completed)    Comprehensive Metabolic Panel (Completed)    Lipid Panel (Completed)    Magnesium (Completed)    TSH with reflex to Free T4 if abnormal (Completed)    Vitamin B12 (Completed)    Vitamin D 25-Hydroxy,Total (for eval of Vitamin D levels) (Completed)    CBC and Auto Differential    Comprehensive Metabolic Panel    Lipid Panel    Magnesium    TSH with reflex to Free T4 if abnormal    Follow Up In [facility] - PCP - Established     Other Visit Diagnoses       Routine general medical examination at health care facility    -  Primary    Relevant Orders    Follow Up [facility] - PCP - Established    Moderate mixed hyperlipidemia not requiring statin therapy        Relevant Orders    CBC and Auto Differential (Completed)    Comprehensive Metabolic Panel (Completed)    Lipid Panel (Completed)    Magnesium (Completed)    TSH with reflex to Free T4 if abnormal (Completed)    Vitamin B12 (Completed)    Vitamin D 25-Hydroxy,Total (for eval of Vitamin D levels) (Completed)    Comprehensive Metabolic Panel    Lipid Panel    TSH with reflex to Free T4 if abnormal    Follow Up In [facility] - PCP - Established    B12 deficiency        Relevant Orders    CBC and Auto Differential (Completed)    Comprehensive Metabolic Panel (Completed)    Lipid Panel (Completed)    Magnesium (Completed)    TSH with reflex to Free T4 if abnormal (Completed)    Vitamin B12 (Completed)    Vitamin D 25-Hydroxy,Total (for eval of Vitamin D levels) (Completed)    CBC and Auto Differential    Vitamin B12    Follow Up In [facility] - PCP -  Established    Advanced directives, counseling/discussion        Relevant Orders    Full code (Completed)    Follow Up In [facility] - PCP - Established    HFrEF (heart failure with reduced ejection fraction)        Relevant Medications    metoprolol tartrate (Lopressor) 25 mg tablet            Advance care planning was discussed including living will, power of  for healthcare and living will.  Advance care planning packet will be provided to patient at discharge.  Patient was encouraged to bring in any advanced care planning paperwork to file on the chart at their own convenience.  (16min spent discussing above)  Patient was identified as a fall risk. Risk prevention instructions provided.    Patient was seen and evaluated with student. I was present for critical portion of history and physical exam. I reviewed note with student and agree with findings as written  WILFREDO DO

## 2025-04-20 ENCOUNTER — OFFICE VISIT (OUTPATIENT)
Dept: URGENT CARE | Age: 85
End: 2025-04-20
Payer: MEDICARE

## 2025-04-20 VITALS
TEMPERATURE: 96.6 F | DIASTOLIC BLOOD PRESSURE: 54 MMHG | RESPIRATION RATE: 18 BRPM | OXYGEN SATURATION: 99 % | WEIGHT: 143 LBS | HEART RATE: 59 BPM | HEIGHT: 67 IN | BODY MASS INDEX: 22.44 KG/M2 | SYSTOLIC BLOOD PRESSURE: 137 MMHG

## 2025-04-20 DIAGNOSIS — R35.0 URINARY FREQUENCY: ICD-10-CM

## 2025-04-20 DIAGNOSIS — B33.8 INFECTION DUE TO RESPIRATORY SYNCYTIAL VIRUS (RSV), UNSPECIFIED INFECTION TYPE: Primary | ICD-10-CM

## 2025-04-20 DIAGNOSIS — N39.0 URINARY TRACT INFECTION WITH HEMATURIA, SITE UNSPECIFIED: ICD-10-CM

## 2025-04-20 DIAGNOSIS — R05.9 COUGH IN ADULT PATIENT: ICD-10-CM

## 2025-04-20 DIAGNOSIS — R31.9 URINARY TRACT INFECTION WITH HEMATURIA, SITE UNSPECIFIED: ICD-10-CM

## 2025-04-20 LAB
POC APPEARANCE, URINE: CLEAR
POC BILIRUBIN, URINE: NEGATIVE
POC BLOOD, URINE: ABNORMAL
POC COLOR, URINE: YELLOW
POC CORONAVIRUS SARS-COV-2 PCR: NEGATIVE
POC GLUCOSE, URINE: NEGATIVE MG/DL
POC HUMAN RHINOVIRUS PCR: NEGATIVE
POC INFLUENZA A VIRUS PCR: NEGATIVE
POC INFLUENZA B VIRUS PCR: NEGATIVE
POC KETONES, URINE: NEGATIVE MG/DL
POC LEUKOCYTES, URINE: ABNORMAL
POC NITRITE,URINE: POSITIVE
POC PH, URINE: 6 PH
POC PROTEIN, URINE: NEGATIVE MG/DL
POC RESPIRATORY SYNCYTIAL VIRUS PCR: POSITIVE
POC SPECIFIC GRAVITY, URINE: 1.01
POC UROBILINOGEN, URINE: 0.2 EU/DL

## 2025-04-20 PROCEDURE — 1159F MED LIST DOCD IN RCRD: CPT | Performed by: NURSE PRACTITIONER

## 2025-04-20 PROCEDURE — 99214 OFFICE O/P EST MOD 30 MIN: CPT | Performed by: NURSE PRACTITIONER

## 2025-04-20 PROCEDURE — 81003 URINALYSIS AUTO W/O SCOPE: CPT | Performed by: NURSE PRACTITIONER

## 2025-04-20 PROCEDURE — 3078F DIAST BP <80 MM HG: CPT | Performed by: NURSE PRACTITIONER

## 2025-04-20 PROCEDURE — 87631 RESP VIRUS 3-5 TARGETS: CPT | Performed by: NURSE PRACTITIONER

## 2025-04-20 PROCEDURE — 1157F ADVNC CARE PLAN IN RCRD: CPT | Performed by: NURSE PRACTITIONER

## 2025-04-20 PROCEDURE — 3075F SYST BP GE 130 - 139MM HG: CPT | Performed by: NURSE PRACTITIONER

## 2025-04-20 PROCEDURE — 1160F RVW MEDS BY RX/DR IN RCRD: CPT | Performed by: NURSE PRACTITIONER

## 2025-04-20 PROCEDURE — 1123F ACP DISCUSS/DSCN MKR DOCD: CPT | Performed by: NURSE PRACTITIONER

## 2025-04-20 PROCEDURE — 1036F TOBACCO NON-USER: CPT | Performed by: NURSE PRACTITIONER

## 2025-04-20 RX ORDER — NITROFURANTOIN 25; 75 MG/1; MG/1
100 CAPSULE ORAL 2 TIMES DAILY
Qty: 14 CAPSULE | Refills: 0 | Status: ON HOLD | OUTPATIENT
Start: 2025-04-20 | End: 2025-04-27

## 2025-04-20 RX ORDER — BROMPHENIRAMINE MALEATE, PSEUDOEPHEDRINE HYDROCHLORIDE, AND DEXTROMETHORPHAN HYDROBROMIDE 2; 30; 10 MG/5ML; MG/5ML; MG/5ML
10 SYRUP ORAL 4 TIMES DAILY PRN
Qty: 150 ML | Refills: 0 | Status: ON HOLD | OUTPATIENT
Start: 2025-04-20 | End: 2025-04-25

## 2025-04-20 ASSESSMENT — ENCOUNTER SYMPTOMS
COUGH: 1
FREQUENCY: 1

## 2025-04-20 ASSESSMENT — PATIENT HEALTH QUESTIONNAIRE - PHQ9
2. FEELING DOWN, DEPRESSED OR HOPELESS: NOT AT ALL
1. LITTLE INTEREST OR PLEASURE IN DOING THINGS: NOT AT ALL
SUM OF ALL RESPONSES TO PHQ9 QUESTIONS 1 & 2: 0

## 2025-04-20 NOTE — PATIENT INSTRUCTIONS
Urinary Tract Infection:  -UA completed  -Good oral hydration; avoid holding urine  -C&S sent; will change abx if C&S suggestive  - Discussed UTI prevention methods with patient  -Advised on s/s to seek emergent care for  -f/u with PCP in the next week for re-evaluation of Urine  - Warm compress to back or lower abd if needed    RSV/Acute Cough:  - Discussed dx with patient  - Good oral hydration; avoid milk products  - Alvaro's Vapor rub; humidifier; warm showers  - Take medications as prescribed  - Avoid triggers as much as possible  - Advised on s/s to seek emergent care for  - f/u with PCP in the next 3-5 days if no better

## 2025-04-20 NOTE — PROGRESS NOTES
Subjective   Patient ID: Laurel Pugh is a 84 y.o. female. They present today with a chief complaint of Urinary Frequency (Started 6-7 days ago ) and Cough (Post nasal drip /Started 6 days ago ).    History of Present Illness  Patient presents to the  with the c/o urinary frequency and urgency x 6 days. She states she has ended up septic in the past from UTI and was in the hospital. She denies fever or dysuria at this time. She denies back pain or lower abd pain. No other associated symptoms.   Pt also complains of cough and congestion and runny nose x 7 days. She denies fever, sob, cp or pain with deep inspiration. She states her family has been giving her multiple OTC medication with minimal improvement in symptoms. No other associated symptoms or concerns to address at this time.       Urinary Frequency  Associated symptoms: cough    Cough        Past Medical History  Allergies as of 04/20/2025 - Reviewed 04/20/2025   Allergen Reaction Noted    Haloperidol Hallucinations 03/01/2023    Keflex [cephalexin] Itching and Rash 07/23/2024    Zofran [ondansetron hcl] Hallucinations 07/13/2024    Amoxicillin-pot clavulanate Rash 03/01/2023    Levofloxacin Rash 03/01/2023    Sulfa (sulfonamide antibiotics) Rash 03/01/2023       Prescriptions Prior to Admission[1]     Medical History[2]    Surgical History[3]     reports that she has never smoked. She has never been exposed to tobacco smoke. She has never used smokeless tobacco. She reports that she does not drink alcohol and does not use drugs.    Review of Systems  Review of Systems   Respiratory:  Positive for cough.    Genitourinary:  Positive for frequency.   10 point ROS completed and all are negative other than what is stated in the current HPI                                 Objective    Vitals:    04/20/25 1631   BP: 137/54   BP Location: Left arm   Patient Position: Sitting   Pulse: 59   Resp: 18   Temp: 35.9 °C (96.6 °F)   SpO2: 99%   Weight: 64.9 kg (143  "lb)   Height: 1.702 m (5' 7\")     No LMP recorded. Patient is postmenopausal.    Physical Exam  Constitutional:       Appearance: Normal appearance. She is not ill-appearing or toxic-appearing.   HENT:      Nose: Congestion and rhinorrhea present.      Mouth/Throat:      Mouth: Mucous membranes are moist.      Comments: (+)postnasal discharge  Cardiovascular:      Rate and Rhythm: Regular rhythm. Bradycardia present.   Pulmonary:      Effort: Pulmonary effort is normal.      Breath sounds: Normal breath sounds. No wheezing or rhonchi.   Abdominal:      General: Abdomen is flat. Bowel sounds are normal.      Palpations: Abdomen is soft.      Tenderness: There is no abdominal tenderness. There is no right CVA tenderness or left CVA tenderness.   Musculoskeletal:      Cervical back: No tenderness.   Lymphadenopathy:      Cervical: No cervical adenopathy.   Skin:     General: Skin is warm and dry.      Capillary Refill: Capillary refill takes less than 2 seconds.      Findings: No rash.   Neurological:      Mental Status: She is alert and oriented to person, place, and time.   Psychiatric:         Behavior: Behavior normal.         Procedures    Point of Care Test & Imaging Results from this visit  Results for orders placed or performed in visit on 04/20/25   POCT UA Automated manually resulted   Result Value Ref Range    POC Color, Urine Yellow Straw, Yellow, Light-Yellow    POC Appearance, Urine Clear Clear    POC Glucose, Urine NEGATIVE NEGATIVE mg/dl    POC Bilirubin, Urine NEGATIVE NEGATIVE    POC Ketones, Urine NEGATIVE NEGATIVE mg/dl    POC Specific Gravity, Urine 1.010 1.005 - 1.035    POC Blood, Urine SMALL (1+) (A) NEGATIVE    POC PH, Urine 6.0 No Reference Range Established PH    POC Protein, Urine NEGATIVE NEGATIVE mg/dl    POC Urobilinogen, Urine 0.2 0.2, 1.0 EU/DL    Poc Nitrite, Urine POSITIVE (A) NEGATIVE    POC Leukocytes, Urine MODERATE (2+) (A) NEGATIVE   POCT SPOTFIRE R/ST Panel Mini w/COVID " ([facility]) manually resulted    Specimen: Swab   Result Value Ref Range    POC Sars-Cov-2 PCR Negative Negative    POC Respiratory Syncytial Virus PCR Positive (A) Negative    POC Influenza A Virus PCR Negative Negative    POC Influenza B Virus PCR Negative Negative    POC Human Rhinovirus PCR Negative Negative      Imaging  No results found.    Cardiology, Vascular, and Other Imaging  No other imaging results found for the past 2 days      Diagnostic study results (if any) were reviewed by RYLAN Rob.    Assessment/Plan   Allergies, medications, history, and pertinent labs/EKGs/Imaging reviewed by RYLAN Rob.     Medical Decision Making  Urinary Tract Infection:  -UA completed  -Good oral hydration; avoid holding urine  -C&S sent; will change abx if C&S suggestive  - Discussed UTI prevention methods with patient  -Advised on s/s to seek emergent care for  -f/u with PCP in the next week for re-evaluation of Urine  - Warm compress to back or lower abd if needed    RSV/Acute Cough:  - Discussed dx with patient  - Good oral hydration; avoid milk products  - Alvaro's Vapor rub; humidifier; warm showers  - Take medications as prescribed  - Avoid triggers as much as possible  - Advised on s/s to seek emergent care for  - f/u with PCP in the next 3-5 days if no better    Orders and Diagnoses  Diagnoses and all orders for this visit:  Infection due to respiratory syncytial virus (RSV), unspecified infection type  Urinary frequency  -     POCT UA Automated manually resulted  Cough in adult patient  -     POCT SPOTFIRE R/ST Panel Mini w/COVID ([facility]) manually resulted      Medical Admin Record      Patient disposition: Home    Electronically signed by RYLAN Rob  5:37 PM           [1] (Not in a hospital admission)  [2]   Past Medical History:  Diagnosis Date    Arthritis     Bitten or stung by nonvenomous insect and other nonvenomous arthropods, initial encounter  05/22/2019    Tick bite, initial encounter    Encounter for general adult medical examination without abnormal findings 10/05/2022    Medicare annual wellness visit, subsequent    Encounter for general adult medical examination without abnormal findings 11/02/2021    Medicare annual wellness visit, subsequent    Personal history of malignant neoplasm of breast     History of malignant neoplasm of breast    Personal history of other diseases of urinary system 08/10/2019    History of hematuria    Personal history of other specified conditions     History of vertigo    Personal history of other specified conditions 08/10/2019    History of urinary frequency    Personal history of urinary (tract) infections 08/10/2019    History of acute cystitis    Personal history of urinary (tract) infections 09/03/2019    History of urinary tract infection   [3]   Past Surgical History:  Procedure Laterality Date    CARDIAC CATHETERIZATION N/A 7/15/2024    Procedure: Left Heart Cath, With LV;  Surgeon: Tressa Davison MD;  Location: [facility];  Service: Cardiovascular;  Laterality: N/A;  radial approach please    CARDIAC CATHETERIZATION N/A 7/15/2024    Procedure: PCI KHUSHI Stent- Coronary;  Surgeon: Tressa Davison MD;  Location: [facility];  Service: Cardiovascular;  Laterality: N/A;    CARDIAC CATHETERIZATION N/A 2/12/2025    Procedure: Left Heart Cath;  Surgeon: Elvin Means MD;  Location: [facility];  Service: Cardiovascular;  Laterality: N/A;    OTHER SURGICAL HISTORY  04/30/2019    Hysterectomy    OTHER SURGICAL HISTORY  04/30/2019    Mastectomy    OTHER SURGICAL HISTORY  04/30/2019    Tooth extraction

## 2025-04-23 LAB — BACTERIA UR CULT: ABNORMAL

## 2025-04-26 ENCOUNTER — TELEPHONE (OUTPATIENT)
Dept: URGENT CARE | Age: 85
End: 2025-04-26

## 2025-04-27 ENCOUNTER — HOSPITAL ENCOUNTER (EMERGENCY)
Dept: CARDIOLOGY | Facility: HOSPITAL | Age: 85
Discharge: HOME | End: 2025-04-27
Payer: MEDICARE

## 2025-04-27 ENCOUNTER — APPOINTMENT (OUTPATIENT)
Dept: RADIOLOGY | Facility: HOSPITAL | Age: 85
End: 2025-04-27
Payer: MEDICARE

## 2025-04-27 ENCOUNTER — HOSPITAL ENCOUNTER (INPATIENT)
Facility: HOSPITAL | Age: 85
End: 2025-04-27
Attending: STUDENT IN AN ORGANIZED HEALTH CARE EDUCATION/TRAINING PROGRAM | Admitting: STUDENT IN AN ORGANIZED HEALTH CARE EDUCATION/TRAINING PROGRAM
Payer: MEDICARE

## 2025-04-27 DIAGNOSIS — K57.31 DIVERTICULAR HEMORRHAGE: ICD-10-CM

## 2025-04-27 DIAGNOSIS — K92.1 MELENA: ICD-10-CM

## 2025-04-27 DIAGNOSIS — R26.2 AMBULATORY DYSFUNCTION: ICD-10-CM

## 2025-04-27 DIAGNOSIS — R55 SYNCOPE, UNSPECIFIED SYNCOPE TYPE: Primary | ICD-10-CM

## 2025-04-27 DIAGNOSIS — K92.0 COFFEE GROUND EMESIS: ICD-10-CM

## 2025-04-27 DIAGNOSIS — D64.9 ANEMIA, UNSPECIFIED TYPE: ICD-10-CM

## 2025-04-27 LAB
ABO GROUP (TYPE) IN BLOOD: NORMAL
ABO GROUP (TYPE) IN BLOOD: NORMAL
ALBUMIN SERPL BCP-MCNC: 3.4 G/DL (ref 3.4–5)
ALP SERPL-CCNC: 37 U/L (ref 33–136)
ALT SERPL W P-5'-P-CCNC: 6 U/L (ref 7–45)
ANION GAP SERPL CALC-SCNC: 11 MMOL/L (ref 10–20)
ANTIBODY SCREEN: NORMAL
APPEARANCE UR: CLEAR
AST SERPL W P-5'-P-CCNC: 12 U/L (ref 9–39)
ATRIAL RATE: 63 BPM
BASOPHILS # BLD AUTO: 0.04 X10*3/UL (ref 0–0.1)
BASOPHILS NFR BLD AUTO: 0.7 %
BILIRUB SERPL-MCNC: 0.5 MG/DL (ref 0–1.2)
BILIRUB UR STRIP.AUTO-MCNC: NEGATIVE MG/DL
BLOOD EXPIRATION DATE: NORMAL
BNP SERPL-MCNC: 201 PG/ML (ref 0–99)
BUN SERPL-MCNC: 50 MG/DL (ref 6–23)
CALCIUM SERPL-MCNC: 8.7 MG/DL (ref 8.6–10.3)
CARDIAC TROPONIN I PNL SERPL HS: 9 NG/L (ref 0–13)
CHLORIDE SERPL-SCNC: 105 MMOL/L (ref 98–107)
CO2 SERPL-SCNC: 26 MMOL/L (ref 21–32)
COLOR UR: ABNORMAL
CREAT SERPL-MCNC: 1 MG/DL (ref 0.5–1.05)
D DIMER PPP FEU-MCNC: 1075 NG/ML FEU
DISPENSE STATUS: NORMAL
EGFRCR SERPLBLD CKD-EPI 2021: 55 ML/MIN/1.73M*2
EOSINOPHIL # BLD AUTO: 0.1 X10*3/UL (ref 0–0.4)
EOSINOPHIL NFR BLD AUTO: 1.9 %
ERYTHROCYTE [DISTWIDTH] IN BLOOD BY AUTOMATED COUNT: 13.9 % (ref 11.5–14.5)
ERYTHROCYTE [DISTWIDTH] IN BLOOD BY AUTOMATED COUNT: 14 % (ref 11.5–14.5)
GLUCOSE SERPL-MCNC: 99 MG/DL (ref 74–99)
GLUCOSE UR STRIP.AUTO-MCNC: NORMAL MG/DL
HCT VFR BLD AUTO: 28 % (ref 36–46)
HCT VFR BLD AUTO: 30.6 % (ref 36–46)
HGB BLD-MCNC: 9 G/DL (ref 12–16)
HGB BLD-MCNC: 9.8 G/DL (ref 12–16)
HOLD SPECIMEN: NORMAL
IMM GRANULOCYTES # BLD AUTO: 0.01 X10*3/UL (ref 0–0.5)
IMM GRANULOCYTES NFR BLD AUTO: 0.2 % (ref 0–0.9)
KETONES UR STRIP.AUTO-MCNC: ABNORMAL MG/DL
LACTATE SERPL-SCNC: 0.8 MMOL/L (ref 0.4–2)
LEUKOCYTE ESTERASE UR QL STRIP.AUTO: ABNORMAL
LYMPHOCYTES # BLD AUTO: 1.06 X10*3/UL (ref 0.8–3)
LYMPHOCYTES NFR BLD AUTO: 19.7 %
MAGNESIUM SERPL-MCNC: 2.03 MG/DL (ref 1.6–2.4)
MCH RBC QN AUTO: 27.3 PG (ref 26–34)
MCH RBC QN AUTO: 27.7 PG (ref 26–34)
MCHC RBC AUTO-ENTMCNC: 32 G/DL (ref 32–36)
MCHC RBC AUTO-ENTMCNC: 32.1 G/DL (ref 32–36)
MCV RBC AUTO: 85 FL (ref 80–100)
MCV RBC AUTO: 86 FL (ref 80–100)
MONOCYTES # BLD AUTO: 0.46 X10*3/UL (ref 0.05–0.8)
MONOCYTES NFR BLD AUTO: 8.5 %
NEUTROPHILS # BLD AUTO: 3.72 X10*3/UL (ref 1.6–5.5)
NEUTROPHILS NFR BLD AUTO: 69 %
NITRITE UR QL STRIP.AUTO: NEGATIVE
NRBC BLD-RTO: 0 /100 WBCS (ref 0–0)
NRBC BLD-RTO: 0 /100 WBCS (ref 0–0)
P AXIS: 22 DEGREES
P OFFSET: 184 MS
P ONSET: 133 MS
PH UR STRIP.AUTO: 6.5 [PH]
PLATELET # BLD AUTO: 209 X10*3/UL (ref 150–450)
PLATELET # BLD AUTO: 222 X10*3/UL (ref 150–450)
POTASSIUM SERPL-SCNC: 4.9 MMOL/L (ref 3.5–5.3)
PR INTERVAL: 164 MS
PRODUCT BLOOD TYPE: 600
PRODUCT CODE: NORMAL
PROT SERPL-MCNC: 6 G/DL (ref 6.4–8.2)
PROT UR STRIP.AUTO-MCNC: NEGATIVE MG/DL
Q ONSET: 215 MS
QRS COUNT: 10 BEATS
QRS DURATION: 126 MS
QT INTERVAL: 442 MS
QTC CALCULATION(BAZETT): 452 MS
QTC FREDERICIA: 449 MS
R AXIS: -15 DEGREES
RBC # BLD AUTO: 3.3 X10*6/UL (ref 4–5.2)
RBC # BLD AUTO: 3.54 X10*6/UL (ref 4–5.2)
RBC # UR STRIP.AUTO: NEGATIVE MG/DL
RBC #/AREA URNS AUTO: ABNORMAL /HPF
RH FACTOR (ANTIGEN D): NORMAL
RH FACTOR (ANTIGEN D): NORMAL
SODIUM SERPL-SCNC: 137 MMOL/L (ref 136–145)
SP GR UR STRIP.AUTO: 1.02
SQUAMOUS #/AREA URNS AUTO: ABNORMAL /HPF
T AXIS: 184 DEGREES
T OFFSET: 436 MS
UNIT ABO: NORMAL
UNIT NUMBER: NORMAL
UNIT RH: NORMAL
UNIT VOLUME: 350
UROBILINOGEN UR STRIP.AUTO-MCNC: NORMAL MG/DL
VENTRICULAR RATE: 63 BPM
WBC # BLD AUTO: 5.4 X10*3/UL (ref 4.4–11.3)
WBC # BLD AUTO: 5.7 X10*3/UL (ref 4.4–11.3)
WBC #/AREA URNS AUTO: ABNORMAL /HPF
XM INTEP: NORMAL

## 2025-04-27 PROCEDURE — 87086 URINE CULTURE/COLONY COUNT: CPT | Mod: ELYLAB | Performed by: STUDENT IN AN ORGANIZED HEALTH CARE EDUCATION/TRAINING PROGRAM

## 2025-04-27 PROCEDURE — 83605 ASSAY OF LACTIC ACID: CPT | Performed by: STUDENT IN AN ORGANIZED HEALTH CARE EDUCATION/TRAINING PROGRAM

## 2025-04-27 PROCEDURE — 83880 ASSAY OF NATRIURETIC PEPTIDE: CPT | Performed by: STUDENT IN AN ORGANIZED HEALTH CARE EDUCATION/TRAINING PROGRAM

## 2025-04-27 PROCEDURE — 1200000002 HC GENERAL ROOM WITH TELEMETRY DAILY

## 2025-04-27 PROCEDURE — 2550000001 HC RX 255 CONTRASTS: Performed by: STUDENT IN AN ORGANIZED HEALTH CARE EDUCATION/TRAINING PROGRAM

## 2025-04-27 PROCEDURE — 86923 COMPATIBILITY TEST ELECTRIC: CPT

## 2025-04-27 PROCEDURE — 84484 ASSAY OF TROPONIN QUANT: CPT | Performed by: STUDENT IN AN ORGANIZED HEALTH CARE EDUCATION/TRAINING PROGRAM

## 2025-04-27 PROCEDURE — 2500000004 HC RX 250 GENERAL PHARMACY W/ HCPCS (ALT 636 FOR OP/ED): Performed by: STUDENT IN AN ORGANIZED HEALTH CARE EDUCATION/TRAINING PROGRAM

## 2025-04-27 PROCEDURE — 99223 1ST HOSP IP/OBS HIGH 75: CPT | Performed by: STUDENT IN AN ORGANIZED HEALTH CARE EDUCATION/TRAINING PROGRAM

## 2025-04-27 PROCEDURE — 36415 COLL VENOUS BLD VENIPUNCTURE: CPT | Performed by: STUDENT IN AN ORGANIZED HEALTH CARE EDUCATION/TRAINING PROGRAM

## 2025-04-27 PROCEDURE — 86850 RBC ANTIBODY SCREEN: CPT | Performed by: STUDENT IN AN ORGANIZED HEALTH CARE EDUCATION/TRAINING PROGRAM

## 2025-04-27 PROCEDURE — 71275 CT ANGIOGRAPHY CHEST: CPT | Performed by: RADIOLOGY

## 2025-04-27 PROCEDURE — 74176 CT ABD & PELVIS W/O CONTRAST: CPT

## 2025-04-27 PROCEDURE — 2500000002 HC RX 250 W HCPCS SELF ADMINISTERED DRUGS (ALT 637 FOR MEDICARE OP, ALT 636 FOR OP/ED): Performed by: STUDENT IN AN ORGANIZED HEALTH CARE EDUCATION/TRAINING PROGRAM

## 2025-04-27 PROCEDURE — 85027 COMPLETE CBC AUTOMATED: CPT | Performed by: STUDENT IN AN ORGANIZED HEALTH CARE EDUCATION/TRAINING PROGRAM

## 2025-04-27 PROCEDURE — 2500000001 HC RX 250 WO HCPCS SELF ADMINISTERED DRUGS (ALT 637 FOR MEDICARE OP): Performed by: STUDENT IN AN ORGANIZED HEALTH CARE EDUCATION/TRAINING PROGRAM

## 2025-04-27 PROCEDURE — 2500000004 HC RX 250 GENERAL PHARMACY W/ HCPCS (ALT 636 FOR OP/ED): Mod: JZ | Performed by: STUDENT IN AN ORGANIZED HEALTH CARE EDUCATION/TRAINING PROGRAM

## 2025-04-27 PROCEDURE — 83735 ASSAY OF MAGNESIUM: CPT | Performed by: STUDENT IN AN ORGANIZED HEALTH CARE EDUCATION/TRAINING PROGRAM

## 2025-04-27 PROCEDURE — 93005 ELECTROCARDIOGRAM TRACING: CPT

## 2025-04-27 PROCEDURE — 80053 COMPREHEN METABOLIC PANEL: CPT | Performed by: STUDENT IN AN ORGANIZED HEALTH CARE EDUCATION/TRAINING PROGRAM

## 2025-04-27 PROCEDURE — 71275 CT ANGIOGRAPHY CHEST: CPT

## 2025-04-27 PROCEDURE — 81001 URINALYSIS AUTO W/SCOPE: CPT | Performed by: STUDENT IN AN ORGANIZED HEALTH CARE EDUCATION/TRAINING PROGRAM

## 2025-04-27 PROCEDURE — 85379 FIBRIN DEGRADATION QUANT: CPT | Performed by: STUDENT IN AN ORGANIZED HEALTH CARE EDUCATION/TRAINING PROGRAM

## 2025-04-27 PROCEDURE — 99285 EMERGENCY DEPT VISIT HI MDM: CPT | Mod: 25 | Performed by: STUDENT IN AN ORGANIZED HEALTH CARE EDUCATION/TRAINING PROGRAM

## 2025-04-27 PROCEDURE — 96361 HYDRATE IV INFUSION ADD-ON: CPT

## 2025-04-27 PROCEDURE — 85025 COMPLETE CBC W/AUTO DIFF WBC: CPT | Performed by: STUDENT IN AN ORGANIZED HEALTH CARE EDUCATION/TRAINING PROGRAM

## 2025-04-27 PROCEDURE — 74176 CT ABD & PELVIS W/O CONTRAST: CPT | Performed by: RADIOLOGY

## 2025-04-27 PROCEDURE — 96374 THER/PROPH/DIAG INJ IV PUSH: CPT

## 2025-04-27 RX ORDER — ACETAMINOPHEN 160 MG/5ML
650 SOLUTION ORAL EVERY 4 HOURS PRN
Status: DISCONTINUED | OUTPATIENT
Start: 2025-04-27 | End: 2025-05-02 | Stop reason: HOSPADM

## 2025-04-27 RX ORDER — ACETAMINOPHEN 650 MG/1
650 SUPPOSITORY RECTAL EVERY 4 HOURS PRN
Status: DISCONTINUED | OUTPATIENT
Start: 2025-04-27 | End: 2025-05-02 | Stop reason: HOSPADM

## 2025-04-27 RX ORDER — PANTOPRAZOLE SODIUM 40 MG/10ML
40 INJECTION, POWDER, LYOPHILIZED, FOR SOLUTION INTRAVENOUS 2 TIMES DAILY
Status: DISCONTINUED | OUTPATIENT
Start: 2025-04-28 | End: 2025-04-29

## 2025-04-27 RX ORDER — ONDANSETRON HYDROCHLORIDE 2 MG/ML
4 INJECTION, SOLUTION INTRAVENOUS EVERY 8 HOURS PRN
Status: DISCONTINUED | OUTPATIENT
Start: 2025-04-27 | End: 2025-04-27

## 2025-04-27 RX ORDER — CHOLECALCIFEROL (VITAMIN D3) 25 MCG
25 TABLET ORAL DAILY
Status: DISCONTINUED | OUTPATIENT
Start: 2025-04-27 | End: 2025-05-02 | Stop reason: HOSPADM

## 2025-04-27 RX ORDER — POLYETHYLENE GLYCOL 3350 17 G/17G
17 POWDER, FOR SOLUTION ORAL DAILY
Status: DISCONTINUED | OUTPATIENT
Start: 2025-04-27 | End: 2025-04-29

## 2025-04-27 RX ORDER — ACETAMINOPHEN 325 MG/1
650 TABLET ORAL EVERY 4 HOURS PRN
Status: DISCONTINUED | OUTPATIENT
Start: 2025-04-27 | End: 2025-05-02 | Stop reason: HOSPADM

## 2025-04-27 RX ORDER — PROMETHAZINE HYDROCHLORIDE 25 MG/1
25 TABLET ORAL ONCE
Status: COMPLETED | OUTPATIENT
Start: 2025-04-27 | End: 2025-04-27

## 2025-04-27 RX ORDER — PANTOPRAZOLE SODIUM 40 MG/10ML
80 INJECTION, POWDER, LYOPHILIZED, FOR SOLUTION INTRAVENOUS ONCE
Status: COMPLETED | OUTPATIENT
Start: 2025-04-27 | End: 2025-04-27

## 2025-04-27 RX ORDER — METOCLOPRAMIDE HYDROCHLORIDE 5 MG/ML
5 INJECTION INTRAMUSCULAR; INTRAVENOUS ONCE
Status: DISCONTINUED | OUTPATIENT
Start: 2025-04-27 | End: 2025-04-27

## 2025-04-27 RX ORDER — PANTOPRAZOLE SODIUM 40 MG/1
40 TABLET, DELAYED RELEASE ORAL
Status: DISCONTINUED | OUTPATIENT
Start: 2025-04-28 | End: 2025-04-27

## 2025-04-27 RX ORDER — SODIUM CHLORIDE 9 MG/ML
75 INJECTION, SOLUTION INTRAVENOUS CONTINUOUS
Status: ACTIVE | OUTPATIENT
Start: 2025-04-27 | End: 2025-04-28

## 2025-04-27 RX ORDER — ONDANSETRON 4 MG/1
4 TABLET, FILM COATED ORAL EVERY 8 HOURS PRN
Status: DISCONTINUED | OUTPATIENT
Start: 2025-04-27 | End: 2025-04-27

## 2025-04-27 RX ORDER — ACETAMINOPHEN 325 MG/1
650 TABLET ORAL EVERY 4 HOURS PRN
Status: DISCONTINUED | OUTPATIENT
Start: 2025-04-27 | End: 2025-04-27

## 2025-04-27 RX ORDER — ACETAMINOPHEN 160 MG/5ML
650 SOLUTION ORAL EVERY 4 HOURS PRN
Status: DISCONTINUED | OUTPATIENT
Start: 2025-04-27 | End: 2025-04-27

## 2025-04-27 RX ORDER — NAPROXEN SODIUM 220 MG/1
81 TABLET, FILM COATED ORAL DAILY
Status: DISCONTINUED | OUTPATIENT
Start: 2025-04-27 | End: 2025-05-02 | Stop reason: HOSPADM

## 2025-04-27 RX ORDER — METOPROLOL TARTRATE 25 MG/1
12.5 TABLET, FILM COATED ORAL 2 TIMES DAILY
Status: DISCONTINUED | OUTPATIENT
Start: 2025-04-27 | End: 2025-05-02 | Stop reason: HOSPADM

## 2025-04-27 RX ORDER — ROSUVASTATIN CALCIUM 10 MG/1
5 TABLET, COATED ORAL DAILY
Status: DISCONTINUED | OUTPATIENT
Start: 2025-04-27 | End: 2025-05-02 | Stop reason: HOSPADM

## 2025-04-27 RX ORDER — PROMETHAZINE HYDROCHLORIDE 25 MG/1
25 SUPPOSITORY RECTAL ONCE
Status: COMPLETED | OUTPATIENT
Start: 2025-04-27 | End: 2025-04-27

## 2025-04-27 RX ORDER — ACETAMINOPHEN 650 MG/1
650 SUPPOSITORY RECTAL EVERY 4 HOURS PRN
Status: DISCONTINUED | OUTPATIENT
Start: 2025-04-27 | End: 2025-04-27

## 2025-04-27 RX ADMIN — PROMETHAZINE HYDROCHLORIDE 25 MG: 25 TABLET ORAL at 14:47

## 2025-04-27 RX ADMIN — BENZOCAINE AND MENTHOL 1 LOZENGE: 15; 3.6 LOZENGE ORAL at 23:25

## 2025-04-27 RX ADMIN — ACETAMINOPHEN 650 MG: 325 TABLET ORAL at 23:22

## 2025-04-27 RX ADMIN — ROSUVASTATIN CALCIUM 5 MG: 10 TABLET, FILM COATED ORAL at 23:22

## 2025-04-27 RX ADMIN — SODIUM CHLORIDE 500 ML: 0.9 INJECTION, SOLUTION INTRAVENOUS at 20:54

## 2025-04-27 RX ADMIN — PANTOPRAZOLE SODIUM 80 MG: 40 INJECTION, POWDER, FOR SOLUTION INTRAVENOUS at 14:15

## 2025-04-27 RX ADMIN — SODIUM CHLORIDE 75 ML/HR: 0.9 INJECTION, SOLUTION INTRAVENOUS at 19:04

## 2025-04-27 RX ADMIN — IOHEXOL 75 ML: 350 INJECTION, SOLUTION INTRAVENOUS at 12:20

## 2025-04-27 RX ADMIN — PROMETHAZINE HYDROCHLORIDE 12.5 MG: 25 INJECTION INTRAMUSCULAR; INTRAVENOUS at 21:09

## 2025-04-27 RX ADMIN — SODIUM CHLORIDE 500 ML: 0.9 INJECTION, SOLUTION INTRAVENOUS at 12:03

## 2025-04-27 ASSESSMENT — PAIN SCALES - GENERAL
PAINLEVEL_OUTOF10: 0 - NO PAIN
PAINLEVEL_OUTOF10: 0 - NO PAIN
PAINLEVEL_OUTOF10: 3
PAINLEVEL_OUTOF10: 0 - NO PAIN
PAINLEVEL_OUTOF10: 0 - NO PAIN

## 2025-04-27 ASSESSMENT — LIFESTYLE VARIABLES
EVER FELT BAD OR GUILTY ABOUT YOUR DRINKING: NO
HAVE PEOPLE ANNOYED YOU BY CRITICIZING YOUR DRINKING: NO
EVER HAD A DRINK FIRST THING IN THE MORNING TO STEADY YOUR NERVES TO GET RID OF A HANGOVER: NO
HAVE YOU EVER FELT YOU SHOULD CUT DOWN ON YOUR DRINKING: NO
TOTAL SCORE: 0

## 2025-04-27 ASSESSMENT — COLUMBIA-SUICIDE SEVERITY RATING SCALE - C-SSRS
2. HAVE YOU ACTUALLY HAD ANY THOUGHTS OF KILLING YOURSELF?: NO
1. IN THE PAST MONTH, HAVE YOU WISHED YOU WERE DEAD OR WISHED YOU COULD GO TO SLEEP AND NOT WAKE UP?: NO
6. HAVE YOU EVER DONE ANYTHING, STARTED TO DO ANYTHING, OR PREPARED TO DO ANYTHING TO END YOUR LIFE?: NO

## 2025-04-27 ASSESSMENT — PAIN - FUNCTIONAL ASSESSMENT
PAIN_FUNCTIONAL_ASSESSMENT: 0-10

## 2025-04-27 ASSESSMENT — PAIN DESCRIPTION - DESCRIPTORS: DESCRIPTORS: ACHING

## 2025-04-27 NOTE — Clinical Note
Huddle and Timeout completed together with team. Patient wristband and MEET information verified.  Anesthesia safety check completed. Patient was oriented and participated.

## 2025-04-27 NOTE — Clinical Note
Patient tolerated procedure well. Appears comfortable with no complaints of pain. VS stable. Arousable prior to transport. Patient transported to 11 th floor via cart.  Report called per CRNA Handoff completed.

## 2025-04-27 NOTE — H&P
History Of Present Illness  Laurel Pugh is a 84 y.o. female With a past medical history of coronary artery disease, recent UTI, breast cancer, vertigo, recent RSV who presented to the ER with a syncopal event.  Patient says that she woke up got up to go to the computer and felt like she was about to pass out, was laid to the ground by her son after she passed out.  She lost consciousness for about a minute, patient remembers that event and since then she was dizzy/lightheaded.  Does report occasional dizziness.  She usually sits at the edge of the bed before immediately getting up, she was recently being treated for UTI and before that had RSV he does acknowledge not eating and drinking like she should.  Currently denies any abdominal pain nausea, did have an episode of vomiting while in the ER vomited coffee-ground stuff also had a bloody bowel movement.  Denies any previous history of GI bleeding, no fevers, chills or shortness of breath.  She has been on aspirin and Plavix, had cardiac stents placed last July..  In the ER blood pressure was 111/68 but has been dropping, hemoglobin was low at baseline, creatinine was stable, D-dimer was elevated, CT PE did not show any acute PE or pneumonia.     Past Medical History  Medical History[1]    Surgical History  Surgical History[2]     Social History  She reports that she has never smoked. She has never been exposed to tobacco smoke. She has never used smokeless tobacco. She reports that she does not drink alcohol and does not use drugs.    Family History  Family History[3]     Allergies  Haloperidol, Keflex [cephalexin], Zofran [ondansetron hcl], Amoxicillin-pot clavulanate, Levofloxacin, and Sulfa (sulfonamide antibiotics)    Review of Systems  As per HPI, comprehensive review of systems performed     Physical Exam  Constitutional:       Appearance: Normal appearance.   HENT:      Head: Normocephalic.   Eyes:      Extraocular Movements: Extraocular movements  "intact.   Cardiovascular:      Rate and Rhythm: Normal rate and regular rhythm.   Pulmonary:      Effort: Pulmonary effort is normal. No respiratory distress.      Breath sounds: No wheezing or rales.   Abdominal:      General: There is no distension.      Palpations: Abdomen is soft.      Tenderness: There is no abdominal tenderness. There is no guarding.   Musculoskeletal:         General: No deformity or signs of injury.      Cervical back: Neck supple.   Neurological:      General: No focal deficit present.      Mental Status: She is alert.   Psychiatric:         Mood and Affect: Mood normal.          Last Recorded Vitals  Blood pressure 111/58, pulse 78, temperature 36.8 °C (98.2 °F), temperature source Temporal, resp. rate 18, height 1.499 m (4' 11\"), weight 63.5 kg (140 lb), SpO2 99%.    Relevant Results             Assessment & Plan  Syncope, unspecified syncope type      84-year-old female with past medical history of coronary artery disease, breast cancer, vertigo, recent RSV and UTI admitted with acute hematemesis and bleeding per rectum with hypotension and a syncopal event    Syncope is most likely secondary to orthostasis  Will observe her on telemetry  EKG does show ST depressions-T wave inversion in anterolateral leads  Denying any chest pain  Troponins were flat  Will give her IV fluids  Orthostatic vital signs, repeat TTE  Monitor hemoglobin  Transfuse if hemoglobin drops by more than 1 g  Currently its 9.7, her baseline is around 10  Hold Plavix, continue aspirin because of recent stents  Protonix 40 mg p.o. twice daily  GI consult  Hold antihypertensives except for beta-blocker  DVT prophylaxis with SCDs          Stephen Coulter MD         [1]   Past Medical History:  Diagnosis Date    Arthritis     Bitten or stung by nonvenomous insect and other nonvenomous arthropods, initial encounter 05/22/2019    Tick bite, initial encounter    Encounter for general adult medical examination without " abnormal findings 10/05/2022    Medicare annual wellness visit, subsequent    Encounter for general adult medical examination without abnormal findings 11/02/2021    Medicare annual wellness visit, subsequent    Personal history of malignant neoplasm of breast     History of malignant neoplasm of breast    Personal history of other diseases of urinary system 08/10/2019    History of hematuria    Personal history of other specified conditions     History of vertigo    Personal history of other specified conditions 08/10/2019    History of urinary frequency    Personal history of urinary (tract) infections 08/10/2019    History of acute cystitis    Personal history of urinary (tract) infections 09/03/2019    History of urinary tract infection   [2]   Past Surgical History:  Procedure Laterality Date    CARDIAC CATHETERIZATION N/A 7/15/2024    Procedure: Left Heart Cath, With LV;  Surgeon: Tressa Davison MD;  Location: [facility];  Service: Cardiovascular;  Laterality: N/A;  radial approach please    CARDIAC CATHETERIZATION N/A 7/15/2024    Procedure: PCI KHUSHI Stent- Coronary;  Surgeon: Tressa Davsion MD;  Location: [facility];  Service: Cardiovascular;  Laterality: N/A;    CARDIAC CATHETERIZATION N/A 2/12/2025    Procedure: Left Heart Cath;  Surgeon: Elvin Means MD;  Location: [facility];  Service: Cardiovascular;  Laterality: N/A;    OTHER SURGICAL HISTORY  04/30/2019    Hysterectomy    OTHER SURGICAL HISTORY  04/30/2019    Mastectomy    OTHER SURGICAL HISTORY  04/30/2019    Tooth extraction   [3]   Family History  Problem Relation Name Age of Onset    Stroke Mother      Other (cardiac disorder) Father      Breast cancer Sister      Arthritis Sister

## 2025-04-27 NOTE — Clinical Note
Patient tolerated procedure well. Appears comfortable with no complaints of pain. VS stable. Arousable prior to transport. Patient transported to PACU via bed.   Handoff completed at bedside. Dentures remain with pt chart at bedside

## 2025-04-27 NOTE — ED PROVIDER NOTES
HPI   Chief Complaint   Patient presents with    Syncope     Patient brought in by EMS after passing out at home. Stated she did not hit the floor because son broke her fall and lowered her to the ground. She is currently taking medications for UTI infection and tested positive for RSV recently         History provided by:  Patient and relative  84-year-old female presenting with daughter for evaluation of syncopal event.  This was witnessed by another family member.  Patient reports that over the last week she has been taking medications for a UTI, recently had RSV and was recovering from this.  Patient reports that she thinks she may have been dehydrated.  States that she got up from the couch and was walking when she became lightheaded and told her son that she needed to sit down.  Before getting to the couch to lay down she passed out and was lowered to the ground slowly by family member.  Return to normal mental status shortly after.  Denies any chest pain or shortness of breath.  Denies any fever.    Patient History   Medical History[1]  Surgical History[2]  Family History[3]  Social History[4]    Physical Exam   ED Triage Vitals   Temperature Heart Rate Respirations BP   04/27/25 1049 04/27/25 1049 04/27/25 1049 04/27/25 1049   35.3 °C (95.5 °F) 68 18 111/68      Pulse Ox Temp Source Heart Rate Source Patient Position   04/27/25 1049 04/27/25 1049 04/27/25 1049 04/27/25 1212   100 % Temporal Monitor Lying      BP Location FiO2 (%)     04/27/25 1212 --     Right arm        Physical Exam  Vitals and nursing note reviewed.   Constitutional:       General: She is not in acute distress.  HENT:      Head: Atraumatic.      Mouth/Throat:      Mouth: Mucous membranes are moist.      Pharynx: Oropharynx is clear.   Eyes:      Extraocular Movements: Extraocular movements intact.      Conjunctiva/sclera: Conjunctivae normal.      Pupils: Pupils are equal, round, and reactive to light.   Cardiovascular:      Rate and  Rhythm: Normal rate and regular rhythm.      Pulses: Normal pulses.   Pulmonary:      Effort: Pulmonary effort is normal. No respiratory distress.      Breath sounds: Normal breath sounds.   Abdominal:      General: There is no distension.      Palpations: Abdomen is soft.      Tenderness: There is no abdominal tenderness. There is no guarding or rebound.   Musculoskeletal:         General: No deformity.      Cervical back: Neck supple.   Skin:     General: Skin is warm and dry.   Neurological:      Mental Status: She is alert and oriented to person, place, and time. Mental status is at baseline.      Cranial Nerves: No cranial nerve deficit.      Sensory: No sensory deficit.      Motor: No weakness.   Psychiatric:         Mood and Affect: Mood normal.         Behavior: Behavior normal.           ED Course & MDM   Diagnoses as of 04/27/25 1538   Syncope, unspecified syncope type   Melena   Coffee ground emesis                 No data recorded     Jose Coma Scale Score: 15 (04/27/25 1051 : Prince YAYO Godoy RN)                   Labs Reviewed   CBC WITH AUTO DIFFERENTIAL - Abnormal       Result Value    WBC 5.4      nRBC 0.0      RBC 3.54 (*)     Hemoglobin 9.8 (*)     Hematocrit 30.6 (*)     MCV 86      MCH 27.7      MCHC 32.0      RDW 14.0      Platelets 209      Neutrophils % 69.0      Immature Granulocytes %, Automated 0.2      Lymphocytes % 19.7      Monocytes % 8.5      Eosinophils % 1.9      Basophils % 0.7      Neutrophils Absolute 3.72      Immature Granulocytes Absolute, Automated 0.01      Lymphocytes Absolute 1.06      Monocytes Absolute 0.46      Eosinophils Absolute 0.10      Basophils Absolute 0.04     COMPREHENSIVE METABOLIC PANEL - Abnormal    Glucose 99      Sodium 137      Potassium 4.9      Chloride 105      Bicarbonate 26      Anion Gap 11      Urea Nitrogen 50 (*)     Creatinine 1.00      eGFR 55 (*)     Calcium 8.7      Albumin 3.4      Alkaline Phosphatase 37      Total Protein 6.0 (*)      AST 12      Bilirubin, Total 0.5      ALT 6 (*)    D-DIMER, VTE EXCLUSION - Abnormal    D-Dimer, Quantitative VTE Exclusion 1,075 (*)     Narrative:     The VTE Exclusion D-Dimer assay is reported in ng/mL Fibrinogen Equivalent Units (FEU).    Per 's instructions for use, a value of less than 500 ng/mL (FEU) may help to exclude DVT or PE in outpatients when the assay is used with a clinical pretest probability assessment.([facility] must utilize and document eCalc 'Wells Score Deep Vein Thrombosis Risk' for DVT exclusion only. Emergency Department should utilize  Guidelines for Emergency Department Use of the VTE Exclusion D-Dimer and Clinical Pretest probability assessment model for DVT or PE exclusion.)   B-TYPE NATRIURETIC PEPTIDE - Abnormal     (*)     Narrative:        <100 pg/mL - Heart failure unlikely  100-299 pg/mL - Intermediate probability of acute heart                  failure exacerbation. Correlate with clinical                  context and patient history.    >=300 pg/mL - Heart Failure likely. Correlate with clinical                  context and patient history.    BNP testing is performed using different testing methodology at [facility] than at other [facility]. Direct result comparisons should only be made within the same method.      URINALYSIS WITH REFLEX CULTURE AND MICROSCOPIC - Abnormal    Color, Urine Light-Yellow      Appearance, Urine Clear      Specific Gravity, Urine 1.017      pH, Urine 6.5      Protein, Urine NEGATIVE      Glucose, Urine Normal      Blood, Urine NEGATIVE      Ketones, Urine 10 (1+) (*)     Bilirubin, Urine NEGATIVE      Urobilinogen, Urine Normal      Nitrite, Urine NEGATIVE      Leukocyte Esterase, Urine 250 Jacqueline/uL (*)    MICROSCOPIC ONLY, URINE - Abnormal    WBC, Urine 11-20 (*)     RBC, Urine 3-5      Squamous Epithelial Cells, Urine 1-9 (SPARSE)     MAGNESIUM - Normal    Magnesium 2.03     LACTATE - Normal    Lactate 0.8       Narrative:     Venipuncture immediately after or during the administration of Metamizole may lead to falsely low results. Testing should be performed immediately prior to Metamizole dosing.   TROPONIN I, HIGH SENSITIVITY - Normal    Troponin I, High Sensitivity 9      Narrative:     Less than 99th percentile of normal range cutoff-  Female and children under 18 years old <14 ng/L; Male <21 ng/L: Negative  Repeat testing should be performed if clinically indicated.     Female and children under 18 years old 14-50 ng/L; Male 21-50 ng/L:  Consistent with possible cardiac damage and possible increased clinical   risk. Serial measurements may help to assess extent of myocardial damage.     >50 ng/L: Consistent with cardiac damage, increased clinical risk and  myocardial infarction. Serial measurements may help assess extent of   myocardial damage.      NOTE: Children less than 1 year old may have higher baseline troponin   levels and results should be interpreted in conjunction with the overall   clinical context.     NOTE: Troponin I testing is performed using a different   testing methodology at [facility] than at other   [facility]. Direct result comparisons should only   be made within the same method.   URINE CULTURE   URINALYSIS WITH REFLEX CULTURE AND MICROSCOPIC    Narrative:     The following orders were created for panel order Urinalysis with Reflex Culture and Microscopic.  Procedure                               Abnormality         Status                     ---------                               -----------         ------                     Urinalysis with Reflex C...[[id number]]  Abnormal            Final result               Extra Urine Gray Tube[[id number]]                            In process                   Please view results for these tests on the individual orders.   EXTRA URINE GRAY TUBE     CT angio chest for pulmonary embolism   Final Result   No pulmonary embolism or active  disease in the chest identified on   slightly motion degraded exam.        Signed by: Maicol Mcdonald 4/27/2025 12:53 PM   Dictation workstation:   [id number]                Medical Decision Making  Amount and/or Complexity of Data Reviewed  ECG/medicine tests: ordered and independent interpretation performed. Decision-making details documented in ED Course.     Details: Twelve-lead ECG obtained at 1047 by my interpretation demonstrates normal sinus rhythm with a rate of 63, no STEMI.  Left bundle branch block.  Previous history of left bundle branch block.    84-year-old female presenting for evaluation following syncopal event.  Recent treatment with Macrobid for UTI, recent RSV.  On evaluation patient is awake and alert, GCS 15.  Hemodynamically stable.  ECG and lab work obtained.  ECG showing sinus rhythm with previous known left bundle branch block.  Patient was given IV fluid bolus.    Lab work reviewed.  CBC without leukocytosis, baseline anemia with hemoglobin of 9.8.  Metabolic panel with baseline renal function, elevated BUN at 50.  No major electrolyte derangements.  Normal serum lactate.  D-dimer was obtained initially to rule out PE as a cause of patient's syncopal event.  This was elevated over 1000, unable to use age-adjusted cut off.  CT PE study was obtained to rule out PE and was negative.  BNP mildly elevated 201.  Normal troponin level.  Urinalysis without evidence of active infection, 1+ ketones consistent with mild dehydration.  While in the emergency department patient had episode of melena per staff.  When questioned, patient reports that she had another episode of dark black stools yesterday.  Concern for upper GI bleed.  Elevated BUN consistent with this.  Patient on aspirin and Plavix.  Patient given Protonix 80 mg IV for suspected upper GI bleed.  Patient also had episode shortly after of coffee-ground emesis.  No active abdominal pain.  Given promethazine for nausea.  Case discussed with  hospitalist for admission.    Procedure  Procedures       [1]   Past Medical History:  Diagnosis Date    Arthritis     Bitten or stung by nonvenomous insect and other nonvenomous arthropods, initial encounter 05/22/2019    Tick bite, initial encounter    Encounter for general adult medical examination without abnormal findings 10/05/2022    Medicare annual wellness visit, subsequent    Encounter for general adult medical examination without abnormal findings 11/02/2021    Medicare annual wellness visit, subsequent    Personal history of malignant neoplasm of breast     History of malignant neoplasm of breast    Personal history of other diseases of urinary system 08/10/2019    History of hematuria    Personal history of other specified conditions     History of vertigo    Personal history of other specified conditions 08/10/2019    History of urinary frequency    Personal history of urinary (tract) infections 08/10/2019    History of acute cystitis    Personal history of urinary (tract) infections 09/03/2019    History of urinary tract infection   [2]   Past Surgical History:  Procedure Laterality Date    CARDIAC CATHETERIZATION N/A 7/15/2024    Procedure: Left Heart Cath, With LV;  Surgeon: Tressa Davison MD;  Location: [facility];  Service: Cardiovascular;  Laterality: N/A;  radial approach please    CARDIAC CATHETERIZATION N/A 7/15/2024    Procedure: PCI KHUSHI Stent- Coronary;  Surgeon: Tressa Davison MD;  Location: [facility];  Service: Cardiovascular;  Laterality: N/A;    CARDIAC CATHETERIZATION N/A 2/12/2025    Procedure: Left Heart Cath;  Surgeon: Elvin Means MD;  Location: [facility];  Service: Cardiovascular;  Laterality: N/A;    OTHER SURGICAL HISTORY  04/30/2019    Hysterectomy    OTHER SURGICAL HISTORY  04/30/2019    Mastectomy    OTHER SURGICAL HISTORY  04/30/2019    Tooth extraction   [3]   Family History  Problem Relation Name Age of Onset    Stroke Mother       Other (cardiac disorder) Father      Breast cancer Sister      Arthritis Sister     [4]   Social History  Tobacco Use    Smoking status: Never     Passive exposure: Never    Smokeless tobacco: Never   Vaping Use    Vaping status: Never Used   Substance Use Topics    Alcohol use: Never    Drug use: Never        Austin Jaffe MD  04/27/25 1535

## 2025-04-27 NOTE — Clinical Note
Huddle and Timeout completed together with team. Patient wristband and MEET information verified.  Anesthesia safety check completed. Patient was

## 2025-04-28 ENCOUNTER — APPOINTMENT (OUTPATIENT)
Dept: CARDIOLOGY | Facility: HOSPITAL | Age: 85
End: 2025-04-28
Payer: MEDICARE

## 2025-04-28 ENCOUNTER — ANESTHESIA EVENT (OUTPATIENT)
Dept: GASTROENTEROLOGY | Facility: HOSPITAL | Age: 85
End: 2025-04-28
Payer: MEDICARE

## 2025-04-28 ENCOUNTER — APPOINTMENT (OUTPATIENT)
Dept: GASTROENTEROLOGY | Facility: HOSPITAL | Age: 85
End: 2025-04-28
Payer: MEDICARE

## 2025-04-28 ENCOUNTER — ANESTHESIA (OUTPATIENT)
Dept: GASTROENTEROLOGY | Facility: HOSPITAL | Age: 85
End: 2025-04-28
Payer: MEDICARE

## 2025-04-28 VITALS
HEART RATE: 82 BPM | TEMPERATURE: 98.4 F | DIASTOLIC BLOOD PRESSURE: 57 MMHG | HEIGHT: 59 IN | RESPIRATION RATE: 16 BRPM | WEIGHT: 140 LBS | SYSTOLIC BLOOD PRESSURE: 126 MMHG | OXYGEN SATURATION: 100 % | BODY MASS INDEX: 28.22 KG/M2

## 2025-04-28 PROBLEM — R93.1 DECREASED CARDIAC EJECTION FRACTION: Status: ACTIVE | Noted: 2025-04-28

## 2025-04-28 PROBLEM — Z95.5 STENTED CORONARY ARTERY: Status: ACTIVE | Noted: 2025-04-28

## 2025-04-28 LAB
ABO GROUP (TYPE) IN BLOOD: NORMAL
ANION GAP SERPL CALC-SCNC: 12 MMOL/L (ref 10–20)
ATRIAL RATE: 119 BPM
ATRIAL RATE: 120 BPM
ATRIAL RATE: 79 BPM
BASOPHILS # BLD AUTO: 0.04 X10*3/UL (ref 0–0.1)
BASOPHILS NFR BLD AUTO: 0.5 %
BILIRUB DIRECT SERPL-MCNC: 0.1 MG/DL (ref 0–0.3)
BILIRUB SERPL-MCNC: 0.6 MG/DL (ref 0–1.2)
BLOOD EXPIRATION DATE: NORMAL
BUN SERPL-MCNC: 55 MG/DL (ref 6–23)
CALCIUM SERPL-MCNC: 8.4 MG/DL (ref 8.6–10.3)
CARDIAC TROPONIN I PNL SERPL HS: 100 NG/L (ref 0–13)
CARDIAC TROPONIN I PNL SERPL HS: 70 NG/L (ref 0–13)
CHLORIDE SERPL-SCNC: 109 MMOL/L (ref 98–107)
CO2 SERPL-SCNC: 22 MMOL/L (ref 21–32)
COMPONENT CODE: NORMAL
CREAT SERPL-MCNC: 0.97 MG/DL (ref 0.5–1.05)
DAT-POLYSPECIFIC: NORMAL
DISPENSE STATUS: NORMAL
EGFRCR SERPLBLD CKD-EPI 2021: 57 ML/MIN/1.73M*2
EOSINOPHIL # BLD AUTO: 0.02 X10*3/UL (ref 0–0.4)
EOSINOPHIL NFR BLD AUTO: 0.3 %
ERYTHROCYTE [DISTWIDTH] IN BLOOD BY AUTOMATED COUNT: 14.4 % (ref 11.5–14.5)
ERYTHROCYTE [DISTWIDTH] IN BLOOD BY AUTOMATED COUNT: 14.8 % (ref 11.5–14.5)
GLUCOSE SERPL-MCNC: 99 MG/DL (ref 74–99)
HAPTOGLOB SERPL NEPH-MCNC: 120 MG/DL (ref 30–200)
HCT VFR BLD AUTO: 25.9 % (ref 36–46)
HCT VFR BLD AUTO: 27.3 % (ref 36–46)
HCT VFR BLD AUTO: 27.6 % (ref 36–46)
HGB BLD-MCNC: 8.6 G/DL (ref 12–16)
HGB BLD-MCNC: 8.8 G/DL (ref 12–16)
HGB BLD-MCNC: 9 G/DL (ref 12–16)
HOLD SPECIMEN: NORMAL
IMM GRANULOCYTES # BLD AUTO: 0.02 X10*3/UL (ref 0–0.5)
IMM GRANULOCYTES NFR BLD AUTO: 0.3 % (ref 0–0.9)
LDH SERPL L TO P-CCNC: 134 U/L (ref 84–246)
LYMPHOCYTES # BLD AUTO: 1.27 X10*3/UL (ref 0.8–3)
LYMPHOCYTES NFR BLD AUTO: 17.2 %
MCH RBC QN AUTO: 27.2 PG (ref 26–34)
MCH RBC QN AUTO: 27.8 PG (ref 26–34)
MCHC RBC AUTO-ENTMCNC: 32.2 G/DL (ref 32–36)
MCHC RBC AUTO-ENTMCNC: 32.6 G/DL (ref 32–36)
MCV RBC AUTO: 85 FL (ref 80–100)
MCV RBC AUTO: 85 FL (ref 80–100)
MONOCYTES # BLD AUTO: 0.64 X10*3/UL (ref 0.05–0.8)
MONOCYTES NFR BLD AUTO: 8.7 %
NEUTROPHILS # BLD AUTO: 5.39 X10*3/UL (ref 1.6–5.5)
NEUTROPHILS NFR BLD AUTO: 73 %
NRBC BLD-RTO: 0 /100 WBCS (ref 0–0)
NRBC BLD-RTO: 0 /100 WBCS (ref 0–0)
P AXIS: 29 DEGREES
P AXIS: 31 DEGREES
P AXIS: 39 DEGREES
P OFFSET: 169 MS
P OFFSET: 170 MS
P OFFSET: 182 MS
P ONSET: 112 MS
P ONSET: 113 MS
P ONSET: 128 MS
PLATELET # BLD AUTO: 207 X10*3/UL (ref 150–450)
PLATELET # BLD AUTO: 225 X10*3/UL (ref 150–450)
POTASSIUM SERPL-SCNC: 4.7 MMOL/L (ref 3.5–5.3)
PR INTERVAL: 168 MS
PR INTERVAL: 194 MS
PR INTERVAL: 200 MS
PRODUCT BLOOD TYPE: 600
PRODUCT CODE: NORMAL
Q ONSET: 210 MS
Q ONSET: 212 MS
Q ONSET: 212 MS
QRS COUNT: 13 BEATS
QRS COUNT: 20 BEATS
QRS COUNT: 20 BEATS
QRS DURATION: 130 MS
QRS DURATION: 132 MS
QRS DURATION: 132 MS
QT INTERVAL: 380 MS
QT INTERVAL: 384 MS
QT INTERVAL: 430 MS
QTC CALCULATION(BAZETT): 493 MS
QTC CALCULATION(BAZETT): 534 MS
QTC CALCULATION(BAZETT): 542 MS
QTC FREDERICIA: 471 MS
QTC FREDERICIA: 477 MS
QTC FREDERICIA: 483 MS
R AXIS: -19 DEGREES
R AXIS: 16 DEGREES
R AXIS: 2 DEGREES
RBC # BLD AUTO: 3.23 X10*6/UL (ref 4–5.2)
RBC # BLD AUTO: 3.24 X10*6/UL (ref 4–5.2)
RH FACTOR (ANTIGEN D): NORMAL
SODIUM SERPL-SCNC: 138 MMOL/L (ref 136–145)
T AXIS: 150 DEGREES
T AXIS: 153 DEGREES
T AXIS: 158 DEGREES
T OFFSET: 402 MS
T OFFSET: 402 MS
T OFFSET: 427 MS
UNIT ABO: NORMAL
UNIT NUMBER: NORMAL
UNIT NUMBER: NORMAL
UNIT RH: NORMAL
UNIT VOLUME: 350
VENTRICULAR RATE: 119 BPM
VENTRICULAR RATE: 120 BPM
VENTRICULAR RATE: 79 BPM
WBC # BLD AUTO: 7.4 X10*3/UL (ref 4.4–11.3)
WBC # BLD AUTO: 7.4 X10*3/UL (ref 4.4–11.3)
XM INTEP: NORMAL

## 2025-04-28 PROCEDURE — 2500000001 HC RX 250 WO HCPCS SELF ADMINISTERED DRUGS (ALT 637 FOR MEDICARE OP): Performed by: STUDENT IN AN ORGANIZED HEALTH CARE EDUCATION/TRAINING PROGRAM

## 2025-04-28 PROCEDURE — 36430 TRANSFUSION BLD/BLD COMPNT: CPT

## 2025-04-28 PROCEDURE — 3700000001 HC GENERAL ANESTHESIA TIME - INITIAL BASE CHARGE

## 2025-04-28 PROCEDURE — P9040 RBC LEUKOREDUCED IRRADIATED: HCPCS

## 2025-04-28 PROCEDURE — 2500000005 HC RX 250 GENERAL PHARMACY W/O HCPCS: Performed by: NURSE ANESTHETIST, CERTIFIED REGISTERED

## 2025-04-28 PROCEDURE — 88305 TISSUE EXAM BY PATHOLOGIST: CPT | Performed by: PATHOLOGY

## 2025-04-28 PROCEDURE — 7100000001 HC RECOVERY ROOM TIME - INITIAL BASE CHARGE

## 2025-04-28 PROCEDURE — 86900 BLOOD TYPING SEROLOGIC ABO: CPT | Performed by: STUDENT IN AN ORGANIZED HEALTH CARE EDUCATION/TRAINING PROGRAM

## 2025-04-28 PROCEDURE — 87075 CULTR BACTERIA EXCEPT BLOOD: CPT | Mod: ELYLAB | Performed by: STUDENT IN AN ORGANIZED HEALTH CARE EDUCATION/TRAINING PROGRAM

## 2025-04-28 PROCEDURE — 85027 COMPLETE CBC AUTOMATED: CPT | Performed by: STUDENT IN AN ORGANIZED HEALTH CARE EDUCATION/TRAINING PROGRAM

## 2025-04-28 PROCEDURE — 36415 COLL VENOUS BLD VENIPUNCTURE: CPT | Performed by: STUDENT IN AN ORGANIZED HEALTH CARE EDUCATION/TRAINING PROGRAM

## 2025-04-28 PROCEDURE — 83010 ASSAY OF HAPTOGLOBIN QUANT: CPT | Mod: ELYLAB | Performed by: STUDENT IN AN ORGANIZED HEALTH CARE EDUCATION/TRAINING PROGRAM

## 2025-04-28 PROCEDURE — 93005 ELECTROCARDIOGRAM TRACING: CPT

## 2025-04-28 PROCEDURE — 80048 BASIC METABOLIC PNL TOTAL CA: CPT | Performed by: STUDENT IN AN ORGANIZED HEALTH CARE EDUCATION/TRAINING PROGRAM

## 2025-04-28 PROCEDURE — 83615 LACTATE (LD) (LDH) ENZYME: CPT | Performed by: STUDENT IN AN ORGANIZED HEALTH CARE EDUCATION/TRAINING PROGRAM

## 2025-04-28 PROCEDURE — 3700000002 HC GENERAL ANESTHESIA TIME - EACH INCREMENTAL 1 MINUTE

## 2025-04-28 PROCEDURE — 99223 1ST HOSP IP/OBS HIGH 75: CPT | Performed by: INTERNAL MEDICINE

## 2025-04-28 PROCEDURE — 7100000002 HC RECOVERY ROOM TIME - EACH INCREMENTAL 1 MINUTE

## 2025-04-28 PROCEDURE — 82248 BILIRUBIN DIRECT: CPT | Performed by: STUDENT IN AN ORGANIZED HEALTH CARE EDUCATION/TRAINING PROGRAM

## 2025-04-28 PROCEDURE — 99222 1ST HOSP IP/OBS MODERATE 55: CPT | Performed by: NURSE PRACTITIONER

## 2025-04-28 PROCEDURE — 1200000002 HC GENERAL ROOM WITH TELEMETRY DAILY

## 2025-04-28 PROCEDURE — 43239 EGD BIOPSY SINGLE/MULTIPLE: CPT | Performed by: STUDENT IN AN ORGANIZED HEALTH CARE EDUCATION/TRAINING PROGRAM

## 2025-04-28 PROCEDURE — 2500000002 HC RX 250 W HCPCS SELF ADMINISTERED DRUGS (ALT 637 FOR MEDICARE OP, ALT 636 FOR OP/ED): Performed by: STUDENT IN AN ORGANIZED HEALTH CARE EDUCATION/TRAINING PROGRAM

## 2025-04-28 PROCEDURE — 82247 BILIRUBIN TOTAL: CPT | Performed by: STUDENT IN AN ORGANIZED HEALTH CARE EDUCATION/TRAINING PROGRAM

## 2025-04-28 PROCEDURE — 45378 DIAGNOSTIC COLONOSCOPY: CPT | Performed by: STUDENT IN AN ORGANIZED HEALTH CARE EDUCATION/TRAINING PROGRAM

## 2025-04-28 PROCEDURE — 85018 HEMOGLOBIN: CPT | Performed by: STUDENT IN AN ORGANIZED HEALTH CARE EDUCATION/TRAINING PROGRAM

## 2025-04-28 PROCEDURE — 2500000004 HC RX 250 GENERAL PHARMACY W/ HCPCS (ALT 636 FOR OP/ED): Mod: JZ | Performed by: NURSE ANESTHETIST, CERTIFIED REGISTERED

## 2025-04-28 PROCEDURE — 0753T DGTZ GLS MCRSCP SLD LEVEL IV: CPT | Mod: TC,ELYLAB | Performed by: STUDENT IN AN ORGANIZED HEALTH CARE EDUCATION/TRAINING PROGRAM

## 2025-04-28 PROCEDURE — 2500000004 HC RX 250 GENERAL PHARMACY W/ HCPCS (ALT 636 FOR OP/ED): Mod: JZ | Performed by: STUDENT IN AN ORGANIZED HEALTH CARE EDUCATION/TRAINING PROGRAM

## 2025-04-28 PROCEDURE — 84484 ASSAY OF TROPONIN QUANT: CPT | Performed by: STUDENT IN AN ORGANIZED HEALTH CARE EDUCATION/TRAINING PROGRAM

## 2025-04-28 PROCEDURE — 85025 COMPLETE CBC W/AUTO DIFF WBC: CPT | Performed by: STUDENT IN AN ORGANIZED HEALTH CARE EDUCATION/TRAINING PROGRAM

## 2025-04-28 PROCEDURE — 86078 PHYS BLOOD BANK SERV REACTJ: CPT | Performed by: STUDENT IN AN ORGANIZED HEALTH CARE EDUCATION/TRAINING PROGRAM

## 2025-04-28 PROCEDURE — 0DJD8ZZ INSPECTION OF LOWER INTESTINAL TRACT, VIA NATURAL OR ARTIFICIAL OPENING ENDOSCOPIC: ICD-10-PCS | Performed by: STUDENT IN AN ORGANIZED HEALTH CARE EDUCATION/TRAINING PROGRAM

## 2025-04-28 PROCEDURE — 86880 COOMBS TEST DIRECT: CPT

## 2025-04-28 PROCEDURE — 0DB68ZX EXCISION OF STOMACH, VIA NATURAL OR ARTIFICIAL OPENING ENDOSCOPIC, DIAGNOSTIC: ICD-10-PCS | Performed by: STUDENT IN AN ORGANIZED HEALTH CARE EDUCATION/TRAINING PROGRAM

## 2025-04-28 PROCEDURE — 93010 ELECTROCARDIOGRAM REPORT: CPT | Performed by: INTERNAL MEDICINE

## 2025-04-28 PROCEDURE — 99233 SBSQ HOSP IP/OBS HIGH 50: CPT | Performed by: STUDENT IN AN ORGANIZED HEALTH CARE EDUCATION/TRAINING PROGRAM

## 2025-04-28 RX ORDER — POLYETHYLENE GLYCOL 3350 17 G/17G
238 POWDER, FOR SOLUTION ORAL ONCE AS NEEDED
Status: DISCONTINUED | OUTPATIENT
Start: 2025-04-28 | End: 2025-04-29

## 2025-04-28 RX ORDER — DIPHENHYDRAMINE HYDROCHLORIDE 50 MG/ML
INJECTION, SOLUTION INTRAMUSCULAR; INTRAVENOUS AS NEEDED
Status: DISCONTINUED | OUTPATIENT
Start: 2025-04-28 | End: 2025-04-28

## 2025-04-28 RX ORDER — SODIUM CHLORIDE 9 MG/ML
75 INJECTION, SOLUTION INTRAVENOUS CONTINUOUS
Status: DISCONTINUED | OUTPATIENT
Start: 2025-04-28 | End: 2025-04-29

## 2025-04-28 RX ORDER — ETOMIDATE 2 MG/ML
INJECTION INTRAVENOUS AS NEEDED
Status: DISCONTINUED | OUTPATIENT
Start: 2025-04-28 | End: 2025-04-28

## 2025-04-28 RX ORDER — SODIUM CHLORIDE, SODIUM LACTATE, POTASSIUM CHLORIDE, CALCIUM CHLORIDE 600; 310; 30; 20 MG/100ML; MG/100ML; MG/100ML; MG/100ML
INJECTION, SOLUTION INTRAVENOUS CONTINUOUS PRN
Status: DISCONTINUED | OUTPATIENT
Start: 2025-04-28 | End: 2025-04-28

## 2025-04-28 RX ORDER — DEXTROMETHORPHAN/PSEUDOEPHED 2.5-7.5/.8
DROPS ORAL AS NEEDED
Status: DISCONTINUED | OUTPATIENT
Start: 2025-04-28 | End: 2025-04-28 | Stop reason: HOSPADM

## 2025-04-28 RX ORDER — PROPOFOL 10 MG/ML
INJECTION, EMULSION INTRAVENOUS CONTINUOUS PRN
Status: DISCONTINUED | OUTPATIENT
Start: 2025-04-28 | End: 2025-04-28

## 2025-04-28 RX ORDER — METOPROLOL TARTRATE 1 MG/ML
5 INJECTION, SOLUTION INTRAVENOUS EVERY 6 HOURS PRN
Status: DISCONTINUED | OUTPATIENT
Start: 2025-04-28 | End: 2025-05-02 | Stop reason: HOSPADM

## 2025-04-28 RX ORDER — SUCCINYLCHOLINE CHLORIDE 100 MG/5ML
SYRINGE (ML) INTRAVENOUS AS NEEDED
Status: DISCONTINUED | OUTPATIENT
Start: 2025-04-28 | End: 2025-04-28

## 2025-04-28 RX ORDER — LIDOCAINE HYDROCHLORIDE 20 MG/ML
INJECTION, SOLUTION INFILTRATION; PERINEURAL AS NEEDED
Status: DISCONTINUED | OUTPATIENT
Start: 2025-04-28 | End: 2025-04-28

## 2025-04-28 RX ADMIN — DEXAMETHASONE SODIUM PHOSPHATE 4 MG: 4 INJECTION, SOLUTION INTRAMUSCULAR; INTRAVENOUS at 15:10

## 2025-04-28 RX ADMIN — ACETAMINOPHEN 650 MG: 325 TABLET ORAL at 09:05

## 2025-04-28 RX ADMIN — SODIUM CHLORIDE, POTASSIUM CHLORIDE, SODIUM LACTATE AND CALCIUM CHLORIDE: 600; 310; 30; 20 INJECTION, SOLUTION INTRAVENOUS at 15:48

## 2025-04-28 RX ADMIN — METOPROLOL TARTRATE 5 MG: 5 INJECTION INTRAVENOUS at 18:49

## 2025-04-28 RX ADMIN — SODIUM CHLORIDE 75 ML/HR: 0.9 INJECTION, SOLUTION INTRAVENOUS at 05:12

## 2025-04-28 RX ADMIN — METOPROLOL TARTRATE 12.5 MG: 25 TABLET, FILM COATED ORAL at 21:47

## 2025-04-28 RX ADMIN — Medication 120 MG: at 15:04

## 2025-04-28 RX ADMIN — PANTOPRAZOLE SODIUM 40 MG: 40 INJECTION, POWDER, FOR SOLUTION INTRAVENOUS at 09:04

## 2025-04-28 RX ADMIN — SODIUM CHLORIDE, POTASSIUM CHLORIDE, SODIUM LACTATE AND CALCIUM CHLORIDE: 600; 310; 30; 20 INJECTION, SOLUTION INTRAVENOUS at 15:00

## 2025-04-28 RX ADMIN — ETOMIDATE 14 MG: 40 INJECTION, SOLUTION INTRAVENOUS at 15:04

## 2025-04-28 RX ADMIN — LIDOCAINE HYDROCHLORIDE 60 MG: 20 INJECTION, SOLUTION INFILTRATION; PERINEURAL at 15:04

## 2025-04-28 RX ADMIN — DIPHENHYDRAMINE HYDROCHLORIDE 12.5 MG: 50 INJECTION INTRAMUSCULAR; INTRAVENOUS at 15:09

## 2025-04-28 RX ADMIN — ROSUVASTATIN CALCIUM 5 MG: 10 TABLET, FILM COATED ORAL at 21:47

## 2025-04-28 RX ADMIN — Medication 25 MCG: at 09:03

## 2025-04-28 RX ADMIN — SIMETHICONE 40 MG: 20 EMULSION ORAL at 15:31

## 2025-04-28 RX ADMIN — METOPROLOL TARTRATE 12.5 MG: 25 TABLET, FILM COATED ORAL at 09:04

## 2025-04-28 RX ADMIN — PANTOPRAZOLE SODIUM 40 MG: 40 INJECTION, POWDER, FOR SOLUTION INTRAVENOUS at 21:47

## 2025-04-28 SDOH — SOCIAL STABILITY: SOCIAL INSECURITY: ABUSE: ADULT

## 2025-04-28 SDOH — SOCIAL STABILITY: SOCIAL INSECURITY: DOES ANYONE TRY TO KEEP YOU FROM HAVING/CONTACTING OTHER FRIENDS OR DOING THINGS OUTSIDE YOUR HOME?: NO

## 2025-04-28 SDOH — SOCIAL STABILITY: SOCIAL INSECURITY: WERE YOU ABLE TO COMPLETE ALL THE BEHAVIORAL HEALTH SCREENINGS?: YES

## 2025-04-28 SDOH — SOCIAL STABILITY: SOCIAL INSECURITY: HAS ANYONE EVER THREATENED TO HURT YOUR FAMILY OR YOUR PETS?: NO

## 2025-04-28 SDOH — SOCIAL STABILITY: SOCIAL INSECURITY: HAVE YOU HAD ANY THOUGHTS OF HARMING ANYONE ELSE?: NO

## 2025-04-28 SDOH — SOCIAL STABILITY: SOCIAL INSECURITY: HAVE YOU HAD THOUGHTS OF HARMING ANYONE ELSE?: NO

## 2025-04-28 SDOH — SOCIAL STABILITY: SOCIAL INSECURITY: ARE YOU OR HAVE YOU BEEN THREATENED OR ABUSED PHYSICALLY, EMOTIONALLY, OR SEXUALLY BY ANYONE?: NO

## 2025-04-28 SDOH — SOCIAL STABILITY: SOCIAL INSECURITY: DO YOU FEEL ANYONE HAS EXPLOITED OR TAKEN ADVANTAGE OF YOU FINANCIALLY OR OF YOUR PERSONAL PROPERTY?: NO

## 2025-04-28 SDOH — SOCIAL STABILITY: SOCIAL INSECURITY: ARE THERE ANY APPARENT SIGNS OF INJURIES/BEHAVIORS THAT COULD BE RELATED TO ABUSE/NEGLECT?: NO

## 2025-04-28 SDOH — HEALTH STABILITY: MENTAL HEALTH: CURRENT SMOKER: 0

## 2025-04-28 SDOH — SOCIAL STABILITY: SOCIAL INSECURITY: DO YOU FEEL UNSAFE GOING BACK TO THE PLACE WHERE YOU ARE LIVING?: NO

## 2025-04-28 ASSESSMENT — ACTIVITIES OF DAILY LIVING (ADL)
HEARING - RIGHT EAR: FUNCTIONAL
JUDGMENT_ADEQUATE_SAFELY_COMPLETE_DAILY_ACTIVITIES: YES
TOILETING: INDEPENDENT
PATIENT'S MEMORY ADEQUATE TO SAFELY COMPLETE DAILY ACTIVITIES?: YES
WALKS IN HOME: INDEPENDENT
ADEQUATE_TO_COMPLETE_ADL: YES
BATHING: INDEPENDENT
GROOMING: INDEPENDENT
HEARING - LEFT EAR: FUNCTIONAL
FEEDING YOURSELF: INDEPENDENT
DRESSING YOURSELF: INDEPENDENT

## 2025-04-28 ASSESSMENT — PAIN SCALES - GENERAL
PAINLEVEL_OUTOF10: 0 - NO PAIN
PAINLEVEL_OUTOF10: 0 - NO PAIN
PAIN_LEVEL: 0
PAINLEVEL_OUTOF10: 6
PAINLEVEL_OUTOF10: 0 - NO PAIN

## 2025-04-28 ASSESSMENT — COGNITIVE AND FUNCTIONAL STATUS - GENERAL
MOVING TO AND FROM BED TO CHAIR: A LITTLE
MOVING TO AND FROM BED TO CHAIR: A LITTLE
HELP NEEDED FOR BATHING: A LITTLE
HELP NEEDED FOR BATHING: A LITTLE
DRESSING REGULAR UPPER BODY CLOTHING: A LITTLE
MOVING FROM LYING ON BACK TO SITTING ON SIDE OF FLAT BED WITH BEDRAILS: A LITTLE
STANDING UP FROM CHAIR USING ARMS: A LITTLE
WALKING IN HOSPITAL ROOM: A LOT
PATIENT BASELINE BEDBOUND: NO
DAILY ACTIVITIY SCORE: 21
TOILETING: A LOT
STANDING UP FROM CHAIR USING ARMS: A LITTLE
DAILY ACTIVITIY SCORE: 21
CLIMB 3 TO 5 STEPS WITH RAILING: TOTAL
WALKING IN HOSPITAL ROOM: A LOT
CLIMB 3 TO 5 STEPS WITH RAILING: TOTAL
TURNING FROM BACK TO SIDE WHILE IN FLAT BAD: A LITTLE
TURNING FROM BACK TO SIDE WHILE IN FLAT BAD: A LITTLE
TOILETING: A LITTLE
MOBILITY SCORE: 16
MOBILITY SCORE: 15

## 2025-04-28 ASSESSMENT — PATIENT HEALTH QUESTIONNAIRE - PHQ9
2. FEELING DOWN, DEPRESSED OR HOPELESS: NOT AT ALL
SUM OF ALL RESPONSES TO PHQ9 QUESTIONS 1 & 2: 0
1. LITTLE INTEREST OR PLEASURE IN DOING THINGS: NOT AT ALL

## 2025-04-28 ASSESSMENT — PAIN - FUNCTIONAL ASSESSMENT
PAIN_FUNCTIONAL_ASSESSMENT: 0-10

## 2025-04-28 ASSESSMENT — PAIN DESCRIPTION - ORIENTATION: ORIENTATION: RIGHT;LEFT

## 2025-04-28 ASSESSMENT — LIFESTYLE VARIABLES
HOW OFTEN DO YOU HAVE A DRINK CONTAINING ALCOHOL: NEVER
HOW MANY STANDARD DRINKS CONTAINING ALCOHOL DO YOU HAVE ON A TYPICAL DAY: PATIENT DOES NOT DRINK
HOW OFTEN DO YOU HAVE 6 OR MORE DRINKS ON ONE OCCASION: NEVER
AUDIT-C TOTAL SCORE: 0
AUDIT-C TOTAL SCORE: 0
SKIP TO QUESTIONS 9-10: 1

## 2025-04-28 ASSESSMENT — PAIN DESCRIPTION - LOCATION: LOCATION: OTHER (COMMENT)

## 2025-04-28 NOTE — CONSULTS
Inpatient consult to Cardiology  Consult performed by: YECENIA Palmer-CNP  Consult ordered by: Anson Bowen MD  Reason for consult: chest pain , EKG changes      [facility] Cardiology Consult Note      Date:   4/28/2025  Patient name:  Laurel Pugh  Date of admission:  4/27/2025 10:44 AM  MRN:   [id number]  YOB: 1940  Time of Consult:  9:26 AM    Consulting Cardiologist: YECENIA Hunt, CNP  Primary Cardiologist:  Dr. Adrian Echavarria      Admission Diagnosis:     Syncope, unspecified syncope type      History of Present Illness:      Laurel Pugh is a 84 y.o.  female patient who is being at the request of Dr. Bowen for inpatient consultation of  chest pain and EKG changes . She was admitted on 4/27/2025.  Previous [facility] and [facility] records have been reviewed in detail.      Patient presented to [facility] emergency department after having syncopal episode while at home.  She describes that she lives with her son and yesterday morning when she woke up was rushing to get to the computer to watch Sunday mass and became nauseated.  She tried to go to lay down and felt lightheaded, states that her son helped her to the floor.  She further describes that she is recovering from urinary tract infection and also had RSV on Easter Sunday.  She says that her son does not think that she is eating and drinking very much.  She describes that a couple of days ago she had a bowel movement that was rather dark in color.  She has had no chest pain or shortness of breath prior to admission.    Patient is admitted to medical service.  Hemoglobin on admission was 9.8.  Initial troponin was 9, .  BUN 50, creatinine 1.0, GFR 55.  D-dimer 1075.  She was reported to have a bowel movement yesterday evening that was dark red appearing and became hypotensive and tachycardic.  CT abdomen pelvis showed colonic diverticulosis without  evidence of diverticulitis.  1 unit of packed red blood cells was ordered given active bleeding and hypotension the patient became tachycardic.  She states that during this time had a mild pressure in her mid chest that radiated to the right side and lasted approximately 15 minutes and resolved on its own.  Overnight she has had no further chest pain.  She has no shortness of breath.  Repeat troponin at time of hypotension episode was 70, this morning troponin is 100.  Hemoglobin today 9.0.  EKG reveals sinus tachycardia with left bundle branch block.  Cardiology consult was placed for further evaluation of chest pain and EKG changes.    Past Medical History  Urinary tract infection  Ischemic cardiomyopathy, heart failure with mildly reduced ejection fraction  Diastolic heart failure, not on SGLT2 inhibitor secondary to recurrent urinary tract infections  Hypertension  Coronary artery disease with previous PCI and stent to LAD on 7/15/2024  Left bundle branch block  Breast cancer  Vertigo    Previous Cardiac testing   2/12/2025 transthoracic echocardiogram  CONCLUSIONS:   1. Entire apex is abnormal.   2. Left ventricular ejection fraction is moderately decreased, by visual estimate at 40%.   3. Abnormal wall motion.   4. There is normal right ventricular global systolic function.   5. Normal sized right ventricle.   6. The pulmonary artery is not well visualized.     2/12/2025 cardiac catheterization   CONCLUSIONS:   1. Right coronary artery system dominance.   2. Single vessel coronary artery disease.   3. Patent stent to mid LAD.   4. Ostial 1st Dg 50% lesion (residual lesion from PCI in bifurcation lesion).   5. Moderately reduced LV systolic function ~40% due to apical akinesia.   6. Compared to the previous left heart catheterization (07/2024), the findings are similar, with patent stent to LAD and similar post-bifurcation PCI lesion to ostial 1st Dg.     5/27/2022 carotid artery duplex  IMPRESSION:  No  evidence of hemodynamically relevant stenosis of the carotid  circulation as described above.    Allergies:     Allergies[1]      Past Medical History:     Medical History[2]    Past Surgical History:     Surgical History[3]    Family History:     Family History[4]    Social History:     Social History[5]    CURRENT INPATIENT MEDICATIONS    Scheduled Medications[6]  Continuous Medications[7]  Current Outpatient Medications   Medication Instructions    aspirin 81 mg EC tablet 1 tablet, Daily    blood pressure test kit-large kit Automatic type blood pressure monitor.    Check blood pressure daily and maintain log    cholecalciferol (VITAMIN D-3) 1,000 Units, Daily    clopidogrel (Plavix) 75 mg tablet Take 1 tablet (75 mg) by mouth once daily.    docusate sodium (STOOL SOFTENER ORAL) Daily PRN    metoprolol tartrate (LOPRESSOR) 12.5 mg, oral, 2 times daily    rosuvastatin (CRESTOR) 5 mg, oral, Daily    sacubitriL-valsartan (Entresto) 24-26 mg tablet 0.5 tablets, oral, 2 times daily        Review of Systems:      12 point review of systems was obtained in detail and is negative other than that detailed above.    Vital Signs:     Vitals:    04/28/25 0226 04/28/25 0515 04/28/25 0646 04/28/25 0733   BP: 139/67 132/61  141/68   BP Location:  Right arm     Patient Position:  Lying     Pulse: (!) 123 86 95 97   Resp: 18 16     Temp: 37.3 °C (99.1 °F) 37.6 °C (99.7 °F)  36.3 °C (97.3 °F)   TempSrc: Temporal Temporal     SpO2: 100% 98%  99%   Weight:       Height:           Intake/Output Summary (Last 24 hours) at 4/28/2025 0926  Last data filed at 4/28/2025 0134  Gross per 24 hour   Intake 685 ml   Output --   Net 685 ml       Wt Readings from Last 4 Encounters:   04/27/25 63.5 kg (140 lb)   04/20/25 64.9 kg (143 lb)   03/10/25 64.9 kg (143 lb)   02/26/25 64.3 kg (141 lb 12.8 oz)       Physical Examination:     GENERAL APPEARANCE: Frail, elderly appearing female, well developed, well nourished, in no acute distress.  CHEST:  Symmetric and non-tender.  INTEGUMENT: Skin pale, warm and dry, without gross excoriationis or lesions.  HEENT: No gross abnormalities of conjunctiva, teeth, gums, oral mucosa  NECK: Supple, no JVD, no bruit. Thyroid not palpable. Carotid upstrokes normal.  NEURO/PSHCY: Alert and oriented x3; appropriate behavior and responses. Moves all extremities equally.  LUNGS: Clear to auscultation bilaterally; normal respiratory effort.  HEART: Rate and rhythm regular with no evident murmur; no gallop appreciated. There are no rubs, clicks or heaves. PMI nondisplaced.  ABDOMEN: Soft, nontender, no palpable hepatosplenomegaly, no mases, no bruits.   MUSCULOSKELETAL: Normal range of motion.  EXTREMITIES: Warm with good color, no clubbing or cyanois. There is no edema noted.  PERIPHERAL VASCULAR: 2+ radial and 1+ posterior tibial pulse bilateral.     Lab:     CBC:   Results from last 7 days   Lab Units 04/28/25  0211 04/27/25  1835 04/27/25  1136   WBC AUTO x10*3/uL 7.4 5.7 5.4   RBC AUTO x10*6/uL 3.23* 3.30* 3.54*   HEMOGLOBIN g/dL 8.8* 9.0* 9.8*   HEMATOCRIT % 27.3* 28.0* 30.6*   MCV fL 85 85 86   MCH pg 27.2 27.3 27.7   MCHC g/dL 32.2 32.1 32.0   RDW % 14.4 13.9 14.0   PLATELETS AUTO x10*3/uL 207 222 209     CMP:    Results from last 7 days   Lab Units 04/28/25  0218 04/27/25  1136   SODIUM mmol/L 138 137   POTASSIUM mmol/L 4.7 4.9   CHLORIDE mmol/L 109* 105   CO2 mmol/L 22 26   BUN mg/dL 55* 50*   CREATININE mg/dL 0.97 1.00   GLUCOSE mg/dL 99 99   PROTEIN TOTAL g/dL  --  6.0*   CALCIUM mg/dL 8.4* 8.7   BILIRUBIN TOTAL mg/dL 0.6 0.5   ALK PHOS U/L  --  37   AST U/L  --  12   ALT U/L  --  6*     BMP:    Results from last 7 days   Lab Units 04/28/25  0218 04/27/25  1136   SODIUM mmol/L 138 137   POTASSIUM mmol/L 4.7 4.9   CHLORIDE mmol/L 109* 105   CO2 mmol/L 22 26   BUN mg/dL 55* 50*   CREATININE mg/dL 0.97 1.00   CALCIUM mg/dL 8.4* 8.7   GLUCOSE mg/dL 99 99     Magnesium:  Results from last 7 days   Lab Units 04/27/25  113    MAGNESIUM mg/dL 2.03     Troponin:    Results from last 7 days   Lab Units 04/28/25  0636 04/28/25  0218 04/27/25  1136   TROPHS ng/L 100* 70* 9     BNP:   Results from last 7 days   Lab Units 04/27/25  1136   BNP pg/mL 201*       Diagnostic Studies:     ECG 12 lead  Result Date: 4/28/2025  Normal sinus rhythm Left bundle branch block Abnormal ECG When compared with ECG of 28-APR-2025 03:02, (unconfirmed) Vent. rate has decreased BY  40 BPM ST no longer depressed in Anterior leads T wave inversion no longer evident in Anterior leads    Radiology:     CT abdomen pelvis wo IV contrast   Final Result      CT angio chest for pulmonary embolism   Final Result   No pulmonary embolism or active disease in the chest identified on   slightly motion degraded exam.        Signed by: Maicol Mcdonald 4/27/2025 12:53 PM   Dictation workstation:   [id number]      Transthoracic Echo Complete    (Results Pending)       Problem List:     Problem List[8]    Assessment:     Syncope  Normocytic anemia being evaluated by GI likely secondary to GI bleed  Elevated troponin likely type II MI secondary to hypovolemia, hypotension, tachycardia and anemia  Coronary artery disease with PCI/stent of LAD 7/2024.  Repeat cardiac catheterization February 2025 with patent LAD stent  Heart failure with mildly reduced ejection fraction, EF 40% appears compensated currently  Recent urinary tract infection and RSV infection  Left bundle branch block      Plan:     Patient is admitted after experiencing a syncopal episode.  She has recent RSV infection diagnosed on Easter Sunday and recurrent UTI at that time as well.  She had bloody bowel movement prior to admission and again yesterday evening and became hypotensive and tachycardic and transient chest pain during this time which resolved without intervention.  She is receiving IV fluids.  Her last coronary intervention was July 2024.  She appears compensated from heart failure standpoint.  She is not on  SGLT2 inhibitor prior to admission secondary to history of recurrent UTI.  She is evaluated by gastroenterology found to have normocytic anemia and is receiving IV Protonix and is recommended to have EGD.  From cardiology perspective dual antiplatelet therapy including aspirin and Plavix can be held for GI evaluation.  RYLAN Melendez  [facility]  [facility]    Of note, this documentation is completed using the Dragon Dictation system (voice recognition software). There may be spelling and/or grammatical errors that were not corrected prior to final submission.    Electronically signed by RYLAN Palmer, on 4/28/2025 at 9:26 AM     I have personally interviewed and examined the patient.   I have personally and independently reviewed labs and diagnostic testing.  I have personally verified the elements of the history and physical listed above and changes, if any, are noted.   I have personally reviewed the assessment and plan as documented by SANJAY Melendez CNP and hailey.    She was initially seen in consultation by Dr. Rosa at [facility] on July 14, 2024. She presented with lightheadedness, nausea, and confusion.  CT scan of the chest was negative for pulmonary embolus.  Chest x-ray did not show any active disease.  CBC and CMP were normal.  BNP was 373.  High-sensitivity troponin levels 427, 614, 1196, and 519.  EKG showed normal sinus rhythm with complete left bundle branch block.  No change from previous remote EKG.  CT scan of the brain was negative.  She was diagnosed with an acute urinary tract infection.  No reports of chest pain or shortness of breath.  No history of hypertension or diabetes mellitus.  She is a non-smoker.  She has a history of hyperlipidemia.     An echocardiogram July 15, 2024 showed an estimated LV ejection fraction of 40% with apical hypokinesis.  There was mild asymmetric septal hypertrophy.  Mild mitral and  tricuspid regurgitation.  Cardiac catheterization done the same day showed 80% stenosis of the LAD.  There were mild irregularities of the circumflex and right coronary artery.  She underwent angioplasty and stenting of the LAD.  Estimated LV ejection fraction was 45%.  She was started on GDMT and continued on DAPT.     She was brought to emergency department by EMS February 11, 2025 after being found outside on the ground by some neighbors. It was extremely cold.  The patient recalls that she had slipped on some icy steps and fell backwards.  She was had been lying outside for over 5 hours when her son found her.  She was noted to be hypothermic.  Initial temperature in the ED was 30.4 (86.7).  Vital signs were otherwise stable.  She developed some mild hypotension with systolic blood pressures in the 80s to low 100s.  CBC was normal with exception of WBC 12.1.  Basic metabolic profile was normal with exception of BUN 45 and glucose 166.  CPK was 391.  BNP level 379.  Initial high-sensitivity troponin level was 3,071 with subsequent levels of 4,468, 7,405, and 7,123.  Chest x-ray did not show any acute disease.  CT scan of the brain was negative for any acute findings.  No fractures identified.  Initial EKG showed significant baseline artifact with probable atrial fibrillation.  Repeat EKG showed normal sinus rhythm with occasional PVCs.  Known complete left bundle much block. She was treated with warm IV fluids and a Fariha hugger.  She had slow improvement of her temperature as well as her mental status.  She denied any chest pain or shortness of breath.  Repeat echocardiogram showed estimated LV ejection fraction of 40% with apical hypokinesis.     Cardiac catheterization on February 12, 2025 showed a patent stent in the mid LAD, 50% stenosis of the ostial first diagonal branch, and otherwise minimal disease.  There was apical akinesis with estimated LV ejection fraction 40%.  The diagonal stenosis was similar to  a post bifurcation PCI lesion on a previous study.  Medical therapy is recommended.  Echocardiogram February 12, 2025 also showed apical akinesis and estimated LV ejection fraction 40%.  Normal right ventricular chamber size and function.  Valvular structure and function were not assessed on that study.  It was felt her elevated cardiac enzymes for secondary to a type II myocardial infarction secondary to possible cardiac contusion as well as myocardial stress in the setting of hypothermia.  Prior to her discharge she was experiencing some hypotension and she was changed from Toprol-XL to metoprolol tartrate 12.5 mg twice daily.  Entresto was held.  We held off on starting her on an SGLT2 inhibitor secondary to recurrent urinary tract infections.  She was seen in the office in fibber 2025 and was doing well.  She was continued on aspirin, Plavix, metoprolol, and atorvastatin.  She was restarted on low-dose Entresto.     She presented to the emergency department yesterday afternoon after she experienced a brief episode of syncope.  She recently treated for a urinary tract infection and also was diagnosed with RSV on Easter Sunday.  Patient reports that she had stood up from the couch and started walking when she became very lightheaded.  She states she felt the need to sit back down and then briefly passed out.  She was lowered to the ground by a family member.  She remained consciousness very quickly.  No incontinence.  No any seizure activity.  No chest pain or shortness of breath.  She generally has not been eating or drinking very much.  Upon arrival to the emergency department she was noted to have stable vital signs.  Oxygen saturation 100%.  Hemoglobin was 9.1 hematocrit 30.6.  WC 5.4.  Comprehensive metabolic profile was normal with exception of BUN 50.  Creatinine was 1.0.  High-sensitivity potable 9.  BNP level 201.  EKG shows normal sinus rhythm with complete left bundle block.  Patient reported that she  "recent bowel movement that was darkish red appearing.  She was admitted to telemetry.  She developed an episode of transient hypotension with systolic blood pressure of 87 mmHg.  She also developed some sinus tachycardia in the 120s.  She was transfused with 1 unit packed RBCs.  The patient did report some mild pressure in her chest radiating toward the right side.  Total duration was approximately 15 minutes.  Discomfort resolved spontaneously.  Repeat troponin level 70 and 100.    She currently is resting comfortably without any specific plaints.  Aspirin and Plavix have been placed on hold.  GI evaluation is ongoing.  She scheduled undergo upper endoscopy today.  Suspect probable small type II myocardial infarction in the setting of hypotension, GI bleeding, involve depletion.  Overall I believe she is reasonably low risk to proceed with much-needed upper endoscopy and possible colonoscopy.           [1]   Allergies  Allergen Reactions    Haloperidol Hallucinations    Keflex [Cephalexin] Itching and Rash     \"Severe Itching\"    Red patches on arms, trunks, back,  and legs     Zofran [Ondansetron Hcl] Hallucinations    Amoxicillin-Pot Clavulanate Rash    Levofloxacin Rash    Sulfa (Sulfonamide Antibiotics) Rash   [2]   Past Medical History:  Diagnosis Date    Arthritis     Bitten or stung by nonvenomous insect and other nonvenomous arthropods, initial encounter 05/22/2019    Tick bite, initial encounter    Encounter for general adult medical examination without abnormal findings 10/05/2022    Medicare annual wellness visit, subsequent    Encounter for general adult medical examination without abnormal findings 11/02/2021    Medicare annual wellness visit, subsequent    Personal history of malignant neoplasm of breast     History of malignant neoplasm of breast    Personal history of other diseases of urinary system 08/10/2019    History of hematuria    Personal history of other specified conditions     History of " vertigo    Personal history of other specified conditions 08/10/2019    History of urinary frequency    Personal history of urinary (tract) infections 08/10/2019    History of acute cystitis    Personal history of urinary (tract) infections 09/03/2019    History of urinary tract infection   [3]   Past Surgical History:  Procedure Laterality Date    CARDIAC CATHETERIZATION N/A 7/15/2024    Procedure: Left Heart Cath, With LV;  Surgeon: Tressa Davison MD;  Location: [facility];  Service: Cardiovascular;  Laterality: N/A;  radial approach please    CARDIAC CATHETERIZATION N/A 7/15/2024    Procedure: PCI KHUSHI Stent- Coronary;  Surgeon: Tressa Davison MD;  Location: [facility];  Service: Cardiovascular;  Laterality: N/A;    CARDIAC CATHETERIZATION N/A 2/12/2025    Procedure: Left Heart Cath;  Surgeon: Elvin Means MD;  Location: [facility];  Service: Cardiovascular;  Laterality: N/A;    OTHER SURGICAL HISTORY  04/30/2019    Hysterectomy    OTHER SURGICAL HISTORY  04/30/2019    Mastectomy    OTHER SURGICAL HISTORY  04/30/2019    Tooth extraction   [4]   Family History  Problem Relation Name Age of Onset    Stroke Mother      Other (cardiac disorder) Father      Breast cancer Sister      Arthritis Sister     [5]   Social History  Tobacco Use    Smoking status: Never     Passive exposure: Never    Smokeless tobacco: Never   Vaping Use    Vaping status: Never Used   Substance Use Topics    Alcohol use: Never    Drug use: Never   [6] [Held by provider] aspirin, 81 mg, oral, Daily  cholecalciferol, 25 mcg, oral, Daily  metoprolol tartrate, 12.5 mg, oral, BID  pantoprazole, 40 mg, intravenous, BID  perflutren lipid microspheres, 0.5-10 mL of dilution, intravenous, Once in imaging  perflutren protein A microsphere, 0.5 mL, intravenous, Once in imaging  polyethylene glycol, 17 g, oral, Daily  rosuvastatin, 5 mg, oral, Daily  sulfur hexafluoride microsphr, 2 mL, intravenous, Once in  imaging  [7] sodium chloride 0.9%, 75 mL/hr, Last Rate: 75 mL/hr (04/28/25 0512)  [8]   Patient Active Problem List  Diagnosis    Arthritis of foot, left    History of breast cancer    Constipation    GERD (gastroesophageal reflux disease)    Left bundle branch block    Mild vitamin D deficiency    Actinic keratosis    Seborrheic keratosis    Varicose veins of both lower extremities with inflammation    Ocular migraine    Atherosclerosis of native coronary artery of native heart without angina pectoris    Essential hypertension    Hx of percutaneous transluminal coronary angioplasty    Heart failure with mildly reduced ejection fraction (HFmrEF)    Syncope, unspecified syncope type

## 2025-04-28 NOTE — SIGNIFICANT EVENT
Notified by nursing at the start of shift the patient had large dark red appearing bowel movement and was hypotensive.  CT A/P w/o contrast was ordered by admitting physician which showed colonic diverticulosis without evidence of diverticulitis.  Type and screen along with 1 unit PRBC was ordered given active bleeding and hypotension.  Shortly after blood transfusion began, patient became tachycardic in the 120s.  She felt warm however did not spike a fever.  She also endorses about 1 minute of chest tightness that resolved on its own.  She does have a history of CAD s/p PTCA with stent placed in July 2024.  EKG was obtained which showed worsening ST depression in anterolateral leads as well as worsening T wave inversions in anterior leads.  She also has a known left bundle branch block and appears to have concordant ST depression in V2 meeting Sgarbossa criteria for MI.  Given persistent tachycardia with EKG changes and concern for transfusion reaction, blood transfusion was stopped and transfusion reaction labs were sent.  Repeat CBC showed stable hemoglobin of 8.8, slightly down from 9.0 about 8 hours prior.  I called on-call interventional cardiologist Dr. Mejia to discuss EKG changes.  Given ongoing GI bleed he did not recommend anticoagulation.  He recommends trending troponin and obtaining serial EKGs.  Will also consult cardiology service for later this morning.  Patient has remained hemodynamically stable since initial hypotension.  We will continue to monitor the patient closely along with serial EKGs and troponin levels as recommended.  GI consult is pending regarding GI bleed.    Anson Bowen MD

## 2025-04-28 NOTE — CONSULTS
Department of Internal Medicine  Gastroenterology  Consult note    IMPRESSION/RECOMMENDATIONS  Laurel Pugh is a 84 y.o. female with a PMH of CAD on DAPT, breast cancer, recent UTI, recent RSV presents to [facility] after a syncopal event.  Patient has had 1 episode of coffee-ground emesis and 2 episodes of hematochezia since admission.  Hgb 9.8-->8.8.  Also with episode of tachycardia, chest discomfort and EKG changes.  Cardiology has been consulted.    Normocytic anemia likely 2/2 GIB  Coffee-ground emesis, hematochezia  DAPT  Tachycardia, chest discomfort, EKG changes-manage per cardiology    -Keep patient n.p.o. for possible EGD today  - Pantoprazole 40 IV twice daily  - Hold Plavix  - Trend H&H, consider transfusing RBC cautiously for Hgb less than 8.0  - Monitor stool color and consistency and document  - Will need cardiac clearance prior to proceeding with EGD. According to cardiology, patient is a reasonable low risk to to proceed with much needed EGD.    Discussed case with Dr. Nunez and Dr. JAGDEEP Dougherty      Reason for Consult: Melena, coffee ground emesis    History of Present Illness    Laurel Pugh is a 84 y.o. female with a PMH of CAD, breast cancer, recent UTI, recent RSV presents to [facility] after a syncopal event.  Patient states she got up from the couch and was walking when she became lightheaded and dizzy.  Told her son she needed to sit down.  Before getting to the couch she passed out and was lowered to the ground slowly by her son.  Patient is currently on antibiotics for UTI and recently tested positive for RSV.    On arrival to ED, patient was afebrile, heart rate 68, /68, SPO2 100%.  Initial blood work showed WBC 5.4, Hgb 9.8, HCT 30.6 %.  MCV 86, and platelet count 209.  CMP showed BUN 50, creatinine 1.0 concerning for GIB.  LFTs normal.  Lactate normal.  Patient had an episode of coffee-ground emesis in the ED and also had a bloody bowel movement.  She denies any previous history  of GIB.  No fever or chills, shortness of breath or chest pain.  Patient has been taking aspirin and Plavix for CAD with stents (July, 2024).  Repeat Hgb dropped to 9.0 from 9.8.  GIs been consulted for concern of GIB.    Patient seen and examined at bedside.  She is afebrile and hemodynamically stable.  Repeat Hgb this morning 8.8, HCT 27.3%.  Overnight, patient had a large dark red appearing bowel movement and was hypotensive.  CT abdomen pelvis without contrast was ordered which showed colonic diverticulosis, duodenal diverticulosis and cholelithiasis.  Patient was given 1 unit PRBCs given active bleeding and hypotension.  Shortly after the blood transfusion began, patient became tachycardic in the 120s and complained of chest tightness for approximately 1 minute that resolved on its own.  Patient does have a history of CAD with stent placement in July, 2024 and was on aspirin and Plavix up until ER visit.  EKG showed some worsening ST depression in the anterolateral leads as well as worsening T wave inversions in the anterior leads.  With concern of tachycardia and chest pain and EKG changes the blood transfusion was stopped.  Serial troponins have been ordered troponin 70->100.  Cardiology is on consult.  After this episode, patient has remained hemodynamically stable and blood pressure improved.    Patient seen and examined at bedside.  She is afebrile and hemodynamically stable.  She denies abdominal pain, nausea or vomiting.  She did have about 1 episode of painless bloody bowel movement overnight.  Patient has been on DAPT since July, 2024.  No routine NSAID use.  Last Plavix dose yesterday morning.  No history of GI bleeding.  No family history of GI malignancies.  Never had EGD.  Last colonoscopy more than 10 years ago.  Patient was seen in conjunction with Dr. Coulter and cardiology. They believe patient's tachycardia, chest discomfort and EKG changes overnight are related to her anemia and acute blood  loss. Cardiology states we may proceed with EGD.    ENDOSCOPIC REVIEW  EGD: never  Colonoscopy: >10 years ago, normal according to patient    Allergies  Haloperidol, Keflex [cephalexin], Zofran [ondansetron hcl], Amoxicillin-pot clavulanate, Levofloxacin, and Sulfa (sulfonamide antibiotics)    Current Medication  Current Medications[1]    Past Medical History  Active Ambulatory Problems     Diagnosis Date Noted    Arthritis of foot, left 03/01/2023    History of breast cancer 03/01/2023    Constipation 03/01/2023    GERD (gastroesophageal reflux disease) 03/01/2023    Left bundle branch block 03/01/2023    Mild vitamin D deficiency 03/01/2023    Actinic keratosis 03/01/2023    Seborrheic keratosis 03/01/2023    Varicose veins of both lower extremities with inflammation 03/01/2023    Ocular migraine 12/08/2023    Atherosclerosis of native coronary artery of native heart without angina pectoris 07/13/2024    Essential hypertension 07/25/2024    Hx of percutaneous transluminal coronary angioplasty 10/24/2024    Heart failure with mildly reduced ejection fraction (HFmrEF) 10/24/2024     Resolved Ambulatory Problems     Diagnosis Date Noted    Anemia 03/01/2023    Benign mole 03/01/2023    Bronchitis 03/01/2023    Cataract 03/01/2023    Dizziness 03/01/2023    Vertigo 03/01/2023    Blood cholesterol increased compared with prior measurement 03/01/2023    Headache, occipital 03/01/2023    Itching 03/01/2023    Juvenile cortical cataract of right eye 03/01/2023    Left ankle swelling 03/01/2023    Lesion of face 03/01/2023    Nausea with vomiting 03/01/2023    Posterior subcapsular polar age-related cataract 03/01/2023    Shortness of breath 03/01/2023    Tightness in chest 03/01/2023    URI (upper respiratory infection) 03/01/2023    UTI symptoms 03/01/2023    Varicose veins of legs 03/01/2023    Depression, recurrent (CMS-HCC) 12/08/2023    Malignant neoplasm of breast 07/13/2012    Altered mental status, unspecified  "altered mental status type 07/13/2024    Recurrent UTI (urinary tract infection) 07/13/2024    Chronic diastolic heart failure 10/24/2024    Fall, initial encounter 02/12/2025    Hypothermia 02/11/2025    Elevated troponin 02/11/2025     Past Medical History:   Diagnosis Date    Arthritis     Bitten or stung by nonvenomous insect and other nonvenomous arthropods, initial encounter 05/22/2019    Encounter for general adult medical examination without abnormal findings 10/05/2022    Encounter for general adult medical examination without abnormal findings 11/02/2021    Personal history of malignant neoplasm of breast     Personal history of other diseases of urinary system 08/10/2019    Personal history of other specified conditions     Personal history of other specified conditions 08/10/2019    Personal history of urinary (tract) infections 08/10/2019    Personal history of urinary (tract) infections 09/03/2019       Past Surgical History  Surgical History[2]    Family History  Family History[3]  No family history of GI malignancies     Social History  TOBACCO:  reports that she has never smoked. She has never been exposed to tobacco smoke. She has never used smokeless tobacco.  ETOH:  reports no history of alcohol use.  DRUGS:  reports no history of drug use.    Review of Systems  Review of systems negative unless otherwise stated above.    PHYSICAL EXAM  VS: /68   Pulse 97   Temp 36.3 °C (97.3 °F)   Resp 16   Ht 1.499 m (4' 11\")   Wt 63.5 kg (140 lb)   SpO2 99%   BMI 28.28 kg/m²  Body mass index is 28.28 kg/m².  Constitutional:       Appearance: Normal appearance.   HENT:      Head: Normocephalic.   Eyes:      Extraocular Movements: Extraocular movements intact.   Cardiovascular:      Rate and Rhythm: Normal rate and regular rhythm.   Pulmonary:      Effort: Pulmonary effort is normal. No respiratory distress.      Breath sounds: No wheezing or rales.   Abdominal:      General: There is no distension. "      Palpations: Abdomen is soft.      Tenderness: There is no abdominal tenderness. There is no guarding.   Musculoskeletal:         General: No deformity or signs of injury.      Cervical back: Neck supple.   Neurological:      General: No focal deficit present.      Mental Status: She is alert.   Psychiatric:         Mood and Affect: Mood normal.        DATA  Recent blood work and relevant radiology and endoscopic studies were reviewed and discussed with the patient   Results from last 7 days   Lab Units 04/28/25  0211   WBC AUTO x10*3/uL 7.4   RBC AUTO x10*6/uL 3.23*   HEMOGLOBIN g/dL 8.8*   HEMATOCRIT % 27.3*   MCV fL 85   MCHC g/dL 32.2   RDW % 14.4   PLATELETS AUTO x10*3/uL 207       Results from last 72 hours   Lab Units 04/28/25  0218 04/27/25  1136   SODIUM mmol/L 138 137   POTASSIUM mmol/L 4.7 4.9   CHLORIDE mmol/L 109* 105   CO2 mmol/L 22 26   BUN mg/dL 55* 50*   CREATININE mg/dL 0.97 1.00   CALCIUM mg/dL 8.4* 8.7   PROTEIN TOTAL g/dL  --  6.0*   BILIRUBIN TOTAL mg/dL 0.6 0.5   ALK PHOS U/L  --  37   AST U/L  --  12   ALT U/L  --  6*     RADIOLOGY REVIEW  === 04/27/25 ===    CT ABDOMEN PELVIS WO IV CONTRAST       (Electronically signed byYECENIA Calvin-CNP on 4/28/2025 at 9:21 AM)         [1]   Current Facility-Administered Medications:     acetaminophen (Tylenol) tablet 650 mg, 650 mg, oral, q4h PRN, 650 mg at 04/28/25 0905 **OR** acetaminophen (Tylenol) oral liquid 650 mg, 650 mg, oral, q4h PRN **OR** acetaminophen (Tylenol) suppository 650 mg, 650 mg, rectal, q4h PRN, Stephen Coulter MD    acetaminophen (Tylenol) tablet 650 mg, 650 mg, oral, q4h PRN **OR** acetaminophen (Tylenol) oral liquid 650 mg, 650 mg, nasogastric tube, q4h PRN **OR** acetaminophen (Tylenol) suppository 650 mg, 650 mg, rectal, q4h PRN, Anson Bowen MD    [Held by provider] aspirin chewable tablet 81 mg, 81 mg, oral, Daily, Stephen Coulter MD    benzocaine-menthol (Cepastat Sore Throat) lozenge 1 lozenge, 1  lozenge, Mouth/Throat, q2h PRN, Stephen Coulter MD, 1 lozenge at 04/27/25 2325    cholecalciferol (Vitamin D-3) tablet 25 mcg, 25 mcg, oral, Daily, Stephen Coulter MD, 25 mcg at 04/28/25 0903    metoprolol tartrate (Lopressor) tablet 12.5 mg, 12.5 mg, oral, BID, Anson Bowen MD, 12.5 mg at 04/28/25 0904    pantoprazole (Protonix) injection 40 mg, 40 mg, intravenous, BID, Anson Bowen MD, 40 mg at 04/28/25 0904    perflutren lipid microspheres (Definity) injection 0.5-10 mL of dilution, 0.5-10 mL of dilution, intravenous, Once in imaging, Stephen Coulter MD    perflutren protein A microsphere (Optison) injection 0.5 mL, 0.5 mL, intravenous, Once in imaging, Stephen Coulter MD    polyethylene glycol (Glycolax, Miralax) packet 17 g, 17 g, oral, Daily, Stephen Coulter MD    promethazine (Phenergan) 12.5 mg in sodium chloride 0.9% 50 mL IV, 12.5 mg, intravenous, q6h PRN, Anson Bowen MD, 12.5 mg at 04/27/25 2109    rosuvastatin (Crestor) tablet 5 mg, 5 mg, oral, Daily, Stephen Coulter MD, 5 mg at 04/27/25 2322    sodium chloride 0.9% infusion, 75 mL/hr, intravenous, Continuous, Stephen Coulter MD, Last Rate: 75 mL/hr at 04/28/25 0512, 75 mL/hr at 04/28/25 0512    sulfur hexafluoride microsphr (Lumason) injection 24.28 mg, 2 mL, intravenous, Once in imaging, Stephen Coulter MD  [2]   Past Surgical History:  Procedure Laterality Date    CARDIAC CATHETERIZATION N/A 7/15/2024    Procedure: Left Heart Cath, With LV;  Surgeon: Tressa Davison MD;  Location: [facility];  Service: Cardiovascular;  Laterality: N/A;  radial approach please    CARDIAC CATHETERIZATION N/A 7/15/2024    Procedure: PCI KHUSHI Stent- Coronary;  Surgeon: Tressa Davison MD;  Location: [facility];  Service: Cardiovascular;  Laterality: N/A;    CARDIAC CATHETERIZATION N/A 2/12/2025    Procedure: Left Heart Cath;  Surgeon: Elvin Means MD;  Location: [facility];  Service:  Cardiovascular;  Laterality: N/A;    OTHER SURGICAL HISTORY  04/30/2019    Hysterectomy    OTHER SURGICAL HISTORY  04/30/2019    Mastectomy    OTHER SURGICAL HISTORY  04/30/2019    Tooth extraction   [3]   Family History  Problem Relation Name Age of Onset    Stroke Mother      Other (cardiac disorder) Father      Breast cancer Sister      Arthritis Sister

## 2025-04-28 NOTE — ANESTHESIA PROCEDURE NOTES
Airway  Date/Time: 4/28/2025 3:13 PM  Reason: elective    Airway not difficult    Staffing  Performed: CRNA   Authorized by: Yanira Soto MD    Performed by: YECENIA Schulte-MATTHEW  Patient location during procedure: patient's room    Patient Condition  Indications for airway management: anesthesia  Patient position: sniffing  MILS maintained throughout  Sedation level: deep     Final Airway Details   Preoxygenated: yes  Final airway type: endotracheal airway  Successful airway: ETT   Successful intubation technique: video laryngoscopy  Adjuncts used in placement: intubating stylet  Blade type: Chandra.  Blade size: #3  ETT size (mm): 7.0  Cormack-Lehane Classification: grade I - full view of glottis  Placement verified by: capnometry   Measured from: lips  Number of attempts at approach: 1

## 2025-04-28 NOTE — PROGRESS NOTES
"Laurel Pugh is a 84 y.o. female on day 1 of admission presenting with Syncope, unspecified syncope type.    Subjective   Patient seen and examined.  Denying any chest pain, dizziness, shortness of breath or belly pain.  Bleeding per rectum is improving.       Objective     Physical Exam  HENT:      Head: Normocephalic.   Eyes:      Extraocular Movements: Extraocular movements intact.   Cardiovascular:      Rate and Rhythm: Normal rate and regular rhythm.   Pulmonary:      Effort: Pulmonary effort is normal. No respiratory distress.      Breath sounds: No wheezing or rales.   Abdominal:      General: There is no distension.      Palpations: Abdomen is soft.      Tenderness: There is no abdominal tenderness. There is no guarding.   Musculoskeletal:         General: No deformity or signs of injury.      Cervical back: Neck supple.   Neurological:      General: No focal deficit present.      Mental Status: She is alert.   Psychiatric:         Mood and Affect: Mood normal.   Last Recorded Vitals  Blood pressure 141/68, pulse 97, temperature 36.3 °C (97.3 °F), resp. rate 16, height 1.499 m (4' 11\"), weight 63.5 kg (140 lb), SpO2 99%.  Intake/Output last 3 Shifts:  I/O last 3 completed shifts:  In: 685 (10.8 mL/kg) [Blood:185; IV Piggyback:500]  Out: - (0 mL/kg)   Weight: 63.5 kg     Relevant Results                              Assessment & Plan  Syncope, unspecified syncope type    84-year-old female with past medical history of coronary artery disease, breast cancer, vertigo, recent RSV and UTI admitted with acute hematemesis and bleeding per rectum with hypotension and a syncopal event     Patient had multiple bloody bowel movements up until last evening this seems to be slowing down now  Her syncope was most likely secondary to orthostasis  Continue to observe On telemetry  Troponins went up slightly, cardiology consulted no acute intervention as per them for now  Aspirin and Plavix on hold  Continue Protonix " twice daily  GI following patient may need endoscopy/colonoscopy  Continue IV fluids  Did receive 1 unit of blood yesterday but during transfusion developed tachycardia and it was deemed as a transfusion reaction and transfusion was stopped  Patient does not report any symptoms, says that she did not feel itchy short of breath or feverish  Follow-up on TTE  Hold antihypertensives except for beta-blocker  DVT prophylaxis            Stephen Coulter MD

## 2025-04-28 NOTE — ANESTHESIA POSTPROCEDURE EVALUATION
Patient: Laurel Pugh    Procedure Summary       Date: 04/28/25 Room / Location: [facility]    Anesthesia Start: 1500 Anesthesia Stop:     Procedures:       EGD      COLONOSCOPY Diagnosis:       Melena      Coffee ground emesis      Anemia, unspecified type    Scheduled Providers: Donnie Nunez DO; Yanira Soto MD; Hussain Lovett RN; Naomi Marcos Responsible Provider: Yanira Soto MD    Anesthesia Type: general ASA Status: 3 - Emergent            Anesthesia Type: general    Vitals Value Taken Time   /80 04/28/25 16:09   Temp 36 04/28/25 16:09   Pulse 80 04/28/25 16:09   Resp 12 04/28/25 16:09   SpO2 100 04/28/25 16:09       Anesthesia Post Evaluation    Patient location during evaluation: PACU  Patient participation: complete - patient cannot participate  Level of consciousness: awake and alert  Pain score: 0  Pain management: adequate  Airway patency: patent  Cardiovascular status: acceptable  Respiratory status: acceptable  Hydration status: acceptable  Postoperative Nausea and Vomiting: none        No notable events documented.

## 2025-04-28 NOTE — ANESTHESIA PREPROCEDURE EVALUATION
Patient: Laurel Pugh    Procedure Information       Date/Time: 04/28/25 1500    Scheduled providers: Donnie Nunez DO; Yanira Soto MD; Hussain Lovett RN; Naomi Marcos    Procedure: EGD    Location: [facility]            Relevant Problems   Anesthesia (within normal limits)      Cardiac  Echo: 1. Entire apex is abnormal.   2. Left ventricular ejection fraction is moderately decreased, by visual estimate at 40%.   3. Abnormal wall motion.   4. There is normal right ventricular global systolic function.   5. Normal sized right ventricle.   6. The pulmonary artery is not well visualized.     (+) Atherosclerosis of native coronary artery of native heart without angina pectoris   (+) Decreased cardiac ejection fraction   (+) Essential hypertension   (+) Left bundle branch block   (+) Stented coronary artery      Pulmonary (within normal limits)      GI   (+) GERD (gastroesophageal reflux disease)      Hematology   (+) Anemia      Musculoskeletal   (+) Arthritis of foot, left      Circulatory   (+) Heart failure with mildly reduced ejection fraction (HFmrEF)   (+) Hx of percutaneous transluminal coronary angioplasty      Symptoms and Signs   (+) Syncope, unspecified syncope type      Hematology and Neoplasia   (+) History of breast cancer       Clinical information reviewed:                   NPO Detail:  No data recorded     Physical Exam    Airway  Mallampati: III  TM distance: >3 FB  Neck ROM: full  Mouth opening: 3 or more finger widths     Cardiovascular - normal exam  Rhythm: regular     Dental    Pulmonary - normal exam   Abdominal - normal exam           Anesthesia Plan    History of general anesthesia?: yes  History of complications of general anesthesia?: no    ASA 3 - emergent     general     The patient is not a current smoker.  Education provided regarding risk of obstructive sleep apnea.  intravenous induction   Anesthetic plan and risks discussed with  patient.  Use of blood products discussed with patient who consented to blood products.    Plan discussed with CRNA.

## 2025-04-28 NOTE — CARE PLAN
Problem: Pain - Adult  Goal: Verbalizes/displays adequate comfort level or baseline comfort level  Outcome: Progressing  Flowsheets (Taken 4/28/2025 1634)  Verbalizes/displays adequate comfort level or baseline comfort level:   Encourage patient to monitor pain and request assistance   Assess pain using appropriate pain scale   Administer analgesics based on type and severity of pain and evaluate response     Problem: Safety - Adult  Goal: Free from fall injury  Outcome: Progressing  Flowsheets (Taken 4/28/2025 1634)  Free from fall injury:   Instruct family/caregiver on patient safety   Based on caregiver fall risk screen, instruct family/caregiver to ask for assistance with transferring infant if caregiver noted to have fall risk factors     Problem: Discharge Planning  Goal: Discharge to home or other facility with appropriate resources  Outcome: Progressing  Flowsheets (Taken 4/28/2025 1634)  Discharge to home or other facility with appropriate resources:   Identify barriers to discharge with patient and caregiver   Identify discharge learning needs (meds, wound care, etc)   Refer to discharge planning if patient needs post-hospital services based on physician order or complex needs related to functional status, cognitive ability or social support system     Problem: Chronic Conditions and Co-morbidities  Goal: Patient's chronic conditions and co-morbidity symptoms are monitored and maintained or improved  Outcome: Progressing  Flowsheets (Taken 4/28/2025 1634)  Care Plan - Patient's Chronic Conditions and Co-Morbidity Symptoms are Monitored and Maintained or Improved:   Monitor and assess patient's chronic conditions and comorbid symptoms for stability, deterioration, or improvement   Collaborate with multidisciplinary team to address chronic and comorbid conditions and prevent exacerbation or deterioration   Update acute care plan with appropriate goals if chronic or comorbid symptoms are exacerbated and  prevent overall improvement and discharge     Problem: Nutrition  Goal: Nutrient intake appropriate for maintaining nutritional needs  Outcome: Progressing     Problem: Skin  Goal: Decreased wound size/increased tissue granulation at next dressing change  Outcome: Progressing  Flowsheets (Taken 4/28/2025 1634)  Decreased wound size/increased tissue granulation at next dressing change:   Promote sleep for wound healing   Protective dressings over bony prominences  Goal: Participates in plan/prevention/treatment measures  Outcome: Progressing  Flowsheets (Taken 4/28/2025 1634)  Participates in plan/prevention/treatment measures:   Discuss with provider PT/OT consult   Elevate heels  Goal: Prevent/manage excess moisture  Outcome: Progressing  Flowsheets (Taken 4/28/2025 1634)  Prevent/manage excess moisture:   Cleanse incontinence/protect with barrier cream   Monitor for/manage infection if present  Goal: Prevent/minimize sheer/friction injuries  Outcome: Progressing  Flowsheets (Taken 4/28/2025 1634)  Prevent/minimize sheer/friction injuries:   Increase activity/out of bed for meals   HOB 30 degrees or less  Goal: Promote/optimize nutrition  Outcome: Progressing  Flowsheets (Taken 4/28/2025 1634)  Promote/optimize nutrition: Discuss with provider if NPO > 2 days  Goal: Promote skin healing  Outcome: Progressing  Flowsheets (Taken 4/28/2025 1634)  Promote skin healing: Turn/reposition every 2 hours/use positioning/transfer devices     Problem: Pain  Goal: Takes deep breaths with improved pain control throughout the shift  Outcome: Progressing  Goal: Turns in bed with improved pain control throughout the shift  Outcome: Progressing  Goal: Walks with improved pain control throughout the shift  Outcome: Progressing  Goal: Performs ADL's with improved pain control throughout shift  Outcome: Progressing  Goal: Participates in PT with improved pain control throughout the shift  Outcome: Progressing  Goal: Free from opioid  side effects throughout the shift  Outcome: Progressing  Goal: Free from acute confusion related to pain meds throughout the shift  Outcome: Progressing    The clinical goals for the shift include patient will be hemodyamically stable for shift

## 2025-04-28 NOTE — NURSING NOTE
@ [id number] nurse was called into room by patient care technicians while getting patient back into bed after bedside commode. upon entering, patient was kneeling on floor unresponsive, eyes opened, noting chest rising and falling, and pulse palpable. Technicians reported patient was brought down to the floor slowly after syncopal event. Patient reported to be feeling dizzy before episode. Called more help into room and got patient back into bed. Vitals signs post incident: 127/58, HR 94, O2 98, RR 20. Patient alert and responsive. No abrasions noted, patient reported to have no pain. Dr. Coulter at bedside to assess patient. No new orders. Dr. Nunez then at bedside to assess patient post fall. Patient was then emergently brought down for EGD for upper GI scope.

## 2025-04-29 ENCOUNTER — APPOINTMENT (OUTPATIENT)
Dept: CARDIOLOGY | Facility: HOSPITAL | Age: 85
End: 2025-04-29
Payer: MEDICARE

## 2025-04-29 LAB
ANION GAP SERPL CALC-SCNC: 12 MMOL/L (ref 10–20)
BACTERIA UR CULT: NORMAL
BASOPHILS # BLD AUTO: 0.01 X10*3/UL (ref 0–0.1)
BASOPHILS NFR BLD AUTO: 0.1 %
BUN SERPL-MCNC: 49 MG/DL (ref 6–23)
CALCIUM SERPL-MCNC: 8.1 MG/DL (ref 8.6–10.3)
CHLORIDE SERPL-SCNC: 112 MMOL/L (ref 98–107)
CO2 SERPL-SCNC: 19 MMOL/L (ref 21–32)
CREAT SERPL-MCNC: 1.11 MG/DL (ref 0.5–1.05)
DIAGNOSIS-BB-PR33: NORMAL
EGFRCR SERPLBLD CKD-EPI 2021: 49 ML/MIN/1.73M*2
EOSINOPHIL # BLD AUTO: 0 X10*3/UL (ref 0–0.4)
EOSINOPHIL NFR BLD AUTO: 0 %
ERYTHROCYTE [DISTWIDTH] IN BLOOD BY AUTOMATED COUNT: 15.4 % (ref 11.5–14.5)
GLUCOSE SERPL-MCNC: 135 MG/DL (ref 74–99)
HCT VFR BLD AUTO: 22.3 % (ref 36–46)
HGB BLD-MCNC: 7.2 G/DL (ref 12–16)
HOLD SPECIMEN: NORMAL
IMM GRANULOCYTES # BLD AUTO: 0.05 X10*3/UL (ref 0–0.5)
IMM GRANULOCYTES NFR BLD AUTO: 0.5 % (ref 0–0.9)
LYMPHOCYTES # BLD AUTO: 0.45 X10*3/UL (ref 0.8–3)
LYMPHOCYTES NFR BLD AUTO: 4.4 %
MCH RBC QN AUTO: 27.7 PG (ref 26–34)
MCHC RBC AUTO-ENTMCNC: 32.3 G/DL (ref 32–36)
MCV RBC AUTO: 86 FL (ref 80–100)
MONOCYTES # BLD AUTO: 0.44 X10*3/UL (ref 0.05–0.8)
MONOCYTES NFR BLD AUTO: 4.3 %
NEUTROPHILS # BLD AUTO: 9.31 X10*3/UL (ref 1.6–5.5)
NEUTROPHILS NFR BLD AUTO: 90.7 %
NRBC BLD-RTO: 0 /100 WBCS (ref 0–0)
PATH REVIEW-TRANSFUSION REACTION: NORMAL
PLATELET # BLD AUTO: 178 X10*3/UL (ref 150–450)
POTASSIUM SERPL-SCNC: 4.7 MMOL/L (ref 3.5–5.3)
RBC # BLD AUTO: 2.6 X10*6/UL (ref 4–5.2)
SODIUM SERPL-SCNC: 138 MMOL/L (ref 136–145)
TYPE OF REACTION: NORMAL
WBC # BLD AUTO: 10.3 X10*3/UL (ref 4.4–11.3)

## 2025-04-29 PROCEDURE — 36415 COLL VENOUS BLD VENIPUNCTURE: CPT | Performed by: STUDENT IN AN ORGANIZED HEALTH CARE EDUCATION/TRAINING PROGRAM

## 2025-04-29 PROCEDURE — 93005 ELECTROCARDIOGRAM TRACING: CPT

## 2025-04-29 PROCEDURE — 2500000001 HC RX 250 WO HCPCS SELF ADMINISTERED DRUGS (ALT 637 FOR MEDICARE OP): Performed by: PHYSICIAN ASSISTANT

## 2025-04-29 PROCEDURE — 93010 ELECTROCARDIOGRAM REPORT: CPT | Performed by: INTERNAL MEDICINE

## 2025-04-29 PROCEDURE — 2500000001 HC RX 250 WO HCPCS SELF ADMINISTERED DRUGS (ALT 637 FOR MEDICARE OP): Performed by: STUDENT IN AN ORGANIZED HEALTH CARE EDUCATION/TRAINING PROGRAM

## 2025-04-29 PROCEDURE — 2500000004 HC RX 250 GENERAL PHARMACY W/ HCPCS (ALT 636 FOR OP/ED): Mod: JZ | Performed by: STUDENT IN AN ORGANIZED HEALTH CARE EDUCATION/TRAINING PROGRAM

## 2025-04-29 PROCEDURE — 85025 COMPLETE CBC W/AUTO DIFF WBC: CPT | Performed by: STUDENT IN AN ORGANIZED HEALTH CARE EDUCATION/TRAINING PROGRAM

## 2025-04-29 PROCEDURE — 99233 SBSQ HOSP IP/OBS HIGH 50: CPT | Performed by: STUDENT IN AN ORGANIZED HEALTH CARE EDUCATION/TRAINING PROGRAM

## 2025-04-29 PROCEDURE — 2500000002 HC RX 250 W HCPCS SELF ADMINISTERED DRUGS (ALT 637 FOR MEDICARE OP, ALT 636 FOR OP/ED): Performed by: STUDENT IN AN ORGANIZED HEALTH CARE EDUCATION/TRAINING PROGRAM

## 2025-04-29 PROCEDURE — 99232 SBSQ HOSP IP/OBS MODERATE 35: CPT | Performed by: NURSE PRACTITIONER

## 2025-04-29 PROCEDURE — 1200000002 HC GENERAL ROOM WITH TELEMETRY DAILY

## 2025-04-29 PROCEDURE — 99232 SBSQ HOSP IP/OBS MODERATE 35: CPT | Performed by: PHYSICIAN ASSISTANT

## 2025-04-29 PROCEDURE — 80048 BASIC METABOLIC PNL TOTAL CA: CPT | Performed by: STUDENT IN AN ORGANIZED HEALTH CARE EDUCATION/TRAINING PROGRAM

## 2025-04-29 RX ORDER — POLYETHYLENE GLYCOL 3350, SODIUM CHLORIDE, SODIUM BICARBONATE, POTASSIUM CHLORIDE 420; 11.2; 5.72; 1.48 G/4L; G/4L; G/4L; G/4L
2000 POWDER, FOR SOLUTION ORAL ONCE
Status: COMPLETED | OUTPATIENT
Start: 2025-04-29 | End: 2025-04-29

## 2025-04-29 RX ORDER — POLYETHYLENE GLYCOL 3350 17 G/17G
238 POWDER, FOR SOLUTION ORAL ONCE
Status: DISCONTINUED | OUTPATIENT
Start: 2025-04-29 | End: 2025-04-29

## 2025-04-29 RX ORDER — PANTOPRAZOLE SODIUM 40 MG/10ML
40 INJECTION, POWDER, LYOPHILIZED, FOR SOLUTION INTRAVENOUS DAILY
Status: DISCONTINUED | OUTPATIENT
Start: 2025-04-30 | End: 2025-05-02 | Stop reason: HOSPADM

## 2025-04-29 RX ADMIN — METOPROLOL TARTRATE 12.5 MG: 25 TABLET, FILM COATED ORAL at 09:13

## 2025-04-29 RX ADMIN — PANTOPRAZOLE SODIUM 40 MG: 40 INJECTION, POWDER, FOR SOLUTION INTRAVENOUS at 09:13

## 2025-04-29 RX ADMIN — ROSUVASTATIN CALCIUM 5 MG: 10 TABLET, FILM COATED ORAL at 21:40

## 2025-04-29 RX ADMIN — ACETAMINOPHEN 650 MG: 325 TABLET ORAL at 01:43

## 2025-04-29 RX ADMIN — Medication 25 MCG: at 09:13

## 2025-04-29 RX ADMIN — ACETAMINOPHEN 650 MG: 325 TABLET ORAL at 21:40

## 2025-04-29 RX ADMIN — POLYETHYLENE GLYCOL 3350, SODIUM CHLORIDE, SODIUM BICARBONATE AND POTASSIUM CHLORIDE WITH LEMON FLAVOR 2000 ML: 420; 11.2; 5.72; 1.48 POWDER, FOR SOLUTION ORAL at 15:57

## 2025-04-29 RX ADMIN — METOPROLOL TARTRATE 12.5 MG: 25 TABLET, FILM COATED ORAL at 21:40

## 2025-04-29 SDOH — SOCIAL STABILITY: SOCIAL INSECURITY: WITHIN THE LAST YEAR, HAVE YOU BEEN HUMILIATED OR EMOTIONALLY ABUSED IN OTHER WAYS BY YOUR PARTNER OR EX-PARTNER?: NO

## 2025-04-29 SDOH — ECONOMIC STABILITY: HOUSING INSECURITY: IN THE LAST 12 MONTHS, WAS THERE A TIME WHEN YOU WERE NOT ABLE TO PAY THE MORTGAGE OR RENT ON TIME?: NO

## 2025-04-29 SDOH — SOCIAL STABILITY: SOCIAL INSECURITY: WITHIN THE LAST YEAR, HAVE YOU BEEN AFRAID OF YOUR PARTNER OR EX-PARTNER?: NO

## 2025-04-29 SDOH — ECONOMIC STABILITY: HOUSING INSECURITY: AT ANY TIME IN THE PAST 12 MONTHS, WERE YOU HOMELESS OR LIVING IN A SHELTER (INCLUDING NOW)?: NO

## 2025-04-29 SDOH — ECONOMIC STABILITY: TRANSPORTATION INSECURITY: IN THE PAST 12 MONTHS, HAS LACK OF TRANSPORTATION KEPT YOU FROM MEDICAL APPOINTMENTS OR FROM GETTING MEDICATIONS?: NO

## 2025-04-29 SDOH — ECONOMIC STABILITY: FOOD INSECURITY: WITHIN THE PAST 12 MONTHS, YOU WORRIED THAT YOUR FOOD WOULD RUN OUT BEFORE YOU GOT THE MONEY TO BUY MORE.: NEVER TRUE

## 2025-04-29 SDOH — ECONOMIC STABILITY: HOUSING INSECURITY: IN THE PAST 12 MONTHS, HOW MANY TIMES HAVE YOU MOVED WHERE YOU WERE LIVING?: 0

## 2025-04-29 SDOH — ECONOMIC STABILITY: INCOME INSECURITY: IN THE PAST 12 MONTHS HAS THE ELECTRIC, GAS, OIL, OR WATER COMPANY THREATENED TO SHUT OFF SERVICES IN YOUR HOME?: NO

## 2025-04-29 SDOH — ECONOMIC STABILITY: FOOD INSECURITY: WITHIN THE PAST 12 MONTHS, THE FOOD YOU BOUGHT JUST DIDN'T LAST AND YOU DIDN'T HAVE MONEY TO GET MORE.: NEVER TRUE

## 2025-04-29 SDOH — ECONOMIC STABILITY: FOOD INSECURITY: HOW HARD IS IT FOR YOU TO PAY FOR THE VERY BASICS LIKE FOOD, HOUSING, MEDICAL CARE, AND HEATING?: NOT HARD AT ALL

## 2025-04-29 ASSESSMENT — COGNITIVE AND FUNCTIONAL STATUS - GENERAL
STANDING UP FROM CHAIR USING ARMS: A LITTLE
HELP NEEDED FOR BATHING: A LITTLE
DAILY ACTIVITIY SCORE: 20
CLIMB 3 TO 5 STEPS WITH RAILING: TOTAL
TOILETING: A LOT
MOVING TO AND FROM BED TO CHAIR: A LITTLE
MOBILITY SCORE: 17
WALKING IN HOSPITAL ROOM: A LOT
PERSONAL GROOMING: A LITTLE

## 2025-04-29 ASSESSMENT — PAIN SCALES - GENERAL
PAINLEVEL_OUTOF10: 0 - NO PAIN
PAINLEVEL_OUTOF10: 0 - NO PAIN
PAINLEVEL_OUTOF10: 3
PAINLEVEL_OUTOF10: 0 - NO PAIN
PAINLEVEL_OUTOF10: 3

## 2025-04-29 ASSESSMENT — ACTIVITIES OF DAILY LIVING (ADL)
LACK_OF_TRANSPORTATION: NO
LACK_OF_TRANSPORTATION: NO

## 2025-04-29 ASSESSMENT — PAIN DESCRIPTION - LOCATION: LOCATION: BACK

## 2025-04-29 ASSESSMENT — PAIN - FUNCTIONAL ASSESSMENT
PAIN_FUNCTIONAL_ASSESSMENT: 0-10

## 2025-04-29 NOTE — PROGRESS NOTES
"Laurel Pugh is a 84 y.o. female on day 2 of admission presenting with Syncope, unspecified syncope type.    Subjective   Patient accompanied by her daughter at bedside at time of evaluation.  Continues to endorse dark stools.  Denied any chest pain or difficulty breathing.  Further ROS was unremarkable.    Objective     Physical Exam  HENT:      Head: Normocephalic.   Eyes:      Extraocular Movements: Extraocular movements intact.   Cardiovascular:      Rate and Rhythm: Normal rate and regular rhythm.   Pulmonary:      Effort: Pulmonary effort is normal. No respiratory distress.      Breath sounds: No wheezing or rales.   Abdominal:      General: There is no distension.      Palpations: Abdomen is soft.      Tenderness: There is no abdominal tenderness. There is no guarding.   Musculoskeletal:         General: No deformity or signs of injury.      Cervical back: Neck supple.   Neurological:      General: No focal deficit present.      Mental Status: She is alert.   Psychiatric:         Mood and Affect: Mood normal.   Last Recorded Vitals  Blood pressure 131/61, pulse 74, temperature 36.9 °C (98.4 °F), temperature source Temporal, resp. rate 22, height 1.499 m (4' 11\"), weight 63.5 kg (140 lb), SpO2 99%.  Intake/Output last 3 Shifts:  I/O last 3 completed shifts:  In: 3073.8 (48.4 mL/kg) [I.V.:2888.8 (45.5 mL/kg); Blood:185]  Out: 800 (12.6 mL/kg) [Urine:800 (0.3 mL/kg/hr)]  Weight: 63.5 kg     Relevant Results                              Assessment & Plan  Syncope, unspecified syncope type    84-year-old female with past medical history of coronary artery disease, breast cancer, vertigo, recent RSV and UTI admitted with acute hematemesis and bleeding per rectum with hypotension and a syncopal event     -Patient had multiple bloody bowel movements up until last evening this seems to be slowing down now  -Syncope suspected to be secondary to orthostasis and blood loss  -Patient is continued on telemetry " monitoring  -Cardiology was consulted due to concerns of chest discomfort and troponin elevations noted during blood transfusion.  Type II MI suspected in the context of hypotension, GI bleeding, and volume depletion.   - Continue to hold aspirin and Plavix.  Aspirin can likely be discontinued on discharge.  Cards with continuation of Plavix advised once feasible  - TTE was ordered however patient deferred  - GI following.  Status post EGD on 4/28 with no source of bleed identified.  Colonoscopy advised.  GI prep initiated.  - Hemoglobin continues to downtrend 9.0 -> 8.6 -> 7.2.  Showed Hb continue to downtrend and blood not be able to be transfused, may not be stable for endoscopy per discussions with GI, Viry CONWAY.  - Discussed case with pathology, Dr. Ba, transfusion reaction workup unable to be completed given documentation inaccuracies surrounding the transfusion in question  -Nephrology consulted for consideration of Epo administration   -IV fluids on hold to prevent hemodillution. Patient appears hemodynamically stable at this time.   - Hold antihypertensives except for beta-blocker    DVT prophylaxis Pharmacologic on hold due to GIB    55 minutes spent on patient encounter. Time calculated includes chart review, bedside evaluation, counselling, and care coordination.         Nicholas Haines MD

## 2025-04-29 NOTE — CARE PLAN
The clinical goals for the shift include Patient will continue to remain hemodynamically stable

## 2025-04-29 NOTE — PROGRESS NOTES
[facility] Heart Progress Note           Rounding TARYN/Cardiologist:  Jack Manuel, APRN-CNP,   Primary Cardiologist: Dr. Adrian Echavarria    Date:  4/29/2025  Patient:  Laurel Pugh  YOB: 1940  MRN:  [id number]   Admit Date:  4/27/2025      SUBJECTIVE:    4/29/25  Patient is awake alert and oriented x 3.  She is resting quietly I spoke to her at length she states that she went down she decided to refuse the echo.  She states that she just had this done back in February her ejection fraction was 40% she states that she decided she does not want to have an echo at this time  EKG is normal sinus rhythm left bundle branch block    2/12/25  CONCLUSIONS:   1. Entire apex is abnormal.   2. Left ventricular ejection fraction is moderately decreased, by visual estimate at 40%.   3. Abnormal wall motion.   4. There is normal right ventricular global systolic function.   5. Normal sized right ventricle.   6. The pulmonary artery is not well visualized    2/12/25  ONCLUSIONS:   1. Right coronary artery system dominance.   2. Single vessel coronary artery disease.   3. Patent stent to mid LAD.   4. Ostial 1st Dg 50% lesion (residual lesion from PCI in bifurcation lesion).   5. Moderately reduced LV systolic function ~40% due to apical akinesia.   6. Compared to the previous left heart catheterization (07/2024), the findings are similar, with patent stent to LAD and similar post-bifurcation PCI lesion to ostial 1st Dg.       VITALS:     Vitals:    04/28/25 1909 04/28/25 2141 04/29/25 0410 04/29/25 0803   BP:   108/52 138/60   BP Location:   Left arm Right arm   Patient Position:   Lying Lying   Pulse: 71 87 83 71   Resp:   16 22   Temp:   35.8 °C (96.4 °F) 35.8 °C (96.4 °F)   TempSrc:   Temporal Temporal   SpO2:   100% 98%   Weight:       Height:           Intake/Output Summary (Last 24 hours) at 4/29/2025 1114  Last data filed at 4/29/2025 0545  Gross per 24 hour   Intake 2888.75 ml   Output 800 ml    Net 2088.75 ml       Wt Readings from Last 4 Encounters:   04/27/25 63.5 kg (140 lb)   04/20/25 64.9 kg (143 lb)   03/10/25 64.9 kg (143 lb)   02/26/25 64.3 kg (141 lb 12.8 oz)       CURRENT HOSPITAL MEDICATIONS:   Scheduled Medications[1]  Continuous Medications[2]  Current Outpatient Medications   Medication Instructions    aspirin 81 mg EC tablet 1 tablet, oral, Daily    blood pressure test kit-large kit Automatic type blood pressure monitor.    Check blood pressure daily and maintain log    cholecalciferol (VITAMIN D-3) 1,000 Units, oral, Daily    clopidogrel (Plavix) 75 mg tablet Take 1 tablet (75 mg) by mouth once daily.    docusate sodium (STOOL SOFTENER ORAL) oral, Daily PRN    metoprolol tartrate (LOPRESSOR) 12.5 mg, oral, 2 times daily    rosuvastatin (CRESTOR) 5 mg, oral, Daily    sacubitriL-valsartan (Entresto) 24-26 mg tablet 0.5 tablets, oral, 2 times daily        PHYSICAL EXAMINATION:   GENERAL:  Well developed, well nourished, in no acute distress.  CHEST:  Symmetric and nontender.  NEURO/PSYCH:  Alert and oriented times three with approppriate behavior and responses.  NECK:  Supple, no JVD, no bruit.  LUNGS:  Clear to auscultation bilaterally, normal respiratory effort.  HEART:  Rate and rhythm regular with no evident murmur, no gallop appreciated.        There are no rubs, clicks or heaves.  EXTREMITIES:  Warm with good color, no clubbing or cyanosis.  There is no edema noted.  PERIPHERAL VASCULAR:  Pulses present and equally palpable; 2+ throughout.      LAB DATA:     CBC:   Results from last 7 days   Lab Units 04/29/25  0600 04/28/25  1440 04/28/25  0630 04/28/25  0211   WBC AUTO x10*3/uL 10.3  --  7.4 7.4   RBC AUTO x10*6/uL 2.60*  --  3.24* 3.23*   HEMOGLOBIN g/dL 7.2* 8.6* 9.0* 8.8*   HEMATOCRIT % 22.3* 25.9* 27.6* 27.3*   MCV fL 86  --  85 85   MCH pg 27.7  --  27.8 27.2   MCHC g/dL 32.3  --  32.6 32.2   RDW % 15.4*  --  14.8* 14.4   PLATELETS AUTO x10*3/uL 178  --  225 207     CMP:     Results from last 7 days   Lab Units 04/29/25  0600 04/28/25  0218 04/27/25  1136   SODIUM mmol/L 138 138 137   POTASSIUM mmol/L 4.7 4.7 4.9   CHLORIDE mmol/L 112* 109* 105   CO2 mmol/L 19* 22 26   BUN mg/dL 49* 55* 50*   CREATININE mg/dL 1.11* 0.97 1.00   GLUCOSE mg/dL 135* 99 99   PROTEIN TOTAL g/dL  --   --  6.0*   CALCIUM mg/dL 8.1* 8.4* 8.7   BILIRUBIN TOTAL mg/dL  --  0.6 0.5   ALK PHOS U/L  --   --  37   AST U/L  --   --  12   ALT U/L  --   --  6*     BMP:    Results from last 7 days   Lab Units 04/29/25  0600 04/28/25  0218 04/27/25  1136   SODIUM mmol/L 138 138 137   POTASSIUM mmol/L 4.7 4.7 4.9   CHLORIDE mmol/L 112* 109* 105   CO2 mmol/L 19* 22 26   BUN mg/dL 49* 55* 50*   CREATININE mg/dL 1.11* 0.97 1.00   CALCIUM mg/dL 8.1* 8.4* 8.7   GLUCOSE mg/dL 135* 99 99     Magnesium:  Results from last 7 days   Lab Units 04/27/25  1136   MAGNESIUM mg/dL 2.03     Troponin:    Results from last 7 days   Lab Units 04/28/25  0636 04/28/25  0218 04/27/25  1136   TROPHS ng/L 100* 70* 9     BNP:   Results from last 7 days   Lab Units 04/27/25  1136   BNP pg/mL 201*     Lipid Panel:         DIAGNOSTIC TESTING:          CT abdomen pelvis wo IV contrast   Final Result      CT angio chest for pulmonary embolism   Final Result   No pulmonary embolism or active disease in the chest identified on   slightly motion degraded exam.        Signed by: Maicol Mcdonald 4/27/2025 12:53 PM   Dictation workstation:   [id number]      Transthoracic Echo Complete    (Results Pending)       No echocardiogram results found for the past 14 days    RADIOLOGY:     CT abdomen pelvis wo IV contrast   Final Result      CT angio chest for pulmonary embolism   Final Result   No pulmonary embolism or active disease in the chest identified on   slightly motion degraded exam.        Signed by: Maicol Mcdonald 4/27/2025 12:53 PM   Dictation workstation:   [id number]      Transthoracic Echo Complete    (Results Pending)       PROBLEM LIST   Problem  List[3]    ASSESSMENT:   Syncope  Anemia  History of CAD  Chronic systolic heart failure  UTI  Left bundle branch block  EF 40%    PLAN:     She was initially seen in consultation by Dr. Rosa at [facility] on July 14, 2024. She presented with lightheadedness, nausea, and confusion.  CT scan of the chest was negative for pulmonary embolus.  Chest x-ray did not show any active disease.  CBC and CMP were normal.  BNP was 373.  High-sensitivity troponin levels 427, 614, 1196, and 519.  EKG showed normal sinus rhythm with complete left bundle branch block.  No change from previous remote EKG.  CT scan of the brain was negative.  She was diagnosed with an acute urinary tract infection.  No reports of chest pain or shortness of breath.  No history of hypertension or diabetes mellitus.  She is a non-smoker.  She has a history of hyperlipidemia.     An echocardiogram July 15, 2024 showed an estimated LV ejection fraction of 40% with apical hypokinesis.  There was mild asymmetric septal hypertrophy.  Mild mitral and tricuspid regurgitation.  Cardiac catheterization done the same day showed 80% stenosis of the LAD.  There were mild irregularities of the circumflex and right coronary artery.  She underwent angioplasty and stenting of the LAD.  Estimated LV ejection fraction was 45%.  She was started on GDMT and continued on DAPT.     She was brought to emergency department by EMS February 11, 2025 after being found outside on the ground by some neighbors. It was extremely cold.  The patient recalls that she had slipped on some icy steps and fell backwards.  She was had been lying outside for over 5 hours when her son found her.  She was noted to be hypothermic.  Initial temperature in the ED was 30.4 (86.7).  Vital signs were otherwise stable.  She developed some mild hypotension with systolic blood pressures in the 80s to low 100s.  CBC was normal with exception of WBC 12.1.  Basic metabolic profile was normal with exception of  BUN 45 and glucose 166.  CPK was 391.  BNP level 379.  Initial high-sensitivity troponin level was 3,071 with subsequent levels of 4,468, 7,405, and 7,123.  Chest x-ray did not show any acute disease.  CT scan of the brain was negative for any acute findings.  No fractures identified.  Initial EKG showed significant baseline artifact with probable atrial fibrillation.  Repeat EKG showed normal sinus rhythm with occasional PVCs.  Known complete left bundle much block. She was treated with warm IV fluids and a Fariha hugger.  She had slow improvement of her temperature as well as her mental status.  She denied any chest pain or shortness of breath.  Repeat echocardiogram showed estimated LV ejection fraction of 40% with apical hypokinesis.     Cardiac catheterization on February 12, 2025 showed a patent stent in the mid LAD, 50% stenosis of the ostial first diagonal branch, and otherwise minimal disease.  There was apical akinesis with estimated LV ejection fraction 40%.  The diagonal stenosis was similar to a post bifurcation PCI lesion on a previous study.  Medical therapy is recommended.  Echocardiogram February 12, 2025 also showed apical akinesis and estimated LV ejection fraction 40%.  Normal right ventricular chamber size and function.  Valvular structure and function were not assessed on that study.  It was felt her elevated cardiac enzymes for secondary to a type II myocardial infarction secondary to possible cardiac contusion as well as myocardial stress in the setting of hypothermia.  Prior to her discharge she was experiencing some hypotension and she was changed from Toprol-XL to metoprolol tartrate 12.5 mg twice daily.  Entresto was held.  We held off on starting her on an SGLT2 inhibitor secondary to recurrent urinary tract infections.  She was seen in the office in fibber 2025 and was doing well.  She was continued on aspirin, Plavix, metoprolol, and atorvastatin.  She was restarted on low-dose  Entresto.     She presented to the emergency department yesterday afternoon after she experienced a brief episode of syncope.  She recently treated for a urinary tract infection and also was diagnosed with RSV on Easter Sunday.  Patient reports that she had stood up from the couch and started walking when she became very lightheaded.  She states she felt the need to sit back down and then briefly passed out.  She was lowered to the ground by a family member.  She remained consciousness very quickly.  No incontinence.  No any seizure activity.  No chest pain or shortness of breath.  She generally has not been eating or drinking very much.  Upon arrival to the emergency department she was noted to have stable vital signs.  Oxygen saturation 100%.  Hemoglobin was 9.1 hematocrit 30.6.  WC 5.4.  Comprehensive metabolic profile was normal with exception of BUN 50.  Creatinine was 1.0.  High-sensitivity potable 9.  BNP level 201.  EKG shows normal sinus rhythm with complete left bundle block.  Patient reported that she recent bowel movement that was darkish red appearing.  She was admitted to telemetry.  She developed an episode of transient hypotension with systolic blood pressure of 87 mmHg.  She also developed some sinus tachycardia in the 120s.  She was transfused with 1 unit packed RBCs.  The patient did report some mild pressure in her chest radiating toward the right side.  Total duration was approximately 15 minutes.  Discomfort resolved spontaneously.  Repeat troponin level 70 and 100.     She currently is resting comfortably without any specific plaints.  Aspirin and Plavix have been placed on hold.  GI evaluation is ongoing.  She scheduled undergo upper endoscopy today.  Suspect probable small type II myocardial infarction in the setting of hypotension, GI bleeding, involve depletion.  Overall I believe she is reasonably low risk to proceed with much-needed upper endoscopy and possible  colonoscopy.    4/29/25  Tele monitoring  Refused echo  GI input thank  you  Lopressor 12.5 mg p.o. twice daily  Crestor 5 mg daily  Orthostatic vital signs  Hemoglobin 7.2 continue to evaluate  Recommend prior to discharge please resume the Plavix once feasible okay to stay off aspirin    Ritesh Manuel CNP  [facility]      Of note, this documentation is completed using the Dragon Dictation system (voice recognition software). There may be spelling and/or grammatical errors that were not corrected prior to final submission.    Please do not hesitate to call with questions.  Electronically signed by Jack Manuel, YECENIA-CNP, on 4/29/2025 at 11:14 AM         [1] [Held by provider] aspirin, 81 mg, oral, Daily  cholecalciferol, 25 mcg, oral, Daily  metoprolol tartrate, 12.5 mg, oral, BID  [START ON 4/30/2025] pantoprazole, 40 mg, intravenous, Daily  perflutren lipid microspheres, 0.5-10 mL of dilution, intravenous, Once in imaging  perflutren protein A microsphere, 0.5 mL, intravenous, Once in imaging  polyethylene glycol, 17 g, oral, Daily  rosuvastatin, 5 mg, oral, Daily  sulfur hexafluoride microsphr, 2 mL, intravenous, Once in imaging    [2] sodium chloride 0.9%, 75 mL/hr, Last Rate: 75 mL/hr (04/29/25 0545)    [3]   Patient Active Problem List  Diagnosis    Anemia    Arthritis of foot, left    History of breast cancer    Constipation    GERD (gastroesophageal reflux disease)    Left bundle branch block    Mild vitamin D deficiency    Actinic keratosis    Seborrheic keratosis    Varicose veins of both lower extremities with inflammation    Ocular migraine    Atherosclerosis of native coronary artery of native heart without angina pectoris    Essential hypertension    Hx of percutaneous transluminal coronary angioplasty    Heart failure with mildly reduced ejection fraction (HFmrEF)    Syncope, unspecified syncope type    Stented coronary artery    Decreased  cardiac ejection fraction

## 2025-04-29 NOTE — CARE PLAN
Problem: Pain - Adult  Goal: Verbalizes/displays adequate comfort level or baseline comfort level  Outcome: Progressing  Flowsheets (Taken 4/28/2025 1634)  Verbalizes/displays adequate comfort level or baseline comfort level:   Encourage patient to monitor pain and request assistance   Assess pain using appropriate pain scale   Administer analgesics based on type and severity of pain and evaluate response     Problem: Safety - Adult  Goal: Free from fall injury  Outcome: Progressing  Flowsheets (Taken 4/28/2025 1634)  Free from fall injury:   Instruct family/caregiver on patient safety   Based on caregiver fall risk screen, instruct family/caregiver to ask for assistance with transferring infant if caregiver noted to have fall risk factors     Problem: Discharge Planning  Goal: Discharge to home or other facility with appropriate resources  Outcome: Progressing  Flowsheets (Taken 4/28/2025 1634)  Discharge to home or other facility with appropriate resources:   Identify barriers to discharge with patient and caregiver   Identify discharge learning needs (meds, wound care, etc)   Refer to discharge planning if patient needs post-hospital services based on physician order or complex needs related to functional status, cognitive ability or social support system     Problem: Chronic Conditions and Co-morbidities  Goal: Patient's chronic conditions and co-morbidity symptoms are monitored and maintained or improved  Outcome: Progressing  Flowsheets (Taken 4/28/2025 1634)  Care Plan - Patient's Chronic Conditions and Co-Morbidity Symptoms are Monitored and Maintained or Improved:   Monitor and assess patient's chronic conditions and comorbid symptoms for stability, deterioration, or improvement   Collaborate with multidisciplinary team to address chronic and comorbid conditions and prevent exacerbation or deterioration   Update acute care plan with appropriate goals if chronic or comorbid symptoms are exacerbated and  prevent overall improvement and discharge     Problem: Nutrition  Goal: Nutrient intake appropriate for maintaining nutritional needs  Outcome: Progressing     Problem: Skin  Goal: Decreased wound size/increased tissue granulation at next dressing change  Outcome: Progressing  Flowsheets (Taken 4/28/2025 1634)  Decreased wound size/increased tissue granulation at next dressing change:   Promote sleep for wound healing   Protective dressings over bony prominences  Goal: Participates in plan/prevention/treatment measures  Outcome: Progressing  Flowsheets (Taken 4/28/2025 1634)  Participates in plan/prevention/treatment measures:   Discuss with provider PT/OT consult   Elevate heels  Goal: Prevent/manage excess moisture  Outcome: Progressing  Flowsheets (Taken 4/28/2025 1634)  Prevent/manage excess moisture:   Cleanse incontinence/protect with barrier cream   Monitor for/manage infection if present  Goal: Prevent/minimize sheer/friction injuries  Outcome: Progressing  Flowsheets (Taken 4/28/2025 1634)  Prevent/minimize sheer/friction injuries:   Increase activity/out of bed for meals   HOB 30 degrees or less  Goal: Promote/optimize nutrition  Outcome: Progressing  Flowsheets (Taken 4/29/2025 1339)  Promote/optimize nutrition: Consume > 50% meals/supplements  Goal: Promote skin healing  Outcome: Progressing  Flowsheets (Taken 4/28/2025 1634)  Promote skin healing: Turn/reposition every 2 hours/use positioning/transfer devices     Problem: Pain  Goal: Takes deep breaths with improved pain control throughout the shift  Outcome: Progressing  Goal: Turns in bed with improved pain control throughout the shift  Outcome: Progressing  Goal: Walks with improved pain control throughout the shift  Outcome: Progressing  Goal: Performs ADL's with improved pain control throughout shift  Outcome: Progressing  Goal: Participates in PT with improved pain control throughout the shift  Outcome: Progressing  Goal: Free from opioid side  effects throughout the shift  Outcome: Progressing  Goal: Free from acute confusion related to pain meds throughout the shift  Outcome: Progressing   The patient's goals for the shift include      The clinical goals for the shift include patient will be safe and free from falls for shift

## 2025-04-29 NOTE — PROGRESS NOTES
Department of Internal Medicine  Gastroenterology  Progress note      Subjective  GI is following for coffee ground emesis and hematochezia    Today, she denies abdominal pain, nausea, or vomiting. Her last bowel movement was yesterday and was black and bloody looking in color.     Daughter at bedside    Discussed with daughter and nursing. Last night they did not think was prep was ordered and since it wasn't ordered family didn't push for the order so [place] could get a good night of sleep. Upon further investigation, prep was ordered PRN and not given.     Current Medication  Current Medications[1]    Past Medical History  Active Ambulatory Problems     Diagnosis Date Noted    Anemia 03/01/2023    Arthritis of foot, left 03/01/2023    History of breast cancer 03/01/2023    Constipation 03/01/2023    GERD (gastroesophageal reflux disease) 03/01/2023    Left bundle branch block 03/01/2023    Mild vitamin D deficiency 03/01/2023    Actinic keratosis 03/01/2023    Seborrheic keratosis 03/01/2023    Varicose veins of both lower extremities with inflammation 03/01/2023    Ocular migraine 12/08/2023    Atherosclerosis of native coronary artery of native heart without angina pectoris 07/13/2024    Essential hypertension 07/25/2024    Hx of percutaneous transluminal coronary angioplasty 10/24/2024    Heart failure with mildly reduced ejection fraction (HFmrEF) 10/24/2024    Stented coronary artery 04/28/2025    Decreased cardiac ejection fraction 04/28/2025     Resolved Ambulatory Problems     Diagnosis Date Noted    Benign mole 03/01/2023    Bronchitis 03/01/2023    Cataract 03/01/2023    Dizziness 03/01/2023    Vertigo 03/01/2023    Blood cholesterol increased compared with prior measurement 03/01/2023    Headache, occipital 03/01/2023    Itching 03/01/2023    Juvenile cortical cataract of right eye 03/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    Left ankle swelling 03/01/2023    Lesion of face 03/01/2023    Nausea with vomiting 03/01/2023     "Posterior subcapsular polar age-related cataract 03/01/2023    Shortness of breath 03/01/2023    Tightness in chest 03/01/2023    URI (upper respiratory infection) 03/01/2023    UTI symptoms 03/01/2023    Varicose veins of legs 03/01/2023    Depression, recurrent (CMS-HCC) 12/08/2023    Malignant neoplasm of breast 07/13/2012    Altered mental status, unspecified altered mental status type 07/13/2024    Recurrent UTI (urinary tract infection) 07/13/2024    Chronic diastolic heart failure 10/24/2024    Fall, initial encounter 02/12/2025    Hypothermia 02/11/2025    Elevated troponin 02/11/2025     Past Medical History:   Diagnosis Date    Arthritis     Bitten or stung by nonvenomous insect and other nonvenomous arthropods, initial encounter 05/22/2019    Encounter for general adult medical examination without abnormal findings 10/05/2022    Encounter for general adult medical examination without abnormal findings 11/02/2021    Personal history of malignant neoplasm of breast     Personal history of other diseases of urinary system 08/10/2019    Personal history of other specified conditions     Personal history of other specified conditions 08/10/2019    Personal history of urinary (tract) infections 08/10/2019    Personal history of urinary (tract) infections 09/03/2019       PHYSICAL EXAM  VS: /60 (BP Location: Right arm, Patient Position: Lying)   Pulse 71   Temp 35.8 °C (96.4 °F) (Temporal)   Resp 22   Ht 1.499 m (4' 11\")   Wt 63.5 kg (140 lb)   SpO2 98%   BMI 28.28 kg/m²  Body mass index is 28.28 kg/m².  Physical Exam  Constitutional:       General: She is not in acute distress.     Appearance: Normal appearance.   HENT:      Head: Normocephalic and atraumatic.      Mouth/Throat:      Mouth: Mucous membranes are moist.      Pharynx: Oropharynx is clear.   Eyes:      General: No scleral icterus.     Extraocular Movements: Extraocular movements intact.      Conjunctiva/sclera: Conjunctivae normal.     "  Pupils: Pupils are equal, round, and reactive to light.   Cardiovascular:      Rate and Rhythm: Normal rate and regular rhythm.      Heart sounds: No murmur heard.  Pulmonary:      Effort: Pulmonary effort is normal.      Breath sounds: No wheezing or rhonchi.   Abdominal:      General: Bowel sounds are normal. There is no distension.      Palpations: Abdomen is soft. There is no mass.      Tenderness: There is no abdominal tenderness.      Hernia: No hernia is present.   Musculoskeletal:         General: No swelling or deformity.   Skin:     General: Skin is warm.      Coloration: Skin is not jaundiced.   Neurological:      General: No focal deficit present.      Mental Status: She is alert and oriented to person, place, and time.   Psychiatric:         Mood and Affect: Mood normal.          DATA  Recent blood work and endoscopic studies were reviewed and discussed with the patient   Results from last 7 days   Lab Units 04/29/25  0600   WBC AUTO x10*3/uL 10.3   RBC AUTO x10*6/uL 2.60*   HEMOGLOBIN g/dL 7.2*   HEMATOCRIT % 22.3*   MCV fL 86   MCHC g/dL 32.3   RDW % 15.4*   PLATELETS AUTO x10*3/uL 178       Results from last 72 hours   Lab Units 04/29/25  0600 04/28/25  0218 04/27/25  1136   SODIUM mmol/L 138 138 137   POTASSIUM mmol/L 4.7 4.7 4.9   CHLORIDE mmol/L 112* 109* 105   CO2 mmol/L 19* 22 26   BUN mg/dL 49* 55* 50*   CREATININE mg/dL 1.11* 0.97 1.00   CALCIUM mg/dL 8.1* 8.4* 8.7   PROTEIN TOTAL g/dL  --   --  6.0*   BILIRUBIN TOTAL mg/dL  --  0.6 0.5   ALK PHOS U/L  --   --  37   AST U/L  --   --  12   ALT U/L  --   --  6*       RADIOLOGY REVIEW  NA    ENDOSCOPIC REVIEW  EGD by Dr Nunez 4/28/25  Widely patent Schatzki's ring in the distal esophagus  Small hiatal hernia (<1 cm, Hill grade II).   Erythematous mucosa with minimal erosions in the stomach s/p biopsies.   The duodenum appeared normal.  Colonoscopy by Dr Nunez 4/28/25  Poor prep.  No active bleeding seen. Old blood throughout the TI and  colon (only able to assess the last few cms of the small bowel, so possibly could be refluxed blood (vs bleeding more proximal)).   Diverticulosis in the L colon.   IMPRESSION/RECOMMENDATIONS  Laurel Pugh is a 84 y.o. female with a PMH of CAD on DAPT, breast cancer, recent UTI, recent RSV presents to [facility] after a syncopal event.  Patient has had 1 episode of coffee-ground emesis and 2 episodes of hematochezia since admission.       Normocytic anemia likely 2/2 GIB - hgb (4/29) 7.2 baseline 12-13  Coffee-ground emesis, hematochezia  Chronic antiplatelet ASA and Plavix   Tachycardia, chest discomfort, EKG changes-manage per cardiology     PLAN  -Recommend colonsocoipy 4/30, patient and family are agreeable  -Decreased pantoprazole 40 mg BID to daily   -Currently on clear liquid diet with NPO at midnight  -Continue supportive care per primary team    Discussed with Dr Dougherty, GI to follow    (Electronically signed byViry Tillman PA-C on 4/29/2025 at 8:20 AM)         [1]   Current Facility-Administered Medications:     acetaminophen (Tylenol) tablet 650 mg, 650 mg, oral, q4h PRN, 650 mg at 04/29/25 0143 **OR** acetaminophen (Tylenol) oral liquid 650 mg, 650 mg, oral, q4h PRN **OR** acetaminophen (Tylenol) suppository 650 mg, 650 mg, rectal, q4h PRN, Stephen Coulter MD    acetaminophen (Tylenol) tablet 650 mg, 650 mg, oral, q4h PRN **OR** acetaminophen (Tylenol) oral liquid 650 mg, 650 mg, nasogastric tube, q4h PRN **OR** acetaminophen (Tylenol) suppository 650 mg, 650 mg, rectal, q4h PRN, Anson Bowen MD    [Held by provider] aspirin chewable tablet 81 mg, 81 mg, oral, Daily, Stephen Coulter MD    benzocaine-menthol (Cepastat Sore Throat) lozenge 1 lozenge, 1 lozenge, Mouth/Throat, q2h PRN, Stephen Coulter MD, 1 lozenge at 04/27/25 7442    cholecalciferol (Vitamin D-3) tablet 25 mcg, 25 mcg, oral, Daily, Stephen Coulter MD, 25 mcg at 04/28/25 0903    metoprolol tartrate (Lopressor) injection 5 mg,  5 mg, intravenous, q6h PRN, Stephen Coulter MD, 5 mg at 04/28/25 1849    metoprolol tartrate (Lopressor) tablet 12.5 mg, 12.5 mg, oral, BID, Anson Bowen MD, 12.5 mg at 04/28/25 2147    pantoprazole (Protonix) injection 40 mg, 40 mg, intravenous, BID, Anson Bowen MD, 40 mg at 04/28/25 2147    perflutren lipid microspheres (Definity) injection 0.5-10 mL of dilution, 0.5-10 mL of dilution, intravenous, Once in imaging, Stephen Coulter MD    perflutren protein A microsphere (Optison) injection 0.5 mL, 0.5 mL, intravenous, Once in imaging, Stephen Coulter MD    polyethylene glycol (Glycolax, Miralax) packet 17 g, 17 g, oral, Daily, Stephen Coulter MD    polyethylene glycol (Glycolax, Miralax) powder bulk bottle 238 g, 238 g, oral, Once PRN, Donnie Nunez,     promethazine (Phenergan) 12.5 mg in sodium chloride 0.9% 50 mL IV, 12.5 mg, intravenous, q6h PRN, Anson Bowen MD, 12.5 mg at 04/27/25 2109    rosuvastatin (Crestor) tablet 5 mg, 5 mg, oral, Daily, Stephen Coulter MD, 5 mg at 04/28/25 2147    sodium chloride 0.9% infusion, 75 mL/hr, intravenous, Continuous, Stephen Coulter MD, Last Rate: 75 mL/hr at 04/29/25 0545, 75 mL/hr at 04/29/25 0545    sulfur hexafluoride microsphr (Lumason) injection 24.28 mg, 2 mL, intravenous, Once in imaging, Stephen Coulter MD

## 2025-04-29 NOTE — PROGRESS NOTES
04/29/25 0909   Discharge Planning   Living Arrangements Children  (Lives with son who she sttes is either working or sleeoing)   Support Systems Children  (daughter Yanira good support system at bedside)   Who is requesting discharge planning? Provider   Expected Discharge Disposition Home   Does the patient need discharge transport arranged? No   Intensity of Service   Intensity of Service 0-30 min     Met with pt & her daughter Yanira at bedside. She reports she lives with her son but he is not home other than to sleep so basically lives alone. Pt is independent with her ADLl's & IADL's , drives. Uses cane , no other devices. PCP Dr Houston. GI following. Syncopal episode on 4/28. Pt preference is to dc home with support from her daughter. Care transitions team to cont to follow for  dc needs as identified.

## 2025-04-29 NOTE — CARE PLAN
Gastroenterology  Chart Update    Patient had transfusion reaction Sunday when receiving blood. Currently there are issues with the transfusion reaction report that prohibits patient from receiving blood products until details are ironed out.     Concern from nursing that should she prep and lose more blood we would not be able to transfuse or continue with the colonoscopy. Agree with concerns. Luckily patient has not had any overt signs of GIB today and bowel prep should not cause more bleeding. Did alert patient and nursing that old blood is to be expected    Will give half prep PM 4/29. If her H/H is stable in the morning will continue bowel prep with plan for colonoscopy in the afternoon.     Should she develop overt signs of GIB with hemodynamic instability will need IR eval    If her H/H has dropped more she will require blood transfusion prior to colonoscopy and colonoscopy would have to be postponed until patient is allowed to receive transfuse.     Her fluids will be stopped to limit dilution  Dr Haines to discuss with nephrology     Case was discussed with nursing, Dr Dougherty, and Dr Yancy Tillman PA-C

## 2025-04-30 ENCOUNTER — ANESTHESIA EVENT (OUTPATIENT)
Dept: GASTROENTEROLOGY | Facility: HOSPITAL | Age: 85
End: 2025-04-30
Payer: MEDICARE

## 2025-04-30 ENCOUNTER — ANESTHESIA (OUTPATIENT)
Dept: GASTROENTEROLOGY | Facility: HOSPITAL | Age: 85
End: 2025-04-30
Payer: MEDICARE

## 2025-04-30 ENCOUNTER — APPOINTMENT (OUTPATIENT)
Dept: CARDIOLOGY | Facility: HOSPITAL | Age: 85
End: 2025-04-30
Payer: MEDICARE

## 2025-04-30 LAB
ANION GAP SERPL CALC-SCNC: 8 MMOL/L (ref 10–20)
BASOPHILS # BLD AUTO: 0.03 X10*3/UL (ref 0–0.1)
BASOPHILS NFR BLD AUTO: 0.5 %
BUN SERPL-MCNC: 38 MG/DL (ref 6–23)
CALCIUM SERPL-MCNC: 8.2 MG/DL (ref 8.6–10.3)
CHLORIDE SERPL-SCNC: 114 MMOL/L (ref 98–107)
CO2 SERPL-SCNC: 23 MMOL/L (ref 21–32)
CREAT SERPL-MCNC: 1.15 MG/DL (ref 0.5–1.05)
EGFRCR SERPLBLD CKD-EPI 2021: 46 ML/MIN/1.73M*2
EOSINOPHIL # BLD AUTO: 0.03 X10*3/UL (ref 0–0.4)
EOSINOPHIL NFR BLD AUTO: 0.5 %
ERYTHROCYTE [DISTWIDTH] IN BLOOD BY AUTOMATED COUNT: 15.5 % (ref 11.5–14.5)
FERRITIN SERPL-MCNC: 82 NG/ML (ref 8–150)
GLUCOSE SERPL-MCNC: 93 MG/DL (ref 74–99)
HCT VFR BLD AUTO: 19.6 % (ref 36–46)
HCT VFR BLD AUTO: 26.2 % (ref 36–46)
HGB BLD-MCNC: 6.4 G/DL (ref 12–16)
HGB BLD-MCNC: 8.3 G/DL (ref 12–16)
HGB RETIC QN: 32 PG (ref 28–38)
HYPOCHROMIA BLD QL SMEAR: NORMAL
IMM GRANULOCYTES # BLD AUTO: 0.02 X10*3/UL (ref 0–0.5)
IMM GRANULOCYTES NFR BLD AUTO: 0.3 % (ref 0–0.9)
IMMATURE RETIC FRACTION: 20.6 %
IRON SATN MFR SERPL: 5 % (ref 25–45)
IRON SERPL-MCNC: 10 UG/DL (ref 35–150)
LYMPHOCYTES # BLD AUTO: 0.9 X10*3/UL (ref 0.8–3)
LYMPHOCYTES NFR BLD AUTO: 14 %
MCH RBC QN AUTO: 27.8 PG (ref 26–34)
MCHC RBC AUTO-ENTMCNC: 32.7 G/DL (ref 32–36)
MCV RBC AUTO: 85 FL (ref 80–100)
MONOCYTES # BLD AUTO: 0.51 X10*3/UL (ref 0.05–0.8)
MONOCYTES NFR BLD AUTO: 7.9 %
NEUTROPHILS # BLD AUTO: 4.94 X10*3/UL (ref 1.6–5.5)
NEUTROPHILS NFR BLD AUTO: 76.8 %
NRBC BLD-RTO: 0 /100 WBCS (ref 0–0)
PLATELET # BLD AUTO: 184 X10*3/UL (ref 150–450)
POTASSIUM SERPL-SCNC: 4.2 MMOL/L (ref 3.5–5.3)
RBC # BLD AUTO: 2.3 X10*6/UL (ref 4–5.2)
RBC MORPH BLD: NORMAL
RETICS #: 0.04 X10*6/UL (ref 0.02–0.11)
RETICS/RBC NFR AUTO: 1.8 % (ref 0.5–2)
SCHISTOCYTES BLD QL SMEAR: NORMAL
SODIUM SERPL-SCNC: 141 MMOL/L (ref 136–145)
TIBC SERPL-MCNC: 205 UG/DL (ref 240–445)
UIBC SERPL-MCNC: 195 UG/DL (ref 110–370)
WBC # BLD AUTO: 6.4 X10*3/UL (ref 4.4–11.3)

## 2025-04-30 PROCEDURE — 2500000004 HC RX 250 GENERAL PHARMACY W/ HCPCS (ALT 636 FOR OP/ED): Mod: JZ | Performed by: PHYSICIAN ASSISTANT

## 2025-04-30 PROCEDURE — 85045 AUTOMATED RETICULOCYTE COUNT: CPT | Performed by: STUDENT IN AN ORGANIZED HEALTH CARE EDUCATION/TRAINING PROGRAM

## 2025-04-30 PROCEDURE — 99233 SBSQ HOSP IP/OBS HIGH 50: CPT | Performed by: NURSE PRACTITIONER

## 2025-04-30 PROCEDURE — 82728 ASSAY OF FERRITIN: CPT | Performed by: STUDENT IN AN ORGANIZED HEALTH CARE EDUCATION/TRAINING PROGRAM

## 2025-04-30 PROCEDURE — 2500000001 HC RX 250 WO HCPCS SELF ADMINISTERED DRUGS (ALT 637 FOR MEDICARE OP): Performed by: STUDENT IN AN ORGANIZED HEALTH CARE EDUCATION/TRAINING PROGRAM

## 2025-04-30 PROCEDURE — 99232 SBSQ HOSP IP/OBS MODERATE 35: CPT | Performed by: STUDENT IN AN ORGANIZED HEALTH CARE EDUCATION/TRAINING PROGRAM

## 2025-04-30 PROCEDURE — 93005 ELECTROCARDIOGRAM TRACING: CPT

## 2025-04-30 PROCEDURE — 36415 COLL VENOUS BLD VENIPUNCTURE: CPT | Performed by: STUDENT IN AN ORGANIZED HEALTH CARE EDUCATION/TRAINING PROGRAM

## 2025-04-30 PROCEDURE — 83540 ASSAY OF IRON: CPT | Performed by: STUDENT IN AN ORGANIZED HEALTH CARE EDUCATION/TRAINING PROGRAM

## 2025-04-30 PROCEDURE — 80051 ELECTROLYTE PANEL: CPT | Performed by: STUDENT IN AN ORGANIZED HEALTH CARE EDUCATION/TRAINING PROGRAM

## 2025-04-30 PROCEDURE — 1200000002 HC GENERAL ROOM WITH TELEMETRY DAILY

## 2025-04-30 PROCEDURE — 2500000002 HC RX 250 W HCPCS SELF ADMINISTERED DRUGS (ALT 637 FOR MEDICARE OP, ALT 636 FOR OP/ED): Performed by: STUDENT IN AN ORGANIZED HEALTH CARE EDUCATION/TRAINING PROGRAM

## 2025-04-30 PROCEDURE — 2500000001 HC RX 250 WO HCPCS SELF ADMINISTERED DRUGS (ALT 637 FOR MEDICARE OP): Performed by: NURSE PRACTITIONER

## 2025-04-30 PROCEDURE — 36430 TRANSFUSION BLD/BLD COMPNT: CPT

## 2025-04-30 PROCEDURE — 85018 HEMOGLOBIN: CPT | Performed by: STUDENT IN AN ORGANIZED HEALTH CARE EDUCATION/TRAINING PROGRAM

## 2025-04-30 PROCEDURE — 93010 ELECTROCARDIOGRAM REPORT: CPT | Performed by: INTERNAL MEDICINE

## 2025-04-30 PROCEDURE — 85025 COMPLETE CBC W/AUTO DIFF WBC: CPT | Performed by: STUDENT IN AN ORGANIZED HEALTH CARE EDUCATION/TRAINING PROGRAM

## 2025-04-30 PROCEDURE — P9016 RBC LEUKOCYTES REDUCED: HCPCS

## 2025-04-30 RX ORDER — POLYETHYLENE GLYCOL 3350, SODIUM CHLORIDE, SODIUM BICARBONATE, POTASSIUM CHLORIDE 420; 11.2; 5.72; 1.48 G/4L; G/4L; G/4L; G/4L
2000 POWDER, FOR SOLUTION ORAL ONCE AS NEEDED
Status: COMPLETED | OUTPATIENT
Start: 2025-04-30 | End: 2025-04-30

## 2025-04-30 RX ORDER — ACETAMINOPHEN 325 MG/1
975 TABLET ORAL ONCE
Status: COMPLETED | OUTPATIENT
Start: 2025-04-30 | End: 2025-04-30

## 2025-04-30 RX ORDER — DIPHENHYDRAMINE HCL 25 MG
25 TABLET ORAL ONCE
Status: COMPLETED | OUTPATIENT
Start: 2025-04-30 | End: 2025-04-30

## 2025-04-30 RX ADMIN — ROSUVASTATIN CALCIUM 5 MG: 10 TABLET, FILM COATED ORAL at 21:16

## 2025-04-30 RX ADMIN — METOPROLOL TARTRATE 12.5 MG: 25 TABLET, FILM COATED ORAL at 21:16

## 2025-04-30 RX ADMIN — ACETAMINOPHEN 975 MG: 325 TABLET ORAL at 10:56

## 2025-04-30 RX ADMIN — DIPHENHYDRAMINE HCL 25 MG: 25 TABLET, COATED ORAL at 10:56

## 2025-04-30 RX ADMIN — Medication 25 MCG: at 08:29

## 2025-04-30 RX ADMIN — PANTOPRAZOLE SODIUM 40 MG: 40 INJECTION, POWDER, FOR SOLUTION INTRAVENOUS at 05:56

## 2025-04-30 RX ADMIN — POLYETHYLENE GLYCOL 3350, SODIUM CHLORIDE, SODIUM BICARBONATE AND POTASSIUM CHLORIDE WITH LEMON FLAVOR 2000 ML: 420; 11.2; 5.72; 1.48 POWDER, FOR SOLUTION ORAL at 16:58

## 2025-04-30 RX ADMIN — METOPROLOL TARTRATE 12.5 MG: 25 TABLET, FILM COATED ORAL at 08:29

## 2025-04-30 ASSESSMENT — COGNITIVE AND FUNCTIONAL STATUS - GENERAL
WALKING IN HOSPITAL ROOM: A LITTLE
STANDING UP FROM CHAIR USING ARMS: A LITTLE
STANDING UP FROM CHAIR USING ARMS: A LITTLE
DRESSING REGULAR LOWER BODY CLOTHING: A LITTLE
DRESSING REGULAR UPPER BODY CLOTHING: A LITTLE
MOBILITY SCORE: 19
HELP NEEDED FOR BATHING: A LITTLE
PERSONAL GROOMING: A LITTLE
DAILY ACTIVITIY SCORE: 19
TOILETING: A LITTLE
MOBILITY SCORE: 19
TURNING FROM BACK TO SIDE WHILE IN FLAT BAD: A LITTLE
DRESSING REGULAR UPPER BODY CLOTHING: A LITTLE
CLIMB 3 TO 5 STEPS WITH RAILING: A LITTLE
DRESSING REGULAR LOWER BODY CLOTHING: A LITTLE
TOILETING: A LITTLE
MOVING TO AND FROM BED TO CHAIR: A LITTLE
CLIMB 3 TO 5 STEPS WITH RAILING: A LOT
MOVING TO AND FROM BED TO CHAIR: A LITTLE
HELP NEEDED FOR BATHING: A LITTLE
PERSONAL GROOMING: A LITTLE
DAILY ACTIVITIY SCORE: 19
WALKING IN HOSPITAL ROOM: A LITTLE

## 2025-04-30 ASSESSMENT — PAIN SCALES - GENERAL
PAINLEVEL_OUTOF10: 0 - NO PAIN
PAINLEVEL_OUTOF10: 0 - NO PAIN

## 2025-04-30 NOTE — CARE PLAN
The patient's goals for the shift include patient will remain comfortable and safe     The clinical goals for the shift include Patient will remain hemodynamically stable

## 2025-04-30 NOTE — PROGRESS NOTES
"Laurel Pugh is a 84 y.o. female on day 3 of admission presenting with Syncope, unspecified syncope type.    Subjective   Patient had no acute events overnight.  Denying any lightheadedness or dizziness.  Denies chest discomfort difficulty breathing.  Does not believe she had any bloody bowel movements overnight.  Further ROS was unremarkable.    Objective     Physical Exam  General: Well-developed elderly female in mild distress  HEENT: Clear sclera, EOMI, trachea midline, moist mucous membranes  Respiratory: Equal chest rise, no retractions  Abdomen: Soft, nontender, nondistended  Extremities: No cyanosis or clubbing appreciated  Neurological: Spontaneously moves all extremities, no dysarthria, cranial nerves grossly intact  Psychiatric: Appropriate mood and affect  Skin: Warm, dry      Last Recorded Vitals  Blood pressure 135/63, pulse 73, temperature 37.2 °C (99 °F), temperature source Temporal, resp. rate 18, height 1.499 m (4' 11\"), weight 63.5 kg (140 lb), SpO2 98%.  Intake/Output last 3 Shifts:  I/O last 3 completed shifts:  In: 945 (14.9 mL/kg) [I.V.:945 (14.9 mL/kg)]  Out: 800 (12.6 mL/kg) [Urine:800 (0.3 mL/kg/hr)]  Weight: 63.5 kg     Relevant Results               Assessment & Plan  Syncope, unspecified syncope type    84-year-old female with past medical history of coronary artery disease, breast cancer, vertigo, recent RSV and UTI admitted with acute hematemesis and bleeding per rectum with hypotension and a syncopal event     -Patient with multiple reported bloody bowel movements.  -Syncope suspected to be secondary to orthostasis and blood loss  -Patient is continued on telemetry monitoring  -Cardiology was consulted due to concerns of chest discomfort and troponin elevations noted during blood transfusion.  Type II MI suspected in the context of hypotension, GI bleeding, and volume depletion.   - Continue to hold aspirin and Plavix.  Aspirin can likely be discontinued on discharge.  Cards with " continuation of Plavix advised once feasible  - TTE was ordered however patient deferred  - GI following.  Status post EGD on 4/28 with no source of bleed identified.  Colonoscopy advised.  GI prep initiated on 4/29. To continue prep today on 4/30 with tentative plans for C-scope this afternoon  - Hemoglobin continues to downtrend 9.0 -> 8.6 -> 7.2 -> 6.4.   - Transfusion reaction determined to be a febrile non-hemolytic reaction. Cleared for repeat transfusion but patient requires premedication with antihistamine/antipyretics. 1 unit of PRBC ordered  - Will cancel nephrology consultation. They were consulted for consideration of Epo administration should transfusion be precluded  - Hold antihypertensives except for beta-blocker    DVT prophylaxis Pharmacologic on hold due to GIB      Nicholas Haines MD

## 2025-04-30 NOTE — CARE PLAN
The patient's goals for the shift include comfort  Problem: Pain - Adult  Goal: Verbalizes/displays adequate comfort level or baseline comfort level  Outcome: Progressing     Problem: Safety - Adult  Goal: Free from fall injury  Outcome: Progressing     Problem: Discharge Planning  Goal: Discharge to home or other facility with appropriate resources  Outcome: Progressing     Problem: Chronic Conditions and Co-morbidities  Goal: Patient's chronic conditions and co-morbidity symptoms are monitored and maintained or improved  Outcome: Progressing     Problem: Nutrition  Goal: Nutrient intake appropriate for maintaining nutritional needs  Outcome: Progressing     Problem: Skin  Goal: Decreased wound size/increased tissue granulation at next dressing change  Outcome: Progressing  Goal: Participates in plan/prevention/treatment measures  Outcome: Progressing  Goal: Prevent/manage excess moisture  Outcome: Progressing  Goal: Prevent/minimize sheer/friction injuries  Outcome: Progressing  Goal: Promote/optimize nutrition  Outcome: Progressing  Goal: Promote skin healing  Outcome: Progressing     Problem: Pain  Goal: Takes deep breaths with improved pain control throughout the shift  Outcome: Progressing  Goal: Turns in bed with improved pain control throughout the shift  Outcome: Progressing  Goal: Walks with improved pain control throughout the shift  Outcome: Progressing  Goal: Performs ADL's with improved pain control throughout shift  Outcome: Progressing  Goal: Participates in PT with improved pain control throughout the shift  Outcome: Progressing  Goal: Free from opioid side effects throughout the shift  Outcome: Progressing  Goal: Free from acute confusion related to pain meds throughout the shift  Outcome: Progressing     Problem: Fall/Injury  Goal: Not fall by end of shift  Outcome: Progressing  Goal: Be free from injury by end of the shift  Outcome: Progressing  Goal: Verbalize understanding of personal risk  factors for fall in the hospital  Outcome: Progressing  Goal: Verbalize understanding of risk factor reduction measures to prevent injury from fall in the home  Outcome: Progressing  Goal: Use assistive devices by end of the shift  Outcome: Progressing  Goal: Pace activities to prevent fatigue by end of the shift  Outcome: Progressing        The clinical goals for the shift include Safety to prevent falls/injury, monitor for bleeding, maintain mobility, and update with plan of care

## 2025-04-30 NOTE — PROGRESS NOTES
This note was created using voice recognition transcription software. Despite proofreading, unintentional typographical errors may be present. Please contact the GI office with any questions or concerns.       Subjective  Patient states she drank her prep.  I was told only half.  I believe it was a miscommunication.  Son at bedside.  Patient's blood count low and will receive blood per nursing.        Physical Exam:  General: Polite, calm, resting  Skin:  Warm and dry, no jaundice  HEENT: No scleral icterus, no conjunctival pallor, normocephalic, atraumatic, mucous membranes moist  Neck:  atraumatic, trachea midline, no JVD  Chest:  Clear to auscultation bilaterally. No wheezes, rales, or rhonchi  CV:  Regular rate and rhythm.  Positive S1/S2  Abdomen: no distension, +BS, soft, non-tender to palpation, no rebound tenderness, no guarding, no rigidity, no discernible ascites   Extremities: no lower extremity edema, chronic pigmentary changes, no cyanosis  Neurological:  A&Ox3  Psychiatric: cooperative     Investigations:  Labs, radiological imaging and cardiac work up were reviewed        Objective:         4/30/2025    11:06 AM 4/30/2025    12:06 PM 4/30/2025    12:21 PM 4/30/2025    12:30 PM 4/30/2025     1:30 PM 4/30/2025     2:40 PM 4/30/2025     3:32 PM   Vitals   Systolic 115 133 128 151 151 148 126   Diastolic 52 62 59 63 67 65 55   BP Location Right arm    Left arm Right arm Right arm   Heart Rate 66 65 67 67 71 67 62   Temp 37.2 °C (99 °F) 37.4 °C (99.3 °F) 37.4 °C (99.3 °F) 37.4 °C (99.3 °F) 37.4 °C (99.3 °F) 37 °C (98.6 °F) 37.1 °C (98.8 °F)   Resp 18 18 18 18 18 18 18          Medications:  Scheduled Medications[1]     Recent Results (from the past 72 hours)   CBC    Collection Time: 04/27/25  6:35 PM   Result Value Ref Range    WBC 5.7 4.4 - 11.3 x10*3/uL    nRBC 0.0 0.0 - 0.0 /100 WBCs    RBC 3.30 (L) 4.00 - 5.20 x10*6/uL    Hemoglobin 9.0 (L) 12.0 - 16.0 g/dL    Hematocrit 28.0 (L) 36.0 - 46.0 %    MCV  85 80 - 100 fL    MCH 27.3 26.0 - 34.0 pg    MCHC 32.1 32.0 - 36.0 g/dL    RDW 13.9 11.5 - 14.5 %    Platelets 222 150 - 450 x10*3/uL   Prepare RBC: 1 Units    Collection Time: 04/27/25  8:33 PM   Result Value Ref Range    PRODUCT CODE [id number]     Unit Number [id number]     Unit ABO A     Unit RH NEG     XM INTEP COMP     Dispense Status TR     Blood Expiration Date 5/11/2025 11:59:00 PM EDT     PRODUCT BLOOD TYPE 0600     UNIT VOLUME 350    Type and screen    Collection Time: 04/27/25  8:51 PM   Result Value Ref Range    ABO TYPE A     Rh TYPE NEG     ANTIBODY SCREEN NEG    Verify ABO/Rh Group Test (VERAB)    Collection Time: 04/27/25  8:53 PM   Result Value Ref Range    ABO TYPE A     Rh TYPE NEG    CBC and Auto Differential    Collection Time: 04/28/25  2:11 AM   Result Value Ref Range    WBC 7.4 4.4 - 11.3 x10*3/uL    nRBC 0.0 0.0 - 0.0 /100 WBCs    RBC 3.23 (L) 4.00 - 5.20 x10*6/uL    Hemoglobin 8.8 (L) 12.0 - 16.0 g/dL    Hematocrit 27.3 (L) 36.0 - 46.0 %    MCV 85 80 - 100 fL    MCH 27.2 26.0 - 34.0 pg    MCHC 32.2 32.0 - 36.0 g/dL    RDW 14.4 11.5 - 14.5 %    Platelets 207 150 - 450 x10*3/uL    Neutrophils % 73.0 40.0 - 80.0 %    Immature Granulocytes %, Automated 0.3 0.0 - 0.9 %    Lymphocytes % 17.2 13.0 - 44.0 %    Monocytes % 8.7 2.0 - 10.0 %    Eosinophils % 0.3 0.0 - 6.0 %    Basophils % 0.5 0.0 - 2.0 %    Neutrophils Absolute 5.39 1.60 - 5.50 x10*3/uL    Immature Granulocytes Absolute, Automated 0.02 0.00 - 0.50 x10*3/uL    Lymphocytes Absolute 1.27 0.80 - 3.00 x10*3/uL    Monocytes Absolute 0.64 0.05 - 0.80 x10*3/uL    Eosinophils Absolute 0.02 0.00 - 0.40 x10*3/uL    Basophils Absolute 0.04 0.00 - 0.10 x10*3/uL   Transfusion reaction evaluation    Collection Time: 04/28/25  2:17 AM   Result Value Ref Range    Unit Number [id number]     COMPONENT CODE [id number]    Haptoglobin    Collection Time: 04/28/25  2:17 AM   Result Value Ref Range    Haptoglobin 120 30 - 200 mg/dL   Path  Review-Transfusion Reaction    Collection Time: 04/28/25  2:17 AM   Result Value Ref Range    TYPE OF REACTION FEBRILE NON-HEMOLYTIC TRANSFUSION REACTION     PATH REVIEW-TRANSFUSION REACTION       TRANSFUSION REACTION WORKUP WAS INITIATED FOLLOWING TRANSFUSION OF PACKED RED BLOOD CELLS.  PATIENT REPORTEDLY EXPERIENCED A TEMPERATURE INCREASE GREATER OR EQUAL TO 1 DEGREE, BACK PAIN, CHEST PRESSURE AND TACHYCARDIA AFTER THE TRANSFUSION WAS STARTED. THE TRANSFUSION WAS STOPPED AND APPROXIMATELY 150 ML WAS RETURNED TO THE LAB. ACCORDING TO THE EMR NOTES THESE SYMPTOMS RESOLVED BY ITSELF AND PATIENT IS CURRENTLY STABLE.      PRETRANSFUSION: 0013    /60 T 37   P 80 RR 18  POSTTRANSFUSION: 0018   /64 T 36.5 P 87 RR 18    CLERICAL CHECK SHOWS NO DISCREPANCIES IN THE PAPER-WORK ASSOCIATED WITH THE TRANSFUSION.   THERE IS NO VISIBLE HEMOLYSIS IN THE POST TRANSFUSION SPECIMEN.  THE PATIENT'S ABO GROUP, RH(D) TYPE ON THE PRE- AND POST- TRANSFUSION SPECIMEN SHOW NO DISCREPANCIES.    THE DIRECT ANTIGLOBULIN TEST ON THE POST-TRANSFUSION SPECIMEN IS NEGATIVE.    CULTURES OF THE UNIT AFTER TRANSFUSION IS PENDING.      BASED ON THE REPORTED INFORMATION FINDINGS ARE CONSISTENT WITH  POSSIBLE FEBRILE NON-HEMOLYTIC TRANSFUSION REACTION (FNHTR). THE PATIENT SHOULD BE PREMEDICATED WITH ANTIHISTAMINES AND ANTIPYRECTICS PRIOR TO THE NEXT TRANSFUSION IF CLINICALLY INDICATED.    DIAGNOSIS-BB     Transfusion Reaction Culture    Collection Time: 04/28/25  2:17 AM    Specimen: Blood   Result Value Ref Range    Transfusion Reaction Culture No growth to date     Gram Stain No polymorphonuclear leukocytes seen     Gram Stain No organisms seen    Basic metabolic panel    Collection Time: 04/28/25  2:18 AM   Result Value Ref Range    Glucose 99 74 - 99 mg/dL    Sodium 138 136 - 145 mmol/L    Potassium 4.7 3.5 - 5.3 mmol/L    Chloride 109 (H) 98 - 107 mmol/L    Bicarbonate 22 21 - 32 mmol/L    Anion Gap 12 10 - 20 mmol/L    Urea Nitrogen 55  (H) 6 - 23 mg/dL    Creatinine 0.97 0.50 - 1.05 mg/dL    eGFR 57 (L) >60 mL/min/1.73m*2    Calcium 8.4 (L) 8.6 - 10.3 mg/dL   Abo/Rh    Collection Time: 04/28/25  2:18 AM   Result Value Ref Range    ABO TYPE A     Rh TYPE NEG    Direct Antiglobulin Test    Collection Time: 04/28/25  2:18 AM   Result Value Ref Range    LIGIA-POLYSPECIFIC NEG    Bilirubin, Total    Collection Time: 04/28/25  2:18 AM   Result Value Ref Range    Bilirubin, Total 0.6 0.0 - 1.2 mg/dL   Bilirubin, Direct    Collection Time: 04/28/25  2:18 AM   Result Value Ref Range    Bilirubin, Direct 0.1 0.0 - 0.3 mg/dL   Lactate dehydrogenase    Collection Time: 04/28/25  2:18 AM   Result Value Ref Range     84 - 246 U/L   Troponin I, High Sensitivity    Collection Time: 04/28/25  2:18 AM   Result Value Ref Range    Troponin I, High Sensitivity 70 (HH) 0 - 13 ng/L   ECG 12 lead    Collection Time: 04/28/25  2:27 AM   Result Value Ref Range    Ventricular Rate 119 BPM    Atrial Rate 119 BPM    RI Interval 200 ms    QRS Duration 130 ms    QT Interval 380 ms    QTC Calculation(Bazett) 534 ms    P Axis 29 degrees    R Axis 2 degrees    T Axis 150 degrees    QRS Count 20 beats    Q Onset 212 ms    P Onset 112 ms    P Offset 169 ms    T Offset 402 ms    QTC Fredericia 477 ms   ECG 12 lead    Collection Time: 04/28/25  3:21 AM   Result Value Ref Range    Ventricular Rate 120 BPM    Atrial Rate 120 BPM    RI Interval 194 ms    QRS Duration 132 ms    QT Interval 384 ms    QTC Calculation(Bazett) 542 ms    P Axis 39 degrees    R Axis 16 degrees    T Axis 153 degrees    QRS Count 20 beats    Q Onset 210 ms    P Onset 113 ms    P Offset 170 ms    T Offset 402 ms    QTC Fredericia 483 ms   Lavender Top    Collection Time: 04/28/25  6:30 AM   Result Value Ref Range    Extra Tube Hold for add-ons.    CBC    Collection Time: 04/28/25  6:30 AM   Result Value Ref Range    WBC 7.4 4.4 - 11.3 x10*3/uL    nRBC 0.0 0.0 - 0.0 /100 WBCs    RBC 3.24 (L) 4.00 - 5.20  x10*6/uL    Hemoglobin 9.0 (L) 12.0 - 16.0 g/dL    Hematocrit 27.6 (L) 36.0 - 46.0 %    MCV 85 80 - 100 fL    MCH 27.8 26.0 - 34.0 pg    MCHC 32.6 32.0 - 36.0 g/dL    RDW 14.8 (H) 11.5 - 14.5 %    Platelets 225 150 - 450 x10*3/uL   Troponin I, High Sensitivity    Collection Time: 04/28/25  6:36 AM   Result Value Ref Range    Troponin I, High Sensitivity 100 (HH) 0 - 13 ng/L   SST TOP    Collection Time: 04/28/25  6:36 AM   Result Value Ref Range    Extra Tube Hold for add-ons.    ECG 12 lead    Collection Time: 04/28/25  7:08 AM   Result Value Ref Range    Ventricular Rate 79 BPM    Atrial Rate 79 BPM    LA Interval 168 ms    QRS Duration 132 ms    QT Interval 430 ms    QTC Calculation(Bazett) 493 ms    P Axis 31 degrees    R Axis -19 degrees    T Axis 158 degrees    QRS Count 13 beats    Q Onset 212 ms    P Onset 128 ms    P Offset 182 ms    T Offset 427 ms    QTC Fredericia 471 ms   Hemoglobin and hematocrit, blood    Collection Time: 04/28/25  2:40 PM   Result Value Ref Range    Hemoglobin 8.6 (L) 12.0 - 16.0 g/dL    Hematocrit 25.9 (L) 36.0 - 46.0 %   Light Blue Top    Collection Time: 04/28/25  2:40 PM   Result Value Ref Range    Extra Tube Hold for add-ons.    SST TOP    Collection Time: 04/28/25  2:40 PM   Result Value Ref Range    Extra Tube Hold for add-ons.    PST Top    Collection Time: 04/28/25  2:40 PM   Result Value Ref Range    Extra Tube Hold for add-ons.    Basic metabolic panel    Collection Time: 04/29/25  6:00 AM   Result Value Ref Range    Glucose 135 (H) 74 - 99 mg/dL    Sodium 138 136 - 145 mmol/L    Potassium 4.7 3.5 - 5.3 mmol/L    Chloride 112 (H) 98 - 107 mmol/L    Bicarbonate 19 (L) 21 - 32 mmol/L    Anion Gap 12 10 - 20 mmol/L    Urea Nitrogen 49 (H) 6 - 23 mg/dL    Creatinine 1.11 (H) 0.50 - 1.05 mg/dL    eGFR 49 (L) >60 mL/min/1.73m*2    Calcium 8.1 (L) 8.6 - 10.3 mg/dL   CBC and Auto Differential    Collection Time: 04/29/25  6:00 AM   Result Value Ref Range    WBC 10.3 4.4 - 11.3  x10*3/uL    nRBC 0.0 0.0 - 0.0 /100 WBCs    RBC 2.60 (L) 4.00 - 5.20 x10*6/uL    Hemoglobin 7.2 (L) 12.0 - 16.0 g/dL    Hematocrit 22.3 (L) 36.0 - 46.0 %    MCV 86 80 - 100 fL    MCH 27.7 26.0 - 34.0 pg    MCHC 32.3 32.0 - 36.0 g/dL    RDW 15.4 (H) 11.5 - 14.5 %    Platelets 178 150 - 450 x10*3/uL    Neutrophils % 90.7 40.0 - 80.0 %    Immature Granulocytes %, Automated 0.5 0.0 - 0.9 %    Lymphocytes % 4.4 13.0 - 44.0 %    Monocytes % 4.3 2.0 - 10.0 %    Eosinophils % 0.0 0.0 - 6.0 %    Basophils % 0.1 0.0 - 2.0 %    Neutrophils Absolute 9.31 (H) 1.60 - 5.50 x10*3/uL    Immature Granulocytes Absolute, Automated 0.05 0.00 - 0.50 x10*3/uL    Lymphocytes Absolute 0.45 (L) 0.80 - 3.00 x10*3/uL    Monocytes Absolute 0.44 0.05 - 0.80 x10*3/uL    Eosinophils Absolute 0.00 0.00 - 0.40 x10*3/uL    Basophils Absolute 0.01 0.00 - 0.10 x10*3/uL   SST TOP    Collection Time: 04/29/25  6:00 AM   Result Value Ref Range    Extra Tube Hold for add-ons.    ECG 12 Lead    Collection Time: 04/29/25 11:53 AM   Result Value Ref Range    Ventricular Rate 68 BPM    Atrial Rate 68 BPM    ID Interval 182 ms    QRS Duration 136 ms    QT Interval 444 ms    QTC Calculation(Bazett) 472 ms    P Axis 61 degrees    R Axis 24 degrees    T Axis 190 degrees    QRS Count 11 beats    Q Onset 214 ms    P Onset 123 ms    P Offset 180 ms    T Offset 436 ms    QTC Fredericia 463 ms   Basic metabolic panel    Collection Time: 04/30/25  6:39 AM   Result Value Ref Range    Glucose 93 74 - 99 mg/dL    Sodium 141 136 - 145 mmol/L    Potassium 4.2 3.5 - 5.3 mmol/L    Chloride 114 (H) 98 - 107 mmol/L    Bicarbonate 23 21 - 32 mmol/L    Anion Gap 8 (L) 10 - 20 mmol/L    Urea Nitrogen 38 (H) 6 - 23 mg/dL    Creatinine 1.15 (H) 0.50 - 1.05 mg/dL    eGFR 46 (L) >60 mL/min/1.73m*2    Calcium 8.2 (L) 8.6 - 10.3 mg/dL   CBC and Auto Differential    Collection Time: 04/30/25  6:39 AM   Result Value Ref Range    WBC 6.4 4.4 - 11.3 x10*3/uL    nRBC 0.0 0.0 - 0.0 /100 WBCs     RBC 2.30 (L) 4.00 - 5.20 x10*6/uL    Hemoglobin 6.4 (LL) 12.0 - 16.0 g/dL    Hematocrit 19.6 (L) 36.0 - 46.0 %    MCV 85 80 - 100 fL    MCH 27.8 26.0 - 34.0 pg    MCHC 32.7 32.0 - 36.0 g/dL    RDW 15.5 (H) 11.5 - 14.5 %    Platelets 184 150 - 450 x10*3/uL    Neutrophils % 76.8 40.0 - 80.0 %    Immature Granulocytes %, Automated 0.3 0.0 - 0.9 %    Lymphocytes % 14.0 13.0 - 44.0 %    Monocytes % 7.9 2.0 - 10.0 %    Eosinophils % 0.5 0.0 - 6.0 %    Basophils % 0.5 0.0 - 2.0 %    Neutrophils Absolute 4.94 1.60 - 5.50 x10*3/uL    Immature Granulocytes Absolute, Automated 0.02 0.00 - 0.50 x10*3/uL    Lymphocytes Absolute 0.90 0.80 - 3.00 x10*3/uL    Monocytes Absolute 0.51 0.05 - 0.80 x10*3/uL    Eosinophils Absolute 0.03 0.00 - 0.40 x10*3/uL    Basophils Absolute 0.03 0.00 - 0.10 x10*3/uL   Reticulocytes    Collection Time: 04/30/25  6:39 AM   Result Value Ref Range    Retic % 1.8 0.5 - 2.0 %    Retic Absolute 0.043 0.017 - 0.110 x10*6/uL    Reticulocyte Hemoglobin 32 28 - 38 pg    Immature Retic fraction 20.6 (H) <=16.0 %   Iron and TIBC    Collection Time: 04/30/25  6:39 AM   Result Value Ref Range    Iron 10 (L) 35 - 150 ug/dL    UIBC 195 110 - 370 ug/dL    TIBC 205 (L) 240 - 445 ug/dL    % Saturation 5 (L) 25 - 45 %   Ferritin    Collection Time: 04/30/25  6:39 AM   Result Value Ref Range    Ferritin 82 8 - 150 ng/mL   Morphology    Collection Time: 04/30/25  6:39 AM   Result Value Ref Range    RBC Morphology See Below     Hypochromia Mild     RBC Fragments Few    ECG 12 Lead    Collection Time: 04/30/25  7:12 AM   Result Value Ref Range    Ventricular Rate 68 BPM    Atrial Rate 68 BPM    MN Interval 160 ms    QRS Duration 134 ms    QT Interval 444 ms    QTC Calculation(Bazett) 472 ms    P Axis 36 degrees    R Axis -10 degrees    T Axis 171 degrees    QRS Count 11 beats    Q Onset 212 ms    P Onset 132 ms    P Offset 163 ms    T Offset 434 ms    QTC Fredericia 463 ms   Prepare RBC: 1 Units    Collection Time:  04/30/25 10:24 AM   Result Value Ref Range    PRODUCT CODE J1634Z25     Unit Number [id number]     Unit ABO A     Unit RH NEG     XM INTEP COMP     Dispense Status IS     Blood Expiration Date 5/26/2025 11:59:00 PM EDT     PRODUCT BLOOD TYPE 0600     UNIT VOLUME 350           Assessment:  This is an 83 yo Female with a PMH of CAD, recent UTI, breast cancer, vertigo, and recent RSV who presented to [facility] on 4/27/25  with reports of vertigo.  GI was consulted for melena/coffee ground emesis.  EGD done on 4/28 showing a Schatzki ring, small HH, gastritis.  Hgb 9.8 upon admission and now 6.4.  The patient did get a unit of blood, had a transfusion reaction of chest tightness and a fever.  Somehow the paperwork got displaced and there was a mix up and then a delay for receiving a transfusion.  Patient was supposed to drink 2 L of prep overnight and I was told she drank half.  Miscommunication between myself and the night nurse.  Patient was not safe to undergo anesthesia with a Hgb of 6.4 today.  Will re-attempt tomorrow 5/1.        Plan  1.)  Melena/coffee ground emesis-Monitor for overt bleeding, transfuse as necessary, trend Hgb, avoid NSAIDs.  Will plan for colonoscopy tomorrow.  Patient to complete the other 2 L of Gol[place]ely today.  If not cleared by the morning, will need a few enemas and/or lactulose.    Please ensure that all electrolytes are WNL and Hgb is 7.0 and above to avoid any delays in patient care tomorrow.        Discussed patient and above stated assessment and plan with Dr. Dougherty.    I spent 45 minutes in the professional and overall care of this patient.      04/30/25 at 4:37 PM - YECENIA Tiwari-IFEOMA           [1] [Held by provider] aspirin, 81 mg, oral, Daily  cholecalciferol, 25 mcg, oral, Daily  metoprolol tartrate, 12.5 mg, oral, BID  pantoprazole, 40 mg, intravenous, Daily  perflutren lipid microspheres, 0.5-10 mL of dilution, intravenous, Once in imaging  perflutren protein A  microsphere, 0.5 mL, intravenous, Once in imaging  rosuvastatin, 5 mg, oral, Daily  sulfur hexafluoride microsphr, 2 mL, intravenous, Once in imaging

## 2025-05-01 ENCOUNTER — ANESTHESIA (OUTPATIENT)
Dept: GASTROENTEROLOGY | Facility: HOSPITAL | Age: 85
End: 2025-05-01
Payer: MEDICARE

## 2025-05-01 ENCOUNTER — APPOINTMENT (OUTPATIENT)
Dept: CARDIOLOGY | Facility: HOSPITAL | Age: 85
End: 2025-05-01
Payer: MEDICARE

## 2025-05-01 ENCOUNTER — APPOINTMENT (OUTPATIENT)
Dept: GASTROENTEROLOGY | Facility: HOSPITAL | Age: 85
End: 2025-05-01
Payer: MEDICARE

## 2025-05-01 ENCOUNTER — ANESTHESIA EVENT (OUTPATIENT)
Dept: GASTROENTEROLOGY | Facility: HOSPITAL | Age: 85
End: 2025-05-01
Payer: MEDICARE

## 2025-05-01 LAB
BACTERIA BPU CULT: NO GROWTH
BLOOD EXPIRATION DATE: NORMAL
DISPENSE STATUS: NORMAL
GRAM STN SPEC: NORMAL
GRAM STN SPEC: NORMAL
PRODUCT BLOOD TYPE: 600
PRODUCT CODE: NORMAL
UNIT ABO: NORMAL
UNIT NUMBER: NORMAL
UNIT RH: NORMAL
UNIT VOLUME: 350
XM INTEP: NORMAL

## 2025-05-01 PROCEDURE — 93010 ELECTROCARDIOGRAM REPORT: CPT | Performed by: INTERNAL MEDICINE

## 2025-05-01 PROCEDURE — 0DJD8ZZ INSPECTION OF LOWER INTESTINAL TRACT, VIA NATURAL OR ARTIFICIAL OPENING ENDOSCOPIC: ICD-10-PCS | Performed by: STUDENT IN AN ORGANIZED HEALTH CARE EDUCATION/TRAINING PROGRAM

## 2025-05-01 PROCEDURE — 7100000009 HC PHASE TWO TIME - INITIAL BASE CHARGE

## 2025-05-01 PROCEDURE — 2500000004 HC RX 250 GENERAL PHARMACY W/ HCPCS (ALT 636 FOR OP/ED): Mod: JZ | Performed by: NURSE ANESTHETIST, CERTIFIED REGISTERED

## 2025-05-01 PROCEDURE — 2500000004 HC RX 250 GENERAL PHARMACY W/ HCPCS (ALT 636 FOR OP/ED): Performed by: PHYSICIAN ASSISTANT

## 2025-05-01 PROCEDURE — 2500000001 HC RX 250 WO HCPCS SELF ADMINISTERED DRUGS (ALT 637 FOR MEDICARE OP): Performed by: STUDENT IN AN ORGANIZED HEALTH CARE EDUCATION/TRAINING PROGRAM

## 2025-05-01 PROCEDURE — 7100000010 HC PHASE TWO TIME - EACH INCREMENTAL 1 MINUTE

## 2025-05-01 PROCEDURE — 3700000002 HC GENERAL ANESTHESIA TIME - EACH INCREMENTAL 1 MINUTE

## 2025-05-01 PROCEDURE — 99231 SBSQ HOSP IP/OBS SF/LOW 25: CPT | Performed by: NURSE PRACTITIONER

## 2025-05-01 PROCEDURE — 93005 ELECTROCARDIOGRAM TRACING: CPT

## 2025-05-01 PROCEDURE — 99232 SBSQ HOSP IP/OBS MODERATE 35: CPT | Performed by: STUDENT IN AN ORGANIZED HEALTH CARE EDUCATION/TRAINING PROGRAM

## 2025-05-01 PROCEDURE — 45330 DIAGNOSTIC SIGMOIDOSCOPY: CPT | Performed by: STUDENT IN AN ORGANIZED HEALTH CARE EDUCATION/TRAINING PROGRAM

## 2025-05-01 PROCEDURE — 3700000001 HC GENERAL ANESTHESIA TIME - INITIAL BASE CHARGE

## 2025-05-01 PROCEDURE — 1200000002 HC GENERAL ROOM WITH TELEMETRY DAILY

## 2025-05-01 PROCEDURE — 2500000002 HC RX 250 W HCPCS SELF ADMINISTERED DRUGS (ALT 637 FOR MEDICARE OP, ALT 636 FOR OP/ED): Performed by: STUDENT IN AN ORGANIZED HEALTH CARE EDUCATION/TRAINING PROGRAM

## 2025-05-01 RX ORDER — LIDOCAINE HYDROCHLORIDE 20 MG/ML
INJECTION, SOLUTION INFILTRATION; PERINEURAL AS NEEDED
Status: DISCONTINUED | OUTPATIENT
Start: 2025-05-01 | End: 2025-05-01

## 2025-05-01 RX ORDER — PROPOFOL 10 MG/ML
INJECTION, EMULSION INTRAVENOUS CONTINUOUS PRN
Status: DISCONTINUED | OUTPATIENT
Start: 2025-05-01 | End: 2025-05-01

## 2025-05-01 RX ORDER — SODIUM CHLORIDE, SODIUM LACTATE, POTASSIUM CHLORIDE, CALCIUM CHLORIDE 600; 310; 30; 20 MG/100ML; MG/100ML; MG/100ML; MG/100ML
INJECTION, SOLUTION INTRAVENOUS CONTINUOUS PRN
Status: DISCONTINUED | OUTPATIENT
Start: 2025-05-01 | End: 2025-05-01

## 2025-05-01 RX ORDER — PROPOFOL 10 MG/ML
INJECTION, EMULSION INTRAVENOUS AS NEEDED
Status: DISCONTINUED | OUTPATIENT
Start: 2025-05-01 | End: 2025-05-01

## 2025-05-01 RX ADMIN — METOPROLOL TARTRATE 12.5 MG: 25 TABLET, FILM COATED ORAL at 09:10

## 2025-05-01 RX ADMIN — LIDOCAINE HYDROCHLORIDE 50 MG: 20 INJECTION, SOLUTION INFILTRATION; PERINEURAL at 13:00

## 2025-05-01 RX ADMIN — METOPROLOL TARTRATE 12.5 MG: 25 TABLET, FILM COATED ORAL at 20:44

## 2025-05-01 RX ADMIN — PROPOFOL 50 MG: 10 INJECTION, EMULSION INTRAVENOUS at 13:00

## 2025-05-01 RX ADMIN — SODIUM CHLORIDE, POTASSIUM CHLORIDE, SODIUM LACTATE AND CALCIUM CHLORIDE: 600; 310; 30; 20 INJECTION, SOLUTION INTRAVENOUS at 12:59

## 2025-05-01 RX ADMIN — ROSUVASTATIN CALCIUM 5 MG: 10 TABLET, FILM COATED ORAL at 20:44

## 2025-05-01 RX ADMIN — PANTOPRAZOLE SODIUM 40 MG: 40 INJECTION, POWDER, FOR SOLUTION INTRAVENOUS at 06:02

## 2025-05-01 RX ADMIN — Medication 25 MCG: at 09:10

## 2025-05-01 RX ADMIN — PROPOFOL 30 MG: 10 INJECTION, EMULSION INTRAVENOUS at 13:03

## 2025-05-01 RX ADMIN — PROPOFOL 30 MG: 10 INJECTION, EMULSION INTRAVENOUS at 13:05

## 2025-05-01 RX ADMIN — PROPOFOL 50 MCG/KG/MIN: 10 INJECTION, EMULSION INTRAVENOUS at 13:00

## 2025-05-01 SDOH — HEALTH STABILITY: MENTAL HEALTH: CURRENT SMOKER: 0

## 2025-05-01 ASSESSMENT — PAIN SCALES - GENERAL
PAINLEVEL_OUTOF10: 2
PAINLEVEL_OUTOF10: 0 - NO PAIN
PAIN_LEVEL: 0

## 2025-05-01 ASSESSMENT — COGNITIVE AND FUNCTIONAL STATUS - GENERAL
STANDING UP FROM CHAIR USING ARMS: A LITTLE
MOVING TO AND FROM BED TO CHAIR: A LITTLE
MOBILITY SCORE: 19
CLIMB 3 TO 5 STEPS WITH RAILING: A LOT
WALKING IN HOSPITAL ROOM: A LITTLE

## 2025-05-01 ASSESSMENT — PAIN - FUNCTIONAL ASSESSMENT
PAIN_FUNCTIONAL_ASSESSMENT: 0-10
PAIN_FUNCTIONAL_ASSESSMENT: 0-10

## 2025-05-01 NOTE — PROGRESS NOTES
This note was created using voice recognition transcription software. Despite proofreading, unintentional typographical errors may be present. Please contact the GI office with any questions or concerns.       Subjective  GI following for coffee-ground emesis, melena.  Patient seen and examined at bedside.  Alert and oriented.  Daughter present.  Patient states she completed bowel prep and has clear stool output.  She is afebrile and hemodynamically stable.  Last hemoglobin 8.6.  No overt GIB.    Objective:         4/30/2025     1:30 PM 4/30/2025     2:40 PM 4/30/2025     3:32 PM 4/30/2025     4:38 PM 4/30/2025     9:16 PM 4/30/2025     9:38 PM 5/1/2025     8:22 AM   Vitals   Systolic 151 148 126 142  138 134   Diastolic 67 65 55 65  52 63   BP Location Left arm Right arm Right arm   Right arm Right arm   Heart Rate 71 67 62 62 68 67 62   Temp 37.4 °C (99.3 °F) 37 °C (98.6 °F) 37.1 °C (98.8 °F) 36.2 °C (97.2 °F)  36.3 °C (97.3 °F) 37.4 °C (99.3 °F)   Resp 18 18 18   16 16        Physical Exam:  General: Polite, calm, resting  Skin:  Warm and dry, no jaundice  HEENT: No scleral icterus, no conjunctival pallor, normocephalic, atraumatic, mucous membranes moist  Neck:  atraumatic, trachea midline, no JVD  Chest:  Clear to auscultation bilaterally. No wheezes, rales, or rhonchi  CV:  Regular rate and rhythm.  Positive S1/S2  Abdomen: no distension, +BS, soft, non-tender to palpation, no rebound tenderness, no guarding, no rigidity, no discernible ascites   Extremities: no lower extremity edema, chronic pigmentary changes, no cyanosis  Neurological:  A&Ox3  Psychiatric: cooperative     Medications:  Scheduled Medications[1]     Recent Results (from the past 72 hours)   Hemoglobin and hematocrit, blood    Collection Time: 04/28/25  2:40 PM   Result Value Ref Range    Hemoglobin 8.6 (L) 12.0 - 16.0 g/dL    Hematocrit 25.9 (L) 36.0 - 46.0 %   Light Blue Top    Collection Time: 04/28/25  2:40 PM   Result Value Ref Range     Extra Tube Hold for add-ons.    SST TOP    Collection Time: 04/28/25  2:40 PM   Result Value Ref Range    Extra Tube Hold for add-ons.    PST Top    Collection Time: 04/28/25  2:40 PM   Result Value Ref Range    Extra Tube Hold for add-ons.    Basic metabolic panel    Collection Time: 04/29/25  6:00 AM   Result Value Ref Range    Glucose 135 (H) 74 - 99 mg/dL    Sodium 138 136 - 145 mmol/L    Potassium 4.7 3.5 - 5.3 mmol/L    Chloride 112 (H) 98 - 107 mmol/L    Bicarbonate 19 (L) 21 - 32 mmol/L    Anion Gap 12 10 - 20 mmol/L    Urea Nitrogen 49 (H) 6 - 23 mg/dL    Creatinine 1.11 (H) 0.50 - 1.05 mg/dL    eGFR 49 (L) >60 mL/min/1.73m*2    Calcium 8.1 (L) 8.6 - 10.3 mg/dL   CBC and Auto Differential    Collection Time: 04/29/25  6:00 AM   Result Value Ref Range    WBC 10.3 4.4 - 11.3 x10*3/uL    nRBC 0.0 0.0 - 0.0 /100 WBCs    RBC 2.60 (L) 4.00 - 5.20 x10*6/uL    Hemoglobin 7.2 (L) 12.0 - 16.0 g/dL    Hematocrit 22.3 (L) 36.0 - 46.0 %    MCV 86 80 - 100 fL    MCH 27.7 26.0 - 34.0 pg    MCHC 32.3 32.0 - 36.0 g/dL    RDW 15.4 (H) 11.5 - 14.5 %    Platelets 178 150 - 450 x10*3/uL    Neutrophils % 90.7 40.0 - 80.0 %    Immature Granulocytes %, Automated 0.5 0.0 - 0.9 %    Lymphocytes % 4.4 13.0 - 44.0 %    Monocytes % 4.3 2.0 - 10.0 %    Eosinophils % 0.0 0.0 - 6.0 %    Basophils % 0.1 0.0 - 2.0 %    Neutrophils Absolute 9.31 (H) 1.60 - 5.50 x10*3/uL    Immature Granulocytes Absolute, Automated 0.05 0.00 - 0.50 x10*3/uL    Lymphocytes Absolute 0.45 (L) 0.80 - 3.00 x10*3/uL    Monocytes Absolute 0.44 0.05 - 0.80 x10*3/uL    Eosinophils Absolute 0.00 0.00 - 0.40 x10*3/uL    Basophils Absolute 0.01 0.00 - 0.10 x10*3/uL   SST TOP    Collection Time: 04/29/25  6:00 AM   Result Value Ref Range    Extra Tube Hold for add-ons.    ECG 12 Lead    Collection Time: 04/29/25 11:53 AM   Result Value Ref Range    Ventricular Rate 68 BPM    Atrial Rate 68 BPM    ND Interval 182 ms    QRS Duration 136 ms    QT Interval 444 ms    QTC  Calculation(Bazett) 472 ms    P Axis 61 degrees    R Axis 24 degrees    T Axis 190 degrees    QRS Count 11 beats    Q Onset 214 ms    P Onset 123 ms    P Offset 180 ms    T Offset 436 ms    QTC Fredericia 463 ms   Basic metabolic panel    Collection Time: 04/30/25  6:39 AM   Result Value Ref Range    Glucose 93 74 - 99 mg/dL    Sodium 141 136 - 145 mmol/L    Potassium 4.2 3.5 - 5.3 mmol/L    Chloride 114 (H) 98 - 107 mmol/L    Bicarbonate 23 21 - 32 mmol/L    Anion Gap 8 (L) 10 - 20 mmol/L    Urea Nitrogen 38 (H) 6 - 23 mg/dL    Creatinine 1.15 (H) 0.50 - 1.05 mg/dL    eGFR 46 (L) >60 mL/min/1.73m*2    Calcium 8.2 (L) 8.6 - 10.3 mg/dL   CBC and Auto Differential    Collection Time: 04/30/25  6:39 AM   Result Value Ref Range    WBC 6.4 4.4 - 11.3 x10*3/uL    nRBC 0.0 0.0 - 0.0 /100 WBCs    RBC 2.30 (L) 4.00 - 5.20 x10*6/uL    Hemoglobin 6.4 (LL) 12.0 - 16.0 g/dL    Hematocrit 19.6 (L) 36.0 - 46.0 %    MCV 85 80 - 100 fL    MCH 27.8 26.0 - 34.0 pg    MCHC 32.7 32.0 - 36.0 g/dL    RDW 15.5 (H) 11.5 - 14.5 %    Platelets 184 150 - 450 x10*3/uL    Neutrophils % 76.8 40.0 - 80.0 %    Immature Granulocytes %, Automated 0.3 0.0 - 0.9 %    Lymphocytes % 14.0 13.0 - 44.0 %    Monocytes % 7.9 2.0 - 10.0 %    Eosinophils % 0.5 0.0 - 6.0 %    Basophils % 0.5 0.0 - 2.0 %    Neutrophils Absolute 4.94 1.60 - 5.50 x10*3/uL    Immature Granulocytes Absolute, Automated 0.02 0.00 - 0.50 x10*3/uL    Lymphocytes Absolute 0.90 0.80 - 3.00 x10*3/uL    Monocytes Absolute 0.51 0.05 - 0.80 x10*3/uL    Eosinophils Absolute 0.03 0.00 - 0.40 x10*3/uL    Basophils Absolute 0.03 0.00 - 0.10 x10*3/uL   Reticulocytes    Collection Time: 04/30/25  6:39 AM   Result Value Ref Range    Retic % 1.8 0.5 - 2.0 %    Retic Absolute 0.043 0.017 - 0.110 x10*6/uL    Reticulocyte Hemoglobin 32 28 - 38 pg    Immature Retic fraction 20.6 (H) <=16.0 %   Iron and TIBC    Collection Time: 04/30/25  6:39 AM   Result Value Ref Range    Iron 10 (L) 35 - 150 ug/dL     UIBC 195 110 - 370 ug/dL    TIBC 205 (L) 240 - 445 ug/dL    % Saturation 5 (L) 25 - 45 %   Ferritin    Collection Time: 04/30/25  6:39 AM   Result Value Ref Range    Ferritin 82 8 - 150 ng/mL   Morphology    Collection Time: 04/30/25  6:39 AM   Result Value Ref Range    RBC Morphology See Below     Hypochromia Mild     RBC Fragments Few    ECG 12 Lead    Collection Time: 04/30/25  7:12 AM   Result Value Ref Range    Ventricular Rate 68 BPM    Atrial Rate 68 BPM    MD Interval 160 ms    QRS Duration 134 ms    QT Interval 444 ms    QTC Calculation(Bazett) 472 ms    P Axis 36 degrees    R Axis -10 degrees    T Axis 171 degrees    QRS Count 11 beats    Q Onset 212 ms    P Onset 132 ms    P Offset 163 ms    T Offset 434 ms    QTC Fredericia 463 ms   Prepare RBC: 1 Units    Collection Time: 04/30/25 10:24 AM   Result Value Ref Range    PRODUCT CODE [id number]     Unit Number [id number]     Unit ABO A     Unit RH NEG     XM INTEP COMP     Dispense Status IS     Blood Expiration Date 5/26/2025 11:59:00 PM EDT     PRODUCT BLOOD TYPE 0600     UNIT VOLUME 350    Hemoglobin and hematocrit, blood    Collection Time: 04/30/25  5:01 PM   Result Value Ref Range    Hemoglobin 8.3 (L) 12.0 - 16.0 g/dL    Hematocrit 26.2 (L) 36.0 - 46.0 %   ECG 12 Lead    Collection Time: 05/01/25  6:47 AM   Result Value Ref Range    Ventricular Rate 65 BPM    Atrial Rate 65 BPM    MD Interval 178 ms    QRS Duration 132 ms    QT Interval 488 ms    QTC Calculation(Bazett) 507 ms    P Axis 40 degrees    R Axis 14 degrees    T Axis 150 degrees    QRS Count 11 beats    Q Onset 212 ms    P Onset 123 ms    P Offset 181 ms    T Offset 456 ms    QTC Fredericia 501 ms          Assessment:  This is an 85 yo Female with a PMH of CAD on DAPT, recent UTI, breast cancer, vertigo, and recent RSV who presented to [facility] on 4/27/25  with reports of vertigo.  GI was consulted for melena/coffee ground emesis.  EGD done on 4/28 showing a Schatzki ring, small HH,  gastritis.  Hgb 9.8 upon admission and now 6.4.  The patient did get a unit of blood, had a transfusion reaction of chest tightness and a fever.  Somehow the paperwork got displaced and there was a mix up and then a delay for receiving a transfusion.  Patient was supposed to drink 2 L of prep overnight and I was told she drank half.  Miscommunication between myself and the night nurse.  Patient was not safe to undergo anesthesia with a Hgb of 6.4 today.  Will re-attempt tomorrow 5/1.    Normocytic anemia likely 2/2 GIB  Coffee-ground emesis 2/2 gastritis and gastric erosion, hematochezia 2/2 diverticular bleed.   DAPT  Tachycardia, chest discomfort, EKG changes-manage per cardiology    - Trend H&H and transfuse PRBC for Hgb <7-8.0  - Monitor stool color and consistency and document.  Monitor for any overt GIB.  - Keep n.p.o. for colonoscopy today  - Continue PPI 40 twice daily for now  - Avoid NSAIDs  - Further recommendation pending results of colonoscopy     Colonoscopy 5/1/2025   There was severe fredo-diverticular spasm in the distal sigmoid colon in a region of severe diverticulosis that resisted safe passage of colonoscope despite scope reinsertion, placement of patient in supine position and water immersion. Procedure aborted in the sigmoid colon.  Plan  -Clear liquid diet today and advance as tolerated  -Monitor H&H and transfuse PRBC for Hgb <7-8.0  -Continue to monitor for any overt GI bleeding  - Suspect resolved prior diverticular bleed as etiology of patient's hematochezia and anemia  -Suspect gastritis with erosion contributing to CGE  -Continue PPI 40 mg PO BID x 8 weeks, then decrease dose to once a day dosing indefinitely while on Plavix. Weigh risks/benefits of continuing antiplatelet medication.   -Follow up with Gi in 2 weeks to review pathology.  -Spoke with patient and her daughter regarding results of colonoscopy and plan of care.    Discussed with Dr. Dougherty.  I spent 45 minutes in the  professional and overall care of this patient.      05/01/25 at 12:41 PM - YECENIA Calvin-IFEOMA           [1] [Held by provider] aspirin, 81 mg, oral, Daily  cholecalciferol, 25 mcg, oral, Daily  metoprolol tartrate, 12.5 mg, oral, BID  pantoprazole, 40 mg, intravenous, Daily  perflutren lipid microspheres, 0.5-10 mL of dilution, intravenous, Once in imaging  perflutren protein A microsphere, 0.5 mL, intravenous, Once in imaging  rosuvastatin, 5 mg, oral, Daily  sulfur hexafluoride microsphr, 2 mL, intravenous, Once in imaging

## 2025-05-01 NOTE — CARE PLAN
The patient's goals for the shift include comfort  Problem: Pain - Adult  Goal: Verbalizes/displays adequate comfort level or baseline comfort level  Outcome: Progressing     Problem: Safety - Adult  Goal: Free from fall injury  Outcome: Progressing     Problem: Discharge Planning  Goal: Discharge to home or other facility with appropriate resources  Outcome: Progressing     Problem: Chronic Conditions and Co-morbidities  Goal: Patient's chronic conditions and co-morbidity symptoms are monitored and maintained or improved  Outcome: Progressing     Problem: Nutrition  Goal: Nutrient intake appropriate for maintaining nutritional needs  Outcome: Progressing     Problem: Skin  Goal: Decreased wound size/increased tissue granulation at next dressing change  Outcome: Progressing  Goal: Participates in plan/prevention/treatment measures  Outcome: Progressing  Goal: Prevent/manage excess moisture  Outcome: Progressing  Goal: Prevent/minimize sheer/friction injuries  Outcome: Progressing  Goal: Promote/optimize nutrition  Outcome: Progressing  Goal: Promote skin healing  Outcome: Progressing     Problem: Pain  Goal: Takes deep breaths with improved pain control throughout the shift  Outcome: Progressing  Goal: Turns in bed with improved pain control throughout the shift  Outcome: Progressing  Goal: Walks with improved pain control throughout the shift  Outcome: Progressing  Goal: Performs ADL's with improved pain control throughout shift  Outcome: Progressing  Goal: Participates in PT with improved pain control throughout the shift  Outcome: Progressing  Goal: Free from opioid side effects throughout the shift  Outcome: Progressing  Goal: Free from acute confusion related to pain meds throughout the shift  Outcome: Progressing     Problem: Fall/Injury  Goal: Not fall by end of shift  Outcome: Progressing  Goal: Be free from injury by end of the shift  Outcome: Progressing  Goal: Verbalize understanding of personal risk  factors for fall in the hospital  Outcome: Progressing  Goal: Verbalize understanding of risk factor reduction measures to prevent injury from fall in the home  Outcome: Progressing  Goal: Use assistive devices by end of the shift  Outcome: Progressing  Goal: Pace activities to prevent fatigue by end of the shift  Outcome: Progressing        The clinical goals for the shift include Safety to prevent falls/injury, monitor for bleeding, and update with plan of care

## 2025-05-01 NOTE — ANESTHESIA PREPROCEDURE EVALUATION
Patient: Laurel Pugh    Procedure Information       Date/Time: 05/01/25 1430    Scheduled providers: Arianne Peres MD; Luna Faria MD; Hussain Lovett RN    Procedure: COLONOSCOPY    Location: [facility]            Relevant Problems   Cardiac   (+) Atherosclerosis of native coronary artery of native heart without angina pectoris   (+) Decreased cardiac ejection fraction   (+) Essential hypertension   (+) Left bundle branch block   (+) Stented coronary artery      GI   (+) GERD (gastroesophageal reflux disease)      Hematology   (+) Anemia       Clinical information reviewed:   Tobacco  Allergies  Meds  Problems  Med Hx  Surg Hx   Fam Hx          NPO Detail:  No data recorded     Physical Exam    Airway  Mallampati: II  TM distance: >3 FB  Neck ROM: full  Mouth opening: 3 or more finger widths     Cardiovascular - normal exam   Dental    Pulmonary - normal exam   Abdominal - normal exam           Anesthesia Plan    History of general anesthesia?: yes  History of complications of general anesthesia?: no    ASA 3     MAC     The patient is not a current smoker.  Patient did not smoke on day of procedure.    intravenous induction   Anesthetic plan and risks discussed with patient.    Plan discussed with CRNA and fellow.

## 2025-05-01 NOTE — PROGRESS NOTES
"Laurel Pugh is a 84 y.o. female on day 4 of admission presenting with Syncope, unspecified syncope type.    Subjective   Patient had no acute events overnight.  Accompanied by her daughter at bedside.  Patient denies any bloody or black bowel movements overnight.  Tolerating GI prep.  Denies lightheadedness or dizziness.  Further ROS is unremarkable.  All questions were answered.    Objective     Physical Exam  General: Well-developed elderly female in mild distress  HEENT: Clear sclera, EOMI, trachea midline, moist mucous membranes  Respiratory: Equal chest rise, no retractions  Abdomen: Soft, nontender, nondistended  Extremities: No cyanosis or clubbing appreciated  Neurological: Spontaneously moves all extremities, no dysarthria, cranial nerves grossly intact  Psychiatric: Appropriate mood and affect  Skin: Warm, dry      Last Recorded Vitals  Blood pressure 163/71, pulse 67, temperature 36.4 °C (97.5 °F), temperature source Temporal, resp. rate 16, height 1.499 m (4' 11\"), weight 63.5 kg (140 lb), SpO2 99%.  Intake/Output last 3 Shifts:  I/O last 3 completed shifts:  In: 1355.3 (21.3 mL/kg) [Blood:1355.3]  Out: - (0 mL/kg)   Weight: 63.5 kg     Relevant Results               Assessment & Plan  Syncope, unspecified syncope type    84-year-old female with past medical history of coronary artery disease, breast cancer, vertigo, recent RSV and UTI admitted with acute hematemesis and bleeding per rectum with hypotension and a syncopal event     -Patient with multiple reported bloody bowel movements.  -Syncope suspected to be secondary to orthostasis and blood loss  -Patient is continued on telemetry monitoring  -Cardiology was consulted due to concerns of chest discomfort and troponin elevations noted during blood transfusion.  Type II MI suspected in the context of hypotension, GI bleeding, and volume depletion.   - Continue to hold aspirin and Plavix.  Aspirin can likely be discontinued on discharge.  Cards " with continuation of Plavix advised once feasible  - TTE was ordered however patient deferred  - GI following.  Status post EGD on 4/28 with no source of bleed identified.  Colonoscopy advised.  GI prep initiated on 4/29. Prep continued on 4/30 and 5/1. To undergo C-scope later today. Will follow.   - Hemoglobin continued to downtrend 9.0 -> 8.6 -> 7.2 -> 6.4.   - Transfusion reaction determined to be a febrile non-hemolytic reaction. Cleared for repeat transfusion but patient requires premedication with antihistamine/antipyretics. 1 unit of PRBC ordered, Hb improved from 6.4 -> 8.3. Will continue to monitor  - Hold antihypertensives except for beta-blocker    DVT prophylaxis Pharmacologic on hold due to GIB      Nicholas Haines MD

## 2025-05-01 NOTE — ANESTHESIA POSTPROCEDURE EVALUATION
Patient: Laurel Pugh    Procedure Summary       Date: 05/01/25 Room / Location: [facility]    Anesthesia Start: 1255 Anesthesia Stop:     Procedure: COLONOSCOPY Diagnosis:       Melena      Anemia, unspecified type    Scheduled Providers: Arianne Peres MD; Luna Faria MD; Hussain Lovett RN Responsible Provider: Luna Faria MD    Anesthesia Type: MAC ASA Status: 3            Anesthesia Type: MAC    Vitals Value Taken Time   /61 05/01/25 13:27   Temp 36 05/01/25 13:27   Pulse 62 05/01/25 13:27   Resp 18 05/01/25 13:27   SpO2 98 05/01/25 13:27       Anesthesia Post Evaluation    Patient location during evaluation: PACU  Patient participation: complete - patient participated  Level of consciousness: awake  Pain score: 0  Pain management: adequate  Airway patency: patent  Cardiovascular status: acceptable, hemodynamically stable, blood pressure returned to baseline and stable  Respiratory status: acceptable, nonlabored ventilation, spontaneous ventilation and room air  Hydration status: acceptable  Postoperative Nausea and Vomiting: none        There were no known notable events for this encounter.

## 2025-05-02 ENCOUNTER — DOCUMENTATION (OUTPATIENT)
Dept: HOME HEALTH SERVICES | Facility: HOME HEALTH | Age: 85
End: 2025-05-02
Payer: MEDICARE

## 2025-05-02 ENCOUNTER — APPOINTMENT (OUTPATIENT)
Dept: CARDIOLOGY | Facility: HOSPITAL | Age: 85
End: 2025-05-02
Payer: MEDICARE

## 2025-05-02 ENCOUNTER — HOME HEALTH ADMISSION (OUTPATIENT)
Dept: HOME HEALTH SERVICES | Facility: HOME HEALTH | Age: 85
End: 2025-05-02
Payer: MEDICARE

## 2025-05-02 VITALS
HEIGHT: 59 IN | RESPIRATION RATE: 15 BRPM | SYSTOLIC BLOOD PRESSURE: 144 MMHG | DIASTOLIC BLOOD PRESSURE: 65 MMHG | WEIGHT: 140 LBS | BODY MASS INDEX: 28.22 KG/M2 | HEART RATE: 72 BPM | OXYGEN SATURATION: 98 % | TEMPERATURE: 97.2 F

## 2025-05-02 LAB
ANION GAP SERPL CALC-SCNC: 10 MMOL/L (ref 10–20)
BASOPHILS # BLD AUTO: 0.03 X10*3/UL (ref 0–0.1)
BASOPHILS NFR BLD AUTO: 0.8 %
BUN SERPL-MCNC: 19 MG/DL (ref 6–23)
CALCIUM SERPL-MCNC: 8.2 MG/DL (ref 8.6–10.3)
CHLORIDE SERPL-SCNC: 110 MMOL/L (ref 98–107)
CO2 SERPL-SCNC: 22 MMOL/L (ref 21–32)
CREAT SERPL-MCNC: 0.93 MG/DL (ref 0.5–1.05)
EGFRCR SERPLBLD CKD-EPI 2021: 60 ML/MIN/1.73M*2
EOSINOPHIL # BLD AUTO: 0.09 X10*3/UL (ref 0–0.4)
EOSINOPHIL NFR BLD AUTO: 2.5 %
ERYTHROCYTE [DISTWIDTH] IN BLOOD BY AUTOMATED COUNT: 14.6 % (ref 11.5–14.5)
GLUCOSE BLD MANUAL STRIP-MCNC: 68 MG/DL (ref 74–99)
GLUCOSE BLD MANUAL STRIP-MCNC: 90 MG/DL (ref 74–99)
GLUCOSE SERPL-MCNC: 66 MG/DL (ref 74–99)
HCT VFR BLD AUTO: 23.8 % (ref 36–46)
HGB BLD-MCNC: 7.7 G/DL (ref 12–16)
HOLD SPECIMEN: 293
IMM GRANULOCYTES # BLD AUTO: 0.01 X10*3/UL (ref 0–0.5)
IMM GRANULOCYTES NFR BLD AUTO: 0.3 % (ref 0–0.9)
LYMPHOCYTES # BLD AUTO: 0.86 X10*3/UL (ref 0.8–3)
LYMPHOCYTES NFR BLD AUTO: 23.8 %
MAGNESIUM SERPL-MCNC: 1.88 MG/DL (ref 1.6–2.4)
MCH RBC QN AUTO: 27.9 PG (ref 26–34)
MCHC RBC AUTO-ENTMCNC: 32.4 G/DL (ref 32–36)
MCV RBC AUTO: 86 FL (ref 80–100)
MONOCYTES # BLD AUTO: 0.48 X10*3/UL (ref 0.05–0.8)
MONOCYTES NFR BLD AUTO: 13.3 %
NEUTROPHILS # BLD AUTO: 2.15 X10*3/UL (ref 1.6–5.5)
NEUTROPHILS NFR BLD AUTO: 59.3 %
NRBC BLD-RTO: 0 /100 WBCS (ref 0–0)
PLATELET # BLD AUTO: 192 X10*3/UL (ref 150–450)
POTASSIUM SERPL-SCNC: 3.7 MMOL/L (ref 3.5–5.3)
RBC # BLD AUTO: 2.76 X10*6/UL (ref 4–5.2)
SODIUM SERPL-SCNC: 138 MMOL/L (ref 136–145)
WBC # BLD AUTO: 3.6 X10*3/UL (ref 4.4–11.3)

## 2025-05-02 PROCEDURE — 2500000004 HC RX 250 GENERAL PHARMACY W/ HCPCS (ALT 636 FOR OP/ED): Performed by: PHYSICIAN ASSISTANT

## 2025-05-02 PROCEDURE — 93005 ELECTROCARDIOGRAM TRACING: CPT

## 2025-05-02 PROCEDURE — 99232 SBSQ HOSP IP/OBS MODERATE 35: CPT | Performed by: NURSE PRACTITIONER

## 2025-05-02 PROCEDURE — 82947 ASSAY GLUCOSE BLOOD QUANT: CPT

## 2025-05-02 PROCEDURE — 80048 BASIC METABOLIC PNL TOTAL CA: CPT | Performed by: STUDENT IN AN ORGANIZED HEALTH CARE EDUCATION/TRAINING PROGRAM

## 2025-05-02 PROCEDURE — 36415 COLL VENOUS BLD VENIPUNCTURE: CPT | Performed by: STUDENT IN AN ORGANIZED HEALTH CARE EDUCATION/TRAINING PROGRAM

## 2025-05-02 PROCEDURE — 2500000001 HC RX 250 WO HCPCS SELF ADMINISTERED DRUGS (ALT 637 FOR MEDICARE OP): Performed by: STUDENT IN AN ORGANIZED HEALTH CARE EDUCATION/TRAINING PROGRAM

## 2025-05-02 PROCEDURE — 99239 HOSP IP/OBS DSCHRG MGMT >30: CPT | Performed by: STUDENT IN AN ORGANIZED HEALTH CARE EDUCATION/TRAINING PROGRAM

## 2025-05-02 PROCEDURE — 85025 COMPLETE CBC W/AUTO DIFF WBC: CPT | Performed by: STUDENT IN AN ORGANIZED HEALTH CARE EDUCATION/TRAINING PROGRAM

## 2025-05-02 PROCEDURE — 83735 ASSAY OF MAGNESIUM: CPT | Performed by: STUDENT IN AN ORGANIZED HEALTH CARE EDUCATION/TRAINING PROGRAM

## 2025-05-02 PROCEDURE — 93010 ELECTROCARDIOGRAM REPORT: CPT | Performed by: INTERNAL MEDICINE

## 2025-05-02 RX ORDER — PANTOPRAZOLE SODIUM 40 MG/1
40 TABLET, DELAYED RELEASE ORAL 2 TIMES DAILY
Qty: 60 TABLET | Refills: 1 | Status: SHIPPED | OUTPATIENT
Start: 2025-05-02 | End: 2025-07-01

## 2025-05-02 RX ORDER — POLYETHYLENE GLYCOL 3350 17 G/17G
17 POWDER, FOR SOLUTION ORAL DAILY
Qty: 510 G | Refills: 2 | Status: SHIPPED | OUTPATIENT
Start: 2025-05-02 | End: 2025-07-31

## 2025-05-02 RX ADMIN — Medication 25 MCG: at 08:44

## 2025-05-02 RX ADMIN — PANTOPRAZOLE SODIUM 40 MG: 40 INJECTION, POWDER, FOR SOLUTION INTRAVENOUS at 06:06

## 2025-05-02 RX ADMIN — METOPROLOL TARTRATE 12.5 MG: 25 TABLET, FILM COATED ORAL at 08:45

## 2025-05-02 RX ADMIN — ACETAMINOPHEN 650 MG: 325 TABLET ORAL at 02:54

## 2025-05-02 ASSESSMENT — PAIN - FUNCTIONAL ASSESSMENT
PAIN_FUNCTIONAL_ASSESSMENT: 0-10

## 2025-05-02 ASSESSMENT — PAIN SCALES - GENERAL
PAINLEVEL_OUTOF10: 0 - NO PAIN
PAINLEVEL_OUTOF10: 0 - NO PAIN
PAINLEVEL_OUTOF10: 3

## 2025-05-02 ASSESSMENT — PAIN DESCRIPTION - LOCATION: LOCATION: BACK

## 2025-05-02 NOTE — CARE PLAN
The patient's goals for the shift include      The clinical goals for the shift include Safety to prevent falls/injury, monitor for bleeding, and update with plan of care    Over the shift, the patient did not make progress toward the following goals. Barriers to progression include . Recommendations to address these barriers include   Problem: Pain - Adult  Goal: Verbalizes/displays adequate comfort level or baseline comfort level  Outcome: Progressing     Problem: Safety - Adult  Goal: Free from fall injury  Outcome: Progressing     Problem: Discharge Planning  Goal: Discharge to home or other facility with appropriate resources  Outcome: Progressing     Problem: Chronic Conditions and Co-morbidities  Goal: Patient's chronic conditions and co-morbidity symptoms are monitored and maintained or improved  Outcome: Progressing     Problem: Nutrition  Goal: Nutrient intake appropriate for maintaining nutritional needs  Outcome: Progressing     Problem: Skin  Goal: Decreased wound size/increased tissue granulation at next dressing change  Outcome: Progressing  Goal: Participates in plan/prevention/treatment measures  Outcome: Progressing  Goal: Prevent/manage excess moisture  Outcome: Progressing  Goal: Prevent/minimize sheer/friction injuries  Outcome: Progressing  Goal: Promote/optimize nutrition  Outcome: Progressing  Goal: Promote skin healing  Outcome: Progressing     Problem: Pain  Goal: Takes deep breaths with improved pain control throughout the shift  Outcome: Progressing  Goal: Turns in bed with improved pain control throughout the shift  Outcome: Progressing  Goal: Walks with improved pain control throughout the shift  Outcome: Progressing  Goal: Performs ADL's with improved pain control throughout shift  Outcome: Progressing  Goal: Participates in PT with improved pain control throughout the shift  Outcome: Progressing  Goal: Free from opioid side effects throughout the shift  Outcome: Progressing  Goal:  Free from acute confusion related to pain meds throughout the shift  Outcome: Progressing     Problem: Fall/Injury  Goal: Not fall by end of shift  Outcome: Progressing  Goal: Be free from injury by end of the shift  Outcome: Progressing  Goal: Verbalize understanding of personal risk factors for fall in the hospital  Outcome: Progressing  Goal: Verbalize understanding of risk factor reduction measures to prevent injury from fall in the home  Outcome: Progressing  Goal: Use assistive devices by end of the shift  Outcome: Progressing  Goal: Pace activities to prevent fatigue by end of the shift  Outcome: Progressing   .

## 2025-05-02 NOTE — HH CARE COORDINATION
Home Care received a Referral for Nursing, Physical Therapy, and Occupational Therapy. We have processed the referral for a Start of Care on 05/04/2025.     If you have any questions or concerns, please feel free to contact us at [phone]. Follow the prompts, enter your five digit zip code, and you will be directed to your care team on [facility].

## 2025-05-02 NOTE — PROGRESS NOTES
Department of Internal Medicine  Gastroenterology  Progress note      Subjective  GI is following for coffee-ground emesis, melena     Patient tolerating regular diet.  No nausea/vomiting.  No abdominal pain.  No new signs of overt GI bleeding per RN and patient.  Discussed results of scopes with the patient.  Patient verbalized understanding.  All questions answered.  Discussed at length with the patient regarding plan of care and follow-up.      Current Medication  Current Medications[1]    Past Medical History  Active Ambulatory Problems     Diagnosis Date Noted    Anemia 03/01/2023    Arthritis of foot, left 03/01/2023    History of breast cancer 03/01/2023    Constipation 03/01/2023    GERD (gastroesophageal reflux disease) 03/01/2023    Left bundle branch block 03/01/2023    Mild vitamin D deficiency 03/01/2023    Actinic keratosis 03/01/2023    Seborrheic keratosis 03/01/2023    Varicose veins of both lower extremities with inflammation 03/01/2023    Ocular migraine 12/08/2023    Atherosclerosis of native coronary artery of native heart without angina pectoris 07/13/2024    Essential hypertension 07/25/2024    Hx of percutaneous transluminal coronary angioplasty 10/24/2024    Heart failure with mildly reduced ejection fraction (HFmrEF) 10/24/2024    Stented coronary artery 04/28/2025    Decreased cardiac ejection fraction 04/28/2025     Resolved Ambulatory Problems     Diagnosis Date Noted    Benign mole 03/01/2023    Bronchitis 03/01/2023    Cataract 03/01/2023    Dizziness 03/01/2023    Vertigo 03/01/2023    Blood cholesterol increased compared with prior measurement 03/01/2023    Headache, occipital 03/01/2023    Itching 03/01/2023    Juvenile cortical cataract of right eye 03/01/2023    Left ankle swelling 03/01/2023    Lesion of face 03/01/2023    Nausea with vomiting 03/01/2023    Posterior subcapsular polar age-related cataract 03/01/2023    Shortness of breath 03/01/2023    Tightness in chest  "03/01/2023    URI (upper respiratory infection) 03/01/2023    UTI symptoms 03/01/2023    Varicose veins of legs 03/01/2023    Depression, recurrent (CMS-HCC) 12/08/2023    Malignant neoplasm of breast 07/13/2012    Altered mental status, unspecified altered mental status type 07/13/2024    Recurrent UTI (urinary tract infection) 07/13/2024    Chronic diastolic heart failure 10/24/2024    Fall, initial encounter 02/12/2025    Hypothermia 02/11/2025    Elevated troponin 02/11/2025     Past Medical History:   Diagnosis Date    Arthritis     Bitten or stung by nonvenomous insect and other nonvenomous arthropods, initial encounter 05/22/2019    Encounter for general adult medical examination without abnormal findings 10/05/2022    Encounter for general adult medical examination without abnormal findings 11/02/2021    Personal history of malignant neoplasm of breast     Personal history of other diseases of urinary system 08/10/2019    Personal history of other specified conditions     Personal history of other specified conditions 08/10/2019    Personal history of urinary (tract) infections 08/10/2019    Personal history of urinary (tract) infections 09/03/2019       PHYSICAL EXAM  VS: /60 (BP Location: Right arm, Patient Position: Lying)   Pulse 59   Temp 36.7 °C (98.1 °F) (Temporal)   Resp 15   Ht 1.499 m (4' 11\")   Wt 63.5 kg (140 lb)   SpO2 96%   BMI 28.28 kg/m²  Body mass index is 28.28 kg/m².  Physical Exam   Physical Exam:  General: Polite, calm, NAD  Skin:  Warm and dry, no jaundice  HEENT: No scleral icterus, no conjunctival pallor, normocephalic, atraumatic, mucous membranes moist  Neck:  atraumatic, trachea midline, no JVD  Chest:  Clear to auscultation bilaterally.   CV:  Regular rate and rhythm.    Abdomen: no distension, +BS, soft, non-tender to palpation, no rebound tenderness, no guarding, Extremities: no lower extremity edema  Neurological:  A&Ox3  Psychiatric: cooperative     DATA  Recent " blood work and relevant radiology and endoscopic studies were reviewed and discussed with the patient   Results from last 7 days   Lab Units 05/02/25  0555   WBC AUTO x10*3/uL 3.6*   RBC AUTO x10*6/uL 2.76*   HEMOGLOBIN g/dL 7.7*   HEMATOCRIT % 23.8*   MCV fL 86   MCHC g/dL 32.4   RDW % 14.6*   PLATELETS AUTO x10*3/uL 192       Results from last 72 hours   Lab Units 05/02/25  0555   SODIUM mmol/L 138   POTASSIUM mmol/L 3.7   CHLORIDE mmol/L 110*   CO2 mmol/L 22   BUN mg/dL 19   CREATININE mg/dL 0.93   CALCIUM mg/dL 8.2*                 This is an 85 yo Female with a PMH of CAD on DAPT, recent UTI, breast cancer, vertigo, and recent RSV who presented to [facility] on 4/27/25  with reports of vertigo.  GI was consulted for melena/coffee ground emesis.  EGD done on 4/28 showing a Schatzki ring, small HH, gastritis.  Hgb 9.8 upon admission and dropped to 6.4.  The patient did get a unit of blood, had a transfusion reaction of chest tightness and a fever.  Somehow the paperwork got displaced and there was a mix up and then a delay for receiving a transfusion.      Colonoscopy 5/1/2025   There was severe fredo-diverticular spasm in the distal sigmoid colon in a region of severe diverticulosis that resisted safe passage of colonoscope despite scope reinsertion, placement of patient in supine position and water immersion. Procedure aborted in the sigmoid colon.       Normocytic anemia likely 2/2 GIB. Colonoscopy 5/1 noting likely diverticular bleed that has resolved. Hgb 6.4->8.3->7.7 today  Coffee-ground emesis 2/2 gastritis and gastric erosion, hematochezia 2/2 diverticular bleed.   DAPT  Tachycardia, chest discomfort, EKG changes-managed per cardiology            Plan  -Monitor H&H and transfuse PRBC for Hgb <7  -Continue to monitor for any overt GI bleeding  -Continue PPI 40 mg PO BID x 8 weeks, then decrease dose to once a day dosing indefinitely while on Plavix. Weigh risks/benefits of continuing antiplatelet medication.    -Follow up with GI in 2 weeks to review pathology.  -patient up to date on plan of care. All questions answered             Discussed with Dr. Whiting      (Electronically signed byYECENIA Fontenot-CNP on 5/2/2025 at 12:55 PM) gas                              [1]   Current Facility-Administered Medications:     acetaminophen (Tylenol) tablet 650 mg, 650 mg, oral, q4h PRN, 650 mg at 05/02/25 0254 **OR** acetaminophen (Tylenol) oral liquid 650 mg, 650 mg, oral, q4h PRN **OR** acetaminophen (Tylenol) suppository 650 mg, 650 mg, rectal, q4h PRN, Stephen Coulter MD    acetaminophen (Tylenol) tablet 650 mg, 650 mg, oral, q4h PRN **OR** acetaminophen (Tylenol) oral liquid 650 mg, 650 mg, nasogastric tube, q4h PRN **OR** acetaminophen (Tylenol) suppository 650 mg, 650 mg, rectal, q4h PRN, Anson Bowen MD    [Held by provider] aspirin chewable tablet 81 mg, 81 mg, oral, Daily, Stephen Coulter MD    benzocaine-menthol (Cepastat Sore Throat) lozenge 1 lozenge, 1 lozenge, Mouth/Throat, q2h PRN, Stephen Coulter MD, 1 lozenge at 04/27/25 2325    cholecalciferol (Vitamin D-3) tablet 25 mcg, 25 mcg, oral, Daily, Stephen Coulter MD, 25 mcg at 05/02/25 0844    metoprolol tartrate (Lopressor) injection 5 mg, 5 mg, intravenous, q6h PRN, Stephen Coulter MD, 5 mg at 04/28/25 1849    metoprolol tartrate (Lopressor) tablet 12.5 mg, 12.5 mg, oral, BID, Anson Bowen MD, 12.5 mg at 05/02/25 0845    pantoprazole (Protonix) injection 40 mg, 40 mg, intravenous, Daily, Viry Tillman PA-C, 40 mg at 05/02/25 0606    perflutren lipid microspheres (Definity) injection 0.5-10 mL of dilution, 0.5-10 mL of dilution, intravenous, Once in imaging, Stephen Coulter MD    perflutren protein A microsphere (Optison) injection 0.5 mL, 0.5 mL, intravenous, Once in imaging, Stephen Coulter MD    promethazine (Phenergan) 12.5 mg in sodium chloride 0.9% 50 mL IV, 12.5 mg, intravenous, q6h PRN, Anson Bowen MD, 12.5 mg at  04/27/25 2109    rosuvastatin (Crestor) tablet 5 mg, 5 mg, oral, Daily, Stephen Coulter MD, 5 mg at 05/01/25 2044    sulfur hexafluoride microsphr (Lumason) injection 24.28 mg, 2 mL, intravenous, Once in imaging, Stephen Coulter MD

## 2025-05-02 NOTE — DISCHARGE SUMMARY
Medical Group Discharge Summary  DISCHARGE DIAGNOSIS     Likely diverticular bleed  Acute blood loss anemia  Syncopal event  Febrile Transfusion Reaction  Type II MI  Hx CAD, breast cancer, vertigo, Chronic systolic heart failure    HOSPITAL COURSE AND DETAILS     Laurel Pugh is a 84 y.o. female With a past medical history of coronary artery disease, recent UTI, breast cancer, vertigo, recent RSV who presented to the ER following a syncopal event.     In the ER blood pressure was 111/68 but has been dropping, hemoglobin was low at baseline, creatinine was stable, D-dimer was elevated, CT PE did not show any acute PE or pneumonia. Patient was started on IV fluids as orthostasis suspected etiology and was admitted to hospitalist service for continued management and monitoring.     Likely diverticular bleed  Acute blood loss anemia  While on medical floor patient was noted to have a bloody bowel movements x2 and 1 episode of coffee ground emesis with downtrend in hb appreciated. Iron studies consistent with AMANDA with low iron and low normal ferritin.   -GI was consulted for evaluation. Patient was taken for EGD on 4/28 with no active bleeding identified. Small hiatal hernia, schatzki ring, and erythematous mucosa w/ erosion in the stomach noted (biopsied to [facility] out h.pylori). Erosion suspected source of coffee ground emesis.  -Decision was made to proceed with Colonoscopy which was attempted on 5/1. C-scope was ultimately aborted due to fredo-diverticular spasm in the distal sigmoid colon in a region of severe diverticulosis that resisted safe passage of colonoscope.   -Gi believe diverticular bleed as etiology of hematochezia and anemia.   -PPI BID x 8 weeks advised followed by de-escalation to daily dosing. Prescription provided  -GI follow-up in 2 weeks advised, GI to schedule  -Will DC with prescriptions for fiber supplementation and miralax daily   -Will order repeat CBC for 1 week following DC.  -Will  refer to healthy at home and order [facility] for further monitoring post discharge    Syncopal Event  -Likely secondary to blood loss anemia volume depletion.   -HHC to be arranged for PT/OT    Febrile Transfusion Reaction  -During initial bleeding episode, 1 unit of PRBC was ordered. Shortly after transfusion was started a temperature elevation and tachycardia was noted. Transfusion was ceased. There was confusion regarding patient's vital signs and quantity of blood transfused during the event due to loss of paperwork/incomplete documentation. Pathology reviewed and ultimately the transfusion reaction was determined to be a febrile non-hemolytic reaction. Cleared for repeat transfusion but patient requires premedication with antihistamine/antipyretics   -Patient was pre-medicated and tolerated later unit of PRBC that was ordered during hospitalization.     Type II MI  Hx CAD  -Pt noted to have troponin elevations/EKG changes in house. Cardiology evaluated, suspected secondary to hypotension, GI bleeding, and volume depletion. Repeat TTE was ordered for re-evaluation of cardiac function but patient deferred. No further chest discomfort noted during hospitalization. Continued follow-up with primary cardiologist outpatient advised. Aspirin discontinued. Plavix to be resumed on discharge.     Patient was in stable condition on day of discharge with no acute concerns.    40 minutes spent on discharge. Time calculated includes outpatient care coordination, bedside education, and counselling.     ---Of note, parts of this documentation was completed using the Dragon Dictation system (voice recognition software). There may be spelling and/or grammatical errors that were not corrected prior to final submission.---    Nicholas Haines MD    DISCHARGE PHYSICAL EXAM     Last Recorded Vitals:  Vitals:    05/01/25 1649 05/01/25 2043 05/02/25 0501 05/02/25 0726   BP: 163/75 159/69 148/67 130/60   BP Location:  Right arm  Right arm    Patient Position:    Lying   Pulse: 59 62 55 59   Resp:  18  15   Temp: 36.6 °C (97.9 °F) 36.6 °C (97.9 °F) 35.8 °C (96.4 °F) 36.7 °C (98.1 °F)   TempSrc:    Temporal   SpO2: 97% 100% 97% 96%   Weight:       Height:           Physical Exam  General: Well-developed elderly female in mild distress  HEENT: Clear sclera, EOMI, trachea midline, moist mucous membranes  Respiratory: Equal chest rise, no retractions  Abdomen: Soft, nontender, nondistended  Extremities: No cyanosis or clubbing appreciated  Neurological: Spontaneously moves all extremities, no dysarthria, cranial nerves grossly intact  Psychiatric: Appropriate mood and affect  Skin: Warm, dry    DISCHARGE MEDICATIONS        Your medication list        CONTINUE taking these medications        Instructions Last Dose Given Next Dose Due   blood pressure test kit-large kit      Automatic type blood pressure monitor.    Check blood pressure daily and maintain log       clopidogrel 75 mg tablet  Commonly known as: Plavix      Take 1 tablet (75 mg) by mouth once daily.       metoprolol tartrate 25 mg tablet  Commonly known as: Lopressor      Take 0.5 tablets (12.5 mg) by mouth 2 times a day.       rosuvastatin 5 mg tablet  Commonly known as: Crestor      Take 1 tablet (5 mg) by mouth once daily.              STOP taking these medications      aspirin 81 mg EC tablet               ASK your doctor about these medications        Instructions Last Dose Given Next Dose Due   brompheniramine-pseudoeph-DM 2-30-10 mg/5 mL syrup  Ask about: Should I take this medication?      Take 10 mL by mouth 4 times a day as needed for congestion or cough for up to 5 days.       cholecalciferol 25 mcg (1,000 units) tablet  Commonly known as: Vitamin D-3           nitrofurantoin (macrocrystal-monohydrate) 100 mg capsule  Commonly known as: Macrobid  Ask about: Should I take this medication?      Take 1 capsule (100 mg) by mouth 2 times a day for 7 days.       sacubitriL-valsartan 24-26  mg tablet  Commonly known as: Entresto      Take 0.5 tablets by mouth 2 times a day.       STOOL SOFTENER ORAL                      OUTPATIENT FOLLOW-UP     Future Appointments   Date Time Provider Department Center   5/27/2025  9:45 AM Adrian Echavarria MD [id number] [facility]   10/2/2025 10:15 AM Chong Houston DO [id number] [facility]

## 2025-05-02 NOTE — CONSULTS
Nutrition Progress Note    RD consulted for NPO/clear liquids x 5 days. Diet advanced to regular diet this morning. Will discontinue consult.

## 2025-05-03 LAB
ATRIAL RATE: 58 BPM
ATRIAL RATE: 65 BPM
ATRIAL RATE: 68 BPM
ATRIAL RATE: 68 BPM
P AXIS: 30 DEGREES
P AXIS: 36 DEGREES
P AXIS: 40 DEGREES
P AXIS: 61 DEGREES
P OFFSET: 163 MS
P OFFSET: 180 MS
P OFFSET: 181 MS
P OFFSET: 181 MS
P ONSET: 123 MS
P ONSET: 123 MS
P ONSET: 124 MS
P ONSET: 132 MS
PR INTERVAL: 160 MS
PR INTERVAL: 176 MS
PR INTERVAL: 178 MS
PR INTERVAL: 182 MS
Q ONSET: 212 MS
Q ONSET: 214 MS
QRS COUNT: 10 BEATS
QRS COUNT: 11 BEATS
QRS DURATION: 132 MS
QRS DURATION: 134 MS
QRS DURATION: 136 MS
QRS DURATION: 146 MS
QT INTERVAL: 444 MS
QT INTERVAL: 444 MS
QT INTERVAL: 488 MS
QT INTERVAL: 514 MS
QTC CALCULATION(BAZETT): 472 MS
QTC CALCULATION(BAZETT): 472 MS
QTC CALCULATION(BAZETT): 504 MS
QTC CALCULATION(BAZETT): 507 MS
QTC FREDERICIA: 463 MS
QTC FREDERICIA: 463 MS
QTC FREDERICIA: 501 MS
QTC FREDERICIA: 508 MS
R AXIS: -10 DEGREES
R AXIS: -26 DEGREES
R AXIS: 14 DEGREES
R AXIS: 24 DEGREES
T AXIS: 112 DEGREES
T AXIS: 150 DEGREES
T AXIS: 171 DEGREES
T AXIS: 190 DEGREES
T OFFSET: 434 MS
T OFFSET: 436 MS
T OFFSET: 456 MS
T OFFSET: 469 MS
VENTRICULAR RATE: 58 BPM
VENTRICULAR RATE: 65 BPM
VENTRICULAR RATE: 68 BPM
VENTRICULAR RATE: 68 BPM

## 2025-05-04 ENCOUNTER — HOME CARE VISIT (OUTPATIENT)
Dept: HOME HEALTH SERVICES | Facility: HOME HEALTH | Age: 85
End: 2025-05-04
Payer: MEDICARE

## 2025-05-04 ENCOUNTER — PATIENT OUTREACH (OUTPATIENT)
Dept: CARE COORDINATION | Age: 85
End: 2025-05-04
Payer: MEDICARE

## 2025-05-04 VITALS
SYSTOLIC BLOOD PRESSURE: 101 MMHG | HEART RATE: 61 BPM | RESPIRATION RATE: 20 BRPM | DIASTOLIC BLOOD PRESSURE: 50 MMHG | BODY MASS INDEX: 28.83 KG/M2 | HEIGHT: 59 IN | OXYGEN SATURATION: 98 % | TEMPERATURE: 97.4 F | WEIGHT: 143 LBS

## 2025-05-04 PROCEDURE — 169592 NO-PAY CLAIM PROCEDURE

## 2025-05-04 PROCEDURE — G0299 HHS/HOSPICE OF RN EA 15 MIN: HCPCS | Mod: HHH

## 2025-05-04 ASSESSMENT — ACTIVITIES OF DAILY LIVING (ADL)
CURRENT_FUNCTION: ONE PERSON
PHYSICAL_TRANSFERS_DEVICES: ROLLATOR
ENTERING_EXITING_HOME: MODERATE ASSIST
OASIS_M1830: 03
AMBULATION ASSISTANCE: 1
PHYSICAL TRANSFERS ASSESSED: 1
AMBULATION ASSISTANCE: ONE PERSON

## 2025-05-04 ASSESSMENT — ENCOUNTER SYMPTOMS
DIZZINESS: 1
PAIN: 1
BOWEL PATTERN NORMAL: 1
PERSON REPORTING PAIN: PATIENT
PAIN SEVERITY GOAL: 0/10
STOOL FREQUENCY: DAILY
LIMITED RANGE OF MOTION: 1
INDIGESTION: 1
CHANGE IN APPETITE: INCREASED
MUSCLE WEAKNESS: 1
PAIN LOCATION: ABDOMEN
LAST BOWEL MOVEMENT: 67329
SUBJECTIVE PAIN PROGRESSION: RESOLVED
APPETITE LEVEL: GOOD
ABDOMINAL PAIN: 1
HIGHEST PAIN SEVERITY IN PAST 24 HOURS: 6/10
LOWEST PAIN SEVERITY IN PAST 24 HOURS: 0/10

## 2025-05-05 ENCOUNTER — HOME CARE VISIT (OUTPATIENT)
Dept: HOME HEALTH SERVICES | Facility: HOME HEALTH | Age: 85
End: 2025-05-05
Payer: MEDICARE

## 2025-05-05 ENCOUNTER — PATIENT OUTREACH (OUTPATIENT)
Dept: PRIMARY CARE | Facility: CLINIC | Age: 85
End: 2025-05-05
Payer: MEDICARE

## 2025-05-05 ENCOUNTER — PATIENT OUTREACH (OUTPATIENT)
Dept: CARE COORDINATION | Age: 85
End: 2025-05-05
Payer: MEDICARE

## 2025-05-05 PROCEDURE — G0151 HHCP-SERV OF PT,EA 15 MIN: HCPCS | Mod: HHH

## 2025-05-05 SDOH — ECONOMIC STABILITY: HOUSING INSECURITY: IN THE LAST 12 MONTHS, WAS THERE A TIME WHEN YOU WERE NOT ABLE TO PAY THE MORTGAGE OR RENT ON TIME?: NO

## 2025-05-05 SDOH — SOCIAL STABILITY: SOCIAL NETWORK: HOW OFTEN DO YOU ATTEND CHURCH OR RELIGIOUS SERVICES?: MORE THAN 4 TIMES PER YEAR

## 2025-05-05 SDOH — ECONOMIC STABILITY: FOOD INSECURITY: HOW HARD IS IT FOR YOU TO PAY FOR THE VERY BASICS LIKE FOOD, HOUSING, MEDICAL CARE, AND HEATING?: NOT VERY HARD

## 2025-05-05 SDOH — SOCIAL STABILITY: SOCIAL INSECURITY: WITHIN THE LAST YEAR, HAVE YOU BEEN HUMILIATED OR EMOTIONALLY ABUSED IN OTHER WAYS BY YOUR PARTNER OR EX-PARTNER?: NO

## 2025-05-05 SDOH — SOCIAL STABILITY: SOCIAL NETWORK: HOW OFTEN DO YOU ATTEND MEETINGS OF THE CLUBS OR ORGANIZATIONS YOU BELONG TO?: NEVER

## 2025-05-05 SDOH — HEALTH STABILITY: MENTAL HEALTH: HOW MANY DRINKS CONTAINING ALCOHOL DO YOU HAVE ON A TYPICAL DAY WHEN YOU ARE DRINKING?: PATIENT DOES NOT DRINK

## 2025-05-05 SDOH — ECONOMIC STABILITY: FOOD INSECURITY: WITHIN THE PAST 12 MONTHS, YOU WORRIED THAT YOUR FOOD WOULD RUN OUT BEFORE YOU GOT THE MONEY TO BUY MORE.: NEVER TRUE

## 2025-05-05 SDOH — ECONOMIC STABILITY: HOUSING INSECURITY: IN THE PAST 12 MONTHS, HOW MANY TIMES HAVE YOU MOVED WHERE YOU WERE LIVING?: 0

## 2025-05-05 SDOH — SOCIAL STABILITY: SOCIAL INSECURITY: WITHIN THE LAST YEAR, HAVE YOU BEEN AFRAID OF YOUR PARTNER OR EX-PARTNER?: NO

## 2025-05-05 SDOH — HEALTH STABILITY: MENTAL HEALTH: HOW OFTEN DO YOU HAVE A DRINK CONTAINING ALCOHOL?: NEVER

## 2025-05-05 SDOH — SOCIAL STABILITY: SOCIAL NETWORK
DO YOU BELONG TO ANY CLUBS OR ORGANIZATIONS SUCH AS CHURCH GROUPS, UNIONS, FRATERNAL OR ATHLETIC GROUPS, OR SCHOOL GROUPS?: NO

## 2025-05-05 SDOH — HEALTH STABILITY: MENTAL HEALTH
DO YOU FEEL STRESS - TENSE, RESTLESS, NERVOUS, OR ANXIOUS, OR UNABLE TO SLEEP AT NIGHT BECAUSE YOUR MIND IS TROUBLED ALL THE TIME - THESE DAYS?: ONLY A LITTLE

## 2025-05-05 SDOH — ECONOMIC STABILITY: HOUSING INSECURITY: AT ANY TIME IN THE PAST 12 MONTHS, WERE YOU HOMELESS OR LIVING IN A SHELTER (INCLUDING NOW)?: NO

## 2025-05-05 SDOH — HEALTH STABILITY: MENTAL HEALTH: HOW OFTEN DO YOU HAVE SIX OR MORE DRINKS ON ONE OCCASION?: NEVER

## 2025-05-05 SDOH — SOCIAL STABILITY: SOCIAL NETWORK: HOW OFTEN DO YOU GET TOGETHER WITH FRIENDS OR RELATIVES?: MORE THAN THREE TIMES A WEEK

## 2025-05-05 SDOH — ECONOMIC STABILITY: FOOD INSECURITY: WITHIN THE PAST 12 MONTHS, THE FOOD YOU BOUGHT JUST DIDN'T LAST AND YOU DIDN'T HAVE MONEY TO GET MORE.: NEVER TRUE

## 2025-05-05 SDOH — ECONOMIC STABILITY: INCOME INSECURITY: IN THE PAST 12 MONTHS HAS THE ELECTRIC, GAS, OIL, OR WATER COMPANY THREATENED TO SHUT OFF SERVICES IN YOUR HOME?: NO

## 2025-05-05 SDOH — HEALTH STABILITY: PHYSICAL HEALTH: ON AVERAGE, HOW MANY DAYS PER WEEK DO YOU ENGAGE IN MODERATE TO STRENUOUS EXERCISE (LIKE A BRISK WALK)?: 0 DAYS

## 2025-05-05 SDOH — SOCIAL STABILITY: SOCIAL INSECURITY: ARE YOU MARRIED, WIDOWED, DIVORCED, SEPARATED, NEVER MARRIED, OR LIVING WITH A PARTNER?: WIDOWED

## 2025-05-05 SDOH — HEALTH STABILITY: PHYSICAL HEALTH: ON AVERAGE, HOW MANY MINUTES DO YOU ENGAGE IN EXERCISE AT THIS LEVEL?: 0 MIN

## 2025-05-05 SDOH — ECONOMIC STABILITY: TRANSPORTATION INSECURITY: IN THE PAST 12 MONTHS, HAS LACK OF TRANSPORTATION KEPT YOU FROM MEDICAL APPOINTMENTS OR FROM GETTING MEDICATIONS?: NO

## 2025-05-05 SDOH — HEALTH STABILITY: PHYSICAL HEALTH: EXERCISE TYPE: BLE PER HEP

## 2025-05-05 ASSESSMENT — ACTIVITIES OF DAILY LIVING (ADL)
AMBULATION ASSISTANCE ON FLAT SURFACES: 1
LACK_OF_TRANSPORTATION: NO
AMBULATION ASSISTANCE: 1
AMBULATION_DISTANCE/DURATION_TOLERATED: 150'
PHYSICAL TRANSFERS ASSESSED: 1

## 2025-05-05 ASSESSMENT — ENCOUNTER SYMPTOMS
PERSON REPORTING PAIN: PATIENT
DENIES PAIN: 1

## 2025-05-05 ASSESSMENT — LIFESTYLE VARIABLES
AUDIT-C TOTAL SCORE: 0
SKIP TO QUESTIONS 9-10: 1

## 2025-05-05 NOTE — PROGRESS NOTES
The patient was contacted regarding enrollment in the [facility] - the program benefits and expectations were explained to her including the billing of insurance. She agreed to enroll with the idea that she will try it and if she does not think it suits her she will graduate early - son and daughter were present and able to hear as well. All follow up appointments reviewed - the daughter is calling [facility] to confirm which day the lab work will be drawn as she was told they would draw it on 5/8 but SN visit is scheduled for 5/10 currently.   The was recently discharged from [facility] for GI bleed with bloody stools and coffee ground emesis. She presented to the ED with additional complaints of lightheadedness followed by syncopal episode. She had discharge diagnoses of:  Likely diverticular bleed - Acute blood loss anemia - Syncopal event - Febrile Transfusion Reaction - Type II MI  Hx CAD, breast cancer, vertigo, Chronic systolic heart failure.  She lives with her son - son and daughter involved in her care. She reports that she has no need of assistance with food, housing or obtaining medications.

## 2025-05-05 NOTE — PROGRESS NOTES
Discharge Facility: [facility]  Discharge Diagnosis: Likely diverticular bleed  Acute blood loss anemia  Syncopal event  Febrile Transfusion Reaction  Admission Date: 4/27/25  Discharge Date: 5/2/25    PCP Appointment Date: 5/13/25 MANDO KEENE-IFEOMA  Specialist Appointment Date: Cardiology Dr Echavarria 5/27/25  Hospital Encounter and Summary Linked: Yes  ED to Hosp-Admission (Discharged) with Nicholas Haines MD; Austin Jaffe MD (04/27/2025)   See discharge assessment below for further details     Wrap Up  Wrap Up Additional Comments: This CM spoke with patient via phone. Successful transition of care outreach complete. Pt reports doing well at home since discharge. New meds reviewed. Pt denies any prescription medication questions. Pt denies CP and SOB. Pt also denies black or bloody stools or dizziness. [facility] came to evaluate and it was determined the pt will be doing the exercises on her own at home. The pt reports having family support  at her home for her safety and assistance with medications. Patient denies any further discharge questions/needs at this time. Emphasized that Follow up is needed after discharge to review the hospital recommendations, assess your response to your treatment. Pt has an appointment scheduled for follow up.  Pt aware of my availability for non-emergent concerns. Contact info provided to patient. (5/5/2025  1:51 PM)    Medications  Medications reviewed with patient/caregiver?: Yes (5/5/2025  1:51 PM)  Is the patient having any side effects they believe may be caused by any medication additions or changes?: No (5/5/2025  1:51 PM)  Does the patient have all medications ordered at discharge?: Yes (5/5/2025  1:51 PM)  Care Management Interventions: No intervention needed (5/5/2025  1:51 PM)  Prescription Comments: pantoprazole (ProtoNix)   polyethylene glycol (Glycolax, Miralax)   psyllium (Metamucil)    STOP taking:  aspirin 81 mg EC tablet (5/5/2025  1:51 PM)  Is the patient taking  all medications as directed (includes completed medication regime)?: Yes (5/5/2025  1:51 PM)  Care Management Interventions: Provided patient education (5/5/2025  1:51 PM)  Medication Comments: no noted issues obtaining medications (5/5/2025  1:51 PM)    Appointments  Does the patient have a primary care provider?: Yes (5/5/2025  1:51 PM)  Care Management Interventions: Verified appointment date/time/provider (5/5/2025  1:51 PM)  Has the patient kept scheduled appointments due by today?: Yes (5/5/2025  1:51 PM)  Care Management Interventions: Advised patient to keep appointment (5/5/2025  1:51 PM)    Self Management  What is the home health agency?: [facility] (5/5/2025  1:51 PM)  Has home health visited the patient within 72 hours of discharge?: Yes (5/5/2025  1:51 PM)  What Durable Medical Equipment (DME) was ordered?: denies new need (5/5/2025  1:51 PM)    Patient Teaching  Does the patient have access to their discharge instructions?: Yes (5/5/2025  1:51 PM)  Care Management Interventions: Reviewed instructions with patient (5/5/2025  1:51 PM)  What is the patient's perception of their health status since discharge?: Improving (5/5/2025  1:51 PM)  Is the patient/caregiver able to teach back the hierarchy of who to call/visit for symptoms/problems? PCP, Specialist, Home Health nurse, Urgent Care, ED, 911: Yes (5/5/2025  1:51 PM)

## 2025-05-06 ENCOUNTER — PATIENT OUTREACH (OUTPATIENT)
Dept: CARE COORDINATION | Age: 85
End: 2025-05-06
Payer: MEDICARE

## 2025-05-06 ENCOUNTER — HOME CARE VISIT (OUTPATIENT)
Dept: HOME HEALTH SERVICES | Facility: HOME HEALTH | Age: 85
End: 2025-05-06
Payer: MEDICARE

## 2025-05-06 PROCEDURE — G0152 HHCP-SERV OF OT,EA 15 MIN: HCPCS | Mod: HHH

## 2025-05-06 ASSESSMENT — ACTIVITIES OF DAILY LIVING (ADL)
CURRENT_FUNCTION: INDEPENDENT
AMBULATION ASSISTANCE: INDEPENDENT

## 2025-05-06 ASSESSMENT — ENCOUNTER SYMPTOMS
PERSON REPORTING PAIN: PATIENT
DENIES PAIN: 1

## 2025-05-07 ENCOUNTER — TELEPHONE (OUTPATIENT)
Dept: CARDIOLOGY | Facility: CLINIC | Age: 85
End: 2025-05-07
Payer: MEDICARE

## 2025-05-07 ASSESSMENT — ACTIVITIES OF DAILY LIVING (ADL)
GROOMING_WITHIN_DEFINED_LIMITS: 1
TOILETING: INDEPENDENT
FEEDING_WITHIN_DEFINED_LIMITS: 1
TOILETING: 1

## 2025-05-07 NOTE — TELEPHONE ENCOUNTER
Spoke with patient's daughter and patient.  The were both notified that PCP has already done the labs that Dr. Echavarria had wanted before her appointment.  Patient and daughter were both instructed to follow through with regards to the labs ordered from patient's discharge.  Patient and daughter verbalized understanding.  Nilda Laguerre, [facility]

## 2025-05-07 NOTE — TELEPHONE ENCOUNTER
Received call from patient's daughter Kamilla stating patient has pending labs for her upcoming appointment with Dr. Adrian Echavarria F.A.C.C. 5/27/25 but the home health nurse cannot see those orders.  Chart reviewed lab orders placed 2/26/25(CMP, Lipids, CBC) to be done prior to next visit and are showing as active orders.  Called patient's daughter who was not available.  Left voice mail instructing her to call office.  Nilda Laguerre, CMA

## 2025-05-08 ENCOUNTER — APPOINTMENT (OUTPATIENT)
Dept: CARE COORDINATION | Age: 85
End: 2025-05-08
Payer: MEDICARE

## 2025-05-08 ENCOUNTER — HOME CARE VISIT (OUTPATIENT)
Dept: HOME HEALTH SERVICES | Facility: HOME HEALTH | Age: 85
End: 2025-05-08
Payer: MEDICARE

## 2025-05-08 VITALS
HEART RATE: 64 BPM | SYSTOLIC BLOOD PRESSURE: 110 MMHG | TEMPERATURE: 98.1 F | RESPIRATION RATE: 16 BRPM | OXYGEN SATURATION: 95 % | DIASTOLIC BLOOD PRESSURE: 58 MMHG

## 2025-05-08 DIAGNOSIS — I50.31 ACUTE DIASTOLIC (CONGESTIVE) HEART FAILURE: ICD-10-CM

## 2025-05-08 PROCEDURE — G0299 HHS/HOSPICE OF RN EA 15 MIN: HCPCS | Mod: HHH

## 2025-05-08 ASSESSMENT — ENCOUNTER SYMPTOMS
DENIES PAIN: 1
APPETITE LEVEL: GOOD
CHANGE IN APPETITE: UNCHANGED
PERSON REPORTING PAIN: PATIENT

## 2025-05-08 NOTE — TELEPHONE ENCOUNTER
Received refill request from [facility] regarding patients Entresto 24-26mg.  Patient will be out of medication on 05/26/25 and has an upcoming appointment with Dr. Adrian Echavarria MD, FACC on 05/27/25.  Patient to receive a short term 30 day supply then additional refills will be given at office visit.    Refill routed to Dr. Adrian Echavarria MD, FACC to sign.      Nia Aden RN

## 2025-05-09 DIAGNOSIS — K57.31 DIVERTICULAR HEMORRHAGE: ICD-10-CM

## 2025-05-09 LAB
ERYTHROCYTE [DISTWIDTH] IN BLOOD BY AUTOMATED COUNT: 13.7 % (ref 11–15)
HCT VFR BLD AUTO: 29.1 % (ref 35–45)
HGB BLD-MCNC: 8.8 G/DL (ref 11.7–15.5)
LABORATORY COMMENT REPORT: NORMAL
MCH RBC QN AUTO: 26.9 PG (ref 27–33)
MCHC RBC AUTO-ENTMCNC: 30.2 G/DL (ref 32–36)
MCV RBC AUTO: 89 FL (ref 80–100)
PATH REPORT.FINAL DX SPEC: NORMAL
PATH REPORT.GROSS SPEC: NORMAL
PATH REPORT.RELEVANT HX SPEC: NORMAL
PATH REPORT.TOTAL CANCER: NORMAL
PLATELET # BLD AUTO: 338 THOUSAND/UL (ref 140–400)
PMV BLD REES-ECKER: 9.8 FL (ref 7.5–12.5)
RBC # BLD AUTO: 3.27 MILLION/UL (ref 3.8–5.1)
WBC # BLD AUTO: 4.1 THOUSAND/UL (ref 3.8–10.8)

## 2025-05-12 ENCOUNTER — HOME CARE VISIT (OUTPATIENT)
Dept: HOME HEALTH SERVICES | Facility: HOME HEALTH | Age: 85
End: 2025-05-12
Payer: MEDICARE

## 2025-05-12 VITALS
SYSTOLIC BLOOD PRESSURE: 120 MMHG | TEMPERATURE: 97.6 F | DIASTOLIC BLOOD PRESSURE: 60 MMHG | RESPIRATION RATE: 18 BRPM | HEART RATE: 68 BPM | OXYGEN SATURATION: 98 %

## 2025-05-12 PROCEDURE — G0299 HHS/HOSPICE OF RN EA 15 MIN: HCPCS | Mod: HHH

## 2025-05-12 ASSESSMENT — ENCOUNTER SYMPTOMS
CHANGE IN APPETITE: UNCHANGED
PERSON REPORTING PAIN: PATIENT
APPETITE LEVEL: GOOD
DENIES PAIN: 1

## 2025-05-13 ENCOUNTER — APPOINTMENT (OUTPATIENT)
Dept: PRIMARY CARE | Facility: CLINIC | Age: 85
End: 2025-05-13
Payer: MEDICARE

## 2025-05-13 VITALS
HEART RATE: 65 BPM | DIASTOLIC BLOOD PRESSURE: 50 MMHG | HEIGHT: 59 IN | WEIGHT: 141 LBS | RESPIRATION RATE: 16 BRPM | OXYGEN SATURATION: 100 % | TEMPERATURE: 96.9 F | BODY MASS INDEX: 28.43 KG/M2 | SYSTOLIC BLOOD PRESSURE: 101 MMHG

## 2025-05-13 DIAGNOSIS — K92.2 GASTROINTESTINAL HEMORRHAGE, UNSPECIFIED GASTROINTESTINAL HEMORRHAGE TYPE: ICD-10-CM

## 2025-05-13 DIAGNOSIS — Z09 HOSPITAL DISCHARGE FOLLOW-UP: ICD-10-CM

## 2025-05-13 DIAGNOSIS — R05.1 ACUTE COUGH: Primary | ICD-10-CM

## 2025-05-13 PROCEDURE — 3074F SYST BP LT 130 MM HG: CPT

## 2025-05-13 PROCEDURE — 1036F TOBACCO NON-USER: CPT

## 2025-05-13 PROCEDURE — 1111F DSCHRG MED/CURRENT MED MERGE: CPT

## 2025-05-13 PROCEDURE — 1159F MED LIST DOCD IN RCRD: CPT

## 2025-05-13 PROCEDURE — 3078F DIAST BP <80 MM HG: CPT

## 2025-05-13 PROCEDURE — 1160F RVW MEDS BY RX/DR IN RCRD: CPT

## 2025-05-13 PROCEDURE — 99213 OFFICE O/P EST LOW 20 MIN: CPT

## 2025-05-13 RX ORDER — BENZONATATE 100 MG/1
100 CAPSULE ORAL 3 TIMES DAILY PRN
Qty: 42 CAPSULE | Refills: 0 | Status: SHIPPED | OUTPATIENT
Start: 2025-05-13 | End: 2025-06-12

## 2025-05-13 NOTE — PROGRESS NOTES
Subjective   Patient ID: Laurel Pugh is a 84 y.o. female who presents for Hospital Follow-up (Admitted: 4/27/25/Discharged: 5/2/25/Dx: Syncope/Pt states she feels better and stronger everyday./), Restless Legs (Both, mainly left leg wakes her up in the middle of the night. Has been going on for a year getting worse. More pain on back and both hips. 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 ), and Cough (Coughing up white phlegm since easter. Has gotten a little better.  ).    HPI   Patient here for ED follow-up.  Patient had syncope episode at home, someone caught her, she did not hit her head.  Patient was admitted for GI bleed.  Had EGD, sigmoidoscopy, they tried to do colonoscopy but could not complete it due to colon spasming.  They concluded that she had diverticulosis and gastritis and that was causing the bleeding.  Patient received 1 to 1-1/2 units of blood.  Patient has GI follow-up on 5-27.  Patient is almost back to baseline from hospital visit, still has some fatigue.  Patient denies any blood from stool, blood in urine at home.  Patient denies fever, chills, syncope episodes, vision changes, heart palpitations, acid reflux symptoms, abdominal pain, cramping, diarrhea, shortness of breath, chest pain.  Patient has stopped the baby aspirin but has continued Plavix.  No current open wounds or concern for excess bruising or bleeding.  Patient has upcoming appointment with cardiology.  Patient has had home health care visits, blood pressures averaging 100-120 systolic, 50s to 60s diastolic.  Patient continues Metamucil 3 times a day and MiraLAX once a day, appetite is good.  Bowel movements 1-2, Reeves chart #4.  Patient is now taking Protonix prescribed at hospital visit.  Before hospital visit patient was treated for UTI and RSV at urgent care.  Patient reports restless leg syndrome, is a chronic issue that has been happening for 1 year.    Review of  "Systems  Constitutional: Reports always being cold.  Reports some fatigue.  Patient denies any fever, chills, appetite change,  diaphoresis, or unexpected weight change.  HEENT: Patient denies for congestion, ear pain, hearing loss, nosebleeds, postnasal drip, rhinorrhea, sinus pressure, sneezing, sore throat, tinnitus, trouble swallowing and voice change.   Eyes: Patient denies for photophobia, pain, discharge, redness, itching, and visual disturbance.   Respiratory: Patient denies any cough, chest tightness, shortness breath, or wheezing.  Cardiovascular: Patient denies any chest pain, shortness of breath with exertion, tachycardia, palpitations, orthopnea, leg swelling, or paroxysmal nocturnal dyspnea.  Gastrointestinal Patient denies any nausea, vomiting, diarrhea, constipation, abdominal distention, melena, hematochezia, or reflux symptoms.  Endocrine: Patient denies any polyuria, polydipsia, polyphagia, or heat/cold intolerance.  Genitourinary: Patient denies dysuria, flank pain, frequency, hematuria, or urgency.   Musculoskeletal: Patient denies back pain, joint swelling, myalgias, neck pain, and neck stiffness.   Skin: Denies any rashes, wounds, or skin lesions.   Neurology: Restless legs at night.  Patient denies any new motor or sensory losses, numbness, tingling, and incoordination of the extremities, tremors, seizures, dizziness, facial asymmetry, or gait instability.  Hematological: Patient denies bruises, bleeding, or adenopathy.     Objective   /50   Pulse 65   Temp 36.1 °C (96.9 °F) (Temporal)   Resp 16   Ht 1.499 m (4' 11\")   Wt 64 kg (141 lb)   SpO2 100%   BMI 28.48 kg/m²     Physical Exam  Constitutional: Awake, alert, and oriented. In no acute distress, no diaphoresis, well-developed, well-nourished. Appears stated age.   HEENT: Head is normocephalic, and atraumatic. Trachea is midline. Oral mucosa is pink and moist. Bilateral eyes have no discharge, erythema.   Cardiovascular: RRR, " no murmur, gallop, edema, or palpitations. .   Abdominal: Bowel sounds active x4, no masses, abdomen is soft and round, no guarding, rebounding, or tenderness.   Musculoskeletal: No swelling, tenderness, edema, rigidity.   Skin: Good turgor, moist, warm and without jaundice, rashes or lesions.  Neurological: Alert and oriented ×3, no tremor, normal gait, no weakness, tremors, or deficit.     Assessment/Plan   Diagnoses and all orders for this visit:  Acute cough  -    Has lingering cough from RSV infection prior to ED visit  -     benzonatate (Tessalon) 100 mg capsule; Take 1 capsule (100 mg) by mouth 3 times a day as needed for cough. Do not crush or chew.  Hospital discharge follow-up  -Was instructed to get these labs done for GI bleed follow-up on this Thursday, however patient wants to get them done by her home health care person next Tuesday.  Okay with this, patient and family members know the signs to look out for, any bleeding in stool urine or wound, any syncope, any lightheadedness that is severe, any chest pain, shortness of breath, heart palpitations.  -     CBC and Auto Differential; Future  -     Iron and TIBC; Future  -     Reticulocytes; Future  Gastrointestinal hemorrhage, unspecified gastrointestinal hemorrhage type  -     CBC and Auto Differential; Future  -     Iron and TIBC; Future  -     Reticulocytes; Future  Follow Up  -Patient is to keep upcoming appointment with PCP for October with labs done prior.  -Patient is aware to reach back out to provider if symptoms do not improve after treatment is complete.   -Patient is aware to go to the emergency room if the patient would develop fever with worsening symptoms, chest pain, shortness of breath.   -The patient and/or caregiver has verbalized understanding of the above treatment plan and have no further questions at this time.     Laurel Pugh- please be aware that any referrals discussed today in this visit should be placed as well as  tests/labs ordered should result in prompt scheduling today. If not done today-then a phone call for scheduling is expected in a timely manner (within 2 weeks). If testing is to be done-a result should be available to patient within 2 weeks time unless otherwise specified.  Please reach out if you have not heard results back within a timely manner.    You, the patient or caregiver, are responsible for making sure what was discussed in this visit is actually scheduled and completed. If suboptimal understanding of results of tests or referral reason- a follow up appointment with me or your primary provider should be made. If above recommended testing/referrals/labs/treatment ect does NOT occur-you are to connect with us for explanation. Patient understands that should they have testing outside  facilities that we may not receive the results and was told to call us if they have not heard from our office within a week after testing.     Please take into consideration, dragon voice recognition dictation software was utilized partially in the preparation of this note, therefore, inaccuracies in spelling, word choice and punctuation may have occurred which were not recognized at the time of signing.

## 2025-05-13 NOTE — DOCUMENTATION CLARIFICATION NOTE
"    PATIENT:               MAGDI DICKEY  ACCT #:                  [id number]  MRN:                       [id number]  :                       1940  ADMIT DATE:       2025 10:44 AM  DISCH DATE:        2025 5:03 PM  RESPONDING PROVIDER #:        [id number]          PROVIDER RESPONSE TEXT:    Chronic Systolic Congestive Heart Failure    CDI QUERY TEXT:    Clarification        Instruction:    Based on your assessment of the patient and the clinical information, please provide the requested documentation by clicking on the appropriate radio button and enter any additional information if prompted.    Question: Please further clarify the type and acuity of congestive heart failure    When answering this query, please exercise your independent professional judgment. The fact that a question is being asked, does not imply that any particular answer is desired or expected.    The patient's clinical indicators include:  Clinical Information:  84 y.o. female with a past medical history of coronary artery disease, recent UTI, breast cancer, vertigo, recent RSV who presented to the ER with a syncopal event.    Clinical Indicators:  25 Cardiology Consult :\"Ischemic cardiomyopathy, heart failure with mildly reduced ejection fraction    Left ventricular ejection fraction is moderately decreased, by visual estimate at 40 percent\"      25 Gastroenterology: \" Chronic diastolic heart failure\"    2025 Discharge Summary:    \"Hx CAD, breast cancer, vertigo, Chronic systolic heart failure\"      Treatment:  25  ECG  25:  Echocardiogram    Risk Factors:  Hypertension, CHF  Options provided:  -- Chronic Systolic Congestive Heart Failure  -- Chronic Diastolic Congestive Heart Failure  -- Other - I will add my own diagnosis  -- Refer to Clinical Documentation Reviewer    Query created by: Griselda Chaudhry on 2025 11:34 AM      Electronically signed by:  LOUISE SANDERS MD 2025 9:02 AM          "

## 2025-05-16 ENCOUNTER — PATIENT OUTREACH (OUTPATIENT)
Dept: PRIMARY CARE | Facility: CLINIC | Age: 85
End: 2025-05-16
Payer: MEDICARE

## 2025-05-16 NOTE — PROGRESS NOTES
Confirmation of at least 2 patient identifiers.    Completed telephonic follow-up with patient after recent visit with MANDO FAGAN    Spoke to patient during outreach call.    Patient reports feeling: Back to baseline    Patient has questions or concerns about medications: No    Have all prescribed medications been filled? Yes    Patient has necessary resources to manage their care? Yes    Patient has questions or concerns? No    Next care management follow-up approximately within one month.  Care  information provided to patient.

## 2025-05-20 ENCOUNTER — HOME CARE VISIT (OUTPATIENT)
Dept: HOME HEALTH SERVICES | Facility: HOME HEALTH | Age: 85
End: 2025-05-20
Payer: MEDICARE

## 2025-05-20 VITALS
DIASTOLIC BLOOD PRESSURE: 50 MMHG | HEART RATE: 62 BPM | SYSTOLIC BLOOD PRESSURE: 106 MMHG | TEMPERATURE: 98 F | OXYGEN SATURATION: 98 % | RESPIRATION RATE: 18 BRPM

## 2025-05-20 DIAGNOSIS — E61.1 IRON DEFICIENCY: Primary | ICD-10-CM

## 2025-05-20 DIAGNOSIS — K92.2 GASTROINTESTINAL HEMORRHAGE, UNSPECIFIED GASTROINTESTINAL HEMORRHAGE TYPE: ICD-10-CM

## 2025-05-20 DIAGNOSIS — Z09 HOSPITAL DISCHARGE FOLLOW-UP: ICD-10-CM

## 2025-05-20 PROCEDURE — G0299 HHS/HOSPICE OF RN EA 15 MIN: HCPCS | Mod: HHH

## 2025-05-20 ASSESSMENT — ENCOUNTER SYMPTOMS
DENIES PAIN: 1
CHANGE IN APPETITE: UNCHANGED
PERSON REPORTING PAIN: PATIENT
APPETITE LEVEL: GOOD

## 2025-05-21 LAB
BASOPHILS # BLD AUTO: 48 CELLS/UL (ref 0–200)
BASOPHILS NFR BLD AUTO: 1.2 %
EOSINOPHIL # BLD AUTO: 108 CELLS/UL (ref 15–500)
EOSINOPHIL NFR BLD AUTO: 2.7 %
ERYTHROCYTE [DISTWIDTH] IN BLOOD BY AUTOMATED COUNT: 13.8 % (ref 11–15)
HCT VFR BLD AUTO: 30.3 % (ref 35–45)
HGB BLD-MCNC: 9.1 G/DL (ref 11.7–15.5)
IRON SATN MFR SERPL: 8 % (CALC) (ref 16–45)
IRON SERPL-MCNC: 28 MCG/DL (ref 45–160)
LYMPHOCYTES # BLD AUTO: 964 CELLS/UL (ref 850–3900)
LYMPHOCYTES NFR BLD AUTO: 24.1 %
MCH RBC QN AUTO: 27.2 PG (ref 27–33)
MCHC RBC AUTO-ENTMCNC: 30 G/DL (ref 32–36)
MCV RBC AUTO: 90.7 FL (ref 80–100)
MONOCYTES # BLD AUTO: 356 CELLS/UL (ref 200–950)
MONOCYTES NFR BLD AUTO: 8.9 %
NEUTROPHILS # BLD AUTO: 2524 CELLS/UL (ref 1500–7800)
NEUTROPHILS NFR BLD AUTO: 63.1 %
PLATELET # BLD AUTO: 273 THOUSAND/UL (ref 140–400)
PMV BLD REES-ECKER: 10 FL (ref 7.5–12.5)
RBC # BLD AUTO: 3.34 MILLION/UL (ref 3.8–5.1)
RETICS #: NORMAL CELLS/UL (ref 20000–80000)
RETICS/RBC NFR AUTO: 1.4 %
TIBC SERPL-MCNC: 360 MCG/DL (CALC) (ref 250–450)
WBC # BLD AUTO: 4 THOUSAND/UL (ref 3.8–10.8)

## 2025-05-21 RX ORDER — FERROUS SULFATE 325(65) MG
325 TABLET, DELAYED RELEASE (ENTERIC COATED) ORAL EVERY OTHER DAY
Qty: 15 TABLET | Refills: 2 | Status: SHIPPED | OUTPATIENT
Start: 2025-05-21 | End: 2025-06-20

## 2025-05-27 ENCOUNTER — OFFICE VISIT (OUTPATIENT)
Dept: GASTROENTEROLOGY | Facility: CLINIC | Age: 85
End: 2025-05-27
Payer: MEDICARE

## 2025-05-27 ENCOUNTER — APPOINTMENT (OUTPATIENT)
Dept: CARDIOLOGY | Facility: CLINIC | Age: 85
End: 2025-05-27
Payer: MEDICARE

## 2025-05-27 VITALS
WEIGHT: 139.7 LBS | DIASTOLIC BLOOD PRESSURE: 70 MMHG | SYSTOLIC BLOOD PRESSURE: 132 MMHG | HEART RATE: 67 BPM | BODY MASS INDEX: 28.22 KG/M2

## 2025-05-27 DIAGNOSIS — I50.31 ACUTE DIASTOLIC (CONGESTIVE) HEART FAILURE: ICD-10-CM

## 2025-05-27 DIAGNOSIS — I10 ESSENTIAL HYPERTENSION: ICD-10-CM

## 2025-05-27 DIAGNOSIS — I25.10 ATHEROSCLEROSIS OF NATIVE CORONARY ARTERY OF NATIVE HEART WITHOUT ANGINA PECTORIS: ICD-10-CM

## 2025-05-27 DIAGNOSIS — K25.7 CHRONIC GASTRIC EROSION: Primary | ICD-10-CM

## 2025-05-27 DIAGNOSIS — Z87.19 HISTORY OF GI DIVERTICULAR BLEED: ICD-10-CM

## 2025-05-27 DIAGNOSIS — I44.7 LEFT BUNDLE BRANCH BLOCK: Primary | ICD-10-CM

## 2025-05-27 DIAGNOSIS — I50.22 HEART FAILURE WITH MILDLY REDUCED EJECTION FRACTION (HFMREF): ICD-10-CM

## 2025-05-27 DIAGNOSIS — E78.5 DYSLIPIDEMIA: ICD-10-CM

## 2025-05-27 DIAGNOSIS — K92.0 COFFEE GROUND EMESIS: ICD-10-CM

## 2025-05-27 DIAGNOSIS — Z95.5 STENTED CORONARY ARTERY: ICD-10-CM

## 2025-05-27 PROCEDURE — 3075F SYST BP GE 130 - 139MM HG: CPT | Performed by: INTERNAL MEDICINE

## 2025-05-27 PROCEDURE — 99213 OFFICE O/P EST LOW 20 MIN: CPT | Performed by: NURSE PRACTITIONER

## 2025-05-27 PROCEDURE — 1111F DSCHRG MED/CURRENT MED MERGE: CPT | Performed by: INTERNAL MEDICINE

## 2025-05-27 PROCEDURE — 1036F TOBACCO NON-USER: CPT | Performed by: NURSE PRACTITIONER

## 2025-05-27 PROCEDURE — 99214 OFFICE O/P EST MOD 30 MIN: CPT | Performed by: INTERNAL MEDICINE

## 2025-05-27 PROCEDURE — 1159F MED LIST DOCD IN RCRD: CPT | Performed by: INTERNAL MEDICINE

## 2025-05-27 PROCEDURE — 1111F DSCHRG MED/CURRENT MED MERGE: CPT | Performed by: NURSE PRACTITIONER

## 2025-05-27 PROCEDURE — 3078F DIAST BP <80 MM HG: CPT | Performed by: INTERNAL MEDICINE

## 2025-05-27 RX ORDER — PANTOPRAZOLE SODIUM 40 MG/1
40 TABLET, DELAYED RELEASE ORAL
Qty: 30 TABLET | Refills: 11 | Status: SHIPPED | OUTPATIENT
Start: 2025-05-27 | End: 2026-05-22

## 2025-05-27 NOTE — PROGRESS NOTES
Continue  Patient:  Laurel Pugh  YOB: 1940  MRN: [id number]     Chief Complaint   Patient presents with    Follow-up     Patient here for a 3 months follow up for management of cardiomyopathy, CHF, CAD, and hypertension.        HPI:       Laurel Pugh is a 85 y.o. female who returns today for cardiac follow-up.  She is accompanied by her daughter.  She was initially seen in consultation by Dr. Rosa at [facility] on July 14, 2024.  No prior history of atherosclerotic heart or valvular heart disease.  She presented with lightheadedness, nausea, and confusion.  CT scan of the chest was negative for pulmonary embolus.  Chest x-ray did not show any active disease.  CBC and CMP were normal.  BNP was 373.  High-sensitivity troponin levels 427, 614, 1196, and 519.  EKG showed normal sinus rhythm with complete left bundle branch block.  No change from previous remote EKG.  CT scan of the brain was negative.  She was diagnosed with a urinary tract infection.  No reports of chest pain or shortness of breath.  No history of hypertension or diabetes mellitus.  She is a non-smoker.  She has a history of hyperlipidemia.     An echocardiogram July 15, 2024 showed an estimated LV ejection fraction of 40% with apical hypokinesis.  There was mild asymmetric septal hypertrophy.  Mild mitral and tricuspid regurgitation.  Cardiac catheterization done the same day showed 80% stenosis of the LAD.  There were mild irregularities of the circumflex and right coronary artery.  She underwent angioplasty and stenting of the LAD.  Estimated LV ejection fraction was 45%.  She was started on GDMT and continued on DAPT.    She was brought to emergency department by EMS February 11, 2025 after being found outside on the ground by some neighbors.  The patient recalls that she had slipped on some icy steps and fell backwards.  She was had been lying outside for over 5 hours when her son found her.  She was noted to be hypothermic.   Initial temperature in the ED was 30.4 (86.7).  Vital signs were otherwise stable.  She subsequently developed some mild hypotension with systolic blood pressures in the 80s to low 100s.  CBC was normal with the exception of a WBC 12.1.  Basic metabolic profile was normal with exception of BUN 45 and glucose 166.  CPK was 391.  BNP level 379.  Initial high-sensitivity troponin level was 3,071 with subsequent levels of 4,468, 7,405, and 7,123.  Chest x-ray did not show any acute disease.  CT scan of the brain was negative for any acute findings.  No fractures identified.  Initial EKG showed significant baseline artifact with probable atrial fibrillation.  Repeat EKG showed normal sinus rhythm with occasional PVCs.  Known complete left bundle much block.     She was admitted to the ICU.  She was treated with warm IV fluids and a Fariha hugger.  She had some nausea and vomiting and generalized discomfort.  She had slow improvement of her temperature as well as her mental status.   She denied any chest pain or shortness of breath.  Repeat echocardiogram showed estimated LV ejection fraction of 40% with apical hypokinesis.    Cardiac catheterization on February 12, 2025 showed a patent stent in the mid LAD, 50% stenosis of the ostial first diagonal branch, and otherwise minimal disease.  There was apical akinesis with estimated LV ejection fraction 40%.  The diagonal stenosis was similar to a post bifurcation PCI lesion on a previous study.  Medical therapy was recommended.  Echo on February 12, 2025 also showed apical akinesis and estimated LV ejection fraction 40%.  Normal right ventricular chamber size and function.  Valvular structure and function were not assessed on that study.  It was felt her elevated cardiac enzymes were secondary to a type II myocardial infarction due to to a possible cardiac contusion as well as myocardial stress in the setting of hypothermia.  Prior to her discharge she was experiencing some  hypotension and she was changed from Toprol-XL to metoprolol tartrate 12.5 mg twice daily.  Entresto was held.  We held off on starting her on an SGLT2 inhibitor secondary to recurrent urinary tract infections.    She was seen in follow-up in February and started back on low-dose Entresto 24/26 mg one half tab twice daily.  She was continued on metoprolol tartrate, aspirin, Plavix, and atorvastatin.    She presented to the emergency department April 27, 2025 after she experienced a brief episode of syncope.  She recently had been treated for a urinary tract infection and also was diagnosed with RSV on Easter Sunday.  Patient noted that she had stood up from the couch and started walking when she became very lightheaded.  She states she felt the need to sit back down and then briefly passed out.  She was lowered to the ground by a family member.  She remained consciousness very quickly.  No incontinence.  No any seizure activity.  No chest pain or shortness of breath.  She generally has not been eating or drinking very much.  Upon arrival to the emergency department she was noted to have stable vital signs.  Oxygen saturation 100%.  Hemoglobin was 9.1 hematocrit 30.6.  WC 5.4.  Comprehensive metabolic profile was normal with exception of BUN 50.  Creatinine was 1.0.  High-sensitivity potable 9.  BNP level 201.  EKG shows normal sinus rhythm with complete left bundle block.  Patient reported that she recent bowel movement that was darkish red appearing.  She was admitted to telemetry.  She developed an episode of transient hypotension with systolic blood pressure of 87 mmHg.  She also developed some sinus tachycardia in the 120s.  She was transfused with 1 unit packed RBCs.  The patient did report some mild pressure in her chest radiating toward the right side.  Total duration was approximately 15 minutes.  Discomfort resolved spontaneously.  Repeat troponin level 70 and 100.  A CT scan of the chest April 27, 2025 was  negative for pulmonary embolus.  Severe coronary atherosclerotic calcifications were noted.  Moderate thoracic aortic atherosclerosis without aneurysm.  There was severe cardiomegaly.  CT scan of the abdomen was negative for any acute findings.  Diverticulosis and cholelithiasis or noted.  Aspirin and Plavix were placed on hold.  She underwent GI evaluation.  She was taken for EGD on April 28, 2025.  No active bleeding was identified.  There was a small hiatal hernia, Schatzki ring, and erythematous mucosa.  There was an erosion in the stomach noted.  Colonoscopy was attempted on May 1, 2025.  This was aborted due to.  Diverticular spasm in the distal sigmoid colon.  There was a region of severe diverticulosis that resisted safe passage of the scope.  It was felt that she likely had a diverticular bleed.  Syncope was felt to be related to volume depletion and anemia.  Aspirin was discontinued.  Plavix was resumed upon discharge.    She has been doing quite well since her discharge.  She is without any specific cardiovascular related complaints.  She denies any chest pain or shortness of breath.  She denies any orthopnea, PND, or increasing peripheral edema.  She denies any palpitations, lightheadedness, near-syncope, or syncope.  She denies any fever, chills, or cough.  She denies any nausea, vomiting, or diaphoresis.  She denies any hemoptysis, hematemesis, melena, or hematochezia.  She does not have any definite anginal symptoms.  She has underlying ischemic heart disease but has not developed any overt congestive heart failure.  Her blood pressures are well-controlled.   Lab studies May 8, 2025 showed a hemoglobin of 8.8 and hematocrit 29.1.  Iron 28 with TIBC 360 and percent saturation 8.  Basic metabolic profile on May 2, 2025 was normal with exception of glucose 66 and calcium 8.2.  Other details as noted below.      Objective:     Vitals:    05/27/25 0953   BP: 132/70   Pulse: 67         Wt Readings from Last  "4 Encounters:   05/13/25 64 kg (141 lb)   05/04/25 64.9 kg (143 lb)   04/27/25 63.5 kg (140 lb)   04/20/25 64.9 kg (143 lb)       Allergies:     Allergies   Allergen Reactions    Haloperidol Hallucinations    Keflex [Cephalexin] Itching and Rash     \"Severe Itching\"    Red patches on arms, trunks, back,  and legs     Zofran [Ondansetron Hcl] Hallucinations    Amoxicillin-Pot Clavulanate Rash    Levofloxacin Rash    Sulfa (Sulfonamide Antibiotics) Rash        Medications:     Current Outpatient Medications   Medication Instructions    benzonatate (TESSALON) 100 mg, oral, 3 times daily PRN, Do not crush or chew.    blood pressure test kit-large kit Automatic type blood pressure monitor.    Check blood pressure daily and maintain log    cholecalciferol (VITAMIN D-3) 1,000 Units, Daily    clopidogrel (Plavix) 75 mg tablet Take 1 tablet (75 mg) by mouth once daily.    ferrous sulfate 325 (65 Fe) mg EC tablet 1 tablet, oral, Every other day, Do not crush, chew, or split.    metoprolol tartrate (LOPRESSOR) 12.5 mg, oral, 2 times daily    pantoprazole (PROTONIX) 40 mg, oral, 2 times daily, Do not crush, chew, or split.    polyethylene glycol (GLYCOLAX, MIRALAX) 17 g, oral, Daily    psyllium (Metamucil) powder 5.8 g, oral, 3 times daily    rosuvastatin (CRESTOR) 5 mg, oral, Daily    sacubitriL-valsartan (Entresto) 24-26 mg tablet 0.5 tablets, oral, 2 times daily       Physical Examination:   GENERAL:  Well developed, well nourished, in no acute distress.  CHEST:  Symmetric and nontender.  NEURO/PSYCH:  Alert and oriented times three with approppriate behavior and responses.  NECK:  Supple, no JVD, no bruit.  LUNGS:  Clear to auscultation bilaterally, normal respiratory effort.  HEART:  Rate and rhythm regular with I/VI KAYLA LSB, no gallop appreciated.        There are no rubs, clicks or heaves.  EXTREMITIES:  Warm with good color, no clubbing or cyanosis.  There is no edema noted.  PERIPHERAL VASCULAR:  Pulses present and " equally palpable; 2+ throughout.      Lab:     CBC:   Lab Results   Component Value Date    WBC 4.0 05/20/2025    RBC 3.34 (L) 05/20/2025    HGB 9.1 (L) 05/20/2025    HCT 30.3 (L) 05/20/2025     05/20/2025        CMP:    Lab Results   Component Value Date     05/02/2025    K 3.7 05/02/2025     (H) 05/02/2025    CO2 22 05/02/2025    BUN 19 05/02/2025    CREATININE 0.93 05/02/2025    GLUCOSE 66 (L) 05/02/2025    CALCIUM 8.2 (L) 05/02/2025       Lipid Profile:    Lab Results   Component Value Date    TRIG 72 03/05/2025    HDL 52 03/05/2025    LDLCALC 60 03/05/2025       BMP:  Lab Results   Component Value Date     05/02/2025     04/30/2025     04/29/2025     03/05/2025    K 3.7 05/02/2025    K 4.2 04/30/2025    K 4.7 04/29/2025    K 4.0 03/05/2025     (H) 05/02/2025     (H) 04/30/2025     (H) 04/29/2025     03/05/2025    CO2 22 05/02/2025    CO2 23 04/30/2025    CO2 19 (L) 04/29/2025    CO2 25 03/05/2025    BUN 19 05/02/2025    BUN 38 (H) 04/30/2025    BUN 49 (H) 04/29/2025    BUN 25 03/05/2025    CREATININE 0.93 05/02/2025    CREATININE 1.15 (H) 04/30/2025    CREATININE 1.11 (H) 04/29/2025    CREATININE 0.83 03/05/2025       CBC:  Lab Results   Component Value Date    WBC 4.0 05/20/2025    WBC 4.1 05/08/2025    WBC 3.6 (L) 05/02/2025    WBC 6.4 04/30/2025    WBC 10.3 04/29/2025    WBC 3.5 (L) 03/05/2025    RBC 3.34 (L) 05/20/2025    RBC 3.27 (L) 05/08/2025    RBC 2.76 (L) 05/02/2025    RBC 2.30 (L) 04/30/2025    RBC 2.60 (L) 04/29/2025    RBC 3.72 (L) 03/05/2025    HGB 9.1 (L) 05/20/2025    HGB 8.8 (L) 05/08/2025    HGB 7.7 (L) 05/02/2025    HGB 8.3 (L) 04/30/2025    HGB 6.4 (LL) 04/30/2025    HGB 10.5 (L) 03/05/2025    HCT 30.3 (L) 05/20/2025    HCT 29.1 (L) 05/08/2025    HCT 23.8 (L) 05/02/2025    HCT 26.2 (L) 04/30/2025    HCT 19.6 (L) 04/30/2025    HCT 33.9 (L) 03/05/2025    MCV 90.7 05/20/2025    MCV 89.0 05/08/2025    MCV 86 05/02/2025    MCV 85  04/30/2025    MCV 86 04/29/2025    MCV 91.1 03/05/2025    MCH 27.2 05/20/2025    MCH 26.9 (L) 05/08/2025    MCH 27.9 05/02/2025    MCH 27.8 04/30/2025    MCH 27.7 04/29/2025    MCH 28.2 03/05/2025    MCHC 30.0 (L) 05/20/2025    MCHC 30.2 (L) 05/08/2025    MCHC 32.4 05/02/2025    MCHC 32.7 04/30/2025    MCHC 32.3 04/29/2025    MCHC 31.0 (L) 03/05/2025    RDW 13.8 05/20/2025    RDW 13.7 05/08/2025    RDW 14.6 (H) 05/02/2025    RDW 15.5 (H) 04/30/2025    RDW 15.4 (H) 04/29/2025    RDW 14.0 03/05/2025     05/20/2025     05/08/2025     05/02/2025     04/30/2025     04/29/2025     03/05/2025    MPV 10.0 05/20/2025    MPV 9.8 05/08/2025    MPV 10.5 03/05/2025       Hepatic Function Panel:    Lab Results   Component Value Date    ALKPHOS 37 04/27/2025    ALT 6 (L) 04/27/2025    AST 12 04/27/2025    PROT 6.0 (L) 04/27/2025    BILITOT 0.6 04/28/2025    BILIDIR 0.1 04/28/2025       HgBA1c:    Lab Results   Component Value Date    HGBA1C 5.7 (H) 02/11/2025       Magnesium:    Lab Results   Component Value Date    MG 1.88 05/02/2025       TSH:    Lab Results   Component Value Date    TSH 0.98 03/05/2025       BNP:   Lab Results   Component Value Date     (H) 04/27/2025        PT/INR:    Lab Results   Component Value Date    PROTIME 13.1 (H) 02/11/2025    INR 1.2 (H) 02/11/2025       Cardiac Enzymes:    Lab Results   Component Value Date    TROPHS 100 (HH) 04/28/2025    TROPHS 70 (HH) 04/28/2025    TROPHS 9 04/27/2025       Diagnostic Studies:     ECG 12 Lead  Result Date: 2/12/2025    Normal sinus rhythm Left bundle branch block Abnormal ECG When compared with ECG of 12-FEB-2025 00:44, (unconfirmed) Premature ventricular complexes are no longer Present Nonspecific T wave abnormality no longer evident in Inferior leads T wave inversion no longer evident in Lateral leads Confirmed by Remington De León ([id number]) on 2/12/2025 6:55:57 PM      Cardiac Catheterization Procedure  Result Date:  2/12/2025  [facility], Cath Lab        CONCLUSIONS:  1. Right coronary artery system dominance.  2. Single vessel coronary artery disease.  3. Patent stent to mid LAD.  4. Ostial 1st Dg 50% lesion (residual lesion from PCI in bifurcation lesion).  5. Moderately reduced LV systolic function ~40% due to apical akinesia.  6. Compared to the previous left heart catheterization (07/2024), the findings are similar, with patent stent to LAD and similar post-bifurcation PCI lesion to ostial 1st Dg. ICD 10 Codes: Non ST elevation (NSTEMI) myocardial infarction-I21.4  CPT Codes: Left Heart Cath (visualization of coronaries) and LV-95428; Moderate Sedation Services initial 15 minutes patient >5 years-96324; Ultrasound guidance for needle placement-17875; Ultrasound guidance for vascular access-89633; Angiography, Extremity,uni,S&I (PER)-02585  [id number] Elvin Means MD Performing Physician Electronically signed by [id number] Elvin Means MD on 2/12/2025 at 2:03:18 PM  ** Final **       ECG 12 lead  Result Date: 2/12/2025    Atrial fibrillation Left bundle branch block Abnormal ECG When compared with ECG of 15-JUL-2024 14:12, Atrial fibrillation has replaced Sinus rhythm See ED provider note for full interpretation and clinical correlation Confirmed by Riya Bobo ([id number]) on 2/12/2025 10:04:20 AM    ECG 12 Lead  Result Date: 2/12/2025    Sinus rhythm with occasional Premature ventricular complexes Left bundle branch block Abnormal ECG When compared with ECG of 11-FEB-2025 21:19, (unconfirmed) Sinus rhythm has replaced Atrial fibrillation See ED provider note for full interpretation and clinical correlation Confirmed by Riya Bobo ([id number]) on 2/12/2025 10:00:45 AM      Transthoracic Echo (TTE) Limited  Result Date: 2/12/2025   [facility]    CONCLUSIONS:  1. Entire apex is abnormal.  2. Left ventricular ejection fraction is moderately decreased, by visual estimate at 40%.  3.  Abnormal wall motion.  4. There is normal right ventricular global systolic function.  5. Normal sized right ventricle.  6. The pulmonary artery is not well visualized. RECOMMENDATIONS: Technically suboptimal and limited study, therefore accuracy of above interpretation could be substantially diminished. Clinical correlation is advised. Consider additional imaging modalities if clinically indicated. Repeat full study if clinically indicated.        CT chest abdomen pelvis wo IV contrast  Result Date: 2/12/2025    CHEST: Both lungs are hyperinflated with centriacinar and paraseptal emphysema..  Bibasal atelectasis. Abdomen and pelvis: Distended gallbladder with multiple cholelithiasis and thickened wall measure 4 mm.  No evidence of pericholecystic fluid collection. Findings might suggest acute cholecystitis, clinical correlation may be helpful. Moderate amount of stool burden within the rectum and colon. Scattered diverticulosis without diverticulitis. Nonspecific lucent area within the sacrum on the right side measures 1 x 1.7 cm.   Signed by Titi White MD      CT thoracic spine wo IV contrast  Result Date: 2/12/2025    1. No evidence for acute injury to the thoracic or lumbar spine. There is stable mild chronic compression of the superior endplate of T8. 2. There is mild degenerative change throughout the thoracic and lumbar spine with a transitional vertebral body. 3. There is a mild grade 1 spondylolisthesis at L4-5.   Signed by Oliverio Galicia MD      CT lumbar spine wo IV contrast  Result Date: 2/12/2025    1. No evidence for acute injury to the thoracic or lumbar spine. There is stable mild chronic compression of the superior endplate of T8. 2. There is mild degenerative change throughout the thoracic and lumbar spine with a transitional vertebral body. 3. There is a mild grade 1 spondylolisthesis at L4-5.   Signed by Oliverio Galicia MD      CT head wo IV contrast  Result Date: 2/12/2025    1.  No acute  intracranial hemorrhage or depressed calvarial fracture. 2.  No acute fracture at the cervical spine. Degenerative changes.         Signed by: Pat Chavarria 2/12/2025 12:50 AM      CT cervical spine wo IV contrast  Result Date: 2/12/2025    1.  No acute intracranial hemorrhage or depressed calvarial fracture. 2.  No acute fracture at the cervical spine. Degenerative changes.        Signed by: Pat Chavarria 2/12/2025 12:50 AM       XR chest 1 view  Result Date: 2/11/2025    No acute cardiopulmonary process.   Signed by: Abi Barreto 2/11/2025 9:59 PM       Problem List:     Patient Active Problem List   Diagnosis    Anemia    Arthritis of foot, left    History of breast cancer    Constipation    GERD (gastroesophageal reflux disease)    Left bundle branch block    Mild vitamin D deficiency    Actinic keratosis    Seborrheic keratosis    Varicose veins of both lower extremities with inflammation    Ocular migraine    Atherosclerosis of native coronary artery of native heart without angina pectoris    Essential hypertension    Hx of percutaneous transluminal coronary angioplasty    Heart failure with mildly reduced ejection fraction (HFmrEF)    Syncope, unspecified syncope type    Stented coronary artery    Decreased cardiac ejection fraction       Asessment:     Problem List Items Addressed This Visit           ICD-10-CM    Left bundle branch block - Primary I44.7    Atherosclerosis of native coronary artery of native heart without angina pectoris I25.10    Relevant Orders    Follow Up In Cardiology    Essential hypertension I10    Relevant Orders    Follow Up In Cardiology    Comprehensive Metabolic Panel    Heart failure with mildly reduced ejection fraction (HFmrEF) I50.22    Stented coronary artery Z95.5    Dyslipidemia E78.5    Relevant Orders    Lipid Panel     Other Visit Diagnoses         Codes      Acute diastolic (congestive) heart failure     I50.31    Relevant Orders    Follow Up In Cardiology    CBC           I, Marla Vides RN  am scribing for, and in the presence of Dr. Jhon Echavarria MD, [facility].     I, Dr. Jhon Echavarria MD, FACC, personally performed the services described in the documentation as scribed by Marla Vides RN  in my presence, and confirm it is both accurate and complete.     Provider Attestation - Scribe documentation    The above portion of this note was dictated by me using voice recognition software.  All medical record entries made by the scribe were at my direction.  The scribe entering the documentation was in my presence.   I have reviewed the chart and agree that the record accurately reflects my personal performance of the history, physical exam, discussion and plan.

## 2025-05-27 NOTE — PROGRESS NOTES
Assessment/Plan   This is an 83 yo Female with a PMH of CAD on DAPT, recent UTI, breast cancer, vertigo, and recent RSV being evaluated today through virtual visit for hospital follow-up.  Patient presented with syncope. GI was consulted for melena/coffee ground emesis.  EGD done on 4/28 showing a Schatzki ring, small HH, gastritis.  Hgb 9.8 upon admission and dropped to 6.4.  The patient did get a unit of blood, had a transfusion reaction of chest tightness and a fever.  Underwent EGD which showed gastritis with erosion and colonoscopy which unfortunately was aborted due to severe spasm in the distal sigmoid colon.  Aspirin was discontinued but Plavix had to be continued due to recent stent placement.  Patient was sent home with PPI 40 mg twice daily which she is compliant with.  Since discharge, patient reports fatigue and generalized weakness but no further CGE, melena or any signs of overt GIB.  Latest Hgb 9.1.     Normocytic anemia likely 2/2 GIB from gastritis with erosions and possible diverticular bleed while on DAPT.   Blood loss anemia with hemoglobin as low as 6.4, latest Hgb up to 9.1.  No further GI bleeding.    Coffee-ground emesis 2/2 gastritis and gastric erosion, hematochezia 2/2 diverticular bleed.   Anti-platelet therapy with Plavix only     Plan  - Repeat CBC in 4 to 6 weeks  -Continue to monitor for any overt GI bleeding  -Continue PPI 40 mg PO BID x 8 weeks, then decrease dose to once a day dosing indefinitely while on Plavix.   -Weigh risks/benefits of continuing antiplatelet medication.   -Follow up with GI in 2 as needed.     YECENIA Calvin-CNP      Subjective     History of Present Illness:   Laurel Pugh is a 84 y.o. female with a PMH of CAD, breast cancer, recent UTI, recent RSV being evaluated today through virtual visit for hospital follow-up. Patient was hospitalized April 27 through May 2, 2025 for symptomatic anemia with syncopal event.  Patient had reported 1 episode  of coffee-ground emesis and 2 episodes of hematochezia associated with tachycardia, chest discomfort and EKG changes while in the hospital.  Patient has a history of CAD with stent placement 2024 on dual antiplatelet therapy with aspirin and Plavix.  Hgb 9.8-->7.7.  Iron 28, iron saturation 8%, ferritin 82.    CT abdomen pelvis without IV contrast showed colonic diverticulosis, duodenal diverticulosis and cholelithiasis.  Patient received 1 unit PRBCs while in the hospital and had an episode of tachycardia and chest tightness.  Blood transfusion was stopped due to concern for side effect.  Cardiology was consulted and cleared patient for EGD.  EGD showed gastritis with minimal erosions in the stomach. Pathology showed gastric antral and oxyntic type mucosa with mild reactive epithelial changes and focal mild chronic inflammation.  No H. pylori.  Patient is taking iron supplement every other day.  She continues to take Plavix daily however aspirin was discontinued.  Patient has no further hematemesis/coffee-ground emesis, melena or hematochezia.  She does report fatigue and generalized weakness but otherwise doing well.  Latest hemoglobin up to 9.1.  Patient is taking PPI 40 mg p.o. twice daily and denies side effects.  She is trying to add more iron rich foods into her diet.    EGD 4/28/2025 with Dr. Nunez indicated for coffee-ground emesis, melena  Impression  ? Widely patent Schatzki's ring in the distal esophagus  ? Small hiatal hernia (<1 cm, Hill grade II).   ? Erythematous mucosa with minimal erosions in the stomach s/p biopsies.   ? The duodenum appeared normal.    Colonoscopy 4/28/2025   Findings/Impression with Dr. Nunez indicated for hematochezia, melena  ? Poor prep.  ? No active bleeding seen. Old blood throughout the TI and colon (only able to assess the last few cms of the small bowel, so possibly could be refluxed blood (vs bleeding more proximal)).   ? Diverticulosis in the L colon.      Colonoscopy 5/1/2025 indicated for medic easier, anemia  Impression  ? There was severe fredo-diverticular ?spasm in the distal sigmoid colon that precluded passage of colonoscope despite scope reinsertion, placement of patient in supine position and water immersion. Given absence of overt bleeding and patient discomfort, procedure aborted in the     Review of Systems  ROS Negative unless otherwise stated above.    Past Medical History   has a past medical history of Arthritis, Bitten or stung by nonvenomous insect and other nonvenomous arthropods, initial encounter (05/22/2019), Encounter for general adult medical examination without abnormal findings (10/05/2022), Encounter for general adult medical examination without abnormal findings (11/02/2021), Personal history of malignant neoplasm of breast, Personal history of other diseases of urinary system (08/10/2019), Personal history of other specified conditions, Personal history of other specified conditions (08/10/2019), Personal history of urinary (tract) infections (08/10/2019), and Personal history of urinary (tract) infections (09/03/2019).     Social History   reports that she has never smoked. She has never been exposed to tobacco smoke. She has never used smokeless tobacco. She reports that she does not drink alcohol and does not use drugs.     Family History  family history includes Arthritis in her sister; Breast cancer in her sister; Stroke in her mother; cardiac disorder in her father.     Allergies  RX Allergies[1]    Medications  Current Outpatient Medications   Medication Instructions    benzonatate (TESSALON) 100 mg, oral, 3 times daily PRN, Do not crush or chew.    blood pressure test kit-large kit Automatic type blood pressure monitor.    Check blood pressure daily and maintain log    cholecalciferol (VITAMIN D-3) 1,000 Units, Daily    clopidogrel (Plavix) 75 mg tablet Take 1 tablet (75 mg) by mouth once daily.    ferrous sulfate 325 (65 Fe)  mg EC tablet 1 tablet, oral, Every other day, Do not crush, chew, or split.    metoprolol tartrate (LOPRESSOR) 12.5 mg, oral, 2 times daily    pantoprazole (PROTONIX) 40 mg, oral, 2 times daily, Do not crush, chew, or split.    polyethylene glycol (GLYCOLAX, MIRALAX) 17 g, oral, Daily    psyllium (Metamucil) powder 5.8 g, oral, 3 times daily    rosuvastatin (CRESTOR) 5 mg, oral, Daily    sacubitriL-valsartan (Entresto) 24-26 mg tablet 0.5 tablets, oral, 2 times daily        Objective   Virtual visit  Daughter present  Hearing good, speech good  Engaged in conversation  Asking/answering questions appropriately  General: A&Ox3, NAD.  Neuro: No focal deficits.   Psych: Normal mood and affect.     Lab Results   Component Value Date    WBC 4.0 05/20/2025    WBC 4.1 05/08/2025    WBC 3.6 (L) 05/02/2025    HGB 9.1 (L) 05/20/2025    HGB 8.8 (L) 05/08/2025    HGB 7.7 (L) 05/02/2025    HCT 30.3 (L) 05/20/2025    HCT 29.1 (L) 05/08/2025    HCT 23.8 (L) 05/02/2025     05/20/2025     05/08/2025     05/02/2025     Lab Results   Component Value Date     05/02/2025    K 3.7 05/02/2025     (H) 05/02/2025    CO2 22 05/02/2025    BUN 19 05/02/2025    CREATININE 0.93 05/02/2025    GLUCOSE 66 (L) 05/02/2025    CALCIUM 8.2 (L) 05/02/2025       Lab Results   Component Value Date    ALKPHOS 37 04/27/2025    ALKPHOS 40 03/05/2025    ALKPHOS 35 02/13/2025    BILITOT 0.6 04/28/2025    BILITOT 0.5 04/27/2025    BILITOT 0.5 03/05/2025    BILIDIR 0.1 04/28/2025    BILIDIR 0.1 05/09/2019    PROT 6.0 (L) 04/27/2025    PROT 6.2 03/05/2025    PROT 6.1 (L) 02/13/2025    ALT 6 (L) 04/27/2025    ALT 12 03/05/2025    ALT 23 02/13/2025    AST 12 04/27/2025    AST 20 03/05/2025    AST 54 (H) 02/13/2025      Lab Results   Component Value Date    INR 1.2 (H) 02/11/2025     Results from last 7 days   Lab Units 05/20/25  1443   [facility] IRON mcg/dL 28*   [facility] TIBC mcg/dL (calc) 360              [1]   Allergies  Allergen  "Reactions    Haloperidol Hallucinations    Keflex [Cephalexin] Itching and Rash     \"Severe Itching\"    Red patches on arms, trunks, back,  and legs     Zofran [Ondansetron Hcl] Hallucinations    Amoxicillin-Pot Clavulanate Rash    Levofloxacin Rash    Sulfa (Sulfonamide Antibiotics) Rash     "

## 2025-05-28 ENCOUNTER — HOME CARE VISIT (OUTPATIENT)
Dept: HOME HEALTH SERVICES | Facility: HOME HEALTH | Age: 85
End: 2025-05-28
Payer: MEDICARE

## 2025-05-28 VITALS
HEART RATE: 70 BPM | TEMPERATURE: 97.4 F | DIASTOLIC BLOOD PRESSURE: 58 MMHG | SYSTOLIC BLOOD PRESSURE: 108 MMHG | OXYGEN SATURATION: 98 %

## 2025-05-28 PROCEDURE — G0299 HHS/HOSPICE OF RN EA 15 MIN: HCPCS | Mod: HHH

## 2025-05-28 ASSESSMENT — ENCOUNTER SYMPTOMS
CHANGE IN APPETITE: UNCHANGED
DENIES PAIN: 1
PERSON REPORTING PAIN: PATIENT
APPETITE LEVEL: GOOD

## 2025-06-03 ENCOUNTER — HOME CARE VISIT (OUTPATIENT)
Dept: HOME HEALTH SERVICES | Facility: HOME HEALTH | Age: 85
End: 2025-06-03
Payer: MEDICARE

## 2025-06-03 VITALS
HEART RATE: 68 BPM | OXYGEN SATURATION: 99 % | SYSTOLIC BLOOD PRESSURE: 122 MMHG | TEMPERATURE: 97.8 F | RESPIRATION RATE: 18 BRPM | DIASTOLIC BLOOD PRESSURE: 56 MMHG

## 2025-06-03 PROCEDURE — G0299 HHS/HOSPICE OF RN EA 15 MIN: HCPCS | Mod: HHH

## 2025-06-03 ASSESSMENT — ACTIVITIES OF DAILY LIVING (ADL)
HOME_HEALTH_OASIS: 00
OASIS_M1830: 01

## 2025-06-03 ASSESSMENT — ENCOUNTER SYMPTOMS
DENIES PAIN: 1
PERSON REPORTING PAIN: PATIENT

## 2025-06-05 DIAGNOSIS — I50.31 ACUTE DIASTOLIC (CONGESTIVE) HEART FAILURE: ICD-10-CM

## 2025-06-10 ENCOUNTER — PATIENT OUTREACH (OUTPATIENT)
Dept: PRIMARY CARE | Facility: CLINIC | Age: 85
End: 2025-06-10
Payer: MEDICARE

## 2025-06-10 NOTE — PROGRESS NOTES
Unable to reach patient for one month discharge follow up call.   Left voicemail with call back number for patient to call if needed

## 2025-07-01 DIAGNOSIS — E61.1 IRON DEFICIENCY: ICD-10-CM

## 2025-07-01 DIAGNOSIS — K92.2 GASTROINTESTINAL HEMORRHAGE, UNSPECIFIED GASTROINTESTINAL HEMORRHAGE TYPE: ICD-10-CM

## 2025-07-01 LAB
ALBUMIN SERPL-MCNC: 3.9 G/DL (ref 3.6–5.1)
ALP SERPL-CCNC: 40 U/L (ref 37–153)
ALT SERPL-CCNC: 9 U/L (ref 6–29)
ANION GAP SERPL CALCULATED.4IONS-SCNC: 6 MMOL/L (CALC) (ref 7–17)
AST SERPL-CCNC: 13 U/L (ref 10–35)
BASOPHILS # BLD AUTO: 49 CELLS/UL (ref 0–200)
BASOPHILS NFR BLD AUTO: 0.7 %
BILIRUB SERPL-MCNC: 0.5 MG/DL (ref 0.2–1.2)
BUN SERPL-MCNC: 29 MG/DL (ref 7–25)
CALCIUM SERPL-MCNC: 9.4 MG/DL (ref 8.6–10.4)
CHLORIDE SERPL-SCNC: 107 MMOL/L (ref 98–110)
CO2 SERPL-SCNC: 27 MMOL/L (ref 20–32)
CREAT SERPL-MCNC: 1.24 MG/DL (ref 0.6–0.95)
EGFRCR SERPLBLD CKD-EPI 2021: 42 ML/MIN/1.73M2
EOSINOPHIL # BLD AUTO: 112 CELLS/UL (ref 15–500)
EOSINOPHIL NFR BLD AUTO: 1.6 %
ERYTHROCYTE [DISTWIDTH] IN BLOOD BY AUTOMATED COUNT: 14 % (ref 11–15)
FERRITIN SERPL-MCNC: 26 NG/ML (ref 16–288)
GLUCOSE SERPL-MCNC: 99 MG/DL (ref 65–99)
HCT VFR BLD AUTO: 33.7 % (ref 35–45)
HGB BLD-MCNC: 10.3 G/DL (ref 11.7–15.5)
IRON SATN MFR SERPL: 13 % (CALC) (ref 16–45)
IRON SERPL-MCNC: 44 MCG/DL (ref 45–160)
LYMPHOCYTES # BLD AUTO: 1379 CELLS/UL (ref 850–3900)
LYMPHOCYTES NFR BLD AUTO: 19.7 %
MCH RBC QN AUTO: 27 PG (ref 27–33)
MCHC RBC AUTO-ENTMCNC: 30.6 G/DL (ref 32–36)
MCV RBC AUTO: 88.2 FL (ref 80–100)
MONOCYTES # BLD AUTO: 518 CELLS/UL (ref 200–950)
MONOCYTES NFR BLD AUTO: 7.4 %
NEUTROPHILS # BLD AUTO: 4942 CELLS/UL (ref 1500–7800)
NEUTROPHILS NFR BLD AUTO: 70.6 %
PLATELET # BLD AUTO: 246 THOUSAND/UL (ref 140–400)
PMV BLD REES-ECKER: 10.3 FL (ref 7.5–12.5)
POTASSIUM SERPL-SCNC: 4.6 MMOL/L (ref 3.5–5.3)
PROT SERPL-MCNC: 6.5 G/DL (ref 6.1–8.1)
RBC # BLD AUTO: 3.82 MILLION/UL (ref 3.8–5.1)
SODIUM SERPL-SCNC: 140 MMOL/L (ref 135–146)
TIBC SERPL-MCNC: 335 MCG/DL (CALC) (ref 250–450)
WBC # BLD AUTO: 7 THOUSAND/UL (ref 3.8–10.8)

## 2025-08-25 ENCOUNTER — APPOINTMENT (OUTPATIENT)
Dept: PRIMARY CARE | Facility: CLINIC | Age: 85
End: 2025-08-25
Payer: MEDICARE

## 2025-08-27 ENCOUNTER — APPOINTMENT (OUTPATIENT)
Dept: PRIMARY CARE | Facility: CLINIC | Age: 85
End: 2025-08-27
Payer: MEDICARE

## 2025-08-27 VITALS
RESPIRATION RATE: 16 BRPM | SYSTOLIC BLOOD PRESSURE: 145 MMHG | OXYGEN SATURATION: 98 % | DIASTOLIC BLOOD PRESSURE: 76 MMHG | BODY MASS INDEX: 27.82 KG/M2 | HEART RATE: 59 BPM | TEMPERATURE: 97.2 F | HEIGHT: 59 IN | WEIGHT: 138 LBS

## 2025-08-27 DIAGNOSIS — E78.5 DYSLIPIDEMIA: ICD-10-CM

## 2025-08-27 DIAGNOSIS — K92.2 GASTROINTESTINAL HEMORRHAGE, UNSPECIFIED GASTROINTESTINAL HEMORRHAGE TYPE: Primary | ICD-10-CM

## 2025-08-27 DIAGNOSIS — I50.31 ACUTE DIASTOLIC (CONGESTIVE) HEART FAILURE: ICD-10-CM

## 2025-08-27 DIAGNOSIS — D50.0 IRON DEFICIENCY ANEMIA DUE TO CHRONIC BLOOD LOSS: ICD-10-CM

## 2025-08-27 DIAGNOSIS — I10 ESSENTIAL HYPERTENSION: ICD-10-CM

## 2025-08-27 DIAGNOSIS — Z78.0 POSTMENOPAUSAL: ICD-10-CM

## 2025-08-27 DIAGNOSIS — M54.12 CERVICAL RADICULOPATHY: ICD-10-CM

## 2025-08-27 DIAGNOSIS — E55.9 MILD VITAMIN D DEFICIENCY: ICD-10-CM

## 2025-08-27 PROCEDURE — 3077F SYST BP >= 140 MM HG: CPT | Performed by: FAMILY MEDICINE

## 2025-08-27 PROCEDURE — 1036F TOBACCO NON-USER: CPT | Performed by: FAMILY MEDICINE

## 2025-08-27 PROCEDURE — 3078F DIAST BP <80 MM HG: CPT | Performed by: FAMILY MEDICINE

## 2025-08-27 PROCEDURE — 99214 OFFICE O/P EST MOD 30 MIN: CPT | Performed by: FAMILY MEDICINE

## 2025-08-27 PROCEDURE — 1159F MED LIST DOCD IN RCRD: CPT | Performed by: FAMILY MEDICINE

## 2025-08-27 ASSESSMENT — ENCOUNTER SYMPTOMS
CONFUSION: 0
FLANK PAIN: 0
TREMORS: 0
FATIGUE: 1
BRUISES/BLEEDS EASILY: 0
COUGH: 0
AGITATION: 0
VOMITING: 0
ABDOMINAL PAIN: 0
SHORTNESS OF BREATH: 1
JOINT SWELLING: 1
CHEST TIGHTNESS: 0
ABDOMINAL DISTENTION: 0
FREQUENCY: 0
EYE ITCHING: 0
EYE DISCHARGE: 0
HYPERTENSION: 1
FACIAL ASYMMETRY: 0
NUMBNESS: 0
WOUND: 0
PALPITATIONS: 0
NAUSEA: 0
DIZZINESS: 0
CHILLS: 0
FEVER: 0
ACTIVITY CHANGE: 0
DIARRHEA: 0
APPETITE CHANGE: 0
NECK STIFFNESS: 0
LIGHT-HEADEDNESS: 0
CONSTIPATION: 0
SLEEP DISTURBANCE: 0
HEMATURIA: 0
DYSPHORIC MOOD: 0
WEAKNESS: 0
NERVOUS/ANXIOUS: 0
HALLUCINATIONS: 0
ADENOPATHY: 0
BLOOD IN STOOL: 0
SPEECH DIFFICULTY: 0
DYSURIA: 0

## 2025-08-28 ENCOUNTER — TRANSCRIBE ORDERS (OUTPATIENT)
Dept: ADMINISTRATIVE | Age: 85
End: 2025-08-28

## 2025-08-28 DIAGNOSIS — Z78.0 POSTMENOPAUSAL: Primary | ICD-10-CM

## 2025-08-30 LAB
ALBUMIN SERPL-MCNC: 4 G/DL (ref 3.6–5.1)
ALP SERPL-CCNC: 41 U/L (ref 37–153)
ALT SERPL-CCNC: 9 U/L (ref 6–29)
ANION GAP SERPL CALCULATED.4IONS-SCNC: 7 MMOL/L (CALC) (ref 7–17)
AST SERPL-CCNC: 15 U/L (ref 10–35)
BILIRUB SERPL-MCNC: 0.5 MG/DL (ref 0.2–1.2)
BUN SERPL-MCNC: 39 MG/DL (ref 7–25)
CALCIUM SERPL-MCNC: 9.1 MG/DL (ref 8.6–10.4)
CHLORIDE SERPL-SCNC: 107 MMOL/L (ref 98–110)
CHOLEST SERPL-MCNC: 144 MG/DL
CHOLEST/HDLC SERPL: 2.3 (CALC)
CO2 SERPL-SCNC: 27 MMOL/L (ref 20–32)
CREAT SERPL-MCNC: 1.16 MG/DL (ref 0.6–0.95)
EGFRCR SERPLBLD CKD-EPI 2021: 46 ML/MIN/1.73M2
ERYTHROCYTE [DISTWIDTH] IN BLOOD BY AUTOMATED COUNT: 14.7 % (ref 11–15)
GLUCOSE SERPL-MCNC: 99 MG/DL (ref 65–99)
HCT VFR BLD AUTO: 37 % (ref 35–45)
HDLC SERPL-MCNC: 62 MG/DL
HGB BLD-MCNC: 11.8 G/DL (ref 11.7–15.5)
IRON SATN MFR SERPL: 23 % (CALC) (ref 16–45)
IRON SERPL-MCNC: 74 MCG/DL (ref 45–160)
LDLC SERPL CALC-MCNC: 67 MG/DL (CALC)
MCH RBC QN AUTO: 27.6 PG (ref 27–33)
MCHC RBC AUTO-ENTMCNC: 31.9 G/DL (ref 32–36)
MCV RBC AUTO: 86.4 FL (ref 80–100)
NONHDLC SERPL-MCNC: 82 MG/DL (CALC)
PLATELET # BLD AUTO: 218 THOUSAND/UL (ref 140–400)
PMV BLD REES-ECKER: 10.9 FL (ref 7.5–12.5)
POTASSIUM SERPL-SCNC: 4.3 MMOL/L (ref 3.5–5.3)
PROT SERPL-MCNC: 6.5 G/DL (ref 6.1–8.1)
RBC # BLD AUTO: 4.28 MILLION/UL (ref 3.8–5.1)
SODIUM SERPL-SCNC: 141 MMOL/L (ref 135–146)
TIBC SERPL-MCNC: 319 MCG/DL (CALC) (ref 250–450)
TRIGL SERPL-MCNC: 72 MG/DL
WBC # BLD AUTO: 4.5 THOUSAND/UL (ref 3.8–10.8)

## 2025-09-23 ENCOUNTER — APPOINTMENT (OUTPATIENT)
Dept: CARDIOLOGY | Facility: CLINIC | Age: 85
End: 2025-09-23
Payer: MEDICARE

## 2025-10-02 ENCOUNTER — APPOINTMENT (OUTPATIENT)
Dept: PRIMARY CARE | Facility: CLINIC | Age: 85
End: 2025-10-02
Payer: MEDICARE

## 2026-03-12 ENCOUNTER — APPOINTMENT (OUTPATIENT)
Dept: PRIMARY CARE | Facility: CLINIC | Age: 86
End: 2026-03-12
Payer: MEDICARE

## (undated) DEVICE — BAND, VASCULAR, RADIAL HEMOSTAT, REGULAR 24CM

## (undated) DEVICE — CATHETER, DIAGNOSTIC, JUDKINS, LEFT, 5 FR-JL 4.0

## (undated) DEVICE — ACCESS KIT, S-MAK MINI, 5FR 10CM 0.018IN 40CM, SS/SS, ECHO ENHANCE NEEDLE

## (undated) DEVICE — CLOSURE SYSTEM, VASCULAR, VASCADE, 5 F

## (undated) DEVICE — GUIDEWIRE, TIGERWIRE, FLOPPY, ANGLED, 150CM

## (undated) DEVICE — CATHETER, OPTITORQUE, 5FR, JACKY, 3.5/ 2H/110CM, CURVED

## (undated) DEVICE — CATHETER, BALLOON DILATION, EUPHORA SEMICOMPLIANT 2.0  X 15 MM X 142CM

## (undated) DEVICE — SHEATH, PINNACLE, W/.038 GW 10CM, 5FR INTRODUCER, 2.5 CM DIALATOR

## (undated) DEVICE — CATHETER, DIAGNOSTIC, 5FR,  PIG-145, 110CM, 6SH ANGLED

## (undated) DEVICE — CATHETER, INFINITI DIAGNOSTIC, 5 FR 100CM 3DRC, WILLIAMS RIGHT OR NO TORQUE

## (undated) DEVICE — TUBING, MANIFOLD, LOW PRESSURE

## (undated) DEVICE — SHEATH, GLIDESHEATH, SLENDER, 6FR 10CM

## (undated) DEVICE — GUIDEWIRE, RUN THROUGH WIRE, 180CM

## (undated) DEVICE — BANDAGE, QUIKCLOT, INTERVENTIONAL HEMO, W/O SLIT

## (undated) DEVICE — INFLATION DEVICE, COPILOT VALVE AND 20/30 INDEFLATOR

## (undated) DEVICE — Device

## (undated) DEVICE — CATHETER, GUIDING, VISTA BRITE 6FR, XB 3.0 100CM